# Patient Record
Sex: FEMALE | Race: OTHER | Employment: FULL TIME | URBAN - METROPOLITAN AREA
[De-identification: names, ages, dates, MRNs, and addresses within clinical notes are randomized per-mention and may not be internally consistent; named-entity substitution may affect disease eponyms.]

---

## 2016-09-13 LAB
ANTIBODY SCREEN, EXTERNAL: NEGATIVE
GRBS, EXTERNAL: POSITIVE
GRBS, EXTERNAL: POSITIVE
HIV, EXTERNAL: NON REACTIVE
RUBELLA, EXTERNAL: NORMAL
TYPE, ABO & RH, EXTERNAL: NORMAL

## 2017-01-13 LAB — T. PALLIDUM, EXTERNAL: NEGATIVE

## 2017-03-11 ENCOUNTER — TELEPHONE (OUTPATIENT)
Dept: INTERNAL MEDICINE CLINIC | Age: 32
End: 2017-03-11

## 2017-03-11 RX ORDER — OSELTAMIVIR PHOSPHATE 75 MG/1
75 CAPSULE ORAL DAILY
Qty: 10 CAP | Refills: 0 | Status: SHIPPED | OUTPATIENT
Start: 2017-03-11 | End: 2017-03-21

## 2017-03-27 ENCOUNTER — TELEPHONE (OUTPATIENT)
Dept: INTERNAL MEDICINE CLINIC | Age: 32
End: 2017-03-27

## 2017-04-05 ENCOUNTER — ANESTHESIA (OUTPATIENT)
Dept: LABOR AND DELIVERY | Age: 32
End: 2017-04-05
Payer: COMMERCIAL

## 2017-04-05 ENCOUNTER — HOSPITAL ENCOUNTER (INPATIENT)
Age: 32
LOS: 1 days | Discharge: HOME | End: 2017-04-07
Attending: OBSTETRICS & GYNECOLOGY | Admitting: OBSTETRICS & GYNECOLOGY
Payer: COMMERCIAL

## 2017-04-05 ENCOUNTER — ANESTHESIA EVENT (OUTPATIENT)
Dept: LABOR AND DELIVERY | Age: 32
End: 2017-04-05
Payer: COMMERCIAL

## 2017-04-05 LAB
ERYTHROCYTE [DISTWIDTH] IN BLOOD BY AUTOMATED COUNT: 13.6 % (ref 11.5–14.5)
HCT VFR BLD AUTO: 34.6 % (ref 35–47)
HGB BLD-MCNC: 11.7 G/DL (ref 11.5–16)
MCH RBC QN AUTO: 30 PG (ref 26–34)
MCHC RBC AUTO-ENTMCNC: 33.8 G/DL (ref 30–36.5)
MCV RBC AUTO: 88.7 FL (ref 80–99)
PLATELET # BLD AUTO: 283 K/UL (ref 150–400)
RBC # BLD AUTO: 3.9 M/UL (ref 3.8–5.2)
WBC # BLD AUTO: 10.4 K/UL (ref 3.6–11)

## 2017-04-05 PROCEDURE — 77030014125 HC TY EPDRL BBMI -B: Performed by: ANESTHESIOLOGY

## 2017-04-05 PROCEDURE — 00HU33Z INSERTION OF INFUSION DEVICE INTO SPINAL CANAL, PERCUTANEOUS APPROACH: ICD-10-PCS | Performed by: ANESTHESIOLOGY

## 2017-04-05 PROCEDURE — 4A1HXCZ MONITORING OF PRODUCTS OF CONCEPTION, CARDIAC RATE, EXTERNAL APPROACH: ICD-10-PCS | Performed by: OBSTETRICS & GYNECOLOGY

## 2017-04-05 PROCEDURE — 74011000258 HC RX REV CODE- 258: Performed by: OBSTETRICS & GYNECOLOGY

## 2017-04-05 PROCEDURE — 76060000078 HC EPIDURAL ANESTHESIA

## 2017-04-05 PROCEDURE — 75410000003 HC RECOV DEL/VAG/CSECN EA 0.5 HR

## 2017-04-05 PROCEDURE — 74011000250 HC RX REV CODE- 250

## 2017-04-05 PROCEDURE — 0HQ9XZZ REPAIR PERINEUM SKIN, EXTERNAL APPROACH: ICD-10-PCS | Performed by: OBSTETRICS & GYNECOLOGY

## 2017-04-05 PROCEDURE — 74011250636 HC RX REV CODE- 250/636

## 2017-04-05 PROCEDURE — 75410000000 HC DELIVERY VAGINAL/SINGLE

## 2017-04-05 PROCEDURE — 75410000002 HC LABOR FEE PER 1 HR

## 2017-04-05 PROCEDURE — 36415 COLL VENOUS BLD VENIPUNCTURE: CPT | Performed by: OBSTETRICS & GYNECOLOGY

## 2017-04-05 PROCEDURE — 85027 COMPLETE CBC AUTOMATED: CPT | Performed by: OBSTETRICS & GYNECOLOGY

## 2017-04-05 PROCEDURE — 77030031139 HC SUT VCRL2 J&J -A

## 2017-04-05 PROCEDURE — 74011250636 HC RX REV CODE- 250/636: Performed by: OBSTETRICS & GYNECOLOGY

## 2017-04-05 RX ORDER — ACETAMINOPHEN 325 MG/1
650 TABLET ORAL
Status: DISCONTINUED | OUTPATIENT
Start: 2017-04-05 | End: 2017-04-07 | Stop reason: HOSPADM

## 2017-04-05 RX ORDER — DIPHENHYDRAMINE HCL 25 MG
25 CAPSULE ORAL
Status: DISCONTINUED | OUTPATIENT
Start: 2017-04-05 | End: 2017-04-07 | Stop reason: HOSPADM

## 2017-04-05 RX ORDER — OXYCODONE AND ACETAMINOPHEN 5; 325 MG/1; MG/1
2 TABLET ORAL
Status: DISCONTINUED | OUTPATIENT
Start: 2017-04-05 | End: 2017-04-07 | Stop reason: HOSPADM

## 2017-04-05 RX ORDER — OXYCODONE AND ACETAMINOPHEN 5; 325 MG/1; MG/1
1 TABLET ORAL
Status: DISCONTINUED | OUTPATIENT
Start: 2017-04-05 | End: 2017-04-07 | Stop reason: HOSPADM

## 2017-04-05 RX ORDER — DOCUSATE SODIUM 100 MG/1
100 CAPSULE, LIQUID FILLED ORAL
Status: DISCONTINUED | OUTPATIENT
Start: 2017-04-05 | End: 2017-04-07 | Stop reason: HOSPADM

## 2017-04-05 RX ORDER — HYDROCORTISONE ACETATE PRAMOXINE HCL 2.5; 1 G/100G; G/100G
CREAM TOPICAL AS NEEDED
Status: DISCONTINUED | OUTPATIENT
Start: 2017-04-05 | End: 2017-04-07 | Stop reason: HOSPADM

## 2017-04-05 RX ORDER — NALBUPHINE HYDROCHLORIDE 10 MG/ML
10 INJECTION, SOLUTION INTRAMUSCULAR; INTRAVENOUS; SUBCUTANEOUS
Status: DISCONTINUED | OUTPATIENT
Start: 2017-04-05 | End: 2017-04-06 | Stop reason: HOSPADM

## 2017-04-05 RX ORDER — AMMONIA 15 % (W/V)
1 AMPUL (EA) INHALATION AS NEEDED
Status: DISCONTINUED | OUTPATIENT
Start: 2017-04-05 | End: 2017-04-07 | Stop reason: HOSPADM

## 2017-04-05 RX ORDER — ONDANSETRON 2 MG/ML
4 INJECTION INTRAMUSCULAR; INTRAVENOUS
Status: DISCONTINUED | OUTPATIENT
Start: 2017-04-05 | End: 2017-04-06 | Stop reason: HOSPADM

## 2017-04-05 RX ORDER — BUPIVACAINE HYDROCHLORIDE 5 MG/ML
30 INJECTION, SOLUTION EPIDURAL; INTRACAUDAL AS NEEDED
Status: DISCONTINUED | OUTPATIENT
Start: 2017-04-05 | End: 2017-04-06 | Stop reason: HOSPADM

## 2017-04-05 RX ORDER — FENTANYL/BUPIVACAINE/NS/PF 2-1250MCG
PREFILLED PUMP RESERVOIR EPIDURAL
Status: DISPENSED
Start: 2017-04-05 | End: 2017-04-06

## 2017-04-05 RX ORDER — IBUPROFEN 400 MG/1
800 TABLET ORAL EVERY 8 HOURS
Status: DISCONTINUED | OUTPATIENT
Start: 2017-04-05 | End: 2017-04-07 | Stop reason: HOSPADM

## 2017-04-05 RX ORDER — ONDANSETRON 4 MG/1
4 TABLET, ORALLY DISINTEGRATING ORAL
Status: DISCONTINUED | OUTPATIENT
Start: 2017-04-05 | End: 2017-04-07 | Stop reason: HOSPADM

## 2017-04-05 RX ORDER — OXYTOCIN IN 5 % DEXTROSE 30/500 ML
PLASTIC BAG, INJECTION (ML) INTRAVENOUS
Status: DISPENSED
Start: 2017-04-05 | End: 2017-04-06

## 2017-04-05 RX ORDER — OXYTOCIN/RINGER'S LACTATE 20/1000 ML
125-1000 PLASTIC BAG, INJECTION (ML) INTRAVENOUS AS NEEDED
Status: DISCONTINUED | OUTPATIENT
Start: 2017-04-05 | End: 2017-04-07 | Stop reason: HOSPADM

## 2017-04-05 RX ORDER — MINERAL OIL
OIL (ML) ORAL
Status: DISPENSED
Start: 2017-04-05 | End: 2017-04-06

## 2017-04-05 RX ORDER — BUPIVACAINE HYDROCHLORIDE 5 MG/ML
INJECTION, SOLUTION EPIDURAL; INTRACAUDAL
Status: DISPENSED
Start: 2017-04-05 | End: 2017-04-06

## 2017-04-05 RX ORDER — FENTANYL CITRATE 50 UG/ML
INJECTION, SOLUTION INTRAMUSCULAR; INTRAVENOUS
Status: DISPENSED
Start: 2017-04-05 | End: 2017-04-06

## 2017-04-05 RX ORDER — ZOLPIDEM TARTRATE 5 MG/1
5 TABLET ORAL
Status: DISCONTINUED | OUTPATIENT
Start: 2017-04-05 | End: 2017-04-07 | Stop reason: HOSPADM

## 2017-04-05 RX ORDER — SIMETHICONE 80 MG
80 TABLET,CHEWABLE ORAL
Status: DISCONTINUED | OUTPATIENT
Start: 2017-04-05 | End: 2017-04-07 | Stop reason: HOSPADM

## 2017-04-05 RX ORDER — OXYTOCIN 10 [USP'U]/ML
20 INJECTION, SOLUTION INTRAMUSCULAR; INTRAVENOUS ONCE
Status: COMPLETED | OUTPATIENT
Start: 2017-04-05 | End: 2017-04-05

## 2017-04-05 RX ORDER — HYDROCORTISONE 1 %
CREAM (GRAM) TOPICAL AS NEEDED
Status: DISCONTINUED | OUTPATIENT
Start: 2017-04-05 | End: 2017-04-07 | Stop reason: HOSPADM

## 2017-04-05 RX ORDER — LIDOCAINE HYDROCHLORIDE 10 MG/ML
INJECTION INFILTRATION; PERINEURAL
Status: DISPENSED
Start: 2017-04-05 | End: 2017-04-06

## 2017-04-05 RX ORDER — FENTANYL CITRATE 50 UG/ML
100 INJECTION, SOLUTION INTRAMUSCULAR; INTRAVENOUS ONCE
Status: DISCONTINUED | OUTPATIENT
Start: 2017-04-05 | End: 2017-04-06 | Stop reason: HOSPADM

## 2017-04-05 RX ORDER — TERBUTALINE SULFATE 1 MG/ML
0.25 INJECTION SUBCUTANEOUS AS NEEDED
Status: DISCONTINUED | OUTPATIENT
Start: 2017-04-05 | End: 2017-04-06 | Stop reason: HOSPADM

## 2017-04-05 RX ORDER — SODIUM CHLORIDE 0.9 % (FLUSH) 0.9 %
5-10 SYRINGE (ML) INJECTION AS NEEDED
Status: DISCONTINUED | OUTPATIENT
Start: 2017-04-05 | End: 2017-04-07 | Stop reason: HOSPADM

## 2017-04-05 RX ORDER — NALOXONE HYDROCHLORIDE 0.4 MG/ML
0.4 INJECTION, SOLUTION INTRAMUSCULAR; INTRAVENOUS; SUBCUTANEOUS AS NEEDED
Status: DISCONTINUED | OUTPATIENT
Start: 2017-04-05 | End: 2017-04-06 | Stop reason: HOSPADM

## 2017-04-05 RX ORDER — OXYTOCIN 10 [USP'U]/ML
INJECTION, SOLUTION INTRAMUSCULAR; INTRAVENOUS
Status: COMPLETED
Start: 2017-04-05 | End: 2017-04-05

## 2017-04-05 RX ORDER — FENTANYL/BUPIVACAINE/NS/PF 2-1250MCG
1-16 PREFILLED PUMP RESERVOIR EPIDURAL CONTINUOUS
Status: DISCONTINUED | OUTPATIENT
Start: 2017-04-05 | End: 2017-04-07 | Stop reason: HOSPADM

## 2017-04-05 RX ORDER — SODIUM CHLORIDE 0.9 % (FLUSH) 0.9 %
5-10 SYRINGE (ML) INJECTION EVERY 8 HOURS
Status: DISCONTINUED | OUTPATIENT
Start: 2017-04-05 | End: 2017-04-07 | Stop reason: HOSPADM

## 2017-04-05 RX ADMIN — OXYTOCIN 20 UNITS: 10 INJECTION, SOLUTION INTRAMUSCULAR; INTRAVENOUS at 21:17

## 2017-04-05 RX ADMIN — EPHEDRINE SULFATE 12.5 MG: 50 INJECTION INTRAMUSCULAR; INTRAVENOUS; SUBCUTANEOUS at 20:35

## 2017-04-05 RX ADMIN — SODIUM CHLORIDE, POTASSIUM CHLORIDE, SODIUM LACTATE AND CALCIUM CHLORIDE 1000 ML: 600; 310; 30; 20 INJECTION, SOLUTION INTRAVENOUS at 19:45

## 2017-04-05 RX ADMIN — AMPICILLIN SODIUM 2 G: 2 INJECTION, POWDER, FOR SOLUTION INTRAMUSCULAR; INTRAVENOUS at 20:02

## 2017-04-05 RX ADMIN — OXYTOCIN 20 UNITS: 10 INJECTION INTRAVENOUS at 21:17

## 2017-04-05 NOTE — IP AVS SNAPSHOT
Current Discharge Medication List  
  
CONTINUE these medications which have NOT CHANGED Dose & Instructions Dispensing Information Comments Morning Noon Evening Bedtime  
 albuterol 90 mcg/actuation inhaler Commonly known as:  PROAIR HFA Your last dose was: Your next dose is:    
   
   
 Dose:  2 Puff Take 2 puffs by inhalation every four (4) hours as needed for Wheezing or Shortness of Breath. Quantity:  1 Inhaler Refills:  6  
     
   
   
   
  
 fluticasone 50 mcg/actuation nasal spray Commonly known as:  Natali Antony Your last dose was: Your next dose is:    
   
   
 Dose:  2 Spray  
2 sprays by Both Nostrils route daily. Quantity:  1 Bottle Refills:  6 PRENATAL DHA+COMPLETE PRENATAL -300 mg-mcg-mg Cmpk Generic drug:  PNV no.24-iron-folic acid-dha Your last dose was: Your next dose is: Take  by mouth. Refills:  0

## 2017-04-05 NOTE — H&P
EDC:2017  EGA: 39 weeks, 3 days      Primary Provider:  Gato Jones MD      History of Present Illness:  pt presents to L&D with irregular contraactions, that have become more regular for the last hour. q3min, stronger  no LOF, no VB  2cm in office, 5cm here    pregnancy uncomplicated      Patient's Prenatal Care with Doctor of Record Gato Jones MD Notable For -    Anemia on FE pregnant  GBS positive (urine 2016)RX in labor   lab screening  Normal pregnancy multigravida  FOB SICKLE CELL TRAIT: PT NEGATIVE          Impression & Recommendations:    Problem # 1:  Normal pregnancy multigravida (ICD-V22.1) (EAM04-I57.83)  labor  GBS + , abx ordered  desires epidural      Past Pregnancy History      :  2     Term Births:  1     Premature Births: 0     Living Children: 1     Para:   1     Mult. Births:  0     Prev : 0     Aborta:  0     Elect. Ab:  0     Spont.  Ab:  0     Ectopics:  0    Pregnancy # 1     Delivery date:   2011     Weeks Gestation: 39w1d      labor:   no     Delivery type:        Hours of labor:   8     Anesthesia type:   epidural     Delivery location:   [facility]     Infant Sex:  Male     Birth weight:  7.14     Name:  Yonny Stein     Comments:  Del by Dr. Amber Garland 9&10           Past Medical History:     Reviewed history from 10/21/2016 and no changes required:        Fibrocystic Breasts-followed at [facility]    Past Surgical History:     Reviewed history from 2014 and no changes required:        Adenoids removed         m 11 Dr. Virginie Oro    Family History Summary:      Reviewed history Last on 10/21/2016 and no changes required:2017      General Comments - FH:  Family history transferred to [address] [place] [address] And [address] [place] [address]        Social History:     Reviewed history from 2016 and no changes required:        [employer]                 Son Mando Schuster -                 Smoking History:        Patient has never smoked. Review of Systems        See HPI    Except as noted in the HPI, the review of systems is negative for General and . Allergies    This patient has no known allergies. Medications Removed from Medication List        Flowsheet View for Follow-up Visit     Estimated weeks of        gestation:  39 3/7     Weight:  160     Blood pressure: 126 / 68     Cx Dilation:  5     Cx Effacement: 100%     Cx Station:  BB      Vital Signs    Blood Pressure: 126 / 68    Height:   65.5 inches  Weight:  160 pounds      Physical Exam     General           General appearance:  no acute distress    Chest           Lungs:  clear to auscultation          Heart:  regular rate and rhythm    Extremeties           Extremeties:  BLE symmetric 1+ pitting edema    Abdomen           Abdomen:  gravid    Pelvic Exam           EGBUS:  no lesions                  Dilation: : 5                  Effacement:  100%                  Station:  BB            Impression & Recommendations:    Problem # 1:  Normal pregnancy multigravida (ICD-V22.1) (DQK12-T92.83)  labor  GBS + , abx ordered  desires epidural      Medications (at conclusion of this visit)    09/13/2016 OMEGA-3 KRILL  MG ORAL CAPS (KRILL OIL) once a week  84/04/0821 * PNV WITH FOLIC ACID   15/92/2855 FERROUS SULFATE TABS (FERROUS SULFATE TABS) Takes every other day  03/16/2016 * CALCIUM           LABORATORY DATA   TEST DATE RESULT   Group B Strep culture 09/13/2016 GBS Urine                                   (Group B Strep Culture Result Field)   Blood Type 09/13/2016 A                                             (Blood Type Result Field)   Rh 09/13/2016 Positive                                   (Rh Result Field)   Rhogam Inj Given 09/13/2011 *   Tdap Vaccine Given 01/13/2017 Vacc.  [address]   Antibody Screen 01/13/2017 Negative   Rubella  [facility] Reference Ranges On or After 3/10/14                  <0.90              Non-immune      0.90 - 0.99     Equivocal      >0.99 Immune    [facility] Reference Ranges  Before 3/10/14           <5                 Non-immune             5 - 9               Equivocal            >9                 Immune  [facility] Reference Ranges       < Or = 0.90       Negative             0.91-1.09          Equivocal            > Or = 1.10       Positive   09/13/2016     11.30     TPA (T Pallidum Antibodies) 01/13/2017 Negative   Serology (RPR) 09/13/2011 *   HBsAg 09/13/2016 Negative   HIV 09/13/2016 Non Reactive   Hemoglobin 01/13/2017 10.3   Hematocrit 01/13/2017 30.5   Platelets 56/59/0833 265 X10E3/UL   TSH 09/13/2011 *   Urine Culture 09/13/2016 GBS   GC DNA Probe 09/13/2016 Negative   Chlamydia DNA 09/13/2016 Negative   PAP 03/16/2016 NIL   Flu Vaccine Given 10/21/2016 Vacc. [address]   HGBA1C 09/13/2011 *   HGB Electro 02/10/2011 AA   T4, Free 09/13/2011 *   BG Fasting 09/13/2011 *   GTT 1H 50G 01/13/2017 68   GTT 1H 100G 09/13/2011 *   GTT 2H 100G 09/13/2011 *   GTT 3H 100G 09/13/2011 *   Glucose Plasma 09/13/2011 *   CF Accept or Decline 10/21/2016 Declined Unless Pt.  Notifies us Otherwise   CF Screen Result 02/10/2011 Declined   Nuchal Trans 09/28/2016 1.400 mm   AFP Only 10/21/2016 *Screen Negative*   Tetra     AFP Serum 09/13/2011 *   CVS 02/10/2011 declined   AFP Amniotic 09/13/2011 *   Amnio Karyo 09/13/2011 *   FISH 09/13/2011 *   GC Culture 09/13/2011 *   Chlamydia Cult 09/13/2011 *   Ureaplasma     Mycoplasma     WBC 09/13/2016 7.3 X10E3/UL   RBC 09/13/2016 3.72 X10E6/UL   MCV 09/13/2016 91   MCH 09/13/2016 30.4   MCHC RBC 09/13/2016 33.5     ULTRASOUND DATA   TEST DATE RESULT   Estimated Fetal Weight 11/16/2016 [id number]                                     Weight % 11/16/2016 32nd%%                                                DAYAN 07/28/2011 9.299863                    BPP     Cervical Length (mm) 09/13/2016 36.000           Electronically signed by Salome Rojas MD on 04/05/2017 at 7:44 PM    ________________________________________________________________________    Vertex by bedside ultrasound

## 2017-04-05 NOTE — IP AVS SNAPSHOT
[address] [facility] [address] 
[phone] Patient: Chester Villasenor MRN: [id number] UIP:4/51/1108 You are allergic to the following Allergen Reactions Sulfa (Sulfonamide Antibiotics) Anaphylaxis Recent Documentation Height Weight Breastfeeding? BMI OB Status Smoking Status 1.664 m 72.6 kg Unknown 26.22 kg/m2 Recent pregnancy Never Smoker Unresulted Labs Order Current Status SAMPLE TO BLOOD BANK In process Emergency Contacts Name Discharge Info Relation Home Work Mobile Inga Alicia  Spouse [3] [phone] About your hospitalization You were admitted on:  April 6, 2017 You last received care in the:  [address] [facility] You were discharged on:  April 7, 2017 Unit phone number:  [phone] Why you were hospitalized Your primary diagnosis was:  Not on File Your diagnoses also included:  Normal Labor Providers Seen During Your Hospitalizations Provider Role Specialty Primary office phone Vinod Nielsen MD Attending Provider Obstetrics & Gynecology [phone] Your Primary Care Physician (PCP) Primary Care Physician Office Phone Office Fax [facility] [address] [facility] [phone] Follow-up Information Follow up With Details Comments Contact Info MD [place] [address] [address] [facility] [address] 
[phone] Vinod Nielsen MD   [facility] [address] [facility] [address] [facility] 
[phone] Current Discharge Medication List  
  
CONTINUE these medications which have NOT CHANGED Dose & Instructions Dispensing Information Comments Morning Noon Evening Bedtime  
 albuterol 90 mcg/actuation inhaler Commonly known as:  PROAIR HFA Your last dose was: Your next dose is:    
   
   
 Dose:  2 Puff Take 2 puffs by inhalation every four (4) hours as needed for Wheezing or Shortness of Breath. Quantity:  1 Inhaler Refills:  6  
     
   
   
   
  
 fluticasone 50 mcg/actuation nasal spray Commonly known as:  Jennifer Pace Your last dose was: Your next dose is:    
   
   
 Dose:  2 Spray  
2 sprays by Both Nostrils route daily. Quantity:  1 Bottle Refills:  6 PRENATAL DHA+COMPLETE PRENATAL -300 mg-mcg-mg Cmpk Generic drug:  PNV no.24-iron-folic acid-dha Your last dose was: Your next dose is: Take  by mouth. Refills:  0 Discharge Instructions POSTPARTUM DISCHARGE INSTRUCTIONS Name:  Debby Fill YOB: 1985 Admission Diagnosis:  maternity Normal labor Discharge Diagnosis:   
Problem List as of 4/7/2017  Date Reviewed: 11/20/2015 Codes Class Noted - Resolved Normal labor ICD-10-CM: O80, Z37.9 ICD-9-CM: 905  4/6/2017 - Present Delivery normal ICD-10-CM: O80, Z37.9 ICD-9-CM: 975  9/13/2011 - Present RESOLVED: Pregnancy ICD-10-CM: Z33.1 ICD-9-CM: V22.2  9/13/2011 - 9/13/2011 RESOLVED: Fibroids ICD-10-CM: D25.9 ICD-9-CM: 218.9  9/13/2011 - 9/13/2011 Attending Physician:  Giselle Ellison MD 
 
Delivery Type:  Vaginal Childbirth: What To Expect At Home Your Recovery: Your body will slowly heal in the next few weeks. It is easy to get too tired and overwhelmed during the first weeks after your baby is born. Changes in your hormones can shift your mood without warning. You may find it hard to meet the extra demands on your energy and time. Take it easy on yourself. Follow-up care is a key part of your treatment and safety. Be sure to make and go to all appointments, and call your doctor if you are having problems. It's also a good idea to know your test results and keep a list of the medicines you take. How can you care for yourself at home? Vaginal bleeding and cramps · After delivery, you will have a bloody discharge from the vagina. This will turn pink within a week and then white or yellow after about 10 days. It may last for 2 to 4 weeks or longer, until the uterus has healed. Use pads instead of tampons until you stop bleeding. · Do not worry if you pass some blood clots, as long as they are smaller than a golf ball. If you have a tear or stitches in your vaginal area, change the pad at least every 4 hours to prevent soreness and infection. · You may have cramps for the first few days after childbirth. These are normal and occur as the uterus shrinks to normal size. Take an over-the-counter pain medicine, such as acetaminophen (Tylenol), ibuprofen (Advil, Motrin), or naproxen (Aleve), for cramps. Read and follow all instructions on the label. Do not take aspirin, because it can cause more bleeding. Do not take acetaminophen (Tylenol) and other acetaminophen containing medications (i.e. Percocet) at the same time. Breast fullness · Your breasts may overfill (engorge) in the first few days after delivery. To help milk flow and to relieve pain, warm your breasts in the shower or by using warm, moist towels before nursing. · If you are not nursing, do not put warmth on your breasts or touch your breasts. Wear a tight bra or sports bra and use ice until the fullness goes away. This usually takes 2 to 3 days. · Put ice or a cold pack on your breast after nursing to reduce swelling and pain. Put a thin cloth between the ice and your skin. Activity · Eat a balanced diet. Do not try to lose weight by cutting calories. Keep taking your prenatal vitamins, or take a multivitamin. · Get as much rest as you can. Try to take naps when your baby sleeps during the day. · Get some exercise every day. But do not do any heavy exercise until your doctor says it is okay. · Wait until you are healed (about 4 to 6 weeks) before you have sexual intercourse. Your doctor will tell you when it is okay to have sex. · Talk to your doctor about birth control. You can get pregnant even before your period returns. Also, you can get pregnant while you are breast-feeding. Mental Health · Many women get the \"baby blues\" during the first few days after childbirth. You may lose sleep, feel irritable, and cry easily. You may feel happy one minute and sad the next. Hormone changes are one cause of these emotional changes. Also, the demands of a new baby, along with visits from relatives or other family needs, add to a mother's stress. The \"baby blues\" often peak around the fourth day. Then they ease up in less than 2 weeks. · If your moodiness or anxiety lasts for more than 2 weeks, or if you feel like life is not worth living, you may have postpartum depression. This is different for each mother. Some mothers with serious depression may worry intensely about their infant's well-being. Others may feel distant from their child. Some mothers might even feel that they might harm their baby. A mother may have signs of paranoia, wondering if someone is watching her. · With all the changes in your life, you may not know if you are depressed. Pregnancy sometimes causes changes in how you feel that are similar to the symptoms of depression. · Symptoms of depression include: · Feeling sad or hopeless and losing interest in daily activities. These are the most common symptoms of depression. · Sleeping too much or not enough. · Feeling tired. You may feel as if you have no energy. · Eating too much or too little. · POSTPARTUM SUPPORT INTERNATIONAL (PSI) offers a Warm line; Chat with the Expert phone sessions; Information and Articles about Pregnancy and Postpartum Mood Disorders;  Comprehensive List of Free Support Groups; Knowledgeable local coordinators who will offer support, information, and resources; Guide to Resources on Lenet; Calendar of events in the  mood disorders community; Latest News and Research; and [facility] [address] for [employer]. Remember - You are not alone; You are not to blame; With help, you will be well. 6-664-598-PPD(1021). WWW. POSTPARTUM. NET · Writing or talking about death, such as writing suicide notes or talking about guns, knives, or pills. Keep the numbers for these national suicide hotlines: 3-258-922-TALK (8-384.142.2658) and 4-601-PZSIZUQ (5-846.639.4232). If you or someone you know talks about suicide or feeling hopeless, get help right away. Constipation and Hemorrhoids · Drink plenty of fluids, enough so that your urine is light yellow or clear like water. If you have kidney, heart, or liver disease and have to limit fluids, talk with your doctor before you increase the amount of fluids you drink. · Eat plenty of fiber each day. Have a bran muffin or bran cereal for breakfast, and try eating a piece of fruit for a mid-afternoon snack. · For painful, itchy hemorrhoids, put ice or a cold pack on the area several times a day for 10 minutes at a time. Follow this by putting a warm compress on the area for another 10 to 20 minutes or by sitting in a shallow, warm bath. When should you call for help? Call 911 anytime you think you may need emergency care. For example, call if: 
· You are thinking of hurting yourself, your baby, or anyone else. · You passed out (lost consciousness). · You have symptoms of a blood clot in your lung (called a pulmonary embolism). These may include:   
· Sudden chest pain. · Trouble breathing. · Coughing up blood. Call your doctor now or seek immediate medical care if: 
· You have severe vaginal bleeding. · You are soaking through a pad each hour for 2 or more hours. · Your vaginal bleeding seems to be getting heavier or is still bright red 4 days after delivery. · You are dizzy or lightheaded, or you feel like you may faint. · You are vomiting or cannot keep fluids down. · You have a fever. · You have new or more belly pain. · You pass tissue (not just blood). · Your vaginal discharge smells bad. · Your belly feels tender or full and hard. · Your breasts are continuously painful or red. · You feel sad, anxious, or hopeless for more than a few days. · You have sudden, severe pain in your belly. · You have symptoms of a blood clot in your leg (called a deep vein thrombosis),  
       such as: 
· Pain in your calf, back of the knee, thigh, or groin. · Redness and swelling in your leg or groin. · You have symptoms of preeclampsia, such as: 
· Sudden swelling of your face, hands, or feet. · New vision problems (such as dimness or blurring). · A severe headache. · Your blood pressure is higher than it should be or rises suddenly. · You have new nausea or vomiting. Watch closely for changes in your health, and be sure to contact your doctor if you have any problems. Additional Information:  Not applicable These are general instructions for a healthy lifestyle: No smoking/ No tobacco products/ Avoid exposure to second hand smoke Surgeon General's Warning:  Quitting smoking now greatly reduces serious risk to your health. Obesity, smoking, and sedentary lifestyle greatly increases your risk for illness A healthy diet, regular physical exercise & weight monitoring are important for maintaining a healthy lifestyle Recognize signs and symptoms of STROKE: 
 
F-face looks uneven A-arms unable to move or move unevenly S-speech slurred or non-existent T-time-call 911 as soon as signs and symptoms begin - DO NOT go  
    back to bed or wait to see if you get better - TIME IS BRAIN. I have had the opportunity to make my options or choices for discharge. I have received and understand these instructions. Discharge Orders None hopscout Announcement We are excited to announce that we are making your provider's discharge notes available to you in hopscout. You will see these notes when they are completed and signed by the physician that discharged you from your recent hospital stay. If you have any questions or concerns about any information you see in hopscout, please call the Health Information Department where you were seen or reach out to your Primary Care Provider for more information about your plan of care. Introducing [facility]! Dear Zoe Dixon: Thank you for requesting a hopscout account. Our records indicate that you already have an active hopscout account. You can access your account anytime at [email or web address] Did you know that you can access your hospital and ER discharge instructions at any time in hopscout? You can also review all of your test results from your hospital stay or ER visit. Additional Information If you have questions, please visit the Frequently Asked Questions section of the hopscout website at [email or web address] Remember, hopscout is NOT to be used for urgent needs. For medical emergencies, dial 911. Now available from your iPhone and Android! General Information Please provide this summary of care documentation to your next provider. Patient Signature:  ____________________________________________________________ Date:  ____________________________________________________________  
  
Briana Burn Provider Signature:  ____________________________________________________________ Date:  ____________________________________________________________

## 2017-04-06 PROBLEM — Z37.9 NORMAL LABOR: Status: ACTIVE | Noted: 2017-04-06

## 2017-04-06 PROCEDURE — 65410000002 HC RM PRIVATE OB

## 2017-04-06 PROCEDURE — 74011250637 HC RX REV CODE- 250/637: Performed by: OBSTETRICS & GYNECOLOGY

## 2017-04-06 RX ADMIN — DOCUSATE SODIUM 100 MG: 100 CAPSULE, LIQUID FILLED ORAL at 07:02

## 2017-04-06 NOTE — LACTATION NOTE
This note was copied from a baby's chart. Made initial breastfeeding consult to second time breastfeeding mother. It has been 5 years since mother breast fed for 8 months and she is requesting my presence at this feeding. Infant was put skin to skin and then a little closer to mother's R breast where she latched herself. Mom said latch was comfortable. She will look for a round nipple when her baby comes off the breast.  Mom taught how to manually hand express her colostrum. Emphasized the importance of providing infant with valuable colostrum as infant rests skin to skin at breast.  Aware to avoid extended periods of non-feeding. Taught the rationale behind this low tech but highly effective evidence based practice. Mother was taught to keep infant very close by (preferably skin to skin) to watch for feeding cues and put her to the breast at first sign. Mother was advised to allow infant unlimited access to her breast, allowing infant to determine how long she wants to nurse and offering both breasts at a feeding. Normal  feeding behavior discussed with mother-initial sleepiness first 24 hours followed by cluster feeding. Mother's questions were answered and I left my contact information with mother so she can call for assistance.

## 2017-04-06 NOTE — PROGRESS NOTES
NUTRITION       Nutrition screening referral was triggered based on results obtained during nursing admission assessment (enteral/parenteral nutrition support PTA). The patient's chart was reviewed and nutrition assessment is not indicated at this time. No indication in H&P that pt was receiving tube feeding or TPN prior to admission. Plan to see patient for rescreen as indicated. Thank you.      [address]

## 2017-04-06 NOTE — ROUTINE PROCESS
sbar report from Renee 84 RN  Hourly rounds 1015-1193  Hourly rounds 6022-7669  Hourly rounds [id number]

## 2017-04-06 NOTE — PROGRESS NOTES
This patient was 30 or more weeks gestation at the time of [facility] go-live. For complete information pertaining to this patient's pregnancy, please refer to the paper chart and ACOG form. Delivery Note    Obstetrician:  Cecille Banegas MD    Assistant: none    Pre-Delivery Diagnosis: Term pregnancy or Spontaneous labor    Post-Delivery Diagnosis: Living  infant(s) or Female    Intrapartum Event: None    Procedure: Spontaneous vaginal delivery    Epidural: YES    Monitor:  Fetal Heart Tones - External and Uterine Contractions - External    Indications for instrumental delivery: none    Estimated Blood Loss: 250 cc    Episiotomy: none    Laceration(s):  1st degree    Laceration(s) repair: YES    Presentation: Cephalic    Fetal Description: patterson    Fetal Position: Occiput Anterior    Birth Weight:     Birth Length:     Apgar - One Minute: 9    Apgar - Five Minutes: 9    Umbilical Cord: 3 vessels present    Specimens:            Complications:  none           Cord Blood Results:   Information for the patient's :  Milanadonavonsebastian Me [[id number]]   No results found for: PCTABR, PCTDIG, BILI, ABORH    Prenatal Labs:     Lab Results   Component Value Date/Time    ABO,Rh A Positive 02/10/2011    HBsAg, External negative 02/10/2011    HIV, External non reactive 02/10/2011    Rubella, External immune 02/10/2011    RPR, External non reactive 2011    Gonorrhea, External negative 02/10/2011    Chlamydia, External negative 02/10/2011    GrBStrep, External negative 2011        Attending Attestation: I performed the procedure    Signed By:  Cecille Banegas MD     2017

## 2017-04-06 NOTE — LACTATION NOTE
This note was copied from a baby's chart. I assisted mom with breast feeding this afternoon. Baby has been sleepy this morning and mom stated she wasn't able to get her to nurse. She tried hand expression at that time but could not express any colostrum. We were able to get the baby latched in the cross cradle hold and the football hold. Baby sucking strongly with several swallows. I showed mom hand expression and we were able to express a drop of colostrum. Mom will continue to watch the baby for feeding cues and feed frequently.

## 2017-04-06 NOTE — PROGRESS NOTES
: Patient arrived via wheelchair from home with complaints of painful contractions every 3 minutes. : Dr. Kat Gutierrez at the bedside assessing patient and  [address]. SVE 5/100/-1    : Dr. Kat Gutierrez at the bedside with the bedside ultrasound to verify vertex position. : Dr. See Roach at the bedside for epidural placement. 2018: SROM during epidural placement, moderate amount of clear fluid. : SVE 10/100/+2. Dr. Alexandra Meadows notified. : BP low. Ephedrine given. : Started pushing    :  of viable female by Dr. Alexandra Meadows. Apgars 9,9. Baby skin to skin    0000: TRANSFER - OUT REPORT:    Verbal report given to Edilson Saldivar RN (name) on [facility]  being transferred to MIU (unit) for routine progression of care       Report consisted of patients Situation, Background, Assessment and   Recommendations(SBAR). Information from the following report(s) SBAR, MAR and Recent Results was reviewed with the receiving nurse. Lines:   Peripheral IV 17 Right Hand (Active)   Site Assessment Clean, dry, & intact 2017 12:06 AM   Phlebitis Assessment 0 2017 12:06 AM   Infiltration Assessment 0 2017 12:06 AM   Dressing Status Clean, dry, & intact 2017 12:06 AM   Dressing Type Tape;Transparent 2017 12:06 AM   Hub Color/Line Status Pink;Capped;Flushed 2017 12:06 AM        Opportunity for questions and clarification was provided.       Patient transported with:   Registered Nurse

## 2017-04-06 NOTE — ANESTHESIA PROCEDURE NOTES
Epidural Block    Start time: 4/5/2017 8:10 PM  End time: 4/5/2017 8:20 PM  Performed by: ENMA Oleary  Authorized by: ENMA Oleary     Pre-Procedure  Indication: labor epidural    Preanesthetic Checklist: patient identified, risks and benefits discussed, anesthesia consent, site marked, patient being monitored, timeout performed and anesthesia consent      Epidural:   Patient position:  Seated  Prep region:  Lumbar  Prep: Betadine    Location:  L2-3    Needle and Epidural Catheter:   Needle Type:  Tuohy  Needle Gauge:  17 G  Injection Technique:  Loss of resistance using air  Attempts:  1  Catheter Size:  19 G  Events: no blood with aspiration, no cerebrospinal fluid with aspiration, no paresthesia and negative aspiration test    Test Dose:  Bupivacaine 0.25% and negative    Assessment:   Catheter Secured:  Tegaderm and tape  Insertion:  Uncomplicated  Patient tolerance:  Patient tolerated the procedure well with no immediate complications

## 2017-04-06 NOTE — ROUTINE PROCESS
TRANSFER - IN REPORT:    Verbal report received from MARIBEL Dodd RN(name) on David Ramírez  being received from L&D(unit) for routine progression of care      Report consisted of patients Situation, Background, Assessment and   Recommendations(SBAR). Information from the following report(s) SBAR, Kardex, Intake/Output, MAR, Accordion and Recent Results was reviewed with the receiving nurse. Opportunity for questions and clarification was provided. Assessment completed upon patients arrival to unit and care assumed. 9415-9385: Hourly rounds completed. 7472-3214: Hourly rounds completed.

## 2017-04-06 NOTE — LACTATION NOTE
This note was copied from a baby's chart.     Couplet Interdisciplinary Rounds     MATERNAL    Daily Goal:   Vaginal Delivery Pathway Goal Day 1    Influenza screening completed: NA   Tdap screening completed: YES   Rhogam Given:N/A  MMR Given:N/A    VTE Prophylaxis: Not indicated, per Provider order    EPDS:            Patient Name: Cary Lorenzo Diagnosis: [place]  Single liveborn, born in hospital, delivered by vaginal delivery   Date of Admission: 2017 LOS: 1  Gestational Age: Gestational Age: 38w3d       Daily Goal:  [place] Pathway Goal Day 1    Birth Weight: 3.195 kg Current Weight: Weight: 3.195 kg (Filed from Delivery Summary)  % of Weight Change: 0%    Feeding: Breastfeeding  [place] Metabolic Screen: NO    Hepatitis B:  NO    Discharge Bili:  N/A  Car Seat Trial, if needed:  N/A      Patient/Family Teaching Needs:     Days before discharge: One day until discharge    In Attendance:  Nursing and Physician

## 2017-04-06 NOTE — PROGRESS NOTES
Post-Partum Day Number 1 Progress Note    Tameka Briscoe     Assessment: Doing well, post partum day 1, less than 12 hr pp, BF well so far, OOB well    Plan:  1. Continue routine postpartum and perineal care as well as maternal education. 2. N/A     Information for the patient's :  Shelton Kilgore [[id number]]   Vaginal, Spontaneous Delivery   Patient doing well without significant complaint. Voiding without difficulty, normal lochia. Vitals:  Visit Vitals    /66    Pulse 66    Temp 97.8 °F (36.6 °C)    Resp 14    Ht 5' 5.5\" (1.664 m)    Wt 72.6 kg (160 lb)    SpO2 94%    Breastfeeding Unknown    BMI 26.22 kg/m2     Temp (24hrs), Av °F (36.7 °C), Min:97.8 °F (36.6 °C), Max:98.1 °F (36.7 °C)        Exam:   Patient without distress. Abdomen soft, fundus firm, nontender                Perineum with normal lochia noted. Lower extremities are negative for swelling, cords or tenderness.     Labs:     Lab Results   Component Value Date/Time    WBC 10.4 2017 07:46 PM    WBC 6.1 2013 06:38 PM    WBC 5.5 2013 12:00 AM    WBC 8.9 2011 05:15 AM    HGB 11.7 2017 07:46 PM    HGB 12.0 2013 06:38 PM    HGB 12.1 2013 12:00 AM    HGB 11.9 2011 05:15 AM    HCT 34.6 2017 07:46 PM    HCT 35.7 2013 06:38 PM    HCT 39.0 2013 12:00 AM    HCT 35.1 2011 05:15 AM    PLATELET 890  07:46 PM    PLATELET 889  06:38 PM    PLATELET 769  12:00 AM    PLATELET 928  05:15 AM    Hgb, External 10.9 2011    Hct, External 33.5 2011    Platelet cnt., External 337 02/10/2011       Recent Results (from the past 24 hour(s))   CBC W/O DIFF    Collection Time: 17  7:46 PM   Result Value Ref Range    WBC 10.4 3.6 - 11.0 K/uL    RBC 3.90 3.80 - 5.20 M/uL    HGB 11.7 11.5 - 16.0 g/dL    HCT 34.6 (L) 35.0 - 47.0 %    MCV 88.7 80.0 - 99.0 FL    MCH 30.0 26.0 - 34.0 PG MCHC 33.8 30.0 - 36.5 g/dL    RDW 13.6 11.5 - 14.5 %    PLATELET 372 364 - 535 K/uL

## 2017-04-07 VITALS
SYSTOLIC BLOOD PRESSURE: 100 MMHG | DIASTOLIC BLOOD PRESSURE: 60 MMHG | TEMPERATURE: 98.9 F | WEIGHT: 160 LBS | RESPIRATION RATE: 16 BRPM | HEIGHT: 66 IN | BODY MASS INDEX: 25.71 KG/M2 | HEART RATE: 64 BPM | OXYGEN SATURATION: 94 %

## 2017-04-07 PROCEDURE — 74011250637 HC RX REV CODE- 250/637: Performed by: OBSTETRICS & GYNECOLOGY

## 2017-04-07 RX ADMIN — DOCUSATE SODIUM 100 MG: 100 CAPSULE, LIQUID FILLED ORAL at 16:49

## 2017-04-07 NOTE — ROUTINE PROCESS
1600 Received OB SBAR report at bedside from [facility]. Merlyn Clayton RN.  2000 Hourly rounds completed on patient from 1600 to 2000. Discharge instructions reviewed with patient. All questions answered. Patient discharged with infant.

## 2017-04-07 NOTE — LACTATION NOTE
This note was copied from a baby's chart. Mom and baby scheduled for discharge. Baby nursing well and has improved throughout post partum stay, deep latch maintained, mother is comfortable, milk is in transition, baby feeding vigorously with rhythmic suck, swallow, breathe pattern, with audible swallowing, and evident milk transfer, both breasts offerd, baby is asleep following feeding. Baby is feeding on demand, voiding and stools present as appropriate over the last 24 hours. Reviewed cluster feeding. The brief behavioral phase preceeding milk transition is called cluster feeding. Typical  behavior: baby becomes vigorous at the breast and wants to feed frequently- every 1-2 hours for several feedings. Emptying of the breast twice produces double in subsequent feedings. This is the normal process by which the baby demands his/her supply. This type of frequent feeding is the stimulation which causes lactogenesis II (milk coming in). Discussed engorgement. Breasts may become engorged when milk \"comes in\". How milk is made / normal phases of milk production, supply and demand discussed. Taught care of engorged breasts - frequent breastfeeding encouraged, warm compresses and breast massage ac. Then nurse the baby or pump. Apply cold compresses pc x 15 minutes a few times a day for swelling or discomfort. May need to do this care for a couple of days. Pumping and returning to work/school discussed:  Start pumping for storage after first 2-3 weeks- about one hour after first AM feeding when supply is most abundant, once a day to start, timing of pumping at work/school, storage options and guidelines, and clean private pumping location (never in the bathroom). Teaching completed and all questions answered. Feeding log updated and given to mom.

## 2017-04-07 NOTE — ROUTINE PROCESS
2000- Bedside shift change report given to MARYA Gates RN (oncoming nurse) by Thomas Hope RN (offgoing nurse). Report included the following information SBAR. Hourly rounds completed from 2000 to 0000. Hourly rounds completed from 0000 to 0400. Hourly rounds completed from 0400 to 0800.

## 2017-04-07 NOTE — PROGRESS NOTES
Bedside shift change report given to VANE Esparza RN (oncoming nurse) by Kym Constantino. [facility] Blayne RN (offgoing nurse).  Report included the following information SBAR, MAR.     [id number] Hourly rounds completed    [id number] Hourly rounds completed

## 2017-04-07 NOTE — DISCHARGE SUMMARY
Obstetrical Discharge Summary     Name: Kiya Maldonado MRN: [id number]  SSN: [id number]    YOB: 1985  Age: 27 y.o. Sex: female      Admit Date: 2017    Discharge Date: 2017     Admitting Physician: Mireille Eason MD     Attending Physician:  Mireille Eason MD     Admission Diagnoses: maternity   Normal labor    Discharge Diagnoses:   Information for the patient's :  Shahana Serrano [[id number]]   Delivery of a 3.195 kg female infant via Vaginal, Spontaneous Delivery on 2017 at 8:49 PM  by . Apgars were 9 and 9. Additional Diagnoses:   Hospital Problems  Date Reviewed: 2015          Codes Class Noted POA    Normal labor ICD-10-CM: O80, Z37.9  ICD-9-CM: 326  2017 Unknown             Lab Results   Component Value Date/Time    Rubella, External Immune 2016    GrBStrep, External Positive 2016    GrBStrep, External Positive 2016       Hospital Course: Normal hospital course following the delivery. Disposition at Discharge: Home or self care    Discharged Condition: Stable    Patient Instructions:   Current Discharge Medication List      CONTINUE these medications which have NOT CHANGED    Details   PNV no.24-iron-folic acid-dha (PRENATAL DHA+COMPLETE PRENATAL) -300 mg-mcg-mg cmpk Take  by mouth. fluticasone (FLONASE) 50 mcg/actuation nasal spray 2 sprays by Both Nostrils route daily. Qty: 1 Bottle, Refills: 6    Associated Diagnoses: Rhinitis      albuterol (PROAIR HFA) 90 mcg/actuation inhaler Take 2 puffs by inhalation every four (4) hours as needed for Wheezing or Shortness of Breath. Qty: 1 Inhaler, Refills: 6    Associated Diagnoses: History of wheezing             Reference my discharge instructions.     Follow-up Appointments   Procedures    FOLLOW UP VISIT Appointment in: 6 Weeks With Dr. Gin Moseley record     With Dr. Gin Moseley record     Standing Status:   Standing     Number of Occurrences:   1     Order Specific Question: Appointment in     Answer:   6 Weeks        Signed By:  Romaine Self CNM     April 7, 2017

## 2017-04-07 NOTE — LACTATION NOTE
This note was copied from a baby's chart.     Couplet Interdisciplinary Rounds     MATERNAL    Daily Goal:     Influenza screening completed: YES   Tdap screening completed: YES   Rhogam Given:N/A  MMR Given:N/A    VTE Prophylaxis: Not indicated, per Provider order    EPDS:            Patient Name: Fernanda Cruz Diagnosis: [place]  Single liveborn, born in hospital, delivered by vaginal delivery   Date of Admission: 2017 LOS: 2  Gestational Age: Gestational Age: 38w3d       Daily Goal:     Birth Weight: 3.195 kg Current Weight: Weight: 2.975 kg (6-9)  % of Weight Change: -7%    Feeding:  [place] Metabolic Screen: YES    Hepatitis B:  YES    Discharge Bili:  YES  Car Seat Trial, if needed:  N/A      Patient/Family Teaching Needs:     Days before discharge: Ready for discharge    In Attendance:  Nursing and Physician

## 2017-04-07 NOTE — PROGRESS NOTES
Post-Partum Day Number 2 Progress Note    Gissell Ducking     Assessment: Doing well, post partum day 2, BF well,( 2nd baby)    Plan:   1. Discharge home today  2. Follow up in office in 6 weeks with Scott Oreilly MD  3. Post partum activity advised, diet as tolerated  4. Discharge Medications:  medications prior to admission, declines pain Rxs    Information for the patient's :  Clyde Marie [[id number]]   Vaginal, Spontaneous Delivery   Patient doing well without significant complaint. Voiding without difficulty, normal lochia. Vitals:  Visit Vitals    /64 (BP 1 Location: Right arm, BP Patient Position: At rest)    Pulse 68    Temp 98.6 °F (37 °C)    Resp 16    Ht 5' 5.5\" (1.664 m)    Wt 72.6 kg (160 lb)    SpO2 94%    Breastfeeding Unknown    BMI 26.22 kg/m2     Temp (24hrs), Av.5 °F (36.9 °C), Min:98.4 °F (36.9 °C), Max:98.6 °F (37 °C)      Exam:         Patient without distress. Abdomen soft, fundus firm, nontender                 Lower extremities are negative for swelling, cords or tenderness. Labs:     Lab Results   Component Value Date/Time    WBC 10.4 2017 07:46 PM    WBC 6.1 2013 06:38 PM    WBC 5.5 2013 12:00 AM    WBC 8.9 2011 05:15 AM    HGB 11.7 2017 07:46 PM    HGB 12.0 2013 06:38 PM    HGB 12.1 2013 12:00 AM    HGB 11.9 2011 05:15 AM    HCT 34.6 2017 07:46 PM    HCT 35.7 2013 06:38 PM    HCT 39.0 2013 12:00 AM    HCT 35.1 2011 05:15 AM    PLATELET 490  07:46 PM    PLATELET 245  06:38 PM    PLATELET 036  12:00 AM    PLATELET 363  05:15 AM    Hgb, External 10.9 2011    Hct, External 33.5 2011    Platelet cnt., External 337 02/10/2011       No results found for this or any previous visit (from the past 24 hour(s)).

## 2017-04-07 NOTE — DISCHARGE INSTRUCTIONS
POSTPARTUM DISCHARGE INSTRUCTIONS       Name:  Vishnu Sol  YOB: 1985  Admission Diagnosis:  maternity   Normal labor     Discharge Diagnosis:    Problem List as of 4/7/2017  Date Reviewed: 11/20/2015          Codes Class Noted - Resolved    Normal labor ICD-10-CM: O80, Z37.9  ICD-9-CM: 374  4/6/2017 - Present        Delivery normal ICD-10-CM: O80, Z37.9  ICD-9-CM: 220  9/13/2011 - Present        RESOLVED: Pregnancy ICD-10-CM: Z33.1  ICD-9-CM: V22.2  9/13/2011 - 9/13/2011        RESOLVED: Fibroids ICD-10-CM: D25.9  ICD-9-CM: 218.9  9/13/2011 - 9/13/2011            Attending Physician:  Maite Magana MD    Delivery Type:  Vaginal Childbirth: What To Expect At Home    Your Recovery: Your body will slowly heal in the next few weeks. It is easy to get too tired and overwhelmed during the first weeks after your baby is born. Changes in your hormones can shift your mood without warning. You may find it hard to meet the extra demands on your energy and time. Take it easy on yourself. Follow-up care is a key part of your treatment and safety. Be sure to make and go to all appointments, and call your doctor if you are having problems. It's also a good idea to know your test results and keep a list of the medicines you take. How can you care for yourself at home? Vaginal bleeding and cramps  · After delivery, you will have a bloody discharge from the vagina. This will turn pink within a week and then white or yellow after about 10 days. It may last for 2 to 4 weeks or longer, until the uterus has healed. Use pads instead of tampons until you stop bleeding. · Do not worry if you pass some blood clots, as long as they are smaller than a golf ball. If you have a tear or stitches in your vaginal area, change the pad at least every 4 hours to prevent soreness and infection. · You may have cramps for the first few days after childbirth.  These are normal and occur as the uterus shrinks to normal size. Take an over-the-counter pain medicine, such as acetaminophen (Tylenol), ibuprofen (Advil, Motrin), or naproxen (Aleve), for cramps. Read and follow all instructions on the label. Do not take aspirin, because it can cause more bleeding. Do not take acetaminophen (Tylenol) and other acetaminophen containing medications (i.e. Percocet) at the same time. Breast fullness  · Your breasts may overfill (engorge) in the first few days after delivery. To help milk flow and to relieve pain, warm your breasts in the shower or by using warm, moist towels before nursing. · If you are not nursing, do not put warmth on your breasts or touch your breasts. Wear a tight bra or sports bra and use ice until the fullness goes away. This usually takes 2 to 3 days. · Put ice or a cold pack on your breast after nursing to reduce swelling and pain. Put a thin cloth between the ice and your skin. Activity  · Eat a balanced diet. Do not try to lose weight by cutting calories. Keep taking your prenatal vitamins, or take a multivitamin. · Get as much rest as you can. Try to take naps when your baby sleeps during the day. · Get some exercise every day. But do not do any heavy exercise until your doctor says it is okay. · Wait until you are healed (about 4 to 6 weeks) before you have sexual intercourse. Your doctor will tell you when it is okay to have sex. · Talk to your doctor about birth control. You can get pregnant even before your period returns. Also, you can get pregnant while you are breast-feeding. Mental Health  · Many women get the \"baby blues\" during the first few days after childbirth. You may lose sleep, feel irritable, and cry easily. You may feel happy one minute and sad the next. Hormone changes are one cause of these emotional changes. Also, the demands of a new baby, along with visits from relatives or other family needs, add to a mother's stress. The \"baby blues\" often peak around the fourth day.  Then they ease up in less than 2 weeks. · If your moodiness or anxiety lasts for more than 2 weeks, or if you feel like life is not worth living, you may have postpartum depression. This is different for each mother. Some mothers with serious depression may worry intensely about their infant's well-being. Others may feel distant from their child. Some mothers might even feel that they might harm their baby. A mother may have signs of paranoia, wondering if someone is watching her. · With all the changes in your life, you may not know if you are depressed. Pregnancy sometimes causes changes in how you feel that are similar to the symptoms of depression. · Symptoms of depression include:  · Feeling sad or hopeless and losing interest in daily activities. These are the most common symptoms of depression. · Sleeping too much or not enough. · Feeling tired. You may feel as if you have no energy. · Eating too much or too little. · POST[employer] ([employer]I) offers a Warm line; Chat with the Expert phone sessions; Information and Articles about Pregnancy and Postpartum Mood Disorders; Comprehensive List of Free Support Groups; Knowledgeable local coordinators who will offer support, information, and resources; Guide to Resources on kooaba; Calendar of events in the  mood disorders community; Latest News and Research; and [facility] [address] for [employer]. Remember - You are not alone; You are not to blame; With help, you will be well. 8-950-029-PPD(1566). WWW. POSTPARTUM. NET   · Writing or talking about death, such as writing suicide notes or talking about guns, knives, or pills. Keep the numbers for these national suicide hotlines: 3-537-673-TALK (2-520-197-192-172-5087) and 4-632-GRKSTDH (5-618.491.5764). If you or someone you know talks about suicide or feeling hopeless, get help right away.     Constipation and Hemorrhoids  · Drink plenty of fluids, enough so that your urine is light yellow or clear like water. If you have kidney, heart, or liver disease and have to limit fluids, talk with your doctor before you increase the amount of fluids you drink. · Eat plenty of fiber each day. Have a bran muffin or bran cereal for breakfast, and try eating a piece of fruit for a mid-afternoon snack. · For painful, itchy hemorrhoids, put ice or a cold pack on the area several times a day for 10 minutes at a time. Follow this by putting a warm compress on the area for another 10 to 20 minutes or by sitting in a shallow, warm bath. When should you call for help? Call 911 anytime you think you may need emergency care. For example, call if:  · You are thinking of hurting yourself, your baby, or anyone else. · You passed out (lost consciousness). · You have symptoms of a blood clot in your lung (called a pulmonary embolism). These may include:    · Sudden chest pain. · Trouble breathing. · Coughing up blood. Call your doctor now or seek immediate medical care if:  · You have severe vaginal bleeding. · You are soaking through a pad each hour for 2 or more hours. · Your vaginal bleeding seems to be getting heavier or is still bright red 4 days after delivery. · You are dizzy or lightheaded, or you feel like you may faint. · You are vomiting or cannot keep fluids down. · You have a fever. · You have new or more belly pain. · You pass tissue (not just blood). · Your vaginal discharge smells bad. · Your belly feels tender or full and hard. · Your breasts are continuously painful or red. · You feel sad, anxious, or hopeless for more than a few days. · You have sudden, severe pain in your belly. · You have symptoms of a blood clot in your leg (called a deep vein thrombosis),          such as:  · Pain in your calf, back of the knee, thigh, or groin. · Redness and swelling in your leg or groin. · You have symptoms of preeclampsia, such as:  · Sudden swelling of your face, hands, or feet.   · New vision problems (such as dimness or blurring). · A severe headache. · Your blood pressure is higher than it should be or rises suddenly. · You have new nausea or vomiting. Watch closely for changes in your health, and be sure to contact your doctor if you have any problems. Additional Information:  Not applicable    These are general instructions for a healthy lifestyle:    No smoking/ No tobacco products/ Avoid exposure to second hand smoke    Surgeon General's Warning:  Quitting smoking now greatly reduces serious risk to your health. Obesity, smoking, and sedentary lifestyle greatly increases your risk for illness    A healthy diet, regular physical exercise & weight monitoring are important for maintaining a healthy lifestyle    Recognize signs and symptoms of STROKE:    F-face looks uneven    A-arms unable to move or move unevenly    S-speech slurred or non-existent    T-time-call 911 as soon as signs and symptoms begin - DO NOT go       back to bed or wait to see if you get better - TIME IS BRAIN. I have had the opportunity to make my options or choices for discharge. I have received and understand these instructions.

## 2017-06-29 ENCOUNTER — OFFICE VISIT (OUTPATIENT)
Dept: INTERNAL MEDICINE CLINIC | Age: 32
End: 2017-06-29

## 2017-06-29 VITALS
DIASTOLIC BLOOD PRESSURE: 74 MMHG | SYSTOLIC BLOOD PRESSURE: 115 MMHG | HEIGHT: 66 IN | OXYGEN SATURATION: 98 % | HEART RATE: 66 BPM | TEMPERATURE: 98.4 F | WEIGHT: 143.5 LBS | RESPIRATION RATE: 18 BRPM | BODY MASS INDEX: 23.06 KG/M2

## 2017-06-29 DIAGNOSIS — J06.9 VIRAL UPPER RESPIRATORY TRACT INFECTION: Primary | ICD-10-CM

## 2017-06-29 RX ORDER — ACETAMINOPHEN 500 MG
500 TABLET ORAL
Refills: 0 | COMMUNITY
End: 2020-12-21 | Stop reason: ALTCHOICE

## 2017-06-29 RX ORDER — OXYMETAZOLINE HCL 0.05 %
2 SPRAY, NON-AEROSOL (ML) NASAL 2 TIMES DAILY
Qty: 1 BOTTLE | Refills: 0 | COMMUNITY
End: 2018-08-15 | Stop reason: ALTCHOICE

## 2017-06-29 NOTE — MR AVS SNAPSHOT
Visit Information Date & Time Provider Department Dept. Phone Encounter #  
 6/29/2017  8:00 AM Simeon Antunez MD [facility] [address] [facility] [id number] Follow-up Instructions Return if symptoms worsen or fail to improve. Upcoming Health Maintenance Date Due DTaP/Tdap/Td series (1 - Tdap) 2/10/2004 INFLUENZA AGE 9 TO ADULT 8/1/2017 PAP AKA CERVICAL CYTOLOGY 12/20/2017 Allergies as of 6/29/2017  Review Complete On: 6/29/2017 By: Mirian Gonzales LPN Severity Noted Reaction Type Reactions Sulfa (Sulfonamide Antibiotics) High 09/13/2011   Systemic Anaphylaxis Current Immunizations  Never Reviewed Name Date Influenza Vaccine 10/5/2015, 9/20/2014 Not reviewed this visit You Were Diagnosed With   
  
 Codes Comments Viral upper respiratory tract infection    -  Primary ICD-10-CM: J06.9, B97.89 ICD-9-CM: 465.9 Vitals BP Pulse Temp Resp Height(growth percentile) Weight(growth percentile) 115/74 (BP 1 Location: Left arm, BP Patient Position: Sitting) 66 98.4 °F (36.9 °C) (Oral) 18 5' 5.5\" (1.664 m) 143 lb 8 oz (65.1 kg) SpO2 BMI OB Status Smoking Status 98% 23.52 kg/m2 Breastfeeding Never Smoker Vitals History BMI and BSA Data Body Mass Index Body Surface Area  
 23.52 kg/m 2 1.73 m 2 Preferred Pharmacy Pharmacy Name Phone [phone] [facility] [phone] Your Updated Medication List  
  
   
This list is accurate as of: 6/29/17  8:27 AM.  Always use your most recent med list.  
  
  
  
  
 acetaminophen 500 mg tablet Commonly known as:  TYLENOL Take 1 Tab by mouth every six (6) hours as needed for Pain. AFRIN (OXYMETAZOLINE) 0.05 % nasal spray Generic drug:  oxymetazoline 2 Sprays two (2) times a day. albuterol 90 mcg/actuation inhaler Commonly known as:  PROAIR HFA  
 Take 2 puffs by inhalation every four (4) hours as needed for Wheezing or Shortness of Breath. CALCIUM 300 PO Take  by mouth.  
  
 guaiFENesin 1,200 mg Ta12 ER tablet Commonly known as:  [facility] Take  by mouth two (2) times a day. KRILL OIL PO Take  by mouth. PRENATAL DHA+COMPLETE PRENATAL -300 mg-mcg-mg Cmpk Generic drug:  [id number] pablito-folic-dha Take  by mouth. SLOW  mg (45 mg iron) ER tablet Generic drug:  ferrous sulfate Take  by mouth Daily (before breakfast). Follow-up Instructions Return if symptoms worsen or fail to improve. Patient Instructions Upper Respiratory Infection (Cold): Care Instructions Your Care Instructions An upper respiratory infection, or URI, is an infection of the nose, sinuses, or throat. URIs are spread by coughs, sneezes, and direct contact. The common cold is the most frequent kind of URI. The flu and sinus infections are other kinds of URIs. Almost all URIs are caused by viruses. Antibiotics won't cure them. But you can treat most infections with home care. This may include drinking lots of fluids and taking over-the-counter pain medicine. You will probably feel better in 4 to 10 days. The doctor has checked you carefully, but problems can develop later. If you notice any problems or new symptoms, get medical treatment right away. Follow-up care is a key part of your treatment and safety. Be sure to make and go to all appointments, and call your doctor if you are having problems. It's also a good idea to know your test results and keep a list of the medicines you take. How can you care for yourself at home? · To prevent dehydration, drink plenty of fluids, enough so that your urine is light yellow or clear like water. Choose water and other caffeine-free clear liquids until you feel better.  If you have kidney, heart, or liver disease and have to limit fluids, talk with your doctor before you increase the amount of fluids you drink. · Take an over-the-counter pain medicine, such as acetaminophen (Tylenol), ibuprofen (Advil, Motrin), or naproxen (Aleve). Read and follow all instructions on the label. · Before you use cough and cold medicines, check the label. These medicines may not be safe for young children or for people with certain health problems. · Be careful when taking over-the-counter cold or flu medicines and Tylenol at the same time. Many of these medicines have acetaminophen, which is Tylenol. Read the labels to make sure that you are not taking more than the recommended dose. Too much acetaminophen (Tylenol) can be harmful. · Get plenty of rest. 
· Do not smoke or allow others to smoke around you. If you need help quitting, talk to your doctor about stop-smoking programs and medicines. These can increase your chances of quitting for good. When should you call for help? Call 911 anytime you think you may need emergency care. For example, call if: 
· You have severe trouble breathing. Call your doctor now or seek immediate medical care if: 
· You seem to be getting much sicker. · You have new or worse trouble breathing. · You have a new or higher fever. · You have a new rash. Watch closely for changes in your health, and be sure to contact your doctor if: 
· You have a new symptom, such as a sore throat, an earache, or sinus pain. · You cough more deeply or more often, especially if you notice more mucus or a change in the color of your mucus. · You do not get better as expected. Where can you learn more? Go to http://paula-sahil.info/. Enter C141 in the search box to learn more about \"Upper Respiratory Infection (Cold): Care Instructions. \" Current as of: March 25, 2017 Content Version: 11.3 © 0781-6965 [employer], [employer].  Care instructions adapted under license by [employer] (which disclaims liability or warranty for this information). If you have questions about a medical condition or this instruction, always ask your healthcare professional. [address] any warranty or liability for your use of this information. Introducing [facility] & HEALTH SERVICES! Dear rKanthi Wilhelm: Thank you for requesting a [employer] account. Our records indicate that you have previously registered for a [employer] account but its currently inactive. Please call our [employer] support line at [phone]. Additional Information If you have questions, please visit the Frequently Asked Questions section of the [employer] website at [email or web address] Remember, [employer] is NOT to be used for urgent needs. For medical emergencies, dial 911. Now available from your iPhone and Android! Please provide this summary of care documentation to your next provider. Your primary care clinician is listed as Nighat Drummond. If you have any questions after today's visit, please call [phone].

## 2017-06-29 NOTE — PATIENT INSTRUCTIONS

## 2017-06-29 NOTE — PROGRESS NOTES
HISTORY OF PRESENT ILLNESS  Nancy Jim is a 27 y.o. female. HPI  Upper respiratory illness:  Nancy Jim presents with complaints of congestion, sore throat, post nasal drip, dry cough, bilateral, lower sinus pain and yellow nasal discharge for 5 days. no nausea and no vomiting . she has not had  night sweats, fever and chills. Symptoms are moderate. Over-the-counter remedies including    has never been tried. Breast feeding currently  Drinking plenty of fluids: yes  Asthma?:  no  non-smoker  Contacts with similar infections: yes       Patient Active Problem List   Diagnosis Code    Delivery normal O80, Z37.9    Normal labor O80, Z37.9     Past Medical History:   Diagnosis Date    HX OTHER MEDICAL 2/10/11    fibroids    Unspecified breast disorder     fibrocystic breasts, [facility]     Allergies   Allergen Reactions    Sulfa (Sulfonamide Antibiotics) Anaphylaxis     Current Outpatient Prescriptions on File Prior to Visit   Medication Sig Dispense Refill    PNV no.24-iron-folic acid-dha (PRENATAL DHA+COMPLETE PRENATAL) -300 mg-mcg-mg cmpk Take  by mouth.  albuterol (PROAIR HFA) 90 mcg/actuation inhaler Take 2 puffs by inhalation every four (4) hours as needed for Wheezing or Shortness of Breath. 1 Inhaler 6     No current facility-administered medications on file prior to visit. Social History   Substance Use Topics    Smoking status: Never Smoker    Smokeless tobacco: Never Used    Alcohol use No           ROS    Physical Exam   Constitutional: She is oriented to person, place, and time. She appears well-developed and well-nourished. No distress.    /74 (BP 1 Location: Left arm, BP Patient Position: Sitting)  Pulse 66  Temp 98.4 °F (36.9 °C) (Oral)   Resp 18  Ht 5' 5.5\" (1.664 m)  Wt 143 lb 8 oz (65.1 kg)  SpO2 98%  BMI 23.52 kg/m2   HENT:   Right Ear: Tympanic membrane and ear canal normal.   Left Ear: Tympanic membrane and ear canal normal. Nose: Mucosal edema and rhinorrhea present. Right sinus exhibits no maxillary sinus tenderness and no frontal sinus tenderness. Left sinus exhibits no maxillary sinus tenderness and no frontal sinus tenderness. Mouth/Throat: Uvula is midline and mucous membranes are normal. Posterior oropharyngeal erythema present. No oropharyngeal exudate, posterior oropharyngeal edema or tonsillar abscesses. Eyes: Conjunctivae are normal.   Neck: Neck supple. Cardiovascular: Normal rate, regular rhythm and normal heart sounds. Pulmonary/Chest: Effort normal and breath sounds normal. No respiratory distress. She has no wheezes. She has no rales. Lymphadenopathy:     She has no cervical adenopathy. Neurological: She is alert and oriented to person, place, and time. Skin: Skin is warm and dry. She is not diaphoretic. Nursing note and vitals reviewed. Centor criteria for strep pharyngitis diagnosis  Fever -no  Exudative pharyngitis-no  Lymphadenopathy-no  Absence of cough-no    ASSESSMENT and PLAN    ICD-10-CM ICD-9-CM    1. Viral upper respiratory tract infection  James Lau was diagnosed with a viral upper respiratory infection. she is advised to drink plenty of water, use shower steam or humidifier to loosen secretions, saline nasal lavage 3 times daily and get plenty of rest.  she may use mucinex 1200mg twice daily along with tylenol or advil as needed for fever and pain. Written instructions were given to the patient emphasizing these recommendations. J06.9 465.9 guaiFENesin (MUCINEX) 1,200 mg Ta12 ER tablet    B97.89  oxymetazoline (AFRIN, OXYMETAZOLINE,) 0.05 % nasal spray      acetaminophen (TYLENOL) 500 mg tablet     Follow-up Disposition:  Return if symptoms worsen or fail to improve.

## 2018-03-19 ENCOUNTER — TELEPHONE (OUTPATIENT)
Dept: INTERNAL MEDICINE CLINIC | Age: 33
End: 2018-03-19

## 2018-03-19 NOTE — TELEPHONE ENCOUNTER
Contacted pt and advised that if she is progressively getting better no need to test for Flu but is anyone starts to develop symptoms be seen immediately to be tested. Pt understood.

## 2018-03-19 NOTE — TELEPHONE ENCOUNTER
Patient states early last week she was feeling very fatigued and had some body aches that occurred in the evening. Patient states by the end of the week the body aches seemed to go away but  Was still fatigued. States she never got fever but has had a slight sniffle for  the past 3 day but is  progressively feeling better. Patient t states her boss was diag with the flu type  B. 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 States currently her children are not showing any symptoms of being sick. Patient can be reached at [phone] and leaving a  is okay .

## 2018-05-23 ENCOUNTER — TELEPHONE (OUTPATIENT)
Dept: INTERNAL MEDICINE CLINIC | Age: 33
End: 2018-05-23

## 2018-05-23 NOTE — TELEPHONE ENCOUNTER
She had stomach bug started Friday about 3 pm vomiting all weekend. She wants to know how long does it last. And when will she be able to eat really food?  Her no [phone]

## 2018-05-23 NOTE — TELEPHONE ENCOUNTER
Contacted pt who is advising that symptoms stopped on Saturday just doesn't quite feel like her self. Pt denies any diarrhea or other symptoms. Advised to start with bland diet and wean back onto normal diet. Pt understood.

## 2018-08-15 ENCOUNTER — OFFICE VISIT (OUTPATIENT)
Dept: INTERNAL MEDICINE CLINIC | Age: 33
End: 2018-08-15

## 2018-08-15 VITALS
TEMPERATURE: 98.6 F | SYSTOLIC BLOOD PRESSURE: 124 MMHG | BODY MASS INDEX: 23.46 KG/M2 | OXYGEN SATURATION: 97 % | HEIGHT: 65 IN | HEART RATE: 66 BPM | RESPIRATION RATE: 18 BRPM | DIASTOLIC BLOOD PRESSURE: 81 MMHG | WEIGHT: 140.8 LBS

## 2018-08-15 DIAGNOSIS — Z91.09 ENVIRONMENTAL ALLERGIES: ICD-10-CM

## 2018-08-15 DIAGNOSIS — B30.9 ACUTE VIRAL CONJUNCTIVITIS OF RIGHT EYE: Primary | ICD-10-CM

## 2018-08-15 DIAGNOSIS — H10.13 ALLERGIC CONJUNCTIVITIS OF BOTH EYES: ICD-10-CM

## 2018-08-15 RX ORDER — TOBRAMYCIN 3 MG/ML
1 SOLUTION/ DROPS OPHTHALMIC EVERY 4 HOURS
Qty: 1 BOTTLE | Refills: 0 | Status: SHIPPED | OUTPATIENT
Start: 2018-08-15 | End: 2018-11-26 | Stop reason: ALTCHOICE

## 2018-08-15 RX ORDER — KETOTIFEN FUMARATE 0.35 MG/ML
1 SOLUTION/ DROPS OPHTHALMIC 2 TIMES DAILY
Qty: 1 BOTTLE | Refills: 0
Start: 2018-08-15 | End: 2018-08-25

## 2018-08-15 RX ORDER — FLUTICASONE PROPIONATE 50 MCG
2 SPRAY, SUSPENSION (ML) NASAL DAILY
COMMUNITY
End: 2021-08-03

## 2018-08-15 NOTE — PROGRESS NOTES
HISTORY OF PRESENT ILLNESS  Maldonado Ryena is a 26 y.o. female. HPI  Presents with complaints of redness, edema and irritation to right eye since this am when she awoke; has had some thicker mucous type drainage from right eye. Denies vision changes or pain in eye. Has history of allergies particularly to dust and has had issues with itchy watery allergic eyes in past.   Has been to see Allergist and is currently on Flonase nasal spray and takes OTC Antihistamine as needed. Review of Systems   Constitutional: Negative for chills, fever and malaise/fatigue. HENT: Negative for congestion. Eyes: Positive for discharge and redness. Respiratory: Negative for cough. Cardiovascular: Negative for chest pain and palpitations. Skin: Negative for rash. Neurological: Negative for dizziness and headaches. /81 (BP 1 Location: Left arm, BP Patient Position: Sitting)  Pulse 66  Temp 98.6 °F (37 °C) (Oral)   Resp 18  Ht 5' 5\" (1.651 m)  Wt 140 lb 12.8 oz (63.9 kg)  LMP 07/16/2018 (Within Days)  SpO2 97%  BMI 23.43 kg/m2  Physical Exam   Constitutional: She is oriented to person, place, and time. She appears well-developed and well-nourished. HENT:   Head: Normocephalic and atraumatic. Right Ear: External ear normal.   Left Ear: External ear normal.   Nose: Nose normal.   Mouth/Throat: Oropharynx is clear and moist.   Eyes: EOM are normal. Pupils are equal, round, and reactive to light. Right eye exhibits discharge. Left eye exhibits no discharge. Right conjunctiva is injected. Left conjunctiva is not injected. Neck: Normal range of motion. Neck supple. Cardiovascular: Normal rate and regular rhythm. Pulmonary/Chest: Effort normal and breath sounds normal.   Neurological: She is alert and oriented to person, place, and time. Psychiatric: She has a normal mood and affect. Her behavior is normal.   Nursing note and vitals reviewed.       ASSESSMENT and PLAN  Diagnoses and all orders for this visit:    1. Acute viral conjunctivitis of right eye -- appears viral at this point; given Rx for Tobramycin to start if symptoms worsen or fail to improve over the next 2-3 days. -     tobramycin (TOBREX) 0.3 % ophthalmic solution; Administer 1 Drop to right eye every four (4) hours. X 3-4 days    2. Environmental allergies    3. Allergic conjunctivitis of both eyes -- for prn use   -     ketotifen (ZADITOR) 0.025 % (0.035 %) ophthalmic solution; Administer 1 Drop to both eyes two (2) times a day for 10 days.       reviewed diet, exercise and weight control  reviewed medications and side effects in detail

## 2018-08-15 NOTE — MR AVS SNAPSHOT
[facility] 
 
 
 [address] [place] Hodgkin [address] [facility] 
[phone] Patient: Addie Cruz MRN: [id number] RTS:[id number] Visit Information Date & Time Provider Department Dept. Phone Encounter #  
 8/15/2018 11:00 AM Shama Sandoval NP [facility] [address]  [id number] Upcoming Health Maintenance Date Due DTaP/Tdap/Td series (1 - Tdap) 2/10/2004 PAP AKA CERVICAL CYTOLOGY 12/20/2017 Influenza Age 5 to Adult 8/1/2018 Allergies as of 8/15/2018  Review Complete On: 8/15/2018 By: Shama Sandoval NP Severity Noted Reaction Type Reactions Sulfa (Sulfonamide Antibiotics) High 09/13/2011   Systemic Anaphylaxis Current Immunizations  Never Reviewed Name Date Influenza Vaccine 10/5/2015, 9/20/2014 Not reviewed this visit You Were Diagnosed With   
  
 Codes Comments Acute viral conjunctivitis of right eye    -  Primary ICD-10-CM: B30.9 ICD-9-CM: 077.99 Vitals BP Pulse Temp Resp Height(growth percentile) Weight(growth percentile) 124/81 (BP 1 Location: Left arm, BP Patient Position: Sitting) 66 98.6 °F (37 °C) (Oral) 18 5' 5\" (1.651 m) 140 lb 12.8 oz (63.9 kg) LMP SpO2 BMI OB Status Smoking Status 07/16/2018 (Within Days) 97% 23.43 kg/m2 Having regular periods Never Smoker Vitals History BMI and BSA Data Body Mass Index Body Surface Area  
 23.43 kg/m 2 1.71 m 2 Preferred Pharmacy Pharmacy Name Phone [phone] [facility] [phone] Your Updated Medication List  
  
   
This list is accurate as of 8/15/18 11:41 AM.  Always use your most recent med list.  
  
  
  
  
 acetaminophen 500 mg tablet Commonly known as:  TYLENOL Take 1 Tab by mouth every six (6) hours as needed for Pain. albuterol 90 mcg/actuation inhaler Commonly known as:  PROAIR HFA  
 Take 2 puffs by inhalation every four (4) hours as needed for Wheezing or Shortness of Breath. CALCIUM 300 PO Take  by mouth daily. FLONASE ALLERGY RELIEF 50 mcg/actuation nasal spray Generic drug:  fluticasone 2 Sprays by Both Nostrils route daily. guaiFENesin 1,200 mg Ta12 ER tablet Commonly known as:  [employer] Take  by mouth two (2) times a day.  
  
 ketotifen 0.025 % (0.035 %) ophthalmic solution Commonly known as:  ZADITOR Administer 1 Drop to both eyes two (2) times a day for 10 days. KRILL OIL PO Take  by mouth. PRENATAL DHA+COMPLETE PRENATAL -300 mg-mcg-mg Cmpk Generic drug:  [id number] pablito-folic-dha Take  by mouth. SLOW  mg (45 mg iron) ER tablet Generic drug:  ferrous sulfate Take  by mouth Daily (before breakfast). tobramycin 0.3 % ophthalmic solution Commonly known as:  Odette Severin Administer 1 Drop to right eye every four (4) hours. X 3-4 days Prescriptions Sent to Pharmacy Refills  
 tobramycin (TOBREX) 0.3 % ophthalmic solution 0 Sig: Administer 1 Drop to right eye every four (4) hours. X 3-4 days Class: Normal  
 Pharmacy: [facility]/pharmacy #[facility] #: [phone] Route: Right Eye Patient Instructions Pinkeye: Care Instructions Your Care Instructions Pinkeye is redness and swelling of the eye surface and the conjunctiva (the lining of the eyelid and the covering of the white part of the eye). Pinkeye is also called conjunctivitis. Pinkeye is often caused by infection with bacteria or a virus. Dry air, allergies, smoke, and chemicals are other common causes. Pinkeye often clears on its own in 7 to 10 days. Antibiotics only help if the pinkeye is caused by bacteria. Pinkeye caused by infection spreads easily.  If an allergy or chemical is causing pinkeye, it will not go away unless you can avoid whatever is causing it. Follow-up care is a key part of your treatment and safety. Be sure to make and go to all appointments, and call your doctor if you are having problems. It's also a good idea to know your test results and keep a list of the medicines you take. How can you care for yourself at home? · Wash your hands often. Always wash them before and after you treat pinkeye or touch your eyes or face. · Use moist cotton or a clean, wet cloth to remove crust. Wipe from the inside corner of the eye to the outside. Use a clean part of the cloth for each wipe. · Put cold or warm wet cloths on your eye a few times a day if the eye hurts. · Do not wear contact lenses or eye makeup until the pinkeye is gone. Throw away any eye makeup you were using when you got pinkeye. Clean your contacts and storage case. If you wear disposable contacts, use a new pair when your eye has cleared and it is safe to wear contacts again. · If the doctor gave you antibiotic ointment or eyedrops, use them as directed. Use the medicine for as long as instructed, even if your eye starts looking better soon. Keep the bottle tip clean, and do not let it touch the eye area. · To put in eyedrops or ointment: ¨ Tilt your head back, and pull your lower eyelid down with one finger. ¨ Drop or squirt the medicine inside the lower lid. ¨ Close your eye for 30 to 60 seconds to let the drops or ointment move around. ¨ Do not touch the ointment or dropper tip to your eyelashes or any other surface. · Do not share towels, pillows, or washcloths while you have pinkeye. When should you call for help?  
Call your doctor now or seek immediate medical care if: 
  · You have pain in your eye, not just irritation on the surface.  
  · You have a change in vision or loss of vision.  
  · You have an increase in discharge from the eye.  
  · Your eye has not started to improve or begins to get worse within 48 hours after you start using antibiotics.  
  · Pinkeye lasts longer than 7 days.  
 Watch closely for changes in your health, and be sure to contact your doctor if you have any problems. Where can you learn more? Go to http://paula-sahil.info/. Enter Y392 in the search box to learn more about \"Pinkeye: Care Instructions. \" Current as of: November 20, 2017 Content Version: 11.7 © 7686-6618 [employer]. Care instructions adapted under license by [employer] (which disclaims liability or warranty for this information). If you have questions about a medical condition or this instruction, always ask your healthcare professional. [address] any warranty or liability for your use of this information. Introducing [facility] & HEALTH SERVICES! Dear Stephen: Thank you for requesting a [employer] account. Our records indicate that you have previously registered for a [employer] account but its currently inactive. Please call our [employer] support line at [phone]. Additional Information If you have questions, please visit the Frequently Asked Questions section of the [employer] website at [email or web address] Remember, [employer] is NOT to be used for urgent needs. For medical emergencies, dial 911. Now available from your iPhone and Android! Please provide this summary of care documentation to your next provider. Your primary care clinician is listed as Elio Hess. If you have any questions after today's visit, please call [phone].

## 2018-08-15 NOTE — PATIENT INSTRUCTIONS
Pinkeye: Care Instructions  Your Care Instructions    Pinkeye is redness and swelling of the eye surface and the conjunctiva (the lining of the eyelid and the covering of the white part of the eye). Pinkeye is also called conjunctivitis. Pinkeye is often caused by infection with bacteria or a virus. Dry air, allergies, smoke, and chemicals are other common causes. Pinkeye often clears on its own in 7 to 10 days. Antibiotics only help if the pinkeye is caused by bacteria. Pinkeye caused by infection spreads easily. If an allergy or chemical is causing pinkeye, it will not go away unless you can avoid whatever is causing it. Follow-up care is a key part of your treatment and safety. Be sure to make and go to all appointments, and call your doctor if you are having problems. It's also a good idea to know your test results and keep a list of the medicines you take. How can you care for yourself at home? · Wash your hands often. Always wash them before and after you treat pinkeye or touch your eyes or face. · Use moist cotton or a clean, wet cloth to remove crust. Wipe from the inside corner of the eye to the outside. Use a clean part of the cloth for each wipe. · Put cold or warm wet cloths on your eye a few times a day if the eye hurts. · Do not wear contact lenses or eye makeup until the pinkeye is gone. Throw away any eye makeup you were using when you got pinkeye. Clean your contacts and storage case. If you wear disposable contacts, use a new pair when your eye has cleared and it is safe to wear contacts again. · If the doctor gave you antibiotic ointment or eyedrops, use them as directed. Use the medicine for as long as instructed, even if your eye starts looking better soon. Keep the bottle tip clean, and do not let it touch the eye area. · To put in eyedrops or ointment:  ¨ Tilt your head back, and pull your lower eyelid down with one finger.   ¨ Drop or squirt the medicine inside the lower lid.  ¨ Close your eye for 30 to 60 seconds to let the drops or ointment move around. ¨ Do not touch the ointment or dropper tip to your eyelashes or any other surface. · Do not share towels, pillows, or washcloths while you have pinkeye. When should you call for help? Call your doctor now or seek immediate medical care if:    · You have pain in your eye, not just irritation on the surface.     · You have a change in vision or loss of vision.     · You have an increase in discharge from the eye.     · Your eye has not started to improve or begins to get worse within 48 hours after you start using antibiotics.     · Pinkeye lasts longer than 7 days.    Watch closely for changes in your health, and be sure to contact your doctor if you have any problems. Where can you learn more? Go to http://paula-sahil.info/. Enter Y392 in the search box to learn more about \"Pinkeye: Care Instructions. \"  Current as of: November 20, 2017  Content Version: 11.7  © 0510-2454 [employer]. Care instructions adapted under license by [employer] (which disclaims liability or warranty for this information). If you have questions about a medical condition or this instruction, always ask your healthcare professional. [address] any warranty or liability for your use of this information.

## 2018-11-26 ENCOUNTER — OFFICE VISIT (OUTPATIENT)
Dept: INTERNAL MEDICINE CLINIC | Age: 33
End: 2018-11-26

## 2018-11-26 VITALS
TEMPERATURE: 98.4 F | RESPIRATION RATE: 12 BRPM | OXYGEN SATURATION: 99 % | DIASTOLIC BLOOD PRESSURE: 78 MMHG | SYSTOLIC BLOOD PRESSURE: 119 MMHG | HEIGHT: 65 IN | BODY MASS INDEX: 23.36 KG/M2 | HEART RATE: 76 BPM | WEIGHT: 140.2 LBS

## 2018-11-26 DIAGNOSIS — J01.00 ACUTE MAXILLARY SINUSITIS, RECURRENCE NOT SPECIFIED: Primary | ICD-10-CM

## 2018-11-26 DIAGNOSIS — J30.9 ALLERGIC RHINITIS, UNSPECIFIED SEASONALITY, UNSPECIFIED TRIGGER: ICD-10-CM

## 2018-11-26 DIAGNOSIS — J45.21 MILD INTERMITTENT REACTIVE AIRWAY DISEASE WITH ACUTE EXACERBATION: ICD-10-CM

## 2018-11-26 RX ORDER — BUDESONIDE AND FORMOTEROL FUMARATE DIHYDRATE 80; 4.5 UG/1; UG/1
2 AEROSOL RESPIRATORY (INHALATION) 2 TIMES DAILY
Qty: 1 INHALER | Refills: 0 | Status: SHIPPED | OUTPATIENT
Start: 2018-11-26 | End: 2019-01-30 | Stop reason: ALTCHOICE

## 2018-11-26 RX ORDER — AZITHROMYCIN 250 MG/1
250 TABLET, FILM COATED ORAL SEE ADMIN INSTRUCTIONS
Qty: 6 TAB | Refills: 0 | Status: SHIPPED | OUTPATIENT
Start: 2018-11-26 | End: 2018-12-01

## 2018-11-26 NOTE — PATIENT INSTRUCTIONS
Reactive Airway Disease: Care Instructions  Your Care Instructions    Reactive airway disease is a breathing problem that appears as wheezing, a whistling noise in your airways. It may be caused by a viral or bacterial infection, allergies, tobacco smoke, or something else in the environment. When you are around these triggers, your body releases chemicals that make the airways get tight. Reactive airway disease is a lot like asthma. Both can cause wheezing. But asthma is ongoing, while reactive airway disease may occur only now and then. Tests can be done to tell whether you have asthma. You may take the same medicines used to treat asthma. Good home care and follow-up care with your doctor can help you recover. Follow-up care is a key part of your treatment and safety. Be sure to make and go to all appointments, and call your doctor if you are having problems. It's also a good idea to know your test results and keep a list of the medicines you take. How can you care for yourself at home? · Take your medicines exactly as prescribed. Call your doctor if you think you are having a problem with your medicine. · Do not smoke or allow others to smoke around you. If you need help quitting, talk to your doctor about stop-smoking programs and medicines. These can increase your chances of quitting for good. · If you know what caused your wheezing (such as perfume or the odor of household chemicals), try to avoid it in the future. · Wash your hands several times a day, and consider using hand gels or wipes that contain alcohol. This can prevent colds and other infections. When should you call for help? Call 911 anytime you think you may need emergency care. For example, call if:    · You have severe trouble breathing.    Watch closely for changes in your health, and be sure to contact your doctor if:    · You cough up yellow, dark brown, or bloody mucus.     · You have a fever.     · Your wheezing gets worse. Where can you learn more? Go to http://paula-sahil.info/. Enter D016 in the search box to learn more about \"Reactive Airway Disease: Care Instructions. \"  Current as of: December 6, 2017  Content Version: 11.8  © 4064-2192 [employer]. Care instructions adapted under license by [employer] (which disclaims liability or warranty for this information). If you have questions about a medical condition or this instruction, always ask your healthcare professional. [address] any warranty or liability for your use of this information. Sinusitis: Care Instructions  Your Care Instructions    Sinusitis is an infection of the lining of the sinus cavities in your head. Sinusitis often follows a cold. It causes pain and pressure in your head and face. In most cases, sinusitis gets better on its own in 1 to 2 weeks. But some mild symptoms may last for several weeks. Sometimes antibiotics are needed. Follow-up care is a key part of your treatment and safety. Be sure to make and go to all appointments, and call your doctor if you are having problems. It's also a good idea to know your test results and keep a list of the medicines you take. How can you care for yourself at home? · Take an over-the-counter pain medicine, such as acetaminophen (Tylenol), ibuprofen (Advil, Motrin), or naproxen (Aleve). Read and follow all instructions on the label. · If the doctor prescribed antibiotics, take them as directed. Do not stop taking them just because you feel better. You need to take the full course of antibiotics. · Be careful when taking over-the-counter cold or flu medicines and Tylenol at the same time. Many of these medicines have acetaminophen, which is Tylenol. Read the labels to make sure that you are not taking more than the recommended dose. Too much acetaminophen (Tylenol) can be harmful.   · Breathe warm, moist air from a steamy shower, a hot bath, or a sink filled with hot water. Avoid cold, dry air. Using a humidifier in your home may help. Follow the directions for cleaning the machine. · Use saline (saltwater) nasal washes to help keep your nasal passages open and wash out mucus and bacteria. You can buy saline nose drops at a grocery store or drugstore. Or you can make your own at home by adding 1 teaspoon of salt and 1 teaspoon of baking soda to 2 cups of distilled water. If you make your own, fill a bulb syringe with the solution, insert the tip into your nostril, and squeeze gently. [place]uford Saver your nose. · Put a hot, wet towel or a warm gel pack on your face 3 or 4 times a day for 5 to 10 minutes each time. · Try a decongestant nasal spray like oxymetazoline (Afrin). Do not use it for more than 3 days in a row. Using it for more than 3 days can make your congestion worse. When should you call for help? Call your doctor now or seek immediate medical care if:    · You have new or worse swelling or redness in your face or around your eyes.     · You have a new or higher fever.    Watch closely for changes in your health, and be sure to contact your doctor if:    · You have new or worse facial pain.     · The mucus from your nose becomes thicker (like pus) or has new blood in it.     · You are not getting better as expected. Where can you learn more? Go to http://paula-saihl.info/. Enter A839 in the search box to learn more about \"Sinusitis: Care Instructions. \"  Current as of: March 28, 2018  Content Version: 11.8  © 8679-9929 [employer]. Care instructions adapted under license by [email or web address] (which disclaims liability or warranty for this information). If you have questions about a medical condition or this instruction, always ask your healthcare professional. [place] [address] any warranty or liability for your use of this information.

## 2018-11-26 NOTE — PROGRESS NOTES
HISTORY OF PRESENT ILLNESS  Susie Padilla is a 26 y.o. female. HPI  Presents with complaints of sinus congestion, postnasal drainage, thick yellow nasal drainage, fatigue, hot and cold chills for the past 2 weeks. Initially felt like she was developing allergy symptoms and has been taking Zyrtec and Flonase without significant improvement; has developed more sinus pressure and pain over the past 4 days. Had fever to 99.5 yesterday; has been using Mucinex OTC with loosening of mucus. Reports that she recently has been having issues with chest tightness and some shortness of breath and thought that this was related to mold accumulation in her work space. Was sent to physicians at [facility] and [facility] for evaluation and was prescribed albuterol inhaler that she has used on an as-needed basis. Has been feeling more chest tightness in the past week since her illness has progressively worsened. Denies history of asthma in the past.  She thought about filing Workmen's Compensation case but has been out of her office for the past 2 weeks and continues to have some upper chest tightness. Review of Systems   Constitutional: Positive for chills, fever and malaise/fatigue. HENT: Positive for congestion, sinus pain and sore throat. Negative for ear pain. Respiratory: Positive for cough. Negative for sputum production. Cardiovascular: Negative for chest pain and palpitations. Gastrointestinal: Negative for nausea and vomiting. Musculoskeletal: Negative for myalgias. Neurological: Positive for headaches. Negative for dizziness. /78 (BP 1 Location: Left arm, BP Patient Position: Sitting)   Pulse 76   Temp 98.4 °F (36.9 °C) (Oral)   Resp 12   Ht 5' 5\" (1.651 m)   Wt 140 lb 3.2 oz (63.6 kg)   LMP 11/19/2018 (Approximate)   SpO2 99%   Breastfeeding? No   BMI 23.33 kg/m²   Physical Exam   Constitutional: She is oriented to person, place, and time.  She appears well-developed and well-nourished. HENT:   Head: Normocephalic and atraumatic. Right Ear: Tympanic membrane and external ear normal.   Left Ear: Tympanic membrane and external ear normal.   Nose: Mucosal edema present. Right sinus exhibits maxillary sinus tenderness. Left sinus exhibits maxillary sinus tenderness. Mouth/Throat: Posterior oropharyngeal erythema present. Neck: Normal range of motion. Neck supple. No thyromegaly present. Cardiovascular: Normal rate and regular rhythm. Pulmonary/Chest: Effort normal and breath sounds normal.   Lymphadenopathy:     She has no cervical adenopathy. Neurological: She is alert and oriented to person, place, and time. Skin: Skin is warm and dry. Psychiatric: She has a normal mood and affect. Her behavior is normal.   Nursing note and vitals reviewed. ASSESSMENT and PLAN  Diagnoses and all orders for this visit:    1. Acute maxillary sinusitis, recurrence not specified --continue with Mucinex OTC as needed. -     azithromycin (ZITHROMAX) 250 mg tablet; Take 1 Tab by mouth See Admin Instructions for 5 days. 2. Mild intermittent reactive airway disease with acute exacerbation --will add on Symbicort as controller; advised to rinse mouth with water after each use. Can use albuterol inhaler on as needed basis. -     budesonide-formoterol (SYMBICORT) 80-4.5 mcg/actuation HFAA; Take 2 Puffs by inhalation two (2) times a day.  Rinse mouth with water after each use      lab results and schedule of future lab studies reviewed with patient  reviewed diet, exercise and weight control  reviewed medications and side effects in detail

## 2019-01-29 ENCOUNTER — TELEPHONE (OUTPATIENT)
Dept: INTERNAL MEDICINE CLINIC | Age: 34
End: 2019-01-29

## 2019-01-29 NOTE — TELEPHONE ENCOUNTER
----- Message from Mariela Boudreaux sent at 1/29/2019  8:22 AM EST -----  Regarding: Matt Barksdale/ Telephone  Pt has been congested and feeling sick with head cold symptoms and post nasal drip is constant for about two weeks now. Pt would like a call back to discuss symptoms.  Best contact [phone]

## 2019-01-29 NOTE — TELEPHONE ENCOUNTER
Patient returning your call from 10:38am. Stated she received the Flu shot on 1/14/19 and still has yellow mucus and post nasal drip. Please call patient back at [phone].

## 2019-01-29 NOTE — TELEPHONE ENCOUNTER
I called pt back, she states she has nasal congestion, post nasal drip and thick yellow mucus x 2 weeks. Pt has tried otc mucinex. She would like an appt to see if she has another sinus infection.  Appt schedule with NP. More specific PA form received, completed and routed to Dr. Kaba for review and signature.

## 2019-01-30 ENCOUNTER — OFFICE VISIT (OUTPATIENT)
Dept: INTERNAL MEDICINE CLINIC | Age: 34
End: 2019-01-30

## 2019-01-30 VITALS
BODY MASS INDEX: 22.99 KG/M2 | HEART RATE: 68 BPM | WEIGHT: 138 LBS | OXYGEN SATURATION: 100 % | RESPIRATION RATE: 12 BRPM | HEIGHT: 65 IN | SYSTOLIC BLOOD PRESSURE: 116 MMHG | TEMPERATURE: 98.5 F | DIASTOLIC BLOOD PRESSURE: 76 MMHG

## 2019-01-30 DIAGNOSIS — J01.00 ACUTE MAXILLARY SINUSITIS, RECURRENCE NOT SPECIFIED: ICD-10-CM

## 2019-01-30 DIAGNOSIS — J06.9 VIRAL UPPER RESPIRATORY TRACT INFECTION: Primary | ICD-10-CM

## 2019-01-30 RX ORDER — CEFDINIR 300 MG/1
300 CAPSULE ORAL 2 TIMES DAILY
Qty: 14 CAP | Refills: 0 | Status: SHIPPED | OUTPATIENT
Start: 2019-01-30 | End: 2019-05-22 | Stop reason: ALTCHOICE

## 2019-01-30 RX ORDER — GUAIFENESIN 600 MG/1
600 TABLET, EXTENDED RELEASE ORAL 2 TIMES DAILY
Qty: 30 TAB | Refills: 0
Start: 2019-01-30 | End: 2019-05-22

## 2019-01-30 NOTE — PATIENT INSTRUCTIONS
Sinusitis: Care Instructions  Your Care Instructions    Sinusitis is an infection of the lining of the sinus cavities in your head. Sinusitis often follows a cold. It causes pain and pressure in your head and face. In most cases, sinusitis gets better on its own in 1 to 2 weeks. But some mild symptoms may last for several weeks. Sometimes antibiotics are needed. Follow-up care is a key part of your treatment and safety. Be sure to make and go to all appointments, and call your doctor if you are having problems. It's also a good idea to know your test results and keep a list of the medicines you take. How can you care for yourself at home? · Take an over-the-counter pain medicine, such as acetaminophen (Tylenol), ibuprofen (Advil, Motrin), or naproxen (Aleve). Read and follow all instructions on the label. · If the doctor prescribed antibiotics, take them as directed. Do not stop taking them just because you feel better. You need to take the full course of antibiotics. · Be careful when taking over-the-counter cold or flu medicines and Tylenol at the same time. Many of these medicines have acetaminophen, which is Tylenol. Read the labels to make sure that you are not taking more than the recommended dose. Too much acetaminophen (Tylenol) can be harmful. · Breathe warm, moist air from a steamy shower, a hot bath, or a sink filled with hot water. Avoid cold, dry air. Using a humidifier in your home may help. Follow the directions for cleaning the machine. · Use saline (saltwater) nasal washes to help keep your nasal passages open and wash out mucus and bacteria. You can buy saline nose drops at a grocery store or drugstore. Or you can make your own at home by adding 1 teaspoon of salt and 1 teaspoon of baking soda to 2 cups of distilled water. If you make your own, fill a bulb syringe with the solution, insert the tip into your nostril, and squeeze gently. Michael Grammes your nose.   · Put a hot, wet towel or a warm gel pack on your face 3 or 4 times a day for 5 to 10 minutes each time. · Try a decongestant nasal spray like oxymetazoline (Afrin). Do not use it for more than 3 days in a row. Using it for more than 3 days can make your congestion worse. When should you call for help? Call your doctor now or seek immediate medical care if:    · You have new or worse swelling or redness in your face or around your eyes.     · You have a new or higher fever.    Watch closely for changes in your health, and be sure to contact your doctor if:    · You have new or worse facial pain.     · The mucus from your nose becomes thicker (like pus) or has new blood in it.     · You are not getting better as expected. Where can you learn more? Go to http://paula-sahil.info/. Enter R385 in the search box to learn more about \"Sinusitis: Care Instructions. \"  Current as of: March 27, 2018  Content Version: 11.9  © 8612-1904 [employer]. Care instructions adapted under license by [employer] (which disclaims liability or warranty for this information). If you have questions about a medical condition or this instruction, always ask your healthcare professional. [address] any warranty or liability for your use of this information.

## 2019-01-30 NOTE — PROGRESS NOTES
HISTORY OF PRESENT ILLNESS  Cody Brown is a 26 y.o. female. HPI  Upper respiratory illness:  Cody Brown presents with complaints of congestion, sore throat, post nasal drip, dry cough, headache and left  lower, upper sinus pain for 9 days. Felt like symptoms were viral initially but has developed more sinus pain and pressure especially over the past 2-3 days. Mucous has become much thicker and dark yellow with blood streaks. no nausea and no vomiting . she has not had  myalgias, fever and chills. Symptoms are moderate. Over-the-counter remedies including Tylenol sinus, Saline sinus rinses   has been used with poor relief of symptoms. Has started taking Mucinex which has loosened mucous. Denies shortness of breath, wheezing. Drinking plenty of fluids: yes  Asthma?:  no  non-smoker  Contacts with similar infections: yes         Review of Systems   Constitutional: Positive for malaise/fatigue. Negative for chills and fever. HENT: Positive for congestion, sinus pain and sore throat. Respiratory: Positive for cough. Negative for sputum production, shortness of breath and wheezing. Cardiovascular: Negative for chest pain and palpitations. Gastrointestinal: Negative for nausea and vomiting. Musculoskeletal: Negative for myalgias. Neurological: Positive for headaches. Negative for dizziness. Physical Exam   Constitutional: She is oriented to person, place, and time. She appears well-developed and well-nourished. HENT:   Head: Normocephalic and atraumatic. Right Ear: Tympanic membrane and external ear normal.   Left Ear: Tympanic membrane and external ear normal.   Nose: Mucosal edema present. Left sinus exhibits maxillary sinus tenderness and frontal sinus tenderness. Mouth/Throat: Posterior oropharyngeal erythema present. No posterior oropharyngeal edema. Neck: Normal range of motion. Neck supple. No thyromegaly present. Cardiovascular: Normal rate and regular rhythm. Pulmonary/Chest: Effort normal and breath sounds normal. She has no wheezes. Lymphadenopathy:     She has no cervical adenopathy. Neurological: She is alert and oriented to person, place, and time. Skin: Skin is warm and dry. Psychiatric: She has a normal mood and affect. Her behavior is normal.   Nursing note and vitals reviewed. ASSESSMENT and PLAN  Diagnoses and all orders for this visit:    1. Upper respiratory tract infection    2. Acute maxillary sinusitis, recurrence not specified  -     cefdinir (OMNICEF) 300 mg capsule; Take 1 Cap by mouth two (2) times a day. -     guaiFENesin ER (MUCINEX) 600 mg ER tablet; Take 1 Tab by mouth two (2) times a day.       lab results and schedule of future lab studies reviewed with patient  reviewed diet, exercise and weight control  reviewed medications and side effects in detail

## 2019-04-27 LAB
ANTIBODY SCREEN, EXTERNAL: NEGATIVE
HBSAG, EXTERNAL: NEGATIVE
HIV, EXTERNAL: NORMAL
RUBELLA, EXTERNAL: NORMAL
T. PALLIDUM, EXTERNAL: NEGATIVE

## 2019-05-21 ENCOUNTER — TELEPHONE (OUTPATIENT)
Dept: INTERNAL MEDICINE CLINIC | Age: 34
End: 2019-05-21

## 2019-05-21 NOTE — TELEPHONE ENCOUNTER
Contacted pt who is 13 weeks pregnant and complaining of sinus drainage and white coating on tongue. Advised of appt needed. Appt provided.

## 2019-05-21 NOTE — TELEPHONE ENCOUNTER
----- Message from Divya Roach sent at 5/21/2019  2:39 PM EDT -----  Regarding: Dr. Reshma Doyle  Pt would like a call back from the nurse regarding sinus drainage and poss thrush, pt would like a call to discuss the matter. Best contact number [phone].

## 2019-05-22 ENCOUNTER — OFFICE VISIT (OUTPATIENT)
Dept: INTERNAL MEDICINE CLINIC | Age: 34
End: 2019-05-22

## 2019-05-22 VITALS
OXYGEN SATURATION: 98 % | HEIGHT: 65 IN | BODY MASS INDEX: 23.36 KG/M2 | RESPIRATION RATE: 18 BRPM | WEIGHT: 140.2 LBS | HEART RATE: 78 BPM | DIASTOLIC BLOOD PRESSURE: 70 MMHG | SYSTOLIC BLOOD PRESSURE: 115 MMHG | TEMPERATURE: 98.1 F

## 2019-05-22 DIAGNOSIS — R09.81 SINUS CONGESTION: ICD-10-CM

## 2019-05-22 DIAGNOSIS — B37.0 THRUSH: Primary | ICD-10-CM

## 2019-05-22 RX ORDER — NYSTATIN 100000 [USP'U]/ML
500000 SUSPENSION ORAL 4 TIMES DAILY
Qty: 60 ML | Refills: 1 | Status: SHIPPED | OUTPATIENT
Start: 2019-05-22 | End: 2020-06-19

## 2019-05-22 NOTE — PROGRESS NOTES
HISTORY OF PRESENT ILLNESS  Brian Ingram is a 37 y.o. female. HPI  Pt is 13-weeks pregnant. Thrush: Pt c/o post-nasal drip. She suspects thrush. She has been doing salt rinses. She doesn't use inhaler and hasn't recently used Flonase. Denies having recently been on AB's. Pt plans to f/u with GYN for routine evaluation and blood work. Review of Systems   All other systems reviewed and are negative. Physical Exam   Constitutional: She is oriented to person, place, and time. She appears well-developed and well-nourished. HENT:   Head: Normocephalic and atraumatic. Right Ear: External ear normal.   Left Ear: External ear normal.   Nose: Nose normal.   Mouth/Throat: Oropharynx is clear and moist.   Thrush in back of throat. Eyes: Conjunctivae and EOM are normal.   Neck: Normal range of motion. Neck supple. Carotid bruit is not present. No thyroid mass and no thyromegaly present. Cardiovascular: Normal rate, regular rhythm, S1 normal, S2 normal, normal heart sounds and intact distal pulses. Pulmonary/Chest: Effort normal and breath sounds normal.   Abdominal: Soft. Normal appearance and bowel sounds are normal. There is no hepatosplenomegaly. There is no tenderness. Musculoskeletal: Normal range of motion. Neurological: She is alert and oriented to person, place, and time. She has normal strength. No cranial nerve deficit or sensory deficit. Coordination normal.   Skin: Skin is warm, dry and intact. No abrasion and no rash noted. Psychiatric: She has a normal mood and affect. Her behavior is normal. Judgment and thought content normal.   Nursing note and vitals reviewed. ASSESSMENT and PLAN  Diagnoses and all orders for this visit:    1. Thrush  Prescribed Mycostatin. Advised pt to swish Nycostatin in mouth and then spit out. Encouraged pt to use Flonase regularly. -     nystatin (MYCOSTATIN) 100,000 unit/mL suspension; Take 5 mL by mouth four (4) times daily.  swish and spit    2. Sinus congestion- trila of flonase and see if that helps      Lab results and schedule of future lab studies reviewed with patient. Reviewed diet, exercise and weight control. Written by Anjali Beck, as dictated by Arcadio Espitia MD.     Current diagnosis and concerns discussed with pt at length. Understands risks and benefits or current treatment plan and medications and accepts the treatment and medication with any possible risks. Pt asks appropriate questions which were answered. Pt instructed to call with any concerns or problems. This note will not be viewable in [address]

## 2019-10-30 LAB — GRBS, EXTERNAL: POSITIVE

## 2019-11-26 ENCOUNTER — HOSPITAL ENCOUNTER (INPATIENT)
Age: 34
LOS: 2 days | Discharge: HOME | End: 2019-11-28
Attending: OBSTETRICS & GYNECOLOGY | Admitting: OBSTETRICS & GYNECOLOGY
Payer: COMMERCIAL

## 2019-11-26 ENCOUNTER — ANESTHESIA (OUTPATIENT)
Dept: LABOR AND DELIVERY | Age: 34
End: 2019-11-26
Payer: COMMERCIAL

## 2019-11-26 ENCOUNTER — ANESTHESIA EVENT (OUTPATIENT)
Dept: LABOR AND DELIVERY | Age: 34
End: 2019-11-26
Payer: COMMERCIAL

## 2019-11-26 PROBLEM — O47.9 UTERINE CONTRACTIONS DURING PREGNANCY: Status: ACTIVE | Noted: 2019-11-26

## 2019-11-26 PROBLEM — Z3A.39 39 WEEKS GESTATION OF PREGNANCY: Status: ACTIVE | Noted: 2019-11-26

## 2019-11-26 LAB
BASOPHILS # BLD: 0 K/UL (ref 0–0.1)
BASOPHILS NFR BLD: 0 % (ref 0–1)
DIFFERENTIAL METHOD BLD: ABNORMAL
EOSINOPHIL # BLD: 0.2 K/UL (ref 0–0.4)
EOSINOPHIL NFR BLD: 2 % (ref 0–7)
ERYTHROCYTE [DISTWIDTH] IN BLOOD BY AUTOMATED COUNT: 13.2 % (ref 11.5–14.5)
HCT VFR BLD AUTO: 35.5 % (ref 35–47)
HGB BLD-MCNC: 11.8 G/DL (ref 11.5–16)
IMM GRANULOCYTES # BLD AUTO: 0.1 K/UL (ref 0–0.04)
IMM GRANULOCYTES NFR BLD AUTO: 1 % (ref 0–0.5)
LYMPHOCYTES # BLD: 2 K/UL (ref 0.8–3.5)
LYMPHOCYTES NFR BLD: 24 % (ref 12–49)
MCH RBC QN AUTO: 30.4 PG (ref 26–34)
MCHC RBC AUTO-ENTMCNC: 33.2 G/DL (ref 30–36.5)
MCV RBC AUTO: 91.5 FL (ref 80–99)
MONOCYTES # BLD: 0.6 K/UL (ref 0–1)
MONOCYTES NFR BLD: 7 % (ref 5–13)
NEUTS SEG # BLD: 5.5 K/UL (ref 1.8–8)
NEUTS SEG NFR BLD: 66 % (ref 32–75)
NRBC # BLD: 0 K/UL (ref 0–0.01)
NRBC BLD-RTO: 0 PER 100 WBC
PLATELET # BLD AUTO: 228 K/UL (ref 150–400)
PMV BLD AUTO: 10.9 FL (ref 8.9–12.9)
RBC # BLD AUTO: 3.88 M/UL (ref 3.8–5.2)
WBC # BLD AUTO: 8.3 K/UL (ref 3.6–11)

## 2019-11-26 PROCEDURE — 74011250636 HC RX REV CODE- 250/636: Performed by: ANESTHESIOLOGY

## 2019-11-26 PROCEDURE — 10907ZC DRAINAGE OF AMNIOTIC FLUID, THERAPEUTIC FROM PRODUCTS OF CONCEPTION, VIA NATURAL OR ARTIFICIAL OPENING: ICD-10-PCS | Performed by: OBSTETRICS & GYNECOLOGY

## 2019-11-26 PROCEDURE — 3E0R3BZ INTRODUCTION OF ANESTHETIC AGENT INTO SPINAL CANAL, PERCUTANEOUS APPROACH: ICD-10-PCS | Performed by: ANESTHESIOLOGY

## 2019-11-26 PROCEDURE — 74011000258 HC RX REV CODE- 258: Performed by: ADVANCED PRACTICE MIDWIFE

## 2019-11-26 PROCEDURE — 75410000002 HC LABOR FEE PER 1 HR

## 2019-11-26 PROCEDURE — 75410000003 HC RECOV DEL/VAG/CSECN EA 0.5 HR

## 2019-11-26 PROCEDURE — 65410000002 HC RM PRIVATE OB

## 2019-11-26 PROCEDURE — 36415 COLL VENOUS BLD VENIPUNCTURE: CPT

## 2019-11-26 PROCEDURE — 75410000000 HC DELIVERY VAGINAL/SINGLE

## 2019-11-26 PROCEDURE — 74011250637 HC RX REV CODE- 250/637: Performed by: ADVANCED PRACTICE MIDWIFE

## 2019-11-26 PROCEDURE — 74011000250 HC RX REV CODE- 250: Performed by: ANESTHESIOLOGY

## 2019-11-26 PROCEDURE — 74011250636 HC RX REV CODE- 250/636: Performed by: ADVANCED PRACTICE MIDWIFE

## 2019-11-26 PROCEDURE — 0HQ9XZZ REPAIR PERINEUM SKIN, EXTERNAL APPROACH: ICD-10-PCS | Performed by: OBSTETRICS & GYNECOLOGY

## 2019-11-26 PROCEDURE — 85025 COMPLETE CBC W/AUTO DIFF WBC: CPT

## 2019-11-26 PROCEDURE — 76060000078 HC EPIDURAL ANESTHESIA

## 2019-11-26 PROCEDURE — A4300 CATH IMPL VASC ACCESS PORTAL: HCPCS

## 2019-11-26 RX ORDER — EPHEDRINE SULFATE/0.9% NACL/PF 50 MG/5 ML
12.5 SYRINGE (ML) INTRAVENOUS
Status: COMPLETED | OUTPATIENT
Start: 2019-11-26 | End: 2019-11-26

## 2019-11-26 RX ORDER — SWAB
1 SWAB, NON-MEDICATED MISCELLANEOUS DAILY
Status: DISCONTINUED | OUTPATIENT
Start: 2019-11-26 | End: 2019-11-28 | Stop reason: HOSPADM

## 2019-11-26 RX ORDER — SODIUM CHLORIDE 900 MG/100ML
INJECTION INTRAVENOUS
Status: DISPENSED
Start: 2019-11-26 | End: 2019-11-26

## 2019-11-26 RX ORDER — NALOXONE HYDROCHLORIDE 0.4 MG/ML
0.4 INJECTION, SOLUTION INTRAMUSCULAR; INTRAVENOUS; SUBCUTANEOUS AS NEEDED
Status: DISCONTINUED | OUTPATIENT
Start: 2019-11-26 | End: 2019-11-26 | Stop reason: HOSPADM

## 2019-11-26 RX ORDER — METHYLERGONOVINE MALEATE 0.2 MG/ML
0.2 INJECTION INTRAVENOUS
Status: ACTIVE | OUTPATIENT
Start: 2019-11-26 | End: 2019-11-27

## 2019-11-26 RX ORDER — SODIUM CHLORIDE 0.9 % (FLUSH) 0.9 %
5-40 SYRINGE (ML) INJECTION EVERY 8 HOURS
Status: DISCONTINUED | OUTPATIENT
Start: 2019-11-26 | End: 2019-11-28 | Stop reason: HOSPADM

## 2019-11-26 RX ORDER — PENICILLIN G POTASSIUM 5000000 [IU]/1
INJECTION, POWDER, FOR SOLUTION INTRAMUSCULAR; INTRAVENOUS
Status: DISPENSED
Start: 2019-11-26 | End: 2019-11-26

## 2019-11-26 RX ORDER — HYDROCORTISONE ACETATE PRAMOXINE HCL 2.5; 1 G/100G; G/100G
CREAM TOPICAL AS NEEDED
Status: DISCONTINUED | OUTPATIENT
Start: 2019-11-26 | End: 2019-11-28 | Stop reason: HOSPADM

## 2019-11-26 RX ORDER — EPHEDRINE SULFATE/0.9% NACL/PF 50 MG/5 ML
SYRINGE (ML) INTRAVENOUS
Status: DISPENSED
Start: 2019-11-26 | End: 2019-11-26

## 2019-11-26 RX ORDER — IBUPROFEN 600 MG/1
600 TABLET ORAL
Qty: 50 TAB | Refills: 1 | Status: SHIPPED | OUTPATIENT
Start: 2019-11-26 | End: 2019-11-28

## 2019-11-26 RX ORDER — DOCUSATE SODIUM 100 MG/1
100 CAPSULE, LIQUID FILLED ORAL
Status: DISCONTINUED | OUTPATIENT
Start: 2019-11-26 | End: 2019-11-28 | Stop reason: HOSPADM

## 2019-11-26 RX ORDER — LIDOCAINE HYDROCHLORIDE AND EPINEPHRINE 15; 5 MG/ML; UG/ML
4.5 INJECTION, SOLUTION EPIDURAL AS NEEDED
Status: DISCONTINUED | OUTPATIENT
Start: 2019-11-26 | End: 2019-11-26 | Stop reason: HOSPADM

## 2019-11-26 RX ORDER — OXYCODONE AND ACETAMINOPHEN 5; 325 MG/1; MG/1
1 TABLET ORAL
Status: DISCONTINUED | OUTPATIENT
Start: 2019-11-26 | End: 2019-11-28 | Stop reason: HOSPADM

## 2019-11-26 RX ORDER — FENTANYL CITRATE 50 UG/ML
INJECTION, SOLUTION INTRAMUSCULAR; INTRAVENOUS
Status: DISPENSED
Start: 2019-11-26 | End: 2019-11-26

## 2019-11-26 RX ORDER — NALOXONE HYDROCHLORIDE 0.4 MG/ML
0.4 INJECTION, SOLUTION INTRAMUSCULAR; INTRAVENOUS; SUBCUTANEOUS AS NEEDED
Status: DISCONTINUED | OUTPATIENT
Start: 2019-11-26 | End: 2019-11-28 | Stop reason: HOSPADM

## 2019-11-26 RX ORDER — IBUPROFEN 600 MG/1
600 TABLET ORAL
Status: DISCONTINUED | OUTPATIENT
Start: 2019-11-26 | End: 2019-11-28 | Stop reason: HOSPADM

## 2019-11-26 RX ORDER — LIDOCAINE HYDROCHLORIDE AND EPINEPHRINE 15; 5 MG/ML; UG/ML
INJECTION, SOLUTION EPIDURAL
Status: COMPLETED | OUTPATIENT
Start: 2019-11-26 | End: 2019-11-26

## 2019-11-26 RX ORDER — FENTANYL/BUPIVACAINE/NS/PF 2-1250MCG
PREFILLED PUMP RESERVOIR EPIDURAL
Status: DISPENSED
Start: 2019-11-26 | End: 2019-11-26

## 2019-11-26 RX ORDER — SODIUM CHLORIDE 0.9 % (FLUSH) 0.9 %
5-40 SYRINGE (ML) INJECTION AS NEEDED
Status: DISCONTINUED | OUTPATIENT
Start: 2019-11-26 | End: 2019-11-28 | Stop reason: HOSPADM

## 2019-11-26 RX ORDER — BUPIVACAINE HYDROCHLORIDE 2.5 MG/ML
INJECTION, SOLUTION EPIDURAL; INFILTRATION; INTRACAUDAL
Status: DISPENSED
Start: 2019-11-26 | End: 2019-11-26

## 2019-11-26 RX ORDER — FENTANYL CITRATE 50 UG/ML
100 INJECTION, SOLUTION INTRAMUSCULAR; INTRAVENOUS ONCE
Status: COMPLETED | OUTPATIENT
Start: 2019-11-26 | End: 2019-11-26

## 2019-11-26 RX ORDER — OXYTOCIN/RINGER'S LACTATE 20/1000 ML
125-500 PLASTIC BAG, INJECTION (ML) INTRAVENOUS ONCE
Status: ACTIVE | OUTPATIENT
Start: 2019-11-26 | End: 2019-11-26

## 2019-11-26 RX ORDER — SODIUM CHLORIDE, SODIUM LACTATE, POTASSIUM CHLORIDE, CALCIUM CHLORIDE 600; 310; 30; 20 MG/100ML; MG/100ML; MG/100ML; MG/100ML
125 INJECTION, SOLUTION INTRAVENOUS CONTINUOUS
Status: DISCONTINUED | OUTPATIENT
Start: 2019-11-26 | End: 2019-11-28 | Stop reason: HOSPADM

## 2019-11-26 RX ORDER — ACETAMINOPHEN 325 MG/1
650 TABLET ORAL
Status: DISCONTINUED | OUTPATIENT
Start: 2019-11-26 | End: 2019-11-28 | Stop reason: HOSPADM

## 2019-11-26 RX ORDER — MAG HYDROX/ALUMINUM HYD/SIMETH 200-200-20
30 SUSPENSION, ORAL (FINAL DOSE FORM) ORAL
Status: DISCONTINUED | OUTPATIENT
Start: 2019-11-26 | End: 2019-11-26 | Stop reason: HOSPADM

## 2019-11-26 RX ORDER — OXYTOCIN/0.9 % SODIUM CHLORIDE 30/500 ML
PLASTIC BAG, INJECTION (ML) INTRAVENOUS
Status: DISPENSED
Start: 2019-11-26 | End: 2019-11-26

## 2019-11-26 RX ORDER — BUPIVACAINE HYDROCHLORIDE 2.5 MG/ML
INJECTION, SOLUTION EPIDURAL; INFILTRATION; INTRACAUDAL
Status: COMPLETED | OUTPATIENT
Start: 2019-11-26 | End: 2019-11-26

## 2019-11-26 RX ORDER — FENTANYL/BUPIVACAINE/NS/PF 2-1250MCG
1-16 PREFILLED PUMP RESERVOIR EPIDURAL CONTINUOUS
Status: DISCONTINUED | OUTPATIENT
Start: 2019-11-26 | End: 2019-11-26 | Stop reason: HOSPADM

## 2019-11-26 RX ORDER — LIDOCAINE HYDROCHLORIDE 10 MG/ML
10 INJECTION INFILTRATION; PERINEURAL AS NEEDED
Status: DISCONTINUED | OUTPATIENT
Start: 2019-11-26 | End: 2019-11-26 | Stop reason: HOSPADM

## 2019-11-26 RX ADMIN — BUPIVACAINE HYDROCHLORIDE 5 ML: 2.5 INJECTION, SOLUTION EPIDURAL; INFILTRATION; INTRACAUDAL; PERINEURAL at 02:03

## 2019-11-26 RX ADMIN — FENTANYL CITRATE 100 MCG: 50 INJECTION, SOLUTION INTRAMUSCULAR; INTRAVENOUS at 02:03

## 2019-11-26 RX ADMIN — Medication 10 MG: at 02:21

## 2019-11-26 RX ADMIN — SODIUM CHLORIDE, SODIUM LACTATE, POTASSIUM CHLORIDE, AND CALCIUM CHLORIDE 125 ML/HR: 600; 310; 30; 20 INJECTION, SOLUTION INTRAVENOUS at 01:27

## 2019-11-26 RX ADMIN — ACETAMINOPHEN 650 MG: 325 TABLET ORAL at 18:12

## 2019-11-26 RX ADMIN — PENICILLIN G POTASSIUM 5 MILLION UNITS: 5000000 INJECTION, POWDER, FOR SOLUTION INTRAMUSCULAR; INTRAVENOUS at 01:27

## 2019-11-26 RX ADMIN — LIDOCAINE HYDROCHLORIDE,EPINEPHRINE BITARTRATE 3 ML: 15; .005 INJECTION, SOLUTION EPIDURAL; INFILTRATION; INTRACAUDAL; PERINEURAL at 02:03

## 2019-11-26 RX ADMIN — Medication 1 TABLET: at 18:01

## 2019-11-26 RX ADMIN — Medication 10 ML/HR: at 02:14

## 2019-11-26 RX ADMIN — ACETAMINOPHEN 650 MG: 325 TABLET ORAL at 23:31

## 2019-11-26 NOTE — PROGRESS NOTES
CTSP due to prolonged decel after epidural placement and AROM- clear. Prolonged decel with return to baseline, but Cat2 tracing. Comforatble with epidural    38 yo  at 39.6 weeks, h/o  x 2  FHT's 150's  [place] Q 1-2 min  Cx 7/80/-3 Vtx     A/P  Multip in active labor  Cat 2 FHT   O2 on, positon changes and FSE on  A+    Discussed  delivery vs  with pat and FOB, questions answered.

## 2019-11-26 NOTE — LACTATION NOTE
This note was copied from a baby's chart. Initial Lactation Consultation: Infant born vaginally during the night to a  mom at 44 6/7 weeks gestation. Mom nursed her other 2 children for 6 mo and 1 yr with adequate milk supply. At the time of my visit, I assisted mom with positioning infant in the cross cradle position. Deep latch noted. Mom will incorporate breast massage into feeding regimen. Feeding Plan: Mother will keep baby skin to skin as often as possible, feed on demand, 8-12x/day , respond to feeding cues, obtain latch, listen for audible swallowing, be aware of signs of oxytocin release/ cramping,thirst,sleepiness while breastfeeding, offer both breasts,and will not limit feedings. Mother agrees to utilize breast massage while nursing to facilitate lactogenesis.

## 2019-11-26 NOTE — ANESTHESIA PROCEDURE NOTES
Epidural Block    Performed by: Lola Montes DO  Authorized by: Lola Montes DO     Pre-Procedure  Indication: labor epidural    Preanesthetic Checklist: patient identified, risks and benefits discussed, anesthesia consent, site marked, patient being monitored, timeout performed and anesthesia consent      Epidural:   Patient position:  Seated  Prep region:  Lumbar  Prep: Patient draped and DuraPrep    Location:  L3-4    Needle and Epidural Catheter:   Needle Type:  Tuohy  Needle Gauge:  17 G  Injection Technique:  Loss of resistance using air  Attempts:  2  Catheter Size:  19 G  Catheter at Skin Depth (cm):  10  Depth in Epidural Space (cm):  5  Events: no blood with aspiration, no cerebrospinal fluid with aspiration, no paresthesia and negative aspiration test    Test Dose:  Negative    Assessment:   Catheter Secured:  Tegaderm and tape  Insertion:  Uncomplicated  Patient tolerance:  Patient tolerated the procedure well with no immediate complications  First attempt resulted in wet tap. Pulled catheter out and successful one level lower.

## 2019-11-26 NOTE — PROGRESS NOTES
Post-Partum Day Number 1 Progress Note    Philippe Serrato     Assessment: Doing well, post partum day 1 VAVD for fetal distress    Plan:  1. Continue routine postpartum and perineal care as well as maternal education. Information for the patient's :  Cameron Hutchinson [[id number]]   Vaginal, Spontaneous   Patient doing well without significant complaint. Voiding without difficulty, normal lochia. Vitals:  Visit Vitals  BP 99/63 (BP 1 Location: Left arm, BP Patient Position: At rest;Supine)   Pulse 77   Temp 97.9 °F (36.6 °C)   Resp 16   Ht 5' 5\" (1.651 m)   Wt 73.9 kg (163 lb)   LMP 2019 (Exact Date)   SpO2 100%   Breastfeeding Unknown   BMI 27.12 kg/m²     Temp (24hrs), Av.9 °F (36.6 °C), Min:97.5 °F (36.4 °C), Max:98.4 °F (36.9 °C)        Exam:   Patient without distress. Abdomen soft, fundus firm, nontender                Lower extremities are negative for swelling, cords or tenderness.     Labs:     Lab Results   Component Value Date/Time    WBC 8.3 2019 01:28 AM    WBC 10.4 2017 07:46 PM    WBC 6.1 2013 06:38 PM    WBC 5.5 2013 12:00 AM    WBC 8.9 2011 05:15 AM    HGB 11.8 2019 01:28 AM    HGB 11.7 2017 07:46 PM    HGB 12.0 2013 06:38 PM    HGB 12.1 2013 12:00 AM    HGB 11.9 2011 05:15 AM    HCT 35.5 2019 01:28 AM    HCT 34.6 (L) 2017 07:46 PM    HCT 35.7 2013 06:38 PM    HCT 39.0 2013 12:00 AM    HCT 35.1 2011 05:15 AM    PLATELET 918  01:28 AM    PLATELET 893  07:46 PM    PLATELET 273  06:38 PM    PLATELET 837  12:00 AM    PLATELET 244  05:15 AM    Hgb, External 10.9 2011    Hct, External 33.5 2011    Platelet cnt., External 337 02/10/2011       Recent Results (from the past 24 hour(s))   CBC WITH AUTOMATED DIFF    Collection Time: 19  1:28 AM   Result Value Ref Range    WBC 8.3 3.6 - 11.0 K/uL    RBC 3.88 3.80 - 5.20 M/uL    HGB 11.8 11.5 - 16.0 g/dL    HCT 35.5 35.0 - 47.0 %    MCV 91.5 80.0 - 99.0 FL    MCH 30.4 26.0 - 34.0 PG    MCHC 33.2 30.0 - 36.5 g/dL    RDW 13.2 11.5 - 14.5 %    PLATELET 900 687 - 815 K/uL    MPV 10.9 8.9 - 12.9 FL    NRBC 0.0 0  WBC    ABSOLUTE NRBC 0.00 0.00 - 0.01 K/uL    NEUTROPHILS 66 32 - 75 %    LYMPHOCYTES 24 12 - 49 %    MONOCYTES 7 5 - 13 %    EOSINOPHILS 2 0 - 7 %    BASOPHILS 0 0 - 1 %    IMMATURE GRANULOCYTES 1 (H) 0.0 - 0.5 %    ABS. NEUTROPHILS 5.5 1.8 - 8.0 K/UL    ABS. LYMPHOCYTES 2.0 0.8 - 3.5 K/UL    ABS. MONOCYTES 0.6 0.0 - 1.0 K/UL    ABS. EOSINOPHILS 0.2 0.0 - 0.4 K/UL    ABS. BASOPHILS 0.0 0.0 - 0.1 K/UL    ABS. IMM.  GRANS. 0.1 (H) 0.00 - 0.04 K/UL    DF AUTOMATED

## 2019-11-26 NOTE — H&P
Labor and Delivery Admission Note  2019    Patient is a 37 y.o. Tamara Guzman at 39w6d with wade 19 who presents with c/o contractions at 0056. She reports contractions started around 0030 and are now q2-3 minutes and strong. She reports good FM. Denies VB, LOF. Denies hx of STIs, never had HSV outbreak or s/s. No n/v/d, fevers. She reports prenatal care with Dr. Abner Juares c/b hx of postpartum depression- no meds, sees counselor. AMA- NT/AFP normal. GBS positive. FOB sickle cell trait, patient AA.      P1 TSVD LMI 7lbs 13oz. Denies complications.  P2 TSVD LFI 7lbs 1oz. Denies complications. PNC: Blood type: A            RH: pos            Hep B: negative            Rubella: immune            GBS status: positive   HIV: non reactive   GC/CT: negative   TPA/RPR/VDRL: negative   MSAFP: negative   1st trimester screen: negative    Recent Results (from the past 12 hour(s))   CBC WITH AUTOMATED DIFF    Collection Time: 19  1:28 AM   Result Value Ref Range    WBC 8.3 3.6 - 11.0 K/uL    RBC 3.88 3.80 - 5.20 M/uL    HGB 11.8 11.5 - 16.0 g/dL    HCT 35.5 35.0 - 47.0 %    MCV 91.5 80.0 - 99.0 FL    MCH 30.4 26.0 - 34.0 PG    MCHC 33.2 30.0 - 36.5 g/dL    RDW 13.2 11.5 - 14.5 %    PLATELET 002 234 - 021 K/uL    MPV 10.9 8.9 - 12.9 FL    NRBC 0.0 0  WBC    ABSOLUTE NRBC 0.00 0.00 - 0.01 K/uL    NEUTROPHILS 66 32 - 75 %    LYMPHOCYTES 24 12 - 49 %    MONOCYTES 7 5 - 13 %    EOSINOPHILS 2 0 - 7 %    BASOPHILS 0 0 - 1 %    IMMATURE GRANULOCYTES 1 (H) 0.0 - 0.5 %    ABS. NEUTROPHILS 5.5 1.8 - 8.0 K/UL    ABS. LYMPHOCYTES 2.0 0.8 - 3.5 K/UL    ABS. MONOCYTES 0.6 0.0 - 1.0 K/UL    ABS. EOSINOPHILS 0.2 0.0 - 0.4 K/UL    ABS. BASOPHILS 0.0 0.0 - 0.1 K/UL    ABS. IMM.  GRANS. 0.1 (H) 0.00 - 0.04 K/UL    DF AUTOMATED       OBHX:   OB History        4    Para   2    Term   2            AB        Living   2       SAB        TAB        Ectopic        Molar        Multiple   0    Live Births 2          Obstetric Comments   Menarche:  13. LMP: 12/10/2012. # of Children:  1. Age at Delivery of First Child:  27.   Hysterectomy/oophorectomy:  NO/NO. Breast Bx:  no.  Hx of Breast Feeding:  yes. BCP:  yes. Hormone therapy:  no. PMH:   Past Medical History:   Diagnosis Date    Anemia     HX OTHER MEDICAL 2/10/11    fibroids    Postpartum depression     with 1st pregnancy    Unspecified breast disorder     fibrocystic breasts, followed at [facility]:   Past Surgical History:   Procedure Laterality Date    HX OTHER SURGICAL  age 1    adnoidectomy       OB/GYN: [id number] at 39w6d with wade . See above    Meds:   Prior to Admission Medications   Prescriptions Last Dose Informant Patient Reported? Taking? CALCIUM CARBONATE (CALCIUM 300 PO) Not Taking at Unknown time  Yes No   Sig: Take  by mouth daily. KRILL OIL PO Not Taking at Unknown time  Yes No   Sig: Take  by mouth. PNV no.24-iron-folic acid-dha (PRENATAL DHA+COMPLETE PRENATAL) -300 mg-mcg-mg cmpk 2019 at Unknown time  Yes Yes   Sig: Take  by mouth. acetaminophen (TYLENOL) 500 mg tablet Not Taking at Unknown time  Yes No   Sig: Take 1 Tab by mouth every six (6) hours as needed for Pain. albuterol (PROAIR HFA) 90 mcg/actuation inhaler Not Taking at Unknown time  No No   Sig: Take 2 puffs by inhalation every four (4) hours as needed for Wheezing or Shortness of Breath. ferrous sulfate (SLOW FE) 142 mg (45 mg iron) ER tablet 2019 at 2130  Yes Yes   Sig: Take 625 mg by mouth two (2) times a day. fluticasone (FLONASE ALLERGY RELIEF) 50 mcg/actuation nasal spray Not Taking at Unknown time  Yes No   Si Sprays by Both Nostrils route daily. nystatin (MYCOSTATIN) 100,000 unit/mL suspension Not Taking at Unknown time  No No   Sig: Take 5 mL by mouth four (4) times daily. swish and spit      Facility-Administered Medications: None       Allergies:    Allergies   Allergen Reactions    Sulfa (Sulfonamide Antibiotics) Anaphylaxis    Pseudoephedrine Palpitations       Pertinent ROS: Review of Systems - Negative except noted in HPI    [facility]:   Family History   Problem Relation Age of Onset    Hypertension Mother         caused by RA rx   [de-identified] Arthritis-rheumatoid Mother     Stroke Maternal Grandfather     Cancer Neg Hx     Heart Disease Neg Hx        SH:   Social History     Socioeconomic History    Marital status:      Spouse name: Not on file    Number of children: Not on file    Years of education: Not on file    Highest education level: Not on file   Occupational History    Not on file   Social Needs    Financial resource strain: Not on file    Food insecurity:     Worry: Not on file     Inability: Not on file    Transportation needs:     Medical: Not on file     Non-medical: Not on file   Tobacco Use    Smoking status: Never Smoker    Smokeless tobacco: Never Used   Substance and Sexual Activity    Alcohol use: No    Drug use: No    Sexual activity: Yes     Partners: Male     Birth control/protection: None   Lifestyle    Physical activity:     Days per week: Not on file     Minutes per session: Not on file    Stress: Not on file   Relationships    Social connections:     Talks on phone: Not on file     Gets together: Not on file     Attends Synagogue service: Not on file     Active member of club or organization: Not on file     Attends meetings of clubs or organizations: Not on file     Relationship status: Not on file    Intimate partner violence:     Fear of current or ex partner: Not on file     Emotionally abused: Not on file     Physically abused: Not on file     Forced sexual activity: Not on file   Other Topics Concern     Service Not Asked    Blood Transfusions Not Asked    Caffeine Concern Not Asked    Occupational Exposure Not Asked    Hobby Hazards Not Asked    Sleep Concern Not Asked    Stress Concern Not Asked    Weight Concern Not Asked    Special Diet Not Asked    Back Care Not Asked    Exercise Not Asked    Bike Helmet Not Asked    [facility] Not Asked    Self-Exams Not Asked   Social History Narrative    Not on file       OBJECTIVE:    No data recorded. Visit Vitals  Ht 5' 5\" (1.651 m)   Wt 73.9 kg (163 lb)   LMP 2019 (Exact Date)   Breastfeeding No   BMI 27.12 kg/m²       FHR baseline 155s, no accels, variables, moderate variability  [place] q1.5-3 minutes, strong    Exam:  General: alert & oriented x3  HEENT:  normal   Lungs:  clear  Cor:  RRR  Abdomen:  Soft, non-tender                    Clinical EFW 7lbs 8oz  Cervix:  5/90/-2, vtx by Myna Rule, RN  Fluid:  intact  Pelvimetry:  AP-good                      Arch- adequate                      Sidewalls- adequate                      Pelvis feels adequate for fetus.   Extremities:  normal, no edema       Impression:  at 39w6d IUP  Active labor  Cat 2 tracing  GBS positive  Hx of postpartum depression      Plan:  Admit  Review and sign consents  CBC, T&S  GBS prophylaxis  Epidural  Continuous monitoring  Reviewed prenatal records      Jacques Grayson CNM  1:33 AM

## 2019-11-26 NOTE — PROGRESS NOTES
Summary of events:  Dr. Leeanne Parks in room at approx [phone] to assess patient. SVE unchanged and still OT/OP. FSE fell off and reapplied by Dr. Leeanne Parks at 4387. Patient then placed in L lateral and continues to have late decels. Patient continues to have O2 and changing positions. 12 Dr. Leeanne Parks back in room assessing and will continue to monitor. I remained in room with RN. At  [facility] Dr. Leeanne Parks back in room assessing and assumed care of patient at this time. See her note and nurses note.

## 2019-11-26 NOTE — PROGRESS NOTES
6828 - Patient arrived c/o contractions that began at [address]. Patient denies any leaking of fluid or vaginal bleeding and reports active fetal movement. 80 - SPily Pineda notified of patient arrival, SVE, and GBS positive. Admission orders received. 0130 - Bedside and Verbal shift change report given to JOHNATHON Shoemaker RN (oncoming nurse) by MARYA Barkley RN (offgoing nurse). Report included the following information SBAR.

## 2019-11-26 NOTE — PROGRESS NOTES
Labor Progress Note    S: Patient seen, fetal heart rate and contraction pattern evaluated. Patient s/p epidural and getting more comfortable. Partner at bedside.     Physical Exam:  Patient Vitals for the past 4 hrs:   Temp Pulse Resp BP   19 0105 97.5 °F (36.4 °C) 79 16 117/74         Cervical Exam: /-1, BBOW  Membranes:  AROM per patient request for small amount clear fluid  Uterine Contractions:  Q1.5-3 minutes  Fetal Heart Rate: 150s, +scalp stim audibly heard on exam, variables, moderate variability      Assessment/Plan:    37 y.o.  at 39w6d IUP  Active labor  Cat 2 tracing  GBS positive    P:  AROM  Position change  Anticipate     Jones Molina CNM

## 2019-11-26 NOTE — ROUTINE PROCESS
TRANSFER - IN REPORT:    Verbal report received from Berkley(name) on Silvana Antis  being received from L&D(unit) for routine progression of care      Report consisted of patients Situation, Background, Assessment and   Recommendations(SBAR). Information from the following report(s) SBAR was reviewed with the receiving nurse. Opportunity for questions and clarification was provided. Assessment completed upon patients arrival to unit and care assumed.

## 2019-11-26 NOTE — PROGRESS NOTES
This patient was 30 or more weeks gestation at the time of [facility] go-live. For complete information pertaining to this patient's pregnancy, please refer to the paper chart and ACOG form. Delivery Note    Obstetrician:  Vicki Fortune MD    Assistant: none    Pre-Delivery Diagnosis: Term pregnancy, Spontaneous labor and Single fetus, Cat 2-3 FHT    Post-Delivery Diagnosis: Living  infant(s), Female and nuchal cord x 2, body/arm cord, terminal meconium    Intrapartum Event: Decreased variability, Extended fetal bradycardia, Meconium and Multiple late decelerations    Procedure: Vacuum assisted delivery, vacuum applied x 3 pulls with advancement and rotation to OA, no pop-offs    Epidural: YES    Monitor:  Fetal Heart Tones - Internal and Uterine Contractions - External    Indications for instrumental delivery: none, fetal intolerance of labor and malrotation    Estimated Blood Loss: 300 cc    Episiotomy: none    Laceration(s):  1st degree    Laceration(s) repair: YES, 2.0 chromic    Presentation: Cephalic, OT rotated to OA    Fetal Description: patterson    Fetal Position: Occiput Anterior    Birth Weight: 0000    Birth Length: 0000    Apgar - One Minute: 8    Apgar - Five Minutes: 9    Umbilical Cord: Nuchal Cord x  3, 3 vessels present and body cord    Specimens: none           Complications:  fetal distress           Cord Blood Results:   Information for the patient's :  Elba Wharton [[id number]]   No results found for: PCTABR, ABORH, PCTDIG, BILI, ABORH, ABORHEXT    Prenatal Labs:     Lab Results   Component Value Date/Time    HBsAg, External Negative 2019    HIV, External Non-reactive 2019    Rubella, External Immune 2019    RPR, External non reactive 2011    Gonorrhea, External negative 02/10/2011    Chlamydia, External negative 02/10/2011    GrBStrep, External Positive 10/30/2019        Attending Attestation: I performed the procedure    Signed By: Halie Cash MD     November 26, 2019

## 2019-11-26 NOTE — DISCHARGE SUMMARY
Obstetrical Discharge Summary     Name: Marley Ackerman MRN: [id number]  SSN: [id number]    YOB: 1985  Age: 37 y.o. Sex: female      Admit Date: 2019    Discharge Date: 2019     Admitting Physician: Freedom Cardoso MD     Attending Physician:  Genevieve Goodwin MD     Admission Diagnoses: 39 weeks gestation of pregnancy [Z3A.39]  Uterine contractions during pregnancy [O62.2]    Discharge Diagnoses:   Information for the patient's :  Dionne Squibb [[id number]]   Delivery of a 3.505 kg female infant via Vaginal, Spontaneous on 2019 at 4:06 AM  by Freedom Cardoso. Apgars were 8  and 9 . Additional Diagnoses:   Hospital Problems  Date Reviewed: 2019          Codes Class Noted POA    39 weeks gestation of pregnancy ICD-10-CM: Z3A.39  ICD-9-CM: V22.2  2019 Unknown        Uterine contractions during pregnancy ICD-10-CM: O62.2  ICD-9-CM: 661.20  2019 Unknown             Lab Results   Component Value Date/Time    Rubella, External Immune 2019    GrBStrep, External Positive 10/30/2019       Immunization(s):   Immunization History   Administered Date(s) Administered    Influenza Vaccine 2014, 10/05/2015        Hospital Course: Normal hospital course following the delivery. VAVD due to fetal decelerations. 1st degree laceration. Patient Instructions:   Current Discharge Medication List      START taking these medications    Details   ibuprofen (MOTRIN) 600 mg tablet Take 1 Tab by mouth every six (6) hours as needed for Pain. Qty: 50 Tab, Refills: 1         CONTINUE these medications which have NOT CHANGED    Details   ferrous sulfate (SLOW FE) 142 mg (45 mg iron) ER tablet Take 625 mg by mouth two (2) times a day. PNV no.24-iron-folic acid-dha (PRENATAL DHA+COMPLETE PRENATAL) -300 mg-mcg-mg cmpk Take  by mouth. nystatin (MYCOSTATIN) 100,000 unit/mL suspension Take 5 mL by mouth four (4) times daily.  swish and spit  Qty: 60 mL, Refills: 1    Associated Diagnoses: Thrush      fluticasone (FLONASE ALLERGY RELIEF) 50 mcg/actuation nasal spray 2 Sprays by Both Nostrils route daily. Associated Diagnoses: Environmental allergies      CALCIUM CARBONATE (CALCIUM 300 PO) Take  by mouth daily. KRILL OIL PO Take  by mouth. acetaminophen (TYLENOL) 500 mg tablet Take 1 Tab by mouth every six (6) hours as needed for Pain. Refills: 0    Associated Diagnoses: Viral upper respiratory tract infection      albuterol (PROAIR HFA) 90 mcg/actuation inhaler Take 2 puffs by inhalation every four (4) hours as needed for Wheezing or Shortness of Breath. Qty: 1 Inhaler, Refills: 6    Associated Diagnoses: History of wheezing             Please make followup appointment for 4-6 weeks  Pelvic rest for 6 weeks  Pain medication as prescribed. Use of contraception as discussed.     Condition: stable  Disposition: home    Follow-up Appointments   Procedures    [facility]     Standing Status:   Standing     Number of Occurrences:   1     Order Specific Question:   Appointment in     Answer:   6 Weeks        Signed By:  Joss Reyes MD     November 26, 2019

## 2019-11-26 NOTE — PROGRESS NOTES
36 Dr Maryjane Motley at bedside for epidural, time out done, belts off    Fadumo Bananeira 835 at bedside to do SVE and AROM    0242 Dr Nabil Hinson at bedside to check patient    [phone] came off during a SVE, Dr Sharon Aviles replaced FSE    8480 Dr Sharon Aviles came in to view strip and check on patient    0320 Dr Sharon Aviles at bedside to view strip and talk to patient. [place] remains at bedside    6908 Dr Sharon Aviles asked for a vacuum    0406 VAVD liveborn female delivered by Dr Nabil Hinson    3227 TRANSFER - OUT REPORT:    Verbal report given to MARYA Cárdenas RN(name) on Chepe Taylor  being transferred to [facility](unit) for routine progression of care       Report consisted of patients Situation, Background, Assessment and   Recommendations(SBAR). Information from the following report(s) SBAR, Kardex, Procedure Summary, Intake/Output and MAR was reviewed with the receiving nurse. Lines:   Peripheral IV 11/26/19 Left Wrist (Active)   Site Assessment Clean, dry, & intact 11/26/2019  1:25 AM   Phlebitis Assessment 0 11/26/2019  1:25 AM   Infiltration Assessment 0 11/26/2019  1:25 AM   Dressing Status Clean, dry, & intact 11/26/2019  1:25 AM   Dressing Type Tape;Transparent 11/26/2019  1:25 AM   Hub Color/Line Status Pink 11/26/2019  1:25 AM        Opportunity for questions and clarification was provided.       Patient transported with:   Registered Nurse

## 2019-11-26 NOTE — ROUTINE PROCESS
Bedside and Verbal shift change report given to JOHNATHON Staples (oncoming nurse) by AMIRA Mishra (offgoing nurse). Report included the following information SBAR.     1810 Pt c/o frontal headache that feels like pressure after returning from the bathroom. No c/o blurry vision or RUQ pain. /77, pulse 77. Pt reports she has not eaten much or slept much. Medicated with Tylenol and returned to bed. States she feels better lying down. FOB gone to get pt some food. Will continue to observe and re-evaluate. 1845 Pt has eaten. Reports headache pain is now 1 out of 10. Will continue to observe.

## 2019-11-26 NOTE — ANESTHESIA POSTPROCEDURE EVALUATION
* No procedures listed *.    epidural    Anesthesia Post Evaluation        Patient location during evaluation: PACU  Patient participation: complete - patient participated  Level of consciousness: awake  Pain management: adequate  Airway patency: patent  Anesthetic complications: no  Cardiovascular status: hemodynamically stable  Respiratory status: acceptable  Hydration status: acceptable  Comments: I have seen and evaluated the patient. The patient is ready for PACU discharge. [address], DO                         No vitals data found for the desired time range.

## 2019-11-26 NOTE — PROGRESS NOTES
Labor Progress Note    S: Patient seen, fetal heart rate and contraction pattern evaluated. Called by RN for prolonged decel s/p epidural. Patient feeling lots of pressure      O:    Patient Vitals for the past 12 hrs:   Temp Pulse Resp BP SpO2   19 0442 -- 64 -- 117/55 --   19 0427 97.9 °F (36.6 °C) 70 14 113/55 --   19 0334 -- -- -- -- 100 %   19 0333 -- 63 -- 108/58 --   19 0320 -- 69 -- 133/64 --   19 0231 -- 66 -- 106/58 --   19 0228 -- 67 -- 112/55 --   19 0105 97.5 °F (36.4 °C) 79 16 117/74 --     Cervical Exam: /-2 OP  Membranes:  clear  Uterine Contractions:  Q1.5-2.5 minutes, strong  Fetal Heart Rate: 150s, no accels, late decels, prolonged decel x 3.5 minutes with mehdi to 50s, moderate variability  Recovered to 160s  Interventions for decels: O2, bolus, position change    Assessment/Plan:    37 y.o.  at 39w6d IUP  Active labor  Cat 2 tracing  GBS positive    P:  Dr. La Suero at desk and reviewed patient, progress and strip with her.   Will continue to monitor    Maryjane Duarte CNM

## 2019-11-27 PROCEDURE — 77030040922 HC BLNKT HYPOTHRM STRY -A

## 2019-11-27 PROCEDURE — 3E0R3GC INTRODUCTION OF OTHER THERAPEUTIC SUBSTANCE INTO SPINAL CANAL, PERCUTANEOUS APPROACH: ICD-10-PCS | Performed by: ANESTHESIOLOGY

## 2019-11-27 PROCEDURE — 62273 INJECT EPIDURAL PATCH: CPT

## 2019-11-27 PROCEDURE — 65410000002 HC RM PRIVATE OB

## 2019-11-27 PROCEDURE — 74011250637 HC RX REV CODE- 250/637: Performed by: ADVANCED PRACTICE MIDWIFE

## 2019-11-27 RX ADMIN — ACETAMINOPHEN 650 MG: 325 TABLET ORAL at 17:40

## 2019-11-27 RX ADMIN — Medication 1 TABLET: at 11:53

## 2019-11-27 RX ADMIN — ACETAMINOPHEN 650 MG: 325 TABLET ORAL at 13:16

## 2019-11-27 RX ADMIN — ACETAMINOPHEN 650 MG: 325 TABLET ORAL at 08:35

## 2019-11-27 RX ADMIN — ACETAMINOPHEN 650 MG: 325 TABLET ORAL at 03:47

## 2019-11-27 RX ADMIN — ACETAMINOPHEN 650 MG: 325 TABLET ORAL at 21:58

## 2019-11-27 NOTE — ROUTINE PROCESS
Bedside and Verbal shift change report given to INEDR Peterson (oncoming nurse) by AMIRA Wells RN (offgoing nurse). Report included the following information SBAR.

## 2019-11-27 NOTE — PROGRESS NOTES
Epidural Blood Patch Procedure Note      Patient is a 38 y/o woman s/p  dural puncture . Patient has subsequently developed a positional headache relieved with assuming the recumbent position. No fever or focal neurological deficits. Patient wishes to pursue aggressive treatment with epidural blood patch vs conservative therapy. Risk and benefits were discussed with the patient and plans are to proceed. Patient was placed in the lateral position. The back was prepped in the lumbar region and left arm with betadine. 2% lidocaine was used as local at L3 - L4. A 17 Tuohy needle was passed 1 attempt(s) at the above stated level. Loss of resistance was obtained using saline. No blood or CSF. 20 mL of sterile blood was withdrawn and slowly placed in the epidural space. Patient tolerated the procedure well. Patient was instructed to followup emergently in the nearest ER if she experiences signs and symptoms of focal neurological deficits ie. bowel / bladder / motor loss or localized tenderness / erythema at the epidural site associated with fever. Patient also instructed not do any heavy lifting for the next 36-48 hours.

## 2019-11-27 NOTE — PROGRESS NOTES
Pt has probable spinal headache from \"wet tap\" during epidural placement. Postural, neck pain radiating to front. H/A 9/10. Discussed options, she prefers to attempt fluids in lieu of blood patch for now. I encouraged more definitive tx prior to discharge if h/a not improved.

## 2019-11-27 NOTE — LACTATION NOTE
This note was copied from a baby's chart. Infant improving at breast; mom states infant is latching well and she can hear swallowing when infant nurses. Mom has no concerns at this time. Breasts may become engorged when milk \"comes in\". How milk is made / normal phases of milk production, supply and demand discussed. Taught care of engorged breasts - frequent breastfeeding encouraged, warm compresses and breast massage ac. Then nurse the baby or pump. Apply cold compresses pc x 15 minutes a few times a day for swelling or discomfort. May need to do this care for a couple of days. Discussed prevention and treatment of mastitis.

## 2019-11-27 NOTE — ROUTINE PROCESS
Bedside and Verbal shift change report given to AMIRA Cardoza RN (oncoming nurse) by Guillermo Sharma RN (offgoing nurse). Report included the following information SBAR, MAR and Recent Results. 8180- Pt has decided to proceed with blood patch. Anesthesia called, spoke with Lin De Leon regarding pt's decision to get blood patch done. MD stated to call [phone], OR holding to get pt on schedule. OR holding called, spoke to Josie Chowdhury to call back this nurse when they can get pt on schedule. Pt informed of plan of care. 0930- pt getting ready to leave for blood patch procedure. 1105-Report received from Carmen in holding. 1115- Pt returned to room from [address] [place] [address].

## 2019-11-27 NOTE — LACTATION NOTE
This note was copied from a baby's chart. Not seen at breast, mother declines [facility] consult, expresses confidence in ability to breastfeed independently. Mother states that she has no further questions for Lactation Consultant before discharge.

## 2019-11-27 NOTE — PROGRESS NOTES
Called to assess patient for possible PDPH. Suggested they call the day OB Anesthesiologist at 7 am for assessment and suggested Tylenol or Toradol in the meantime.   Unlikely to perform Blood patch at 3:40 am.    Cristela Farias MD

## 2019-11-27 NOTE — PROGRESS NOTES
Post-Partum Day Number 1 Progress Note    Maxcine Person     Assessment: Doing well, post partum day 1    Plan:  1. Continue routine postpartum and perineal care as well as maternal education. Information for the patient's :  Filomena Jernigan [[id number]]   Vaginal, Spontaneous   Patient doing well without significant complaint. Voiding without difficulty, normal lochia. Vitals:  Visit Vitals  /63   Pulse 71   Temp 97.9 °F (36.6 °C)   Resp 17   Ht 5' 5\" (1.651 m)   Wt 73.9 kg (163 lb)   LMP 2019 (Exact Date)   SpO2 100%   Breastfeeding Unknown   BMI 27.12 kg/m²     Temp (24hrs), Av °F (36.7 °C), Min:97.9 °F (36.6 °C), Max:98.2 °F (36.8 °C)        Exam:   Patient without distress. Abdomen soft, fundus firm, nontender                Perineum with normal lochia noted. Lower extremities are negative for swelling, cords or tenderness. Labs:     Lab Results   Component Value Date/Time    WBC 8.3 2019 01:28 AM    WBC 10.4 2017 07:46 PM    WBC 6.1 2013 06:38 PM    WBC 5.5 2013 12:00 AM    WBC 8.9 2011 05:15 AM    HGB 11.8 2019 01:28 AM    HGB 11.7 2017 07:46 PM    HGB 12.0 2013 06:38 PM    HGB 12.1 2013 12:00 AM    HGB 11.9 2011 05:15 AM    HCT 35.5 2019 01:28 AM    HCT 34.6 (L) 2017 07:46 PM    HCT 35.7 2013 06:38 PM    HCT 39.0 2013 12:00 AM    HCT 35.1 2011 05:15 AM    PLATELET 671  01:28 AM    PLATELET 042  07:46 PM    PLATELET 596  06:38 PM    PLATELET 274  12:00 AM    PLATELET 468  05:15 AM    Hgb, External 10.9 2011    Hct, External 33.5 2011    Platelet cnt., External 337 02/10/2011       No results found for this or any previous visit (from the past 24 hour(s)).

## 2019-11-27 NOTE — ROUTINE PROCESS
Bedside shift change report given to [facility] (oncoming nurse) by Emiliana Felipe RN (offgoing nurse). Report included the following information SBAR, Kardex, Intake/Output and MAR.

## 2019-11-27 NOTE — PROGRESS NOTES
Patient complaining of 9/10 headache that is only relieved by laying flat. Needed to void but had to sit back down because of the intense pain. Called Anesthesia Dr Edie Hill to assess patient for a possible spinal headache. Dr. Edie Hill advised me that \" nothing could be done even if it is one, to give her a bed pan. \"

## 2019-11-27 NOTE — PROGRESS NOTES
Anesthesia to bedside. Blood patch recommended. However mom would like to try fluids until lunch and see how she feels. If still in pain would like to get the blood patch.

## 2019-11-28 VITALS
RESPIRATION RATE: 16 BRPM | OXYGEN SATURATION: 98 % | HEART RATE: 63 BPM | DIASTOLIC BLOOD PRESSURE: 62 MMHG | WEIGHT: 163 LBS | HEIGHT: 65 IN | SYSTOLIC BLOOD PRESSURE: 108 MMHG | TEMPERATURE: 98 F | BODY MASS INDEX: 27.16 KG/M2

## 2019-11-28 PROCEDURE — 74011250637 HC RX REV CODE- 250/637: Performed by: ADVANCED PRACTICE MIDWIFE

## 2019-11-28 RX ORDER — IBUPROFEN 600 MG/1
600 TABLET ORAL
Qty: 50 TAB | Refills: 0 | Status: SHIPPED | OUTPATIENT
Start: 2019-11-28 | End: 2020-12-21 | Stop reason: ALTCHOICE

## 2019-11-28 RX ADMIN — ACETAMINOPHEN 650 MG: 325 TABLET ORAL at 08:59

## 2019-11-28 RX ADMIN — Medication 1 TABLET: at 08:59

## 2019-11-28 NOTE — DISCHARGE SUMMARY
Obstetrical Discharge Summary     Name: Artie Cooper MRN: [id number]  SSN: [id number]    YOB: 1985  Age: 37 y.o. Sex: female      Admit Date: 2019    Discharge Date: 2019     Admitting Physician: Izzy Jeffries MD     Attending Physician:  Janet Cortes MD     Admission Diagnoses: 39 weeks gestation of pregnancy [Z3A.39]  Uterine contractions during pregnancy [O62.2]    Discharge Diagnoses:   Information for the patient's :  Jessee Armstrong [[id number]]   Delivery of a 3.505 kg female infant via Vaginal, Spontaneous on 2019 at 4:06 AM  by Izzy Jeffries. Apgars were 8  and 9 . Additional Diagnoses:   Hospital Problems  Date Reviewed: 2019          Codes Class Noted POA    39 weeks gestation of pregnancy ICD-10-CM: Z3A.39  ICD-9-CM: V22.2  2019 Unknown        Uterine contractions during pregnancy ICD-10-CM: O62.2  ICD-9-CM: 661.20  2019 Unknown             Lab Results   Component Value Date/Time    Rubella, External Immune 2019    GrBStrep, External Positive 10/30/2019       Hospital Course: Normal hospital course following the delivery. Disposition at Discharge: Home or self care    Discharged Condition: Stable    Patient Instructions:   Current Discharge Medication List      START taking these medications    Details   ibuprofen (MOTRIN) 600 mg tablet Take 1 Tab by mouth every six (6) hours as needed for Pain. Qty: 50 Tab, Refills: 0         CONTINUE these medications which have NOT CHANGED    Details   ferrous sulfate (SLOW FE) 142 mg (45 mg iron) ER tablet Take 625 mg by mouth two (2) times a day. PNV no.24-iron-folic acid-dha (PRENATAL DHA+COMPLETE PRENATAL) -300 mg-mcg-mg cmpk Take  by mouth. nystatin (MYCOSTATIN) 100,000 unit/mL suspension Take 5 mL by mouth four (4) times daily. swish and spit  Qty: 60 mL, Refills: 1    Associated Diagnoses:  Thrush      fluticasone (FLONASE ALLERGY RELIEF) 50 mcg/actuation nasal spray 2 Sprays by Both Nostrils route daily. Associated Diagnoses: Environmental allergies      CALCIUM CARBONATE (CALCIUM 300 PO) Take  by mouth daily. KRILL OIL PO Take  by mouth. acetaminophen (TYLENOL) 500 mg tablet Take 1 Tab by mouth every six (6) hours as needed for Pain. Refills: 0    Associated Diagnoses: Viral upper respiratory tract infection      albuterol (PROAIR HFA) 90 mcg/actuation inhaler Take 2 puffs by inhalation every four (4) hours as needed for Wheezing or Shortness of Breath. Qty: 1 Inhaler, Refills: 6    Associated Diagnoses: History of wheezing             Reference my discharge instructions.     Follow-up Appointments   Procedures    FOLLOW UP VISIT Appointment in: Prasad Lopez     Standing Status:   Standing     Number of Occurrences:   1     Order Specific Question:   Appointment in     Answer:   6 Weeks        Signed By:  Davion Elliott MD     November 28, 2019

## 2019-11-28 NOTE — PROGRESS NOTES
Post-Partum Day Number 2 Progress Note    Ingris Campos     Assessment: Doing well, post partum day 2    Plan:   1. Discharge home today  2. Follow up in office in 6 weeks with Tramaine Graves MD  3. Post partum activity advised, diet as tolerated      Information for the patient's :  Tyrone Alvarenga [[id number]]   Vaginal, Spontaneous   Patient doing well without significant complaint. Voiding without difficulty, normal lochia. Vitals:  Visit Vitals  BP 97/54 (BP 1 Location: Left arm, BP Patient Position: At rest)   Pulse (!) 54   Temp 97.9 °F (36.6 °C)   Resp 16   Ht 5' 5\" (1.651 m)   Wt 73.9 kg (163 lb)   LMP 2019 (Exact Date)   SpO2 98%   Breastfeeding Unknown   BMI 27.12 kg/m²     Temp (24hrs), Av °F (36.7 °C), Min:97.9 °F (36.6 °C), Max:98.2 °F (36.8 °C)      Exam:         Patient without distress. Abdomen soft, fundus firm, nontender                 Lower extremities are negative for swelling, cords or tenderness. Labs:     Lab Results   Component Value Date/Time    WBC 8.3 2019 01:28 AM    WBC 10.4 2017 07:46 PM    WBC 6.1 2013 06:38 PM    WBC 5.5 2013 12:00 AM    WBC 8.9 2011 05:15 AM    HGB 11.8 2019 01:28 AM    HGB 11.7 2017 07:46 PM    HGB 12.0 2013 06:38 PM    HGB 12.1 2013 12:00 AM    HGB 11.9 2011 05:15 AM    HCT 35.5 2019 01:28 AM    HCT 34.6 (L) 2017 07:46 PM    HCT 35.7 2013 06:38 PM    HCT 39.0 2013 12:00 AM    HCT 35.1 2011 05:15 AM    PLATELET 737  01:28 AM    PLATELET 260  07:46 PM    PLATELET 273  06:38 PM    PLATELET 216  12:00 AM    PLATELET 617  05:15 AM    Hgb, External 10.9 2011    Hct, External 33.5 2011    Platelet cnt., External 337 02/10/2011       No results found for this or any previous visit (from the past 24 hour(s)).

## 2019-11-28 NOTE — DISCHARGE INSTRUCTIONS
Patient Education        Vaginal Childbirth: Care Instructions  Your Care Instructions    Your body will slowly heal in the next few weeks. It is easy to get too tired and overwhelmed during the first weeks after your baby is born. Changes in your hormones can shift your mood without warning. You may find it hard to meet the extra demands on your energy and time. Take it easy on yourself. Follow-up care is a key part of your treatment and safety. Be sure to make and go to all appointments, and call your doctor if you are having problems. It's also a good idea to know your test results and keep a list of the medicines you take. How can you care for yourself at home? · Vaginal bleeding and cramps  ? After delivery, you will have a bloody discharge from the vagina. This will turn pink within a week and then white or yellow after about 10 days. It may last for 2 to 4 weeks or longer, until the uterus has healed. Use pads instead of tampons until you stop bleeding. ? Do not worry if you pass some blood clots, as long as they are smaller than a golf ball. If you have a tear or stitches in your vaginal area, change the pad at least every 4 hours to prevent soreness and infection. ? You may have cramps for the first few days after childbirth. These are normal and occur as the uterus shrinks to normal size. Take an over-the-counter pain medicine, such as acetaminophen (Tylenol), ibuprofen (Advil, Motrin), or naproxen (Aleve), for cramps. Read and follow all instructions on the label. Do not take aspirin, because it can cause more bleeding. ? Do not take two or more pain medicines at the same time unless the doctor told you to. Many pain medicines have acetaminophen, which is Tylenol. Too much acetaminophen (Tylenol) can be harmful. · Stitches  ? If you have stitches, they will dissolve on their own and do not need to be removed. Follow your doctor's instructions for cleaning the stitched area.   ? Put ice or a cold pack on your painful area for 10 to 20 minutes at a time, several times a day, for the first few days. Put a thin cloth between the ice and your skin. ? Sit in a few inches of warm water (sitz bath) 3 times a day and after bowel movements. The warm water helps with pain and itching. If you do not have a tub, a warm shower might help. · Breast fullness  ? Your breasts may overfill (engorge) in the first few days after delivery. To help milk flow and to relieve pain, warm your breasts in the shower or by using warm, moist towels before nursing. ? If you are not nursing, do not put warmth on your breasts or touch your breasts. Wear a tight bra or sports bra and use ice until the fullness goes away. This usually takes 2 to 3 days. ? Put ice or a cold pack on your breast after nursing to reduce swelling and pain. Put a thin cloth between the ice and your skin. · Activity  ? Eat a balanced diet. Do not try to lose weight by cutting calories. Keep taking your prenatal vitamins, or take a multivitamin. ? Get as much rest as you can. Try to take naps when your baby sleeps during the day. ? Get some exercise every day. But do not do any heavy exercise until your doctor says it is okay. ? Wait until you are healed (about 4 to 6 weeks) before you have sexual intercourse. Your doctor will tell you when it is okay to have sex. ? Talk to your doctor about birth control. You can get pregnant even before your period returns. Also, you can get pregnant while you are breastfeeding. · Mental health  ? It is normal to have some sadness, anxiety, sleeplessness, and mood swings after you go home. If you feel upset or hopeless for more than a few days or are having trouble doing the things you need to do, talk to your doctor. · Constipation and hemorrhoids  ? Drink plenty of fluids, enough so that your urine is light yellow or clear like water.  If you have kidney, heart, or liver disease and have to limit fluids, talk with your doctor before you increase the amount of fluids you drink. ? Eat plenty of fiber each day. Have a bran muffin or bran cereal for breakfast, and try eating a piece of fruit for a mid-afternoon snack. ? For painful, itchy hemorrhoids, put ice or a cold pack on the area several times a day for 10 minutes at a time. Follow this by putting a warm compress on the area for another 10 to 20 minutes or by sitting in a shallow, warm bath. When should you call for help? Call 911 anytime you think you may need emergency care. For example, call if:    · You have thoughts of harming yourself, your baby, or another person.     · You passed out (lost consciousness).     · You have chest pain, are short of breath, or cough up blood.     · You have a seizure.    Call your doctor now or seek immediate medical care if:    · You have severe vaginal bleeding.     · You are dizzy or lightheaded, or you feel like you may faint.     · You have a fever.     · You have new or more pain in your belly or pelvis.     · You have symptoms of a blood clot in your leg (called a deep vein thrombosis), such as:  ? Pain in the calf, back of the knee, thigh, or groin. ? Redness and swelling in your leg or groin.     · You have signs of preeclampsia, such as:  ? Sudden swelling of your face, hands, or feet. ? New vision problems (such as dimness, blurring, or seeing spots). ? A severe headache.    Watch closely for changes in your health, and be sure to contact your doctor if:    · Your vaginal bleeding seems to be getting heavier.     · You have new or worse vaginal discharge.     · You feel sad, anxious, or hopeless for more than a few days.     · You do not get better as expected. Where can you learn more? Go to http://paula-sahil.info/. Enter P408 in the search box to learn more about \"Vaginal Childbirth: Care Instructions. \"  Current as of: May 29, 2019  Content Version: 12.2  © 3181-2089 [email or web address], [facility].  Care instructions adapted under license by [email or web address] (which disclaims liability or warranty for this information). If you have questions about a medical condition or this instruction, always ask your healthcare professional. [address] any warranty or liability for your use of this information.

## 2019-11-28 NOTE — ROUTINE PROCESS
Bedside shift change report given to JOHNNY Aguilar (oncoming nurse) by Olesya Rasheed RN (offgoing nurse). Report included the following information SBAR.     1200-Pt discharged home with family. DIscharge instructions reviewed. Family verbalized understanding.

## 2020-06-19 DIAGNOSIS — B37.0 THRUSH: ICD-10-CM

## 2020-06-19 RX ORDER — NYSTATIN 100000 [USP'U]/ML
500000 SUSPENSION ORAL 4 TIMES DAILY
Qty: 60 ML | Refills: 1 | Status: SHIPPED | OUTPATIENT
Start: 2020-06-19 | End: 2021-08-03

## 2020-06-26 ENCOUNTER — TELEPHONE (OUTPATIENT)
Dept: INTERNAL MEDICINE CLINIC | Age: 35
End: 2020-06-26

## 2020-06-26 NOTE — TELEPHONE ENCOUNTER
She needs to discuss pediatrician to make sure but nystatin swish and swallow should be ok but I am not sure what prescription she was given

## 2020-06-26 NOTE — TELEPHONE ENCOUNTER
Patient request a return call regarding concerns w/ thrush after having second child & will the Rx effect the baby while she is breast feeding Patient best contact [phone]

## 2020-06-29 NOTE — TELEPHONE ENCOUNTER
Left v/m that patient should check with pediatrician to make absolutely sure it is ok to use nystatin while breast feeding. Pt is to return my call if she has any more questions.

## 2020-07-17 ENCOUNTER — TELEPHONE (OUTPATIENT)
Dept: INTERNAL MEDICINE CLINIC | Age: 35
End: 2020-07-17

## 2020-07-17 NOTE — TELEPHONE ENCOUNTER
Spoke with patient and she stated that she keeps having thrush and was inquiring why. Advised her to change her toothbrush and use mouthwash she already has. Pt is to call back early next week if she still feels like she has a thrush infection. Pt understood and was thankful for the call.

## 2020-07-17 NOTE — TELEPHONE ENCOUNTER
----- Message from Hannah Galloway sent at 7/17/2020 12:01 PM EDT -----  Regarding: Dr. Dimitris Prabhakar Message/Vendor Calls    Caller's first and last name:Edwige Beyer      Reason for call:questions about mouthwash for Thrush      Callback required yes/no and why:yes      Best contact number(s):[phone]      Details to clarify the request:please call the patient today she has questions about the mouthwash for thrust she is breastfeeding.     Hannah Galloway

## 2020-12-21 ENCOUNTER — OFFICE VISIT (OUTPATIENT)
Dept: INTERNAL MEDICINE CLINIC | Age: 35
End: 2020-12-21
Payer: COMMERCIAL

## 2020-12-21 VITALS
RESPIRATION RATE: 18 BRPM | BODY MASS INDEX: 23.56 KG/M2 | HEIGHT: 65 IN | WEIGHT: 141.4 LBS | OXYGEN SATURATION: 99 % | SYSTOLIC BLOOD PRESSURE: 109 MMHG | DIASTOLIC BLOOD PRESSURE: 67 MMHG | HEART RATE: 72 BPM | TEMPERATURE: 98 F

## 2020-12-21 DIAGNOSIS — F41.9 ANXIETY: Primary | ICD-10-CM

## 2020-12-21 DIAGNOSIS — Z23 NEEDS FLU SHOT: ICD-10-CM

## 2020-12-21 PROCEDURE — 99213 OFFICE O/P EST LOW 20 MIN: CPT | Performed by: INTERNAL MEDICINE

## 2020-12-21 PROCEDURE — 90471 IMMUNIZATION ADMIN: CPT | Performed by: INTERNAL MEDICINE

## 2020-12-21 PROCEDURE — 90686 IIV4 VACC NO PRSV 0.5 ML IM: CPT | Performed by: INTERNAL MEDICINE

## 2020-12-21 NOTE — PROGRESS NOTES
HISTORY OF PRESENT ILLNESS  Elpidio Wharton is a 38 y.o. female. HPI  Mood: Patient reports that she has been in therapy for the past year which has helped significantly with her anxiety. She has some anxiety going out to stores due to the pandemic. She is currently trying to wean her 3year old off breastfeeding and potty training her 1 yr old. Flu vaccination: Patient usually goes to [facility] for her influenza vaccination, but would rather come in office to have this done due to the pandemic. Review of Systems   All other systems reviewed and are negative. Physical Exam  Vitals signs and nursing note reviewed. Constitutional:       Appearance: Normal appearance. She is normal weight. HENT:      Head: Normocephalic and atraumatic. Right Ear: Tympanic membrane, ear canal and external ear normal.      Left Ear: Tympanic membrane, ear canal and external ear normal.      Nose: Nose normal.      Mouth/Throat:      Mouth: Mucous membranes are dry. Pharynx: Oropharynx is clear. Neck:      Musculoskeletal: Normal range of motion and neck supple. Cardiovascular:      Rate and Rhythm: Normal rate and regular rhythm. Pulses: Normal pulses. Heart sounds: Normal heart sounds. Pulmonary:      Effort: Pulmonary effort is normal.      Breath sounds: Normal breath sounds. Abdominal:      General: Abdomen is flat. Palpations: Abdomen is soft. Musculoskeletal: Normal range of motion. Skin:     General: Skin is warm and dry. Neurological:      General: No focal deficit present. Mental Status: She is alert and oriented to person, place, and time. Mental status is at baseline. Psychiatric:         Mood and Affect: Mood normal.         Behavior: Behavior normal.         Thought Content: Thought content normal.         Judgment: Judgment normal.       ASSESSMENT and PLAN  Diagnoses and all orders for this visit:    1. Anxiety  Stable and well-managed. No change in treatment.  Her anxiety has improved significantly since starting therapy a year ago. 2. Needs flu shot  Influenza vaccination given in office today. -     INFLUENZA VIRUS VAC QUAD,SPLIT,PRESV FREE SYRINGE IM    Lab results and schedule of future lab studies reviewed with patient. Reviewed diet, exercise and weight control. Written by Deidre Chakraborty, as dictated by Chichi Sandoval MD.     Current diagnosis and concerns discussed with pt at length. Understands risks and benefits or current treatment plan and medications and accepts the treatment and medication with any possible risks. Pt asks appropriate questions which were answered. Pt instructed to call with any concerns or problems.

## 2021-05-12 ENCOUNTER — TELEPHONE (OUTPATIENT)
Dept: INTERNAL MEDICINE CLINIC | Age: 36
End: 2021-05-12

## 2021-05-12 NOTE — TELEPHONE ENCOUNTER
Spoke with patient and she states that on Wed or Thurs last week and mon this week she had a feeling of dizziness, a \"woosh\" feeling and then a rapid heartbeat. She states that she stayed up late the night before working on projects. The first episode lasted for 20-25 minutes, she did breathing exercises to try to resolve her symptoms and noted she felt a little SOB. She states that the episodes are not really painful. Second occurrence only lasted about 4 minutes. She feels these episodes are different from her previous anxiety attacks. Advised patient is she should develop chest pain, nausea, sweating, jaw pain, or pain radiating down her left arm to seek urgent medical care at an emergency room but I felt it would be appropriate for her to wait until her scheduled appointment as she could not make offered appointment today. Pt understood and was thankful for the call.

## 2021-05-12 NOTE — TELEPHONE ENCOUNTER
----- Message from Ashley Hebert sent at 5/12/2021  1:03 PM EDT -----  Regarding: Level 1  Level 1/Escalated Issue      Caller's first and last name and relationship (if not the patient): PT       Best contact number(s): [phone]      What are the symptoms: overall not feeling well, Heart Palpitations, fluttering feeling in chest, pain in chest       Transfer successful - yes/no (include outcome): No  answer       Transfer declined - yes/no (include reason): No       Was caller advised to seek appropriate level of care - yes/no: Yes       Details to clarify the request: PT has an appointment tomorrow at 2:15         Ashley Hebert

## 2021-05-13 ENCOUNTER — OFFICE VISIT (OUTPATIENT)
Dept: INTERNAL MEDICINE CLINIC | Age: 36
End: 2021-05-13
Payer: COMMERCIAL

## 2021-05-13 VITALS
HEIGHT: 65 IN | OXYGEN SATURATION: 100 % | SYSTOLIC BLOOD PRESSURE: 108 MMHG | BODY MASS INDEX: 23.22 KG/M2 | HEART RATE: 68 BPM | TEMPERATURE: 98.4 F | DIASTOLIC BLOOD PRESSURE: 68 MMHG | WEIGHT: 139.4 LBS | RESPIRATION RATE: 16 BRPM

## 2021-05-13 DIAGNOSIS — R00.0 TACHYCARDIA: Primary | ICD-10-CM

## 2021-05-13 PROCEDURE — 99214 OFFICE O/P EST MOD 30 MIN: CPT | Performed by: INTERNAL MEDICINE

## 2021-05-13 NOTE — PROGRESS NOTES
Maria Luisa Donahue (: 1985) is a 43 y.o. female, established patient, here for evaluation of the following chief complaint(s):  Chest Pain (palpatations and fluttering)       ASSESSMENT/PLAN:  Below is the assessment and plan developed based on review of pertinent history, physical exam, labs, studies, and medications. 1. Tachycardia  I ordered labs and referred her to cardiology. Likely SVT episode. She has an extensive history of anxiety/panic attacks and is very aware of what a panic attack feels like. I advised her to continue to avoid caffeine and Sudafed, manage stress, and increase sleep. Will continue to monitor for improvements or changes. -     CBC W/O DIFF; Future  -     METABOLIC PANEL, COMPREHENSIVE; Future  -     TSH 3RD GENERATION; Future  -     REFERRAL TO CARDIOLOGY      No follow-ups on file. SUBJECTIVE/OBJECTIVE:  HPI    Palpitations/Chest pain: She notes first episode happened after she stayed up late the night before and had not taken her iron supplement. She notes the episodes are associated with dizziness, chest heaviness, and tachycardia. She notes the first episode lasted for 30 minutes, the second episode lasted for 2 minutes, and she can feel when the episode ends. She reports palpitations/chest pain do not feel like a panic attack. She avoids caffeine and Sudafed, as Sudafed previously caused palpitations. Mood: She notes increased stress at work. Health Maintenance: She plans to get the COVID-19 vaccine. Review of Systems   Cardiovascular: Positive for chest pain and palpitations. All other systems reviewed and are negative. Physical Exam  Constitutional:       Appearance: Normal appearance. HENT:      Right Ear: Tympanic membrane and external ear normal.      Left Ear: Tympanic membrane and external ear normal.      Mouth/Throat:      Mouth: Mucous membranes are moist.      Pharynx: Oropharynx is clear.    Cardiovascular:      Rate and Rhythm: Normal rate and regular rhythm. Pulses: Normal pulses. Heart sounds: Normal heart sounds. Pulmonary:      Effort: Pulmonary effort is normal.      Breath sounds: Normal breath sounds. Musculoskeletal: Normal range of motion. Skin:     General: Skin is warm and dry. Neurological:      General: No focal deficit present. Mental Status: She is alert and oriented to person, place, and time. Psychiatric:         Mood and Affect: Mood normal.         Behavior: Behavior normal.       On this date 05/13/2021 I have spent 30 minutes reviewing previous notes, test results and face to face with the patient discussing the diagnosis and importance of compliance with the treatment plan as well as documenting on the day of the visit. An electronic signature was used to authenticate this note.     Written by Van Lutz, as dictated by Dr. Merlin Maffucci, MD.    -- Carleen Velásquez

## 2021-06-18 ENCOUNTER — OFFICE VISIT (OUTPATIENT)
Dept: CARDIOLOGY CLINIC | Age: 36
End: 2021-06-18
Payer: COMMERCIAL

## 2021-06-18 VITALS
SYSTOLIC BLOOD PRESSURE: 120 MMHG | WEIGHT: 144 LBS | OXYGEN SATURATION: 98 % | HEIGHT: 65 IN | DIASTOLIC BLOOD PRESSURE: 70 MMHG | BODY MASS INDEX: 23.99 KG/M2 | HEART RATE: 84 BPM

## 2021-06-18 DIAGNOSIS — I47.1 SVT (SUPRAVENTRICULAR TACHYCARDIA) (HCC): Primary | ICD-10-CM

## 2021-06-18 DIAGNOSIS — F41.9 ANXIETY: ICD-10-CM

## 2021-06-18 DIAGNOSIS — R00.0 TACHYCARDIA: ICD-10-CM

## 2021-06-18 PROCEDURE — 93000 ELECTROCARDIOGRAM COMPLETE: CPT | Performed by: SPECIALIST

## 2021-06-18 PROCEDURE — 99204 OFFICE O/P NEW MOD 45 MIN: CPT | Performed by: SPECIALIST

## 2021-06-18 NOTE — PROGRESS NOTES
Chief Complaint   Patient presents with    New Patient     ref by Dr. Batsheva ARANGOT?  Anxiety     Visit Vitals  /70 (BP 1 Location: Left upper arm, BP Patient Position: Sitting, BP Cuff Size: Adult)   Pulse 84   Ht 5' 5\" (1.651 m)   Wt 144 lb (65.3 kg)   SpO2 98%   BMI 23.96 kg/m²     Chest pain denied   SOB denied   Palpitations denied   Swelling in hands/feet denied   Dizziness denied   Recent hospital stays denied   Refills denied     Hx anxiety/depression, has had panic attacks in past.  Does breathing exercises and goes away. Has 3 kids, youngest 21 month old. Feels like a hiccup in heart beat, then beating really fast.  Did breathing, pulse didn't come down. Lasted x30 min. Wasn't able to measure pulse. Felt different than past anxiety attacks. Last for a while, changed positions, went back to normal.  Happened about 1 month ago. Happened again about 1 week later. Happened when changing child's diaper, felt really dizzy. Stopped after 2 min. After that, in center of chest, felt that something \"shrunk in.\"  Yesterday & today this has happened. Currently moving, happens with stress. When has salt, gets lightheaded, swell. Had echo, EKG, monitor done when in college. She stated the office is no longer there but will try to remember/find records at home. Had tachycardia then as well, this is when she discovered it was all anxiety. Orders for Check an echo when possible. See me in 6 weeks per Dr. Manny Miles VO.   Dx: maribellt

## 2021-06-18 NOTE — PROGRESS NOTES
Escobar Dickson MD. [facility]              Patient: Tor Medina  : 1985      Today's Date: 2021          HISTORY OF PRESENT ILLNESS:     History of Present Illness:    She has had a couple of episodes of heart racing spells when sitting there - once lasted 30 min and one a couple of minutes and maybe some others for 30 seconds. Hx anxiety/depression, has had panic attacks in past.  Does breathing exercises and goes away.     Has 3 kids, youngest 21 month old.     Feels like a hiccup in heart beat, then beating really fast.  Did breathing, pulse didn't come down. Lasted x30 min. Wasn't able to measure pulse. Felt different than past anxiety attacks. Last for a while, changed positions, went back to normal.  Happened about 1 month ago.     Happened again about 1 week later. Happened when changing child's diaper, felt really dizzy. Stopped after 2 min.     After that, in center of chest, felt that something \"shrunk in.\"  Yesterday & today this has happened.     Otherwise OK other than fatigue - No CP or SOB in between spells. PAST MEDICAL HISTORY:     Past Medical History:   Diagnosis Date    Anemia     HX OTHER MEDICAL 2/10/11    fibroids    Postpartum depression     with 1st pregnancy    Unspecified breast disorder     fibrocystic breasts, followed at [facility]       Past Surgical History:   Procedure Laterality Date    HX OTHER SURGICAL  age 1    adnoidectomy           MEDICATIONS:     Current Outpatient Medications   Medication Sig Dispense Refill    ferrous sulfate (SLOW FE) 142 mg (45 mg iron) ER tablet Take 625 mg by mouth two (2) times a day.  fluticasone (FLONASE ALLERGY RELIEF) 50 mcg/actuation nasal spray 2 Sprays by Both Nostrils route daily.  CALCIUM CARBONATE (CALCIUM 300 PO) Take  by mouth daily.  nystatin (MYCOSTATIN) 100,000 unit/mL suspension TAKE 5 ML BY MOUTH FOUR (4) TIMES DAILY.  SWISH AND SPIT (Patient not taking: Reported on 6/18/2021) 60 mL 1       Allergies   Allergen Reactions    Blueberry Swelling    Sulfa (Sulfonamide Antibiotics) Anaphylaxis    Pseudoephedrine Palpitations             SOCIAL HISTORY:     Social History     Tobacco Use    Smoking status: Never Smoker    Smokeless tobacco: Never Used   Substance Use Topics    Alcohol use: No    Drug use: No           REVIEW OF SYMPTOMS:     Review of Symptoms:  Constitutional: Negative for fever, chills  HEENT: Negative for nosebleeds, tinnitus, and vision changes. Respiratory: Negative for cough, wheezing  Cardiovascular: Negative for orthopnea, claudication, syncope, and PND. Gastrointestinal: Negative for abdominal pain, diarrhea, melena. Genitourinary: Negative for dysuria  Musculoskeletal: Negative for myalgias. Skin: Negative for rash  Heme: No problems bleeding. Neurological: Negative for speech change and focal weakness. PHYSICAL EXAM:     Physical Exam:  Visit Vitals  /70 (BP 1 Location: Left upper arm, BP Patient Position: Sitting, BP Cuff Size: Adult)   Pulse 84   Ht 5' 5\" (1.651 m)   Wt 144 lb (65.3 kg)   SpO2 98%   BMI 23.96 kg/m²     Patient appears generally well, mood and affect are appropriate and pleasant. HEENT:  Hearing intact, non-icteric, normocephalic, atraumatic. Neck Exam: Supple, No JVD    Lung Exam: Clear to auscultation, even breath sounds. Cardiac Exam: Regular rate and rhythm with no murmur or rub  Abdomen: Soft, non-tender,   Extremities: Moves all ext well. No lower extremity edema.   MSKTL: Overall good ROM ext  Skin: No significant rashes  Psych: Appropriate affect  Neuro - Grossly intact        LABS / OTHER STUDIES reviewed:       Lab Results   Component Value Date/Time    Sodium 140 07/03/2013 06:38 PM    Potassium 3.6 07/03/2013 06:38 PM    Chloride 110 (H) 07/03/2013 06:38 PM    CO2 20 (L) 07/03/2013 06:38 PM    Anion gap 10 07/03/2013 06:38 PM    Glucose 72 07/03/2013 06:38 PM    BUN 7 07/03/2013 06:38 PM Creatinine 0.47 07/03/2013 06:38 PM    BUN/Creatinine ratio 15 07/03/2013 06:38 PM    GFR est AA >60 07/03/2013 06:38 PM    GFR est non-AA >60 07/03/2013 06:38 PM    Calcium 7.8 (L) 07/03/2013 06:38 PM    Bilirubin, total 0.5 07/03/2013 06:38 PM    Alk. phosphatase 50 07/03/2013 06:38 PM    Protein, total 6.7 07/03/2013 06:38 PM    Albumin 3.5 07/03/2013 06:38 PM    Globulin 3.2 07/03/2013 06:38 PM    A-G Ratio 1.1 07/03/2013 06:38 PM    ALT (SGPT) 14 07/03/2013 06:38 PM    AST (SGOT) 6 (L) 07/03/2013 06:38 PM     Lab Results   Component Value Date/Time    WBC 8.3 11/26/2019 01:28 AM    HGB 11.8 11/26/2019 01:28 AM    HCT 35.5 11/26/2019 01:28 AM    PLATELET 889 73/70/9716 01:28 AM    MCV 91.5 11/26/2019 01:28 AM    Hgb, External 10.9 06/30/2011 12:00 AM    Hct, External 33.5 06/30/2011 12:00 AM    Platelet cnt., External 337 02/10/2011 12:00 AM       No results found for: CHOL, CHOLPOCT, CHOLX, CHLST, CHOLV, HDL, HDLPOC, HDLP, LDL, LDLCPOC, LDLC, DLDLP, VLDLC, VLDL, TGLX, TRIGL, TRIGP, TGLPOCT, CHHD, CHHDX    No results found for: TSH, TSH2, TSH3, TSHP, TSHEXT          CARDIAC DIAGNOSTICS:     Cardiac Evaluation Includes:  I reviewed the results below. CTA chest 2013 - no acute process     EKG 6/18/21 - NSR, normal         ASSESSMENT AND PLAN:     Assessment and Plan:  1) Heart racing - probably SVT  - sounds like she has had some SVT spells a few times past several weeks - never had these problems before (although did have some tachycardia with anxiety in past)   - will check labs  - will get an echo   - will get a 30 day event monitor     2) Anxiety   - anxiety has been under control - sees a counselor    3) See me in 6 weeks. Has 3 young kids. Works at [employer]. Joselyn Baird MD, [address].  [address], [address] [place] [id number]  : [phone]    [phone]      ADDENDUM   7/28/2021  Echo 7/27/21 - LVEF 60-65%, normal study     Will have nurse call. ADDENDUM   8/22/2021  Event Monitor 7/19/21-8/17/21 - NSR, normal study - symptoms (heart racing, skipped beats) correlate with sinus or sinus tach sometimes with single PVC's. Overall rare PVC's (<1% of beats). Will have NP call with normal results.

## 2021-07-20 ENCOUNTER — TELEPHONE (OUTPATIENT)
Dept: CARDIOLOGY CLINIC | Age: 36
End: 2021-07-20

## 2021-07-20 NOTE — TELEPHONE ENCOUNTER
Patient called because she was suppose to be wearing a mckeon monitor for 30 days but the monitor went to her old address. She just received the monitor the other day. She was wondering if the provider could put a new order in from the time she actually has the monitor verse the original time because she is afraid her insurance won't cover the device for the time frame needed. Please give a call back. New address has been updated in the medical record.     Phone [phone]

## 2021-07-26 NOTE — TELEPHONE ENCOUNTER
Called, LVM  Per Jason Maravilla: \"  No because the study & charge doesn't even start until pt applies & does the baseline.  That is what insurance goes off of too.     \"

## 2021-07-27 ENCOUNTER — ANCILLARY PROCEDURE (OUTPATIENT)
Dept: CARDIOLOGY CLINIC | Age: 36
End: 2021-07-27
Payer: COMMERCIAL

## 2021-07-27 VITALS
BODY MASS INDEX: 23.99 KG/M2 | WEIGHT: 144 LBS | HEIGHT: 65 IN | SYSTOLIC BLOOD PRESSURE: 122 MMHG | DIASTOLIC BLOOD PRESSURE: 72 MMHG

## 2021-07-27 DIAGNOSIS — I47.1 SVT (SUPRAVENTRICULAR TACHYCARDIA) (HCC): ICD-10-CM

## 2021-07-27 LAB
ECHO AO ASC DIAM: 2.46 CM
ECHO AO ROOT DIAM: 2.89 CM
ECHO AV AREA PEAK VELOCITY: 2.49 CM2
ECHO AV AREA VTI: 2.52 CM2
ECHO AV AREA/BSA PEAK VELOCITY: 1.4 CM2/M2
ECHO AV AREA/BSA VTI: 1.5 CM2/M2
ECHO AV MEAN GRADIENT: 4.51 MMHG
ECHO AV PEAK GRADIENT: 8.99 MMHG
ECHO AV PEAK VELOCITY: 149.88 CM/S
ECHO AV VTI: 30.32 CM
ECHO EST RA PRESSURE: 3 MMHG
ECHO LA AREA 4C: 13.26 CM2
ECHO LA MAJOR AXIS: 2.73 CM
ECHO LA MINOR AXIS: 1.59 CM
ECHO LA VOL 2C: 43.97 ML (ref 22–52)
ECHO LA VOL 4C: 31.65 ML (ref 22–52)
ECHO LA VOL BP: 42.37 ML (ref 22–52)
ECHO LA VOL/BSA BIPLANE: 24.63 ML/M2 (ref 16–28)
ECHO LA VOLUME INDEX A2C: 25.56 ML/M2 (ref 16–28)
ECHO LA VOLUME INDEX A4C: 18.4 ML/M2 (ref 16–28)
ECHO LV E' LATERAL VELOCITY: 16.37 CM/S
ECHO LV E' SEPTAL VELOCITY: 12.8 CM/S
ECHO LV EDV A2C: 107.37 ML
ECHO LV EDV A4C: 86.55 ML
ECHO LV EDV BP: 101.06 ML (ref 56–104)
ECHO LV EDV INDEX A4C: 50.3 ML/M2
ECHO LV EDV INDEX BP: 58.8 ML/M2
ECHO LV EDV NDEX A2C: 62.4 ML/M2
ECHO LV EJECTION FRACTION A2C: 57 PERCENT
ECHO LV EJECTION FRACTION A4C: 70 PERCENT
ECHO LV EJECTION FRACTION BIPLANE: 63.5 PERCENT (ref 55–100)
ECHO LV ESV A2C: 46.29 ML
ECHO LV ESV A4C: 25.94 ML
ECHO LV ESV BP: 36.88 ML (ref 19–49)
ECHO LV ESV INDEX A2C: 26.9 ML/M2
ECHO LV ESV INDEX A4C: 15.1 ML/M2
ECHO LV ESV INDEX BP: 21.4 ML/M2
ECHO LV INTERNAL DIMENSION DIASTOLIC: 4.4 CM (ref 3.9–5.3)
ECHO LV INTERNAL DIMENSION SYSTOLIC: 2.73 CM
ECHO LV IVSD: 0.81 CM (ref 0.6–0.9)
ECHO LV MASS 2D: 97.2 G (ref 67–162)
ECHO LV MASS INDEX 2D: 56.5 G/M2 (ref 43–95)
ECHO LV POSTERIOR WALL DIASTOLIC: 0.65 CM (ref 0.6–0.9)
ECHO LVOT DIAM: 1.75 CM
ECHO LVOT PEAK GRADIENT: 9.54 MMHG
ECHO LVOT PEAK VELOCITY: 154.46 CM/S
ECHO LVOT SV: 76.5 ML
ECHO LVOT VTI: 31.64 CM
ECHO MV A VELOCITY: 73.26 CM/S
ECHO MV AREA PHT: 4.16 CM2
ECHO MV E DECELERATION TIME (DT): 182.35 MS
ECHO MV E VELOCITY: 117.14 CM/S
ECHO MV E/A RATIO: 1.6
ECHO MV E/E' LATERAL: 7.16
ECHO MV E/E' RATIO (AVERAGED): 8.15
ECHO MV E/E' SEPTAL: 9.15
ECHO MV PRESSURE HALF TIME (PHT): 52.88 MS
ECHO RA AREA 4C: 12.21 CM2
ECHO RIGHT VENTRICULAR SYSTOLIC PRESSURE (RVSP): 19.17 MMHG
ECHO RV INTERNAL DIMENSION: 3.79 CM
ECHO RV TAPSE: 2.04 CM (ref 1.5–2)
ECHO TV REGURGITANT MAX VELOCITY: 201.06 CM/S
ECHO TV REGURGITANT PEAK GRADIENT: 16.17 MMHG
LA VOL DISK BP: 38.28 ML (ref 22–52)

## 2021-07-27 PROCEDURE — 93306 TTE W/DOPPLER COMPLETE: CPT | Performed by: SPECIALIST

## 2021-07-29 ENCOUNTER — TELEPHONE (OUTPATIENT)
Dept: CARDIOLOGY CLINIC | Age: 36
End: 2021-07-29

## 2021-07-29 NOTE — TELEPHONE ENCOUNTER
Called pt,  Per Dr. Alvin Mooney: Pratik Fitzpatrick 7/27/21 Can you please call patient with normal results. \"  Confirmed NOV,  Future Appointments   Date Time Provider Janice Le   8/3/2021 10:40 AM Katherine OCHOA NP [facility] BS AMB   8/4/2021  1:30 PM Ryan Wolf MD [facility] BS AMB

## 2021-08-03 ENCOUNTER — OFFICE VISIT (OUTPATIENT)
Dept: CARDIOLOGY CLINIC | Age: 36
End: 2021-08-03
Payer: COMMERCIAL

## 2021-08-03 VITALS
HEIGHT: 65 IN | HEART RATE: 68 BPM | OXYGEN SATURATION: 97 % | RESPIRATION RATE: 16 BRPM | BODY MASS INDEX: 23.56 KG/M2 | WEIGHT: 141.4 LBS | SYSTOLIC BLOOD PRESSURE: 112 MMHG | DIASTOLIC BLOOD PRESSURE: 72 MMHG

## 2021-08-03 DIAGNOSIS — F41.9 ANXIETY: ICD-10-CM

## 2021-08-03 DIAGNOSIS — R00.2 HEART PALPITATIONS: Primary | ICD-10-CM

## 2021-08-03 PROCEDURE — 99213 OFFICE O/P EST LOW 20 MIN: CPT | Performed by: NURSE PRACTITIONER

## 2021-08-03 RX ORDER — FEXOFENADINE HCL 60 MG
60 TABLET ORAL DAILY
COMMUNITY

## 2021-08-03 NOTE — LETTER
8/3/2021    Patient: Theresa Vila   YOB: 1985   Date of Visit: 8/3/2021     Dalton Lanier MD  [facility] [address]  [address]  [address] [facility]    Dear Dalton Lanier MD,      Thank you for referring Ms. Wes Betancur to [facility] for evaluation. My notes for this consultation are attached. If you have questions, please do not hesitate to call me. I look forward to following your patient along with you.       Sincerely,    Bret Reyes NP

## 2021-08-03 NOTE — PROGRESS NOTES
[address]  [address]  [place], [address] [facility]    Office [phone]  Fax [phone]              Patient: Puja Zee  : 1985      Today's Date: 8/3/2021          HISTORY OF PRESENT ILLNESS:     History of Present Illness:  Here for f/u of palpitations. No events since wearing loop monitor. Pt hads been feeling better. During last event pt had been without much sleep x5 days d/t a sick . Patient denies any chest pain, breathing issues, syncope, orthopnea, edema, or paroxysmal nocturnal dyspnea. No recent panic attacks. Feels anxiety is well controlled with counseling. Does not drink caffeine or alcohol. Denies drug use. Tries to stay well hydrated but often forgets to drink when not sleeping well. PAST MEDICAL HISTORY:     Past Medical History:   Diagnosis Date    Anemia     Anxiety     HX OTHER MEDICAL 2/10/11    fibroids    Postpartum depression     with 1st pregnancy    Unspecified breast disorder     fibrocystic breasts, followed at [facility]       Past Surgical History:   Procedure Laterality Date    HX OTHER SURGICAL  age 1    adnoidectomy       MEDICATIONS:     Current Outpatient Medications   Medication Sig Dispense Refill    fexofenadine (Allegra Allergy) 60 mg tablet Take 60 mg by mouth two (2) times a day.  ferrous sulfate (SLOW FE) 142 mg (45 mg iron) ER tablet Take 625 mg by mouth two (2) times a day. Allergies   Allergen Reactions    Blueberry Swelling    Sulfa (Sulfonamide Antibiotics) Anaphylaxis    Pseudoephedrine Palpitations       SOCIAL HISTORY:     Social History     Tobacco Use    Smoking status: Never Smoker    Smokeless tobacco: Never Used   Substance Use Topics    Alcohol use: No    Drug use: No           REVIEW OF SYMPTOMS:     Review of Symptoms:  (Positive findings above)  Constitutional: Negative for fever, chills, and diaphoresis.    Respiratory: Negative for cough, hemoptysis, sputum production, shortness of breath and wheezing. Cardiovascular: Negative for chest pain,  leg swelling and PND. Gastrointestinal: Negative for vomiting, blood in stool and melena. Genitourinary: Negative for dysuria and flank pain. Neurological: Negative for focal weakness, seizures, loss of consciousness  Endo/Heme/Allergies: Negative for abnormal bleeding. Psychiatric/Behavioral: Negative for memory loss. PHYSICAL EXAM:     Physical Exam:  Visit Vitals  /72 (BP 1 Location: Left upper arm, BP Patient Position: Sitting)   Pulse 68   Resp 16   Ht 5' 5\" (1.651 m)   Wt 141 lb 6.4 oz (64.1 kg)   SpO2 97%   BMI 23.53 kg/m²     General - well developed well nourished  Neck - neck supple, no JVD  Cardiac - normal S1, S2, RRR, no murmurs, rubs or gallops. No clicks  Vascular -  radials pulses equal bilateral  Lungs - clear to auscultation bilaterals, no rales, wheezing or rhonchi  Abd - soft nontender, non-distended, +BS  Extremities - no edema, warm  Neuro - nonfocal  Psych - normal mood and affect      LABS / OTHER STUDIES reviewed:       Lab Results   Component Value Date/Time    Sodium 138 06/18/2021 12:02 PM    Potassium 4.2 06/18/2021 12:02 PM    Chloride 105 06/18/2021 12:02 PM    CO2 28 06/18/2021 12:02 PM    Anion gap 5 06/18/2021 12:02 PM    Glucose 87 06/18/2021 12:02 PM    BUN 13 06/18/2021 12:02 PM    Creatinine 0.52 (L) 06/18/2021 12:02 PM    BUN/Creatinine ratio 25 (H) 06/18/2021 12:02 PM    GFR est AA >60 06/18/2021 12:02 PM    GFR est non-AA >60 06/18/2021 12:02 PM    Calcium 8.9 06/18/2021 12:02 PM    Bilirubin, total 0.5 06/18/2021 12:02 PM    Alk.  phosphatase 59 06/18/2021 12:02 PM    Protein, total 6.7 06/18/2021 12:02 PM    Albumin 3.7 06/18/2021 12:02 PM    Globulin 3.0 06/18/2021 12:02 PM    A-G Ratio 1.2 06/18/2021 12:02 PM    ALT (SGPT) 10 (L) 06/18/2021 12:02 PM    AST (SGOT) 7 (L) 06/18/2021 12:02 PM     Lab Results   Component Value Date/Time    WBC 5.6 06/18/2021 12:02 PM    HGB 11.5 06/18/2021 12:02 PM    HCT 36.7 06/18/2021 12:02 PM    PLATELET 739 72/93/8087 12:02 PM    MCV 95.3 06/18/2021 12:02 PM    Hgb, External 10.9 06/30/2011 12:00 AM    Hct, External 33.5 06/30/2011 12:00 AM    Platelet cnt., External 337 02/10/2011 12:00 AM       No results found for: CHOL, CHOLPOCT, CHOLX, CHLST, CHOLV, HDL, HDLPOC, HDLP, LDL, LDLCPOC, LDLC, DLDLP, VLDLC, VLDL, TGLX, TRIGL, TRIGP, TGLPOCT, CHHD, CHHDX    Lab Results   Component Value Date/Time    TSH 1.38 06/18/2021 12:02 PM       CARDIAC DIAGNOSTICS:     Cardiac Evaluation Includes:  I reviewed the results below. CTA chest 2013 - no acute process     EKG 6/18/21 - NSR, normal     Echo 7/27/21 - LVEF 60-65%, normal study     ASSESSMENT AND PLAN:     Assessment and Plan:  1) Heart racing  - sounds like she has had some SVT spells - though no recurrent events since wearing loop  - prelim loop data reviewed - episodes of sinus tach correlate with activity - no sig arrhythmia; will cont to monitor for full 30d  - recent labs as well as echo were unremarkable  - advised to hydrate and work on improved sleep schedule; may benefit from a HR monitor in the future     2) Anxiety   - anxiety has been under control - sees a counselor    3) f/u in 6mo or PRN; phone fu after completion of loop     Has 3 young kids. Works at [employer].      Anitra Noriega, ANP

## 2021-08-03 NOTE — PROGRESS NOTES
Room B 3  Visit Vitals  /72 (BP 1 Location: Left upper arm, BP Patient Position: Sitting)   Pulse 68   Resp 16   Ht 5' 5\" (1.651 m)   Wt 141 lb 6.4 oz (64.1 kg)   SpO2 97%   BMI 23.53 kg/m²       Event monitor: 7/19/2021 - 8/17/2021  Chest pain: no  Shortness of breath: no  Edema: no  Palpitations, Skipped beats, Rapid heartbeat: elevated HR and skipping  Dizziness: no  Fatigue:no    New diagnosis/Surgeries: no    ER/Hospitalizations: no    Refills:no

## 2021-08-04 ENCOUNTER — OFFICE VISIT (OUTPATIENT)
Dept: INTERNAL MEDICINE CLINIC | Age: 36
End: 2021-08-04
Payer: COMMERCIAL

## 2021-08-04 VITALS
RESPIRATION RATE: 16 BRPM | BODY MASS INDEX: 23.46 KG/M2 | HEART RATE: 64 BPM | DIASTOLIC BLOOD PRESSURE: 64 MMHG | WEIGHT: 140.8 LBS | OXYGEN SATURATION: 99 % | SYSTOLIC BLOOD PRESSURE: 114 MMHG | TEMPERATURE: 98.1 F | HEIGHT: 65 IN

## 2021-08-04 DIAGNOSIS — Z00.00 ROUTINE GENERAL MEDICAL EXAMINATION AT A HEALTH CARE FACILITY: Primary | ICD-10-CM

## 2021-08-04 PROCEDURE — 99395 PREV VISIT EST AGE 18-39: CPT | Performed by: INTERNAL MEDICINE

## 2021-08-04 NOTE — PROGRESS NOTES
Jayy Dupree (: 1985) is a 43 y.o. female, established patient, here for evaluation of the following chief complaint(s):  Complete Physical       ASSESSMENT/PLAN:  Below is the assessment and plan developed based on review of pertinent history, physical exam, labs, studies, and medications. 1. Routine general medical examination at a health care facility    Recent labs reviewed and labs done - she is working on balance and better work life balance   Keep working on exercise   No follow-ups on file. 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 Physical Exam  Constitutional:       Appearance: Normal appearance. HENT:      Right Ear: Tympanic membrane and external ear normal.      Left Ear: Tympanic membrane and external ear normal.      Mouth/Throat:      Mouth: Mucous membranes are moist.      Pharynx: Oropharynx is clear. Cardiovascular:      Rate and Rhythm: Normal rate and regular rhythm. Pulses: Normal pulses. Heart sounds: Normal heart sounds. Pulmonary:      Effort: Pulmonary effort is normal.      Breath sounds: Normal breath sounds. Musculoskeletal:         General: Normal range of motion. Skin:     General: Skin is warm and dry. Neurological:      General: No focal deficit present. Mental Status: She is alert and oriented to person, place, and time.    Psychiatric:         Mood and Affect: Mood normal.         Behavior: Behavior normal.       On this date 2021 I have spent 30 minutes reviewing previous notes, test results and face to face with the patient discussing the diagnosis and importance of compliance with the treatment plan as well as documenting on the day of the visit. An electronic signature was used to authenticate this note.

## 2021-08-24 ENCOUNTER — TELEPHONE (OUTPATIENT)
Dept: CARDIOLOGY CLINIC | Age: 36
End: 2021-08-24

## 2021-08-24 NOTE — TELEPHONE ENCOUNTER
----- Message from Dunia Sparrow MD sent at 8/22/2021 11:17 PM EDT -----  Regarding: event monitor  Can you please call her with normal results. Event Monitor 7/19/21-8/17/21 - NSR, normal study - symptoms (heart racing, skipped beats) correlate with sinus or sinus tach sometimes with single PVC's. Overall rare PVC's (<1% of beats).         Thanks,  SK

## 2021-11-03 ENCOUNTER — TELEPHONE (OUTPATIENT)
Dept: INTERNAL MEDICINE CLINIC | Age: 36
End: 2021-11-03

## 2021-11-03 NOTE — TELEPHONE ENCOUNTER
Pt states she might have a pimple on her inner thigh or an ingrown hair. Pt states it had raised and popped but pt states it didn't go away and there is another one. Pt wants to speak with nurse regarding this issue.  Please call back and advise

## 2021-11-04 NOTE — TELEPHONE ENCOUNTER
It may not need antibiotics if it is just two of them- did she shave there?  If it is spreading she will need a course of abx though like doxy as it could be folliculitis

## 2021-11-04 NOTE — TELEPHONE ENCOUNTER
Spoke with patient and she states that she had a bump on her thigh that isn't near hair that was like a pimple that needed to be popped. She washed the area with antibacterial soap and covered it. She now has a second bump coming up. She states that in the recent past her  also had a bump. Advised patient Dr. Sid Lala should look at the area before deciding on treatment plan. In office appt provided 11/5/21 at 11:45am.  Pt understood and was thankful for the call.

## 2021-11-09 NOTE — PROGRESS NOTES
Karly Waterman (: 1985) is a 43 y.o. female, established patient, here for evaluation of the following chief complaint(s):  Follow-up (bump on inside of L thigh) and Immunization/Injection (flu vaccs)       ASSESSMENT/PLAN:  Below is the assessment and plan developed based on review of pertinent history, physical exam, labs, studies, and medications. 1. Folliculitis  2. Needs flu shot  -     INFLUENZA VIRUS VAC QUAD,SPLIT,PRESV FREE SYRINGE IM      I believe that the bumps are healing folliculitis. I informed the pt that this is not abnormal and occurs fairly frequently due to friction of hair follicles. I recommended that she wash with soap and water, avoid shaving in that area for some time, and change her razor head more frequently. I suggested that she consider laser hair removal of the area, especially during the summer time, because shaving could cause repetitive bumps. No follow-ups on file. SUBJECTIVE/OBJECTIVE:  HPI    Bump on legs: Pt presents today with concerns about a bump on her L thigh. She notes a hx of subdermal cysts \"on her bottom\" around the same time as her menstrual cycle, which generally resolve themselves. Given this hx, she did not immediately feel concern when she found the bump and attributed the area to a cyst, friction from leggings, or pimple. Pt notes that her  noticed bacterial bumps around his pubic area, which he notes happens when he sweats often. Her  is using antibacterial hand soap to wash the are. Pt states that the bump \"popped like a pimple in her jeans one day,\" and she applied neosporin and gauze to the area. She reports that approximately a month ago, a few more alvin began to arise. Pt notes concern to due to a similarity between an infected diaper rash that her daughter had and the bump on her leg. Review of Systems   All other systems reviewed and are negative. Physical Exam  Constitutional:       Appearance: Normal appearance. HENT:      Right Ear: Tympanic membrane and external ear normal.      Left Ear: Tympanic membrane and external ear normal.      Mouth/Throat:      Mouth: Mucous membranes are moist.      Pharynx: Oropharynx is clear. Cardiovascular:      Rate and Rhythm: Normal rate and regular rhythm. Pulses: Normal pulses. Heart sounds: Normal heart sounds. Pulmonary:      Effort: Pulmonary effort is normal.      Breath sounds: Normal breath sounds. Musculoskeletal:         General: Normal range of motion. Legs:    Skin:     General: Skin is warm and dry. Neurological:      General: No focal deficit present. Mental Status: She is alert and oriented to person, place, and time. Psychiatric:         Mood and Affect: Mood normal.         Behavior: Behavior normal.     On this date 11/10/2021 I have spent 25 minutes reviewing previous notes, test results and face to face with the patient discussing the diagnosis and importance of compliance with the treatment plan as well as documenting on the day of the visit. An electronic signature was used to authenticate this note. Written by Brent Mars as dictated by Dr. Odalis Harris.    -- Brent Mars

## 2021-11-10 ENCOUNTER — OFFICE VISIT (OUTPATIENT)
Dept: INTERNAL MEDICINE CLINIC | Age: 36
End: 2021-11-10
Payer: COMMERCIAL

## 2021-11-10 VITALS
TEMPERATURE: 98 F | WEIGHT: 144 LBS | SYSTOLIC BLOOD PRESSURE: 106 MMHG | RESPIRATION RATE: 16 BRPM | HEART RATE: 63 BPM | HEIGHT: 65 IN | OXYGEN SATURATION: 100 % | DIASTOLIC BLOOD PRESSURE: 64 MMHG | BODY MASS INDEX: 23.99 KG/M2

## 2021-11-10 DIAGNOSIS — L73.9 FOLLICULITIS: Primary | ICD-10-CM

## 2021-11-10 DIAGNOSIS — Z23 NEEDS FLU SHOT: ICD-10-CM

## 2021-11-10 PROCEDURE — 99213 OFFICE O/P EST LOW 20 MIN: CPT | Performed by: INTERNAL MEDICINE

## 2021-11-10 PROCEDURE — 90471 IMMUNIZATION ADMIN: CPT | Performed by: INTERNAL MEDICINE

## 2021-11-10 PROCEDURE — 90686 IIV4 VACC NO PRSV 0.5 ML IM: CPT | Performed by: INTERNAL MEDICINE

## 2022-01-01 ENCOUNTER — TELEPHONE (OUTPATIENT)
Dept: INTERNAL MEDICINE CLINIC | Age: 37
End: 2022-01-01

## 2022-01-01 RX ORDER — BENZONATATE 200 MG/1
200 CAPSULE ORAL
Qty: 21 CAPSULE | Refills: 0 | Status: SHIPPED | OUTPATIENT
Start: 2022-01-01 | End: 2022-01-08

## 2022-01-01 NOTE — TELEPHONE ENCOUNTER
Paged by patient, was tested positive for COVID-19 yesterday due to sinus symptoms and cough. No other infectious symptoms. Has a tickle and cough when laying down. No SOB. No MENDOZA. Was in contact with her mom the day before [place] who unfortunately tested positive several days later. The remainder of her family also have similar symptoms. All are mild. Main symptom is cough at night when she lays down. We discussed continuing Mucinex twice daily. Patient continue stay hydrated and quarantine. We will call her in [place] to take as needed. She will let us know if any symptoms worsen.

## 2022-03-08 NOTE — TELEPHONE ENCOUNTER
Get the bloodwork done.    Start avoidance measures.     -No changes in the treatment plan as of now.       Allergy Staff Appt Hours Shot Hours Locations    Physician     Alon Padron MD       Support Staff     Monica CHE, JOYCE CHE CMA  Tuesday:   [place] :  [place]: :         [place]:  [place]        Tuesday: : : : : [facility] [place]       Tues & Wed: :       Mon & Thurs:        Fri:          [facility]  [address] [place] [address]  Appt Line: [phone]    [facility]  [address] [place] [address]  Appt Line: [phone]    Pulmonary Function Scheduling:  [place]: [phone]  [place]: [phone]  [phone]     Prescription Assistance    If you need assistance with your prescriptions (cost, coverage, etc) please contact: [employer] Prescription Assistance Program [phone]    Important Scheduling Information    Appointments for skin testing: Appointment will last approximately 45 minutes.  Please call the appointment line for your clinic to schedule.  Discontinue oral antihistamines 7 days prior to the appointment.  Discontinue nasal and ocular antihistamines 4 days prior to appointment.    Appointments for challenges (oral food challenge, penicillin testing, aspirin desensitization) If your provider instructs you to that this additional testing is indicated, it will need to be scheduled directly through the allergy department.  Please contact them via [employer] or by calling the clinic and asking to speak with the allergy team.  They will provide additional information and instructions for the appointment.  Discontinue oral antihistamines 7 days prior to the appointment.  Discontinue nasal and ocular antihistamines 4 days prior to the appointment.    Thank you for  Results reviewed with patient. trusting us with your care. Please feel free to contact us with any questions or concerns you may have.

## 2022-03-19 PROBLEM — Z37.9 NORMAL LABOR: Status: ACTIVE | Noted: 2017-04-06

## 2022-03-20 PROBLEM — O47.9 UTERINE CONTRACTIONS DURING PREGNANCY: Status: ACTIVE | Noted: 2019-11-26

## 2022-03-20 PROBLEM — Z3A.39 39 WEEKS GESTATION OF PREGNANCY: Status: ACTIVE | Noted: 2019-11-26

## 2022-07-13 ENCOUNTER — OFFICE VISIT (OUTPATIENT)
Dept: SURGERY | Age: 37
End: 2022-07-13
Payer: COMMERCIAL

## 2022-07-13 ENCOUNTER — DOCUMENTATION ONLY (OUTPATIENT)
Dept: SURGERY | Age: 37
End: 2022-07-13

## 2022-07-13 VITALS
WEIGHT: 137 LBS | BODY MASS INDEX: 22.82 KG/M2 | HEIGHT: 65 IN | DIASTOLIC BLOOD PRESSURE: 73 MMHG | SYSTOLIC BLOOD PRESSURE: 118 MMHG

## 2022-07-13 DIAGNOSIS — D05.12 DUCTAL CARCINOMA IN SITU (DCIS) OF LEFT BREAST: Primary | ICD-10-CM

## 2022-07-13 PROCEDURE — 99205 OFFICE O/P NEW HI 60 MIN: CPT | Performed by: SURGERY

## 2022-07-13 NOTE — PROGRESS NOTES
Breast Next Genetic testing ordered, TRF completed on [employer] Portal.  Saliva kit given to patient.

## 2022-07-13 NOTE — PROGRESS NOTES
HISTORY OF PRESENT ILLNESS  Brittani Steve is a 42 y.o. female. HPI NEW patient consult referred by Dr. Yazmin Herrera for LEFT breast multicentric DCIS. No changes before imaging. Denies pain. RIGHT breast has slight discharge. 6/21/22 biopsy LEFT breast- DCIS   1- 8 o'clock ER 60, CA 60   2- 4 o'clock posterior depth       Has a history of cysts in both breasts. Family History:   Mother- Breast cancer dx at 72     Mammogram, [address] 5-25-22 suspicious calcs 4:00 and 8:00    Past Medical History:   Diagnosis Date    Anemia     Anxiety     HX OTHER MEDICAL 2/10/11    fibroids    Postpartum depression     with 1st pregnancy    Unspecified breast disorder     fibrocystic breasts, [facility] at [facility]     Past Surgical History:   Procedure Laterality Date    HX OTHER SURGICAL  age 1    adnoidectomy     Social History     Socioeconomic History    Marital status:      Spouse name: Not on file    Number of children: Not on file    Years of education: Not on file    Highest education level: Not on file   Occupational History    Not on file   Tobacco Use    Smoking status: Never Smoker    Smokeless tobacco: Never Used   Substance and Sexual Activity    Alcohol use: No    Drug use: No    Sexual activity: Yes     Partners: Male     Birth control/protection: None   Other Topics Concern     Service Not Asked    Blood Transfusions Not Asked    Caffeine Concern Not Asked    Occupational Exposure Not Asked    Hobby Hazards Not Asked    Sleep Concern Not Asked    Stress Concern Not Asked    Weight Concern Not Asked    Special Diet Not Asked    Back Care Not Asked    Exercise Not Asked    Bike Helmet Not Asked   [address] Not Asked    Self-Exams Not Asked   Social History Narrative    Not on file     Social Determinants of Health     Financial Resource Strain:     Difficulty of Paying Living Expenses: Not on file   Food Insecurity:     Worried About Running Out of Food in the Last Year: Not on file    Ran Out of Food in the Last Year: Not on file   Transportation Needs:     Lack of Transportation (Medical): Not on file    Lack of Transportation (Non-Medical): Not on file   Physical Activity:     Days of Exercise per Week: Not on file    Minutes of Exercise per Session: Not on file   Stress:     Feeling of Stress : Not on file   Social Connections:     Frequency of Communication with Friends and Family: Not on file    Frequency of Social Gatherings with Friends and Family: Not on file    Attends Mosque Services: Not on file    Active Member of [address] [place] [address] [employer] or Organizations: Not on file    Attends Club or Organization Meetings: Not on file    Marital Status: Not on file   Intimate Partner Violence:     Fear of Current or Ex-Partner: Not on file    Emotionally Abused: Not on file    Physically Abused: Not on file    Sexually Abused: Not on file   Housing Stability:     Unable to Pay for Housing in the Last Year: Not on file    Number of [place] in the Last Year: Not on file    Unstable Housing in the Last Year: Not on file     OB History        4    Para   3    Term   3            AB        Living   3       SAB        IAB        Ectopic        Molar        Multiple   0    Live Births   3          Obstetric Comments   Menarche:  4. LMP: 12/10/2012. # of Children:  3. Age at Delivery of First Child:  27.   Hysterectomy/oophorectomy:  NO/NO. Breast Bx:  Yes, left . Hx of Breast Feeding:  yes. BCP:  yes.  Hormone therapy:  no.             Current Outpatient Medications:     fexofenadine (Allegra Allergy) 60 mg tablet, Take 60 mg by mouth two (2) times a day., Disp: , Rfl:     ferrous sulfate (SLOW FE) 142 mg (45 mg iron) ER tablet, Take 625 mg by mouth two (2) times a day., Disp: , Rfl:   Allergies   Allergen Reactions    Blueberry Swelling    Sulfa (Sulfonamide Antibiotics) Anaphylaxis    Pseudoephedrine Palpitations     Review of Systems HENT: Positive for congestion and sinus pain. Psychiatric/Behavioral: Positive for depression. All other systems reviewed and are negative. Physical Exam  Vitals and nursing note reviewed. Chest:   Breasts: Breasts are symmetrical.      Right: Nipple discharge present. No inverted nipple, mass, skin change, tenderness, axillary adenopathy or supraclavicular adenopathy. Left: No inverted nipple, mass, nipple discharge, skin change, tenderness, axillary adenopathy or supraclavicular adenopathy. Comments: Bilateral multiple small masses c/w lifelong fibroadenomas  Lymphadenopathy:      Cervical: No cervical adenopathy. Upper Body:      Right upper body: No supraclavicular, axillary or pectoral adenopathy. Left upper body: No supraclavicular, axillary or pectoral adenopathy. ASSESSMENT and PLAN    ICD-10-CM ICD-9-CM    1. Ductal carcinoma in situ (DCIS) of left breast  D05.12 233.0 MRI BREAST BI W WO CONT     43 yo female with left breast multicentric stage 0 dcis, Er+ Pr +  She is here with her   I have reviewed the imaging and pathology with her and she was given copies of these reports. 90 minutes were spent face-to-face with the patient during this encounter and 90% of that time was spent on counseling and coordination of care. 1. Discussed lumpectomy and radiation vs mastectomy. Discussed reconstruction. MRI ordered   2. Discussed sentinel lymph node biopsy. 3. Discussed external beam radiation. 4. Discussed hormone therapy. 5. Discussed the possibility of chemotherapy. Not for dcis    Patient will need skin and nipple sparing mastectomy, recon on left  Will check mri. Patient was given her films back for mri  Refer to plastics and send genetic testing.    She will decide if she wants bilateral

## 2022-07-27 ENCOUNTER — TELEPHONE (OUTPATIENT)
Dept: SURGERY | Age: 37
End: 2022-07-27

## 2022-07-27 NOTE — TELEPHONE ENCOUNTER
The patient had called and left a message on the nurse line, had questions about dropping off her films as she forgot to bring them to her breast talk, and questions about scheduling surgery. She will drop her films off either tomorrow or Friday at our [facility] office so we can have them uploaded in time for her MRI on 8/3. She also asked about scheduling surgery as she was looking through the breast talk folder. I let her know that we will schedule that after she has met with the plastic surgeon and after her MRI is back. She has not received an appointment with the plastic surgeon yet. I told her I would reach out to one of the surgery schedulers to touch base on the plastics referral. She was very appreciative of the phone call back.

## 2022-07-29 ENCOUNTER — DOCUMENTATION ONLY (OUTPATIENT)
Dept: SURGERY | Age: 37
End: 2022-07-29

## 2022-08-01 ENCOUNTER — DOCUMENTATION ONLY (OUTPATIENT)
Dept: SURGERY | Age: 37
End: 2022-08-01

## 2022-08-01 NOTE — PROGRESS NOTES
Type of Film: [x] CD [] FILMS  Type of Test: [] MRI [x] MAMMO  From: [address]  Given to: [facility]UENT[facility]  LOCATION  To be Downloaded into PACS:  YES

## 2022-08-03 ENCOUNTER — HOSPITAL ENCOUNTER (OUTPATIENT)
Dept: MRI IMAGING | Age: 37
Discharge: HOME | End: 2022-08-03
Attending: SURGERY
Payer: COMMERCIAL

## 2022-08-03 DIAGNOSIS — D05.12 DUCTAL CARCINOMA IN SITU (DCIS) OF LEFT BREAST: ICD-10-CM

## 2022-08-03 PROCEDURE — 77049 MRI BREAST C-+ W/CAD BI: CPT

## 2022-08-03 PROCEDURE — 74011250636 HC RX REV CODE- 250/636

## 2022-08-03 PROCEDURE — A9576 INJ PROHANCE MULTIPACK: HCPCS

## 2022-08-03 PROCEDURE — 74011000258 HC RX REV CODE- 258: Performed by: SURGERY

## 2022-08-03 PROCEDURE — 74011000250 HC RX REV CODE- 250: Performed by: SURGERY

## 2022-08-03 RX ORDER — SODIUM CHLORIDE 0.9 % (FLUSH) 0.9 %
10 SYRINGE (ML) INJECTION ONCE
Status: COMPLETED | OUTPATIENT
Start: 2022-08-03 | End: 2022-08-03

## 2022-08-03 RX ADMIN — SODIUM CHLORIDE 100 ML: 9 INJECTION, SOLUTION INTRAVENOUS at 09:49

## 2022-08-03 RX ADMIN — GADOTERIDOL 12 ML: 279.3 INJECTION, SOLUTION INTRAVENOUS at 10:00

## 2022-08-03 RX ADMIN — SODIUM CHLORIDE, PRESERVATIVE FREE 10 ML: 5 INJECTION INTRAVENOUS at 09:49

## 2022-08-04 ENCOUNTER — DOCUMENTATION ONLY (OUTPATIENT)
Dept: SURGERY | Age: 37
End: 2022-08-04

## 2022-08-04 NOTE — PROGRESS NOTES
I called the patient after she called back stating she missed Dr. Onofre Eisenberg phone call regarding her MRI results. I instructed the patient to make sure she has her phone nearby tomorrow so she does not miss Dr. Onofre Eisenberg call. She also told me she has been seen in the past by a cardiologist for palpitations. She has episodes about every 6 months. She was last seen last year but she will make a follow up prior to surgery. I asked that the patient have the cardiologist write in his note she is cleared to proceed to surgery. She was suppose to follow up but never did.

## 2022-08-04 NOTE — PROGRESS NOTES
Received message from [facility] in radiology that the patient's MRI results are back. Recommendations have been given. Please advise.

## 2022-08-05 NOTE — PROGRESS NOTES
Called patient with mri results  Need biopsy right breast and left axilla. Will set up has seen dr. Kelly Schools  Will schedule surgery and biopsies.

## 2022-08-11 ENCOUNTER — HOSPITAL ENCOUNTER (OUTPATIENT)
Dept: MAMMOGRAPHY | Age: 37
Discharge: HOME | End: 2022-08-11
Attending: SURGERY
Payer: COMMERCIAL

## 2022-08-11 ENCOUNTER — APPOINTMENT (OUTPATIENT)
Dept: ULTRASOUND IMAGING | Age: 37
End: 2022-08-11
Attending: SURGERY
Payer: COMMERCIAL

## 2022-08-11 DIAGNOSIS — R92.8 ABNORMAL MRI, BREAST: ICD-10-CM

## 2022-08-11 DIAGNOSIS — R93.89 ABNORMAL MRI: ICD-10-CM

## 2022-08-11 PROCEDURE — 77065 DX MAMMO INCL CAD UNI: CPT

## 2022-08-11 PROCEDURE — 76642 ULTRASOUND BREAST LIMITED: CPT

## 2022-08-11 PROCEDURE — 88305 TISSUE EXAM BY PATHOLOGIST: CPT

## 2022-08-11 PROCEDURE — 88341 IMHCHEM/IMCYTCHM EA ADD ANTB: CPT

## 2022-08-11 PROCEDURE — 88342 IMHCHEM/IMCYTCHM 1ST ANTB: CPT

## 2022-08-11 PROCEDURE — 74011000250 HC RX REV CODE- 250: Performed by: RADIOLOGY

## 2022-08-11 PROCEDURE — 19084 BX BREAST ADD LESION US IMAG: CPT

## 2022-08-11 PROCEDURE — 76882 US LMTD JT/FCL EVL NVASC XTR: CPT

## 2022-08-11 PROCEDURE — 77030020003 US BX BREAST VAC RT 1ST LESION W/CLIP AND SPECIMEN

## 2022-08-11 PROCEDURE — 88360 TUMOR IMMUNOHISTOCHEM/MANUAL: CPT

## 2022-08-11 RX ORDER — LIDOCAINE HYDROCHLORIDE 10 MG/ML
15 INJECTION INFILTRATION; PERINEURAL
Status: COMPLETED | OUTPATIENT
Start: 2022-08-11 | End: 2022-08-11

## 2022-08-11 RX ORDER — LIDOCAINE HYDROCHLORIDE AND EPINEPHRINE 10; 10 MG/ML; UG/ML
10 INJECTION, SOLUTION INFILTRATION; PERINEURAL ONCE
Status: COMPLETED | OUTPATIENT
Start: 2022-08-11 | End: 2022-08-11

## 2022-08-11 RX ADMIN — LIDOCAINE HYDROCHLORIDE 15 ML: 10 INJECTION, SOLUTION INFILTRATION; PERINEURAL at 10:10

## 2022-08-11 RX ADMIN — LIDOCAINE HYDROCHLORIDE,EPINEPHRINE BITARTRATE 100 MG: 10; .01 INJECTION, SOLUTION INFILTRATION; PERINEURAL at 10:10

## 2022-08-11 RX ADMIN — LIDOCAINE HYDROCHLORIDE,EPINEPHRINE BITARTRATE 100 MG: 10; .01 INJECTION, SOLUTION INFILTRATION; PERINEURAL at 10:00

## 2022-08-11 RX ADMIN — LIDOCAINE HYDROCHLORIDE 15 ML: 10 INJECTION, SOLUTION INFILTRATION; PERINEURAL at 10:00

## 2022-08-11 NOTE — PROGRESS NOTES
Patient tolerated right breast biopsy x 2 well with scant bleeding. Biopsy sites were bandaged in the usual fashion and discharge instructions were reviewed with the patient. She was provided with a written copy as well. Advised patient that results should be available in 2-3 business days and that she will receive a phone call. Encouraged her to call with any questions or concerns.

## 2022-08-12 ENCOUNTER — DOCUMENTATION ONLY (OUTPATIENT)
Dept: SURGERY | Age: 37
End: 2022-08-12

## 2022-08-12 NOTE — PROGRESS NOTES
Patient called requesting if it's possible to re-schedule her surgery date from 9/13 because that's her son's birthday. I explained to pt that I will need to get with Dr. Marcelino Masters assistant to see if I can re-schedule her to a different date, and I'll have to reach back to her.

## 2022-08-15 NOTE — PROGRESS NOTES
Pathology approved by Dr. Juju Bailey. Called patient and relayed benign results. Advised patient that because of the papilloma and atypical cells that were found, Dr. Melani Huang would have a conversation with her to discuss how this may affect her surgery plans. She reports that her biopsy sites are healing well with no concerns. Encouraged her to call with any question or concerns.

## 2022-08-16 ENCOUNTER — TELEPHONE (OUTPATIENT)
Dept: SURGERY | Age: 37
End: 2022-08-16

## 2022-09-06 ENCOUNTER — NURSE NAVIGATOR (OUTPATIENT)
Dept: CASE MANAGEMENT | Age: 37
End: 2022-09-06

## 2022-09-06 NOTE — NURSE NAVIGATOR
[address]  Breast Cancer Nurse Navigator Encounter    Name: Isrrael Marina  Age: 42 y.o.  : 1985  Diagnosis: Left breast DCIS; ER+/AK+    Encounter type:  []Initial Navigator Encounter  [x]Patient Initiated  []Navigator Follow-up  []Other:     Narrative:   Pt called with questions about upcoming bilateral mastectomies with reconstruction. Answered questions about post-op expectations. She states she will have her MIL and  to help at home. Explained that she will have drains for at least a week, maybe longer depending on output. Pt also inquired about breast cancer support groups. Sent support group info via e-mail (e-mail verified as [email or web address]). Also sent breast surgery education packet as well as RADHA drain education. Pt encouraged to reach out as needed. Addendum:  Pt also inquired about possible support groups for her . I sent her links to reputable organizations listed below. I also shared info for Here for the Girls for pt's benefit. mydoodle.como.nl. org/support_groups/77-caregiver_support_group_spouses_partners  https://abcdbreastcancersupport.org/get-support/ - helps match you with someone who is going through a similar experience.  Can be for family and friends as well.   https://www.day.info/ - for those with cancer and their loved ones    Interdisciplinary Team:  Surg-Onc: Irene Ramos MD  Med-Onc:  Rad-Onc:  Plastics: Nannette Rivera MD  :   Nurse Navigator: EVELYN Hazel BSN, RN, [facility]  Breast Cancer Nurse [address] [place] [address] Nw  W: [phone]  F: [phone]  [email or web address]   Good [facility] in [facility]

## 2022-09-08 ENCOUNTER — DOCUMENTATION ONLY (OUTPATIENT)
Dept: SURGERY | Age: 37
End: 2022-09-08

## 2022-09-08 NOTE — PROGRESS NOTES
Patient Surgery Information Sheet        Patient Name:  Asad Cheng  Surgery Date:  September 27, 2022  Type of Surgery:  BILATERAL SKIN SPARING MASTECTOMIES LEFT BREAST SENTINEL NODE BIOPSY RECONSTRUCTION DR. Shelia Brice  Time of Surgery:  10:00 AM  Pre-Procedure required? Yes :  Time of Procedure: 9/27/22    1. [place] Node Biopsy which is a pre-op injection prior to surgery:   Scheduled 2 hours prior to surgery  2. Needle Localization which is a pre-op procedure where a wire will be inserted prior to surgery:  Scheduled 2 hours prior to surgery    Pre-Operative Testing Department will call prior to surgery to determine if you need to come in and will schedule if needed. Arrival Time on the day of Surgery:    Hospital:  [address] [address]    Check in is located:  [address] entrance and make an immediate left and check in at patient access.     YOU MAY NOT GET A REMINDER CALL FROM THE HOSPITAL    Pre-Operative Instructions:      \" Nothing to eat or drink after midnight the night before surgery  \" Do not wear makeup, lotion, deodorant, perfume  \" Please have a  to take you home from the hospital, failure to have a  could result in canceled surgery  \" All valuables should be left at home, the hospital Is not responsible for valuables  \" Special Instructions if needed:     **NOTIFIED PATIENT IS AWARE     Follow up appointment for post-operative visit with provider :   October 3, 2022    [address], [address] [facility]

## 2022-09-19 ENCOUNTER — OFFICE VISIT (OUTPATIENT)
Dept: CARDIOLOGY CLINIC | Age: 37
End: 2022-09-19
Payer: COMMERCIAL

## 2022-09-19 VITALS
OXYGEN SATURATION: 99 % | WEIGHT: 139.8 LBS | BODY MASS INDEX: 23.29 KG/M2 | HEIGHT: 65 IN | DIASTOLIC BLOOD PRESSURE: 80 MMHG | SYSTOLIC BLOOD PRESSURE: 118 MMHG | HEART RATE: 74 BPM

## 2022-09-19 DIAGNOSIS — Z01.810 PREOP CARDIOVASCULAR EXAM: ICD-10-CM

## 2022-09-19 DIAGNOSIS — Z00.00 ANNUAL PHYSICAL EXAM: ICD-10-CM

## 2022-09-19 DIAGNOSIS — R00.2 PALPITATIONS: Primary | ICD-10-CM

## 2022-09-19 PROCEDURE — 99214 OFFICE O/P EST MOD 30 MIN: CPT | Performed by: SPECIALIST

## 2022-09-19 PROCEDURE — 93000 ELECTROCARDIOGRAM COMPLETE: CPT | Performed by: SPECIALIST

## 2022-09-19 NOTE — PROGRESS NOTES
Chief Complaint   Patient presents with    Surgical Clearance     Bilat Mastectomy       Vitals:    09/19/22 1519   BP: 118/80   Pulse: 74   Height: 5' 5\" (1.651 m)   Weight: 139 lb 12.8 oz (63.4 kg)   SpO2: 99%         Chest pain: no    SOB: no    Palpitations: yes, when being active. Happened only Once    Dizziness: no    Swelling: no    Refills:       Have you been to the ER, urgent care clinic since your last visit? Hospitalized since your last visit? no    Have you sen or consulted other health care providers outside of the [facility] system since your last visit?  (Include any pap smears or colon screening.)      Dr. Derci Vázquez- Clearance

## 2022-09-19 NOTE — PROGRESS NOTES
Kailey Montemayor MD. [facility]              Patient: Ivory Chan  : 1985      Today's Date: 2022          HISTORY OF PRESENT ILLNESS:     History of Present Illness:    She has had a couple of episodes of heart racing spells when sitting there - once lasted 30 min and one a couple of minutes and maybe some others for 30 seconds. She had a spell this summer which lasted a minute - prior spell was a year prior. Otherwise OK. No CP or SOB. She exercises 3-4 times a week. PAST MEDICAL HISTORY:     Past Medical History:   Diagnosis Date    Anemia     Anxiety     HX OTHER MEDICAL 2/10/11    fibroids    Postpartum depression     with 1st pregnancy    Unspecified breast disorder     fibrocystic breasts, followed at [facility]       Past Surgical History:   Procedure Laterality Date    HX OTHER SURGICAL  age 1    adnoidectomy           CURRENT MEDICATIONS:    .  Current Outpatient Medications   Medication Sig Dispense Refill    fexofenadine (ALLEGRA) 60 mg tablet Take 60 mg by mouth two (2) times a day. ferrous sulfate (SLOW FE) 142 mg (45 mg iron) ER tablet Take 625 mg by mouth two (2) times a day. Allergies   Allergen Reactions    Blueberry Swelling    Sulfa (Sulfonamide Antibiotics) Anaphylaxis    Pseudoephedrine Palpitations             SOCIAL HISTORY:     Social History     Tobacco Use    Smoking status: Never    Smokeless tobacco: Never   Substance Use Topics    Alcohol use: No    Drug use: No         FAMILY HISTORY:     Family History   Problem Relation Age of Onset    Hypertension Mother         caused by RA rx    Arthritis-rheumatoid Mother     Stroke Maternal Grandfather     Cancer Neg Hx     Heart Disease Neg Hx               REVIEW OF SYMPTOMS:      Review of Symptoms:  Constitutional: Negative for fever, chills  HEENT: Negative for nosebleeds, tinnitus, and vision changes.    Respiratory: Negative for cough, wheezing  Cardiovascular: Negative for orthopnea, claudication, syncope, and PND. Gastrointestinal: Negative for abdominal pain, diarrhea, melena. Genitourinary: Negative for dysuria  Musculoskeletal: Negative for myalgias. Skin: Negative for rash  Heme: No problems bleeding. Neurological: Negative for speech change and focal weakness. PHYSICAL EXAM:      Physical Exam:  Visit Vitals  /80   Pulse 74   Ht 5' 5\" (1.651 m)   Wt 139 lb 12.8 oz (63.4 kg)   SpO2 99%   BMI 23.26 kg/m²       Patient appears generally well, mood and affect are appropriate and pleasant. HEENT:  Hearing intact, non-icteric, normocephalic, atraumatic. Neck Exam: Supple, No JVD    Lung Exam: Clear to auscultation, even breath sounds. Cardiac Exam: Regular rate and rhythm with no murmur or rub  Abdomen: Soft, non-tender,   Extremities: Moves all ext well. No lower extremity edema. MSKTL: Overall good ROM ext  Skin: No significant rashes  Psych: Appropriate affect  Neuro - Grossly intact           LABS / OTHER STUDIES reviewed:      Lab Results   Component Value Date/Time    Sodium 138 06/18/2021 12:02 PM    Potassium 4.2 06/18/2021 12:02 PM    Chloride 105 06/18/2021 12:02 PM    CO2 28 06/18/2021 12:02 PM    Anion gap 5 06/18/2021 12:02 PM    Glucose 87 06/18/2021 12:02 PM    BUN 13 06/18/2021 12:02 PM    Creatinine 0.52 (L) 06/18/2021 12:02 PM    BUN/Creatinine ratio 25 (H) 06/18/2021 12:02 PM    GFR est AA >60 06/18/2021 12:02 PM    GFR est non-AA >60 06/18/2021 12:02 PM    Calcium 8.9 06/18/2021 12:02 PM    Bilirubin, total 0.5 06/18/2021 12:02 PM    Alk.  phosphatase 59 06/18/2021 12:02 PM    Protein, total 6.7 06/18/2021 12:02 PM    Albumin 3.7 06/18/2021 12:02 PM    Globulin 3.0 06/18/2021 12:02 PM    A-G Ratio 1.2 06/18/2021 12:02 PM    ALT (SGPT) 10 (L) 06/18/2021 12:02 PM    AST (SGOT) 7 (L) 06/18/2021 12:02 PM       Lab Results   Component Value Date/Time    WBC 5.6 06/18/2021 12:02 PM    HGB 11.5 06/18/2021 12:02 PM    HCT 36.7 06/18/2021 12:02 PM    PLATELET 302 84/74/2483 12:02 PM    MCV 95.3 06/18/2021 12:02 PM    Hgb, External 10.9 06/30/2011 12:00 AM    Hct, External 33.5 06/30/2011 12:00 AM    Platelet cnt., External 337 02/10/2011 12:00 AM                   CARDIAC DIAGNOSTICS:      Cardiac Evaluation Includes:  I reviewed the results below. CTA chest 2013 - no acute process     Echo 7/27/21 - LVEF 60-65%, normal study      Event Monitor 7/19/21-8/17/21 - NSR, normal study - symptoms (heart racing, skipped beats) correlate with sinus or sinus tach sometimes with single PVC's. Overall rare PVC's (<1% of beats). EKG 6/18/21 - NSR, normal   EKG 9/19/22 - NSR, normal              ASSESSMENT AND PLAN:      Assessment and Plan:  1) Preop Evaluation   - she has plans for breast surgery   - She can proceed with surgery and should have low cardiac risk,     2) Heart racing   - sounds like she has had some SVT spells in past - has it infrequently   - Echo 7/27/21 - LVEF 60-65%, normal study   - Event Monitor 7/19/21-8/17/21 - NSR, normal study - symptoms (heart racing, skipped beats) correlate with sinus or sinus tach sometimes with single PVC's. Overall rare PVC's (<1% of beats). - can repeat a monitor for worsening symptoms     4) Anxiety   - anxiety has been under control - sees a counselor     4) See me as needed. Has 3 young kids. Works at [employer]. Wellington Lopez MD, [address]  [address] [facility], [address]      [place] [address]  [address]  [address]  Ph: [phone]                               Ph [phone]

## 2022-09-20 ENCOUNTER — HOSPITAL ENCOUNTER (OUTPATIENT)
Dept: PREADMISSION TESTING | Age: 37
Discharge: HOME | End: 2022-09-20
Payer: COMMERCIAL

## 2022-09-20 ENCOUNTER — NURSE NAVIGATOR (OUTPATIENT)
Dept: CASE MANAGEMENT | Age: 37
End: 2022-09-20

## 2022-09-20 VITALS
BODY MASS INDEX: 23.14 KG/M2 | TEMPERATURE: 98.4 F | HEART RATE: 76 BPM | DIASTOLIC BLOOD PRESSURE: 73 MMHG | SYSTOLIC BLOOD PRESSURE: 110 MMHG | WEIGHT: 138.89 LBS | HEIGHT: 65 IN

## 2022-09-20 LAB
BASOPHILS # BLD: 0 K/UL (ref 0–0.1)
BASOPHILS NFR BLD: 1 % (ref 0–1)
DIFFERENTIAL METHOD BLD: NORMAL
EOSINOPHIL # BLD: 0.3 K/UL (ref 0–0.4)
EOSINOPHIL NFR BLD: 4 % (ref 0–7)
ERYTHROCYTE [DISTWIDTH] IN BLOOD BY AUTOMATED COUNT: 13 % (ref 11.5–14.5)
HCT VFR BLD AUTO: 37.5 % (ref 35–47)
HGB BLD-MCNC: 12.3 G/DL (ref 11.5–16)
IMM GRANULOCYTES # BLD AUTO: 0 K/UL (ref 0–0.04)
IMM GRANULOCYTES NFR BLD AUTO: 0 % (ref 0–0.5)
LYMPHOCYTES # BLD: 2.1 K/UL (ref 0.8–3.5)
LYMPHOCYTES NFR BLD: 33 % (ref 12–49)
MCH RBC QN AUTO: 30.7 PG (ref 26–34)
MCHC RBC AUTO-ENTMCNC: 32.8 G/DL (ref 30–36.5)
MCV RBC AUTO: 93.5 FL (ref 80–99)
MONOCYTES # BLD: 0.3 K/UL (ref 0–1)
MONOCYTES NFR BLD: 5 % (ref 5–13)
NEUTS SEG # BLD: 3.6 K/UL (ref 1.8–8)
NEUTS SEG NFR BLD: 57 % (ref 32–75)
NRBC # BLD: 0 K/UL (ref 0–0.01)
NRBC BLD-RTO: 0 PER 100 WBC
PLATELET # BLD AUTO: 170 K/UL (ref 150–400)
RBC # BLD AUTO: 4.01 M/UL (ref 3.8–5.2)
WBC # BLD AUTO: 6.4 K/UL (ref 3.6–11)

## 2022-09-20 PROCEDURE — 85025 COMPLETE CBC W/AUTO DIFF WBC: CPT

## 2022-09-20 PROCEDURE — 36415 COLL VENOUS BLD VENIPUNCTURE: CPT

## 2022-09-20 RX ORDER — BISMUTH SUBSALICYLATE 262 MG
1 TABLET,CHEWABLE ORAL DAILY
COMMUNITY

## 2022-09-20 NOTE — NURSE NAVIGATOR
[address]  Breast Cancer Nurse Navigator Encounter    Name: Zoila Duran  Age: 42 y.o.  : 1985  Diagnosis: Left breast DCIS; ER+/MI+    Encounter type:  []Initial Navigator Encounter  [x]Patient Initiated  []Navigator Follow-up  []Other:     Narrative:   Pt called seeking clarification on pre-op testing that is scheduled for today. Confirmed date/time of this appointment and explained that this is routine testing done before all breast surgeries. Explained that this is to discuss health history, med list, have blood work, possibly an EKG (if not done recently), and be given any instructions for the night before surgery and day of. Pt appreciative for the info.      Interdisciplinary Team:  Surg-Onc: Kori Rivera MD  Med-Onc:  Rad-Onc:  Plastics: Delores Guerra MD  :   Nurse Navigator: Pippa Muñoz, RN      Pippa Muñoz, BSN, RN, [facility]  Breast Cancer Nurse [address]  W: [phone]  F: [phone]  [email or web address]   Good Help to Those in [facility]

## 2022-09-20 NOTE — PERIOP NOTES
[address] [facility] INSTRUCTIONS    Surgery Date:   09/27/2022    Your surgeon's office or [facility] staff will call you between 4 PM- 8 PM the day before surgery with your arrival time. If your surgery is on a Monday, you will receive a call the preceding Friday. Please report to [facility] Patient Access/Admitting on the 1st floor. Bring your insurance card, photo identification, and any copayment ( if applicable). If you are going home the same day of your surgery, you must have a responsible adult to drive you home. You need to have a responsible adult to stay with you the first 24 hours after surgery and you should not drive a car for 24 hours following your surgery. Nothing to eat or drink after midnight the night before surgery. This includes no water, gum, mints, coffee, juice, etc.  Please note special instructions, if applicable, below for medications. Do NOT drink alcohol or smoke 24 hours before surgery. STOP smoking for 14 days prior as it helps with breathing and healing after surgery. If you are being admitted to the hospital, please leave personal belongings/luggage in your car until you have an assigned hospital room number. Please wear comfortable clothes. Wear your glasses instead of contacts. We ask that all money, jewelry and valuables be left at home. Wear no make up, particularly mascara, the day of surgery. All body piercings, rings, and jewelry need to be removed and left at home. Please remove any nail polish or artificial nails from your fingernails. Please wear your hair loose or down. Please no pony-tails, buns, or any metal hair accessories. If you shower the morning of surgery, please do not apply any lotions or powders afterwards. You may wear deodorant, unless having breast surgery. Do not shave any body area within 24 hours of your surgery. Please follow all instructions to avoid any potential surgical cancellation.   Should your physical condition change, (i.e. fever, cold, flu, etc.) please notify your surgeon as soon as possible. It is important to be on time. If a situation occurs where you may be delayed, please call:  [phone] / [phone] on the day of surgery. The Preadmission Testing staff can be reached at [phone]. Special instructions: BRING ADVANCE DIRECTIVE, IF  COMPLETED      Current Outpatient Medications   Medication Sig    multivitamin (ONE A DAY) tablet Take 1 Tablet by mouth daily. fexofenadine (ALLEGRA) 60 mg tablet Take 60 mg by mouth daily. ferrous sulfate (SLOW FE) 142 mg (45 mg iron) ER tablet Take 625 mg by mouth two (2) times a day. No current facility-administered medications for this encounter. YOU MUST ONLY TAKE THESE MEDICATIONS THE MORNING OF SURGERY WITH A SIP OF WATER: ALLEGRA  MEDICATIONS TO TAKE THE MORNING OF SURGERY ONLY IF NEEDED: NONE  HOLD these prescription medications BEFORE Surgery: NONE  Ask your surgeon/prescribing physician about when/if to STOP taking these medications: NONE  Stop all vitamins, herbal medicines and Aspirin containing products 7 days prior to surgery. Stop any non-steroidal anti-inflammatory drugs (i.e. Ibuprofen, Naproxen, Advil, Aleve) 3 days before surgery. You may take Tylenol. If you are currently taking Plavix, Coumadin, or any other blood-thinning/anticoagulant medication contact your prescribing physician for instructions. Preventing Infections Before and After - Your Surgery    IMPORTANT INSTRUCTIONS      You play an important role in your health and preparation for surgery. To reduce the germs on your skin you will need to shower with CHG soap (Chorhexidine gluconate 4%) two times before surgery. CHG soap (Hibiclens, Hex-A-Clens or store brand)  CHG soap will be provided at your Preadmission Testing (PAT) appointment.   If you do not have a PAT appointment before surgery, you may arrange to  CHG soap from our office or purchase CHG soap at a pharmacy, grocery or department store. You need to purchase TWO 4 ounce bottles to use for your 2 showers. Steps to follow:  Cecilia Oddi your hair with your normal shampoo and your body with regular soap and rinse well to remove shampoo and soap from your skin. Wet a clean washcloth and turn off the shower. Put CHG soap on washcloth and apply to your entire body from the neck down. Do not use on your head, face or private parts(genitals). Do not use CHG soap on open sores, wounds or areas of skin irritation. Wash you body gently for 5 minutes. Do not wash your skin too hard. This soap does not create lather. Pay special attention to your underarms and from your belly button to your feet. Turn the shower back on and rinse well to get CHG soap off your body. Pat your skin dry with a clean, dry towel. Do not apply lotions or moisturizer. Put on clean clothes and sleep on fresh bed sheets and do not allow pets to sleep with you. Shower with CHG soap 2 times before your surgery  The evening before your surgery  The morning of your surgery      Tips to help prevent infections after your surgery:  Protect your surgical wound from germs:  Hand washing is the most important thing you and your caregivers can do to prevent infections. Keep your bandage clean and dry! Do not touch your surgical wound. Use clean, freshly washed towels and washcloths every time you shower; do not share bath linens with others. Until your surgical wound is healed, wear clothing and sleep on bed linens each day that are clean and freshly washed. Do not allow pets to sleep in your bed with you or touch your surgical wound. Do not smoke - smoking delays wound healing. This may be a good time to stop smoking. If you have diabetes, it is important for you to manage your blood sugar levels properly before your surgery as well as after your surgery.  Poorly managed blood sugar levels slow down wound healing and prevent you from healing completely. Patient Information Regarding COVID Restrictions    Day of Procedure    Please park in the parking deck or any designated visitor parking lot. Enter the facility through the Main Entrance of the hospital.  On the day of surgery, please provide the cell phone number of the person who will be waiting for you to the Patient Access representative at the time of registration. Please wear a mask on the day of your procedure. We are now allowing two designated visitors per stay. Pediatric patients may have 2 designated visitors. These two people may come in with you on the day of your procedure. The designated visitor must also wear a mask. Once your procedure and the immediate recovery period is completed, a nurse in the recovery area will contact your designated visitor to inform them of your room number or to otherwise review other pertinent information regarding your care. Social distancing practices are to be adhered to in waiting areas and the cafeteria. The patient was contacted  in person. She verbalized understanding of all instructions does not  need reinforcement.

## 2022-09-21 ENCOUNTER — TELEPHONE (OUTPATIENT)
Dept: SURGERY | Age: 37
End: 2022-09-21

## 2022-09-21 NOTE — TELEPHONE ENCOUNTER
Patient called and left a message saying she had questions about surgery on 9/27. Called patient back, no answer. LVM to return call or reach out on [employer].

## 2022-09-23 ENCOUNTER — DOCUMENTATION ONLY (OUTPATIENT)
Dept: SURGERY | Age: 37
End: 2022-09-23

## 2022-09-23 NOTE — PROGRESS NOTES
I called patient [phone] to get her updated insurance information. I left her a detailed voicemail to return my call when possible.

## 2022-09-26 ENCOUNTER — ANESTHESIA EVENT (OUTPATIENT)
Dept: MEDSURG UNIT | Age: 37
End: 2022-09-26
Payer: COMMERCIAL

## 2022-09-26 ENCOUNTER — OFFICE VISIT (OUTPATIENT)
Dept: SURGERY | Age: 37
End: 2022-09-26
Payer: COMMERCIAL

## 2022-09-26 VITALS — WEIGHT: 138 LBS | BODY MASS INDEX: 22.99 KG/M2 | HEIGHT: 65 IN

## 2022-09-26 DIAGNOSIS — D05.12 BREAST NEOPLASM, TIS (DCIS), LEFT: Primary | ICD-10-CM

## 2022-09-26 DIAGNOSIS — N60.99 ATYPICAL HYPERPLASIA OF BREAST: ICD-10-CM

## 2022-09-26 PROCEDURE — 99214 OFFICE O/P EST MOD 30 MIN: CPT | Performed by: SURGERY

## 2022-09-26 NOTE — PROGRESS NOTES
HISTORY OF PRESENT ILLNESS  Zoila Duran is a 42 y.o. female. HPI ESTABLISHED Patient here for surgical discussion for bilateral mastectomies on 2/27/22 for LEFT breast multicentric DCIS. No new concerns and denies pain. 6/21/22 biopsy LEFT breast- DCIS   1- 8 o'clock ER 60, HI 60   2- 4 o'clock posterior depth   Right breast papilloma, atypia     Has a history of cysts in both breasts. Family History: Mother- Breast cancer dx at 72       Review of Systems   All other systems reviewed and are negative. Physical Exam  Vitals and nursing note reviewed. Chest:   Breasts:     Right: No swelling, bleeding, inverted nipple, mass, nipple discharge, skin change or tenderness. Left: No swelling, bleeding, inverted nipple, mass, nipple discharge, skin change or tenderness. Lymphadenopathy:      Upper Body:      Right upper body: No axillary adenopathy. Left upper body: No axillary adenopathy. ASSESSMENT and PLAN    ICD-10-CM ICD-9-CM    1. Breast neoplasm, Tis (DCIS), left  D05.12 233.0       2. Atypical hyperplasia of breast  N60.99 611.1       41 yo female with multicentric left dcis, right atypia  Discussed bilateral skin sparing mastectomies, left sln biopsy   Reconstruction  Discussed recovery 4-6 weeks  Discussed drains  Having surgery tomorrow  Given camisole drain pouches  30 minutes was spent with patient on counseling and coordination of care.

## 2022-09-27 ENCOUNTER — APPOINTMENT (OUTPATIENT)
Dept: NUCLEAR MEDICINE | Age: 37
End: 2022-09-27
Attending: SURGERY
Payer: COMMERCIAL

## 2022-09-27 ENCOUNTER — HOSPITAL ENCOUNTER (OUTPATIENT)
Age: 37
Setting detail: OBSERVATION
Discharge: HOME | End: 2022-09-28
Attending: SURGERY | Admitting: PLASTIC SURGERY
Payer: COMMERCIAL

## 2022-09-27 ENCOUNTER — ANESTHESIA (OUTPATIENT)
Dept: MEDSURG UNIT | Age: 37
End: 2022-09-27
Payer: COMMERCIAL

## 2022-09-27 DIAGNOSIS — D05.12 DUCTAL CARCINOMA IN SITU OF LEFT BREAST: ICD-10-CM

## 2022-09-27 DIAGNOSIS — D05.12 DUCTAL CARCINOMA IN SITU (DCIS) OF LEFT BREAST: ICD-10-CM

## 2022-09-27 PROBLEM — Z90.10 S/P MASTECTOMY: Status: ACTIVE | Noted: 2022-09-27

## 2022-09-27 LAB — HCG UR QL: NEGATIVE

## 2022-09-27 PROCEDURE — 74011000250 HC RX REV CODE- 250: Performed by: PLASTIC SURGERY

## 2022-09-27 PROCEDURE — 77030008684 HC TU ET CUF COVD -B: Performed by: NURSE ANESTHETIST, CERTIFIED REGISTERED

## 2022-09-27 PROCEDURE — C1789 PROSTHESIS, BREAST, IMP: HCPCS | Performed by: SURGERY

## 2022-09-27 PROCEDURE — 77030002996 HC SUT SLK J&J -A: Performed by: SURGERY

## 2022-09-27 PROCEDURE — 77030011267 HC ELECTRD BLD COVD -A: Performed by: SURGERY

## 2022-09-27 PROCEDURE — 76210000035 HC AMBSU PH I REC 1 TO 1.5 HR: Performed by: SURGERY

## 2022-09-27 PROCEDURE — 77030040361 HC SLV COMPR DVT MDII -B: Performed by: SURGERY

## 2022-09-27 PROCEDURE — 77030005513 HC CATH URETH FOL11 MDII -B: Performed by: SURGERY

## 2022-09-27 PROCEDURE — 74011000258 HC RX REV CODE- 258: Performed by: NURSE ANESTHETIST, CERTIFIED REGISTERED

## 2022-09-27 PROCEDURE — 74011250636 HC RX REV CODE- 250/636: Performed by: NURSE ANESTHETIST, CERTIFIED REGISTERED

## 2022-09-27 PROCEDURE — 77030012407 HC DRN WND BARD -B: Performed by: SURGERY

## 2022-09-27 PROCEDURE — 38525 BIOPSY/REMOVAL LYMPH NODES: CPT | Performed by: SURGERY

## 2022-09-27 PROCEDURE — 2709999900 HC NON-CHARGEABLE SUPPLY: Performed by: SURGERY

## 2022-09-27 PROCEDURE — 74011250636 HC RX REV CODE- 250/636: Performed by: ANESTHESIOLOGY

## 2022-09-27 PROCEDURE — 88307 TISSUE EXAM BY PATHOLOGIST: CPT

## 2022-09-27 PROCEDURE — 19303 MAST SIMPLE COMPLETE: CPT | Performed by: SURGERY

## 2022-09-27 PROCEDURE — 77030034626 HC LIGASURE SM JAW SEAL OPN SURG COVD -E: Performed by: SURGERY

## 2022-09-27 PROCEDURE — 77030026438 HC STYL ET INTUB CARD -A: Performed by: NURSE ANESTHETIST, CERTIFIED REGISTERED

## 2022-09-27 PROCEDURE — 77030002966 HC SUT PDS J&J -A: Performed by: SURGERY

## 2022-09-27 PROCEDURE — 81025 URINE PREGNANCY TEST: CPT

## 2022-09-27 PROCEDURE — 77030008462 HC STPLR SKN PROX J&J -A: Performed by: SURGERY

## 2022-09-27 PROCEDURE — G0378 HOSPITAL OBSERVATION PER HR: HCPCS

## 2022-09-27 PROCEDURE — 88360 TUMOR IMMUNOHISTOCHEM/MANUAL: CPT

## 2022-09-27 PROCEDURE — 77030002933 HC SUT MCRYL J&J -A: Performed by: SURGERY

## 2022-09-27 PROCEDURE — 77030040506 HC DRN WND MDII -A: Performed by: SURGERY

## 2022-09-27 PROCEDURE — 77030010507 HC ADH SKN DERMBND J&J -B: Performed by: SURGERY

## 2022-09-27 PROCEDURE — 74011250637 HC RX REV CODE- 250/637: Performed by: PLASTIC SURGERY

## 2022-09-27 PROCEDURE — 88331 PATH CONSLTJ SURG 1 BLK 1SPC: CPT

## 2022-09-27 PROCEDURE — C1889 IMPLANT/INSERT DEVICE, NOC: HCPCS | Performed by: SURGERY

## 2022-09-27 PROCEDURE — 77030041680 HC PNCL ELECSURG SMK EVAC CNMD -B: Performed by: SURGERY

## 2022-09-27 PROCEDURE — 88342 IMHCHEM/IMCYTCHM 1ST ANTB: CPT

## 2022-09-27 PROCEDURE — 74011000272 HC RX REV CODE- 272: Performed by: PLASTIC SURGERY

## 2022-09-27 PROCEDURE — 76060000067 HC AMB SURG ANES 3.5 TO 4 HR: Performed by: SURGERY

## 2022-09-27 PROCEDURE — 74011000250 HC RX REV CODE- 250: Performed by: NURSE ANESTHETIST, CERTIFIED REGISTERED

## 2022-09-27 PROCEDURE — 76030000007 HC AMB SURG OR TIME 3.5 TO 4: Performed by: SURGERY

## 2022-09-27 PROCEDURE — C9290 INJ, BUPIVACAINE LIPOSOME: HCPCS | Performed by: PLASTIC SURGERY

## 2022-09-27 PROCEDURE — 77030013674 HC FIL EXPND TISS ALGN -A: Performed by: SURGERY

## 2022-09-27 PROCEDURE — 74011250636 HC RX REV CODE- 250/636: Performed by: PLASTIC SURGERY

## 2022-09-27 PROCEDURE — 74011250637 HC RX REV CODE- 250/637: Performed by: ANESTHESIOLOGY

## 2022-09-27 PROCEDURE — A9520 TC99 TILMANOCEPT DIAG 0.5MCI: HCPCS

## 2022-09-27 DEVICE — IMPLANTABLE DEVICE: Type: IMPLANTABLE DEVICE | Site: BREAST | Status: FUNCTIONAL

## 2022-09-27 RX ORDER — FENTANYL CITRATE 50 UG/ML
INJECTION, SOLUTION INTRAMUSCULAR; INTRAVENOUS AS NEEDED
Status: DISCONTINUED | OUTPATIENT
Start: 2022-09-27 | End: 2022-09-27 | Stop reason: HOSPADM

## 2022-09-27 RX ORDER — ALBUTEROL SULFATE 0.83 MG/ML
2.5 SOLUTION RESPIRATORY (INHALATION) AS NEEDED
Status: DISCONTINUED | OUTPATIENT
Start: 2022-09-27 | End: 2022-09-27 | Stop reason: HOSPADM

## 2022-09-27 RX ORDER — MIDAZOLAM HYDROCHLORIDE 1 MG/ML
1 INJECTION, SOLUTION INTRAMUSCULAR; INTRAVENOUS AS NEEDED
Status: DISCONTINUED | OUTPATIENT
Start: 2022-09-27 | End: 2022-09-27 | Stop reason: HOSPADM

## 2022-09-27 RX ORDER — IBUPROFEN 400 MG/1
800 TABLET ORAL
Status: DISCONTINUED | OUTPATIENT
Start: 2022-09-27 | End: 2022-09-28 | Stop reason: HOSPADM

## 2022-09-27 RX ORDER — SCOLOPAMINE TRANSDERMAL SYSTEM 1 MG/1
1 PATCH, EXTENDED RELEASE TRANSDERMAL ONCE
Status: DISCONTINUED | OUTPATIENT
Start: 2022-09-27 | End: 2022-09-27 | Stop reason: HOSPADM

## 2022-09-27 RX ORDER — PROPOFOL 10 MG/ML
INJECTION, EMULSION INTRAVENOUS AS NEEDED
Status: DISCONTINUED | OUTPATIENT
Start: 2022-09-27 | End: 2022-09-27 | Stop reason: HOSPADM

## 2022-09-27 RX ORDER — DEXTROSE, SODIUM CHLORIDE, AND POTASSIUM CHLORIDE 5; .45; .15 G/100ML; G/100ML; G/100ML
50 INJECTION INTRAVENOUS CONTINUOUS
Status: DISCONTINUED | OUTPATIENT
Start: 2022-09-27 | End: 2022-09-28

## 2022-09-27 RX ORDER — HYDROMORPHONE HYDROCHLORIDE 1 MG/ML
0.2 INJECTION, SOLUTION INTRAMUSCULAR; INTRAVENOUS; SUBCUTANEOUS
Status: DISCONTINUED | OUTPATIENT
Start: 2022-09-27 | End: 2022-09-27 | Stop reason: HOSPADM

## 2022-09-27 RX ORDER — ONDANSETRON 2 MG/ML
4 INJECTION INTRAMUSCULAR; INTRAVENOUS AS NEEDED
Status: DISCONTINUED | OUTPATIENT
Start: 2022-09-27 | End: 2022-09-27 | Stop reason: HOSPADM

## 2022-09-27 RX ORDER — LIDOCAINE HYDROCHLORIDE 10 MG/ML
0.1 INJECTION, SOLUTION EPIDURAL; INFILTRATION; INTRACAUDAL; PERINEURAL AS NEEDED
Status: DISCONTINUED | OUTPATIENT
Start: 2022-09-27 | End: 2022-09-27 | Stop reason: HOSPADM

## 2022-09-27 RX ORDER — KETAMINE HYDROCHLORIDE 10 MG/ML
INJECTION, SOLUTION INTRAMUSCULAR; INTRAVENOUS AS NEEDED
Status: DISCONTINUED | OUTPATIENT
Start: 2022-09-27 | End: 2022-09-27 | Stop reason: HOSPADM

## 2022-09-27 RX ORDER — DEXAMETHASONE SODIUM PHOSPHATE 4 MG/ML
INJECTION, SOLUTION INTRA-ARTICULAR; INTRALESIONAL; INTRAMUSCULAR; INTRAVENOUS; SOFT TISSUE AS NEEDED
Status: DISCONTINUED | OUTPATIENT
Start: 2022-09-27 | End: 2022-09-27 | Stop reason: HOSPADM

## 2022-09-27 RX ORDER — ACETAMINOPHEN 325 MG/1
650 TABLET ORAL ONCE
Status: COMPLETED | OUTPATIENT
Start: 2022-09-27 | End: 2022-09-27

## 2022-09-27 RX ORDER — HYDROMORPHONE HYDROCHLORIDE 2 MG/1
2-4 TABLET ORAL
Status: DISCONTINUED | OUTPATIENT
Start: 2022-09-27 | End: 2022-09-28 | Stop reason: HOSPADM

## 2022-09-27 RX ORDER — SODIUM CHLORIDE 9 MG/ML
25 INJECTION, SOLUTION INTRAVENOUS CONTINUOUS
Status: DISCONTINUED | OUTPATIENT
Start: 2022-09-27 | End: 2022-09-27 | Stop reason: HOSPADM

## 2022-09-27 RX ORDER — FENTANYL CITRATE 50 UG/ML
25 INJECTION, SOLUTION INTRAMUSCULAR; INTRAVENOUS
Status: DISCONTINUED | OUTPATIENT
Start: 2022-09-27 | End: 2022-09-27 | Stop reason: HOSPADM

## 2022-09-27 RX ORDER — SODIUM CHLORIDE, SODIUM LACTATE, POTASSIUM CHLORIDE, CALCIUM CHLORIDE 600; 310; 30; 20 MG/100ML; MG/100ML; MG/100ML; MG/100ML
1000 INJECTION, SOLUTION INTRAVENOUS CONTINUOUS
Status: DISCONTINUED | OUTPATIENT
Start: 2022-09-27 | End: 2022-09-27 | Stop reason: HOSPADM

## 2022-09-27 RX ORDER — MIDAZOLAM HYDROCHLORIDE 1 MG/ML
INJECTION, SOLUTION INTRAMUSCULAR; INTRAVENOUS AS NEEDED
Status: DISCONTINUED | OUTPATIENT
Start: 2022-09-27 | End: 2022-09-27 | Stop reason: HOSPADM

## 2022-09-27 RX ORDER — ONDANSETRON 2 MG/ML
INJECTION INTRAMUSCULAR; INTRAVENOUS AS NEEDED
Status: DISCONTINUED | OUTPATIENT
Start: 2022-09-27 | End: 2022-09-27 | Stop reason: HOSPADM

## 2022-09-27 RX ORDER — MIDAZOLAM HYDROCHLORIDE 1 MG/ML
0.5 INJECTION, SOLUTION INTRAMUSCULAR; INTRAVENOUS
Status: DISCONTINUED | OUTPATIENT
Start: 2022-09-27 | End: 2022-09-27 | Stop reason: HOSPADM

## 2022-09-27 RX ORDER — ROCURONIUM BROMIDE 10 MG/ML
INJECTION, SOLUTION INTRAVENOUS AS NEEDED
Status: DISCONTINUED | OUTPATIENT
Start: 2022-09-27 | End: 2022-09-27 | Stop reason: HOSPADM

## 2022-09-27 RX ORDER — HYDROCODONE BITARTRATE AND ACETAMINOPHEN 5; 325 MG/1; MG/1
1 TABLET ORAL AS NEEDED
Status: DISCONTINUED | OUTPATIENT
Start: 2022-09-27 | End: 2022-09-27 | Stop reason: HOSPADM

## 2022-09-27 RX ORDER — GLYCOPYRROLATE 0.2 MG/ML
0.2 INJECTION INTRAMUSCULAR; INTRAVENOUS
Status: DISCONTINUED | OUTPATIENT
Start: 2022-09-27 | End: 2022-09-27 | Stop reason: HOSPADM

## 2022-09-27 RX ORDER — FENTANYL CITRATE 50 UG/ML
50 INJECTION, SOLUTION INTRAMUSCULAR; INTRAVENOUS AS NEEDED
Status: DISCONTINUED | OUTPATIENT
Start: 2022-09-27 | End: 2022-09-27 | Stop reason: HOSPADM

## 2022-09-27 RX ORDER — ACETAMINOPHEN 325 MG/1
650 TABLET ORAL
Status: DISCONTINUED | OUTPATIENT
Start: 2022-09-27 | End: 2022-09-28 | Stop reason: HOSPADM

## 2022-09-27 RX ORDER — BACITRACIN ZINC 500 UNIT/G
OINTMENT (GRAM) TOPICAL AS NEEDED
Status: DISCONTINUED | OUTPATIENT
Start: 2022-09-27 | End: 2022-09-27 | Stop reason: HOSPADM

## 2022-09-27 RX ORDER — HYDROMORPHONE HYDROCHLORIDE 2 MG/ML
INJECTION, SOLUTION INTRAMUSCULAR; INTRAVENOUS; SUBCUTANEOUS AS NEEDED
Status: DISCONTINUED | OUTPATIENT
Start: 2022-09-27 | End: 2022-09-27 | Stop reason: HOSPADM

## 2022-09-27 RX ORDER — DIPHENHYDRAMINE HYDROCHLORIDE 50 MG/ML
12.5 INJECTION, SOLUTION INTRAMUSCULAR; INTRAVENOUS AS NEEDED
Status: DISCONTINUED | OUTPATIENT
Start: 2022-09-27 | End: 2022-09-27 | Stop reason: HOSPADM

## 2022-09-27 RX ORDER — ONDANSETRON 2 MG/ML
4 INJECTION INTRAMUSCULAR; INTRAVENOUS
Status: DISCONTINUED | OUTPATIENT
Start: 2022-09-27 | End: 2022-09-28 | Stop reason: HOSPADM

## 2022-09-27 RX ORDER — ROPIVACAINE HYDROCHLORIDE 5 MG/ML
30 INJECTION, SOLUTION EPIDURAL; INFILTRATION; PERINEURAL AS NEEDED
Status: DISCONTINUED | OUTPATIENT
Start: 2022-09-27 | End: 2022-09-27 | Stop reason: HOSPADM

## 2022-09-27 RX ORDER — MORPHINE SULFATE 2 MG/ML
2 INJECTION, SOLUTION INTRAMUSCULAR; INTRAVENOUS
Status: DISCONTINUED | OUTPATIENT
Start: 2022-09-27 | End: 2022-09-27 | Stop reason: HOSPADM

## 2022-09-27 RX ORDER — LIDOCAINE HYDROCHLORIDE 20 MG/ML
INJECTION, SOLUTION EPIDURAL; INFILTRATION; INTRACAUDAL; PERINEURAL AS NEEDED
Status: DISCONTINUED | OUTPATIENT
Start: 2022-09-27 | End: 2022-09-27 | Stop reason: HOSPADM

## 2022-09-27 RX ADMIN — PROPOFOL 50 MG: 10 INJECTION, EMULSION INTRAVENOUS at 10:25

## 2022-09-27 RX ADMIN — HYDROMORPHONE HYDROCHLORIDE 0.25 MG: 2 INJECTION, SOLUTION INTRAMUSCULAR; INTRAVENOUS; SUBCUTANEOUS at 11:28

## 2022-09-27 RX ADMIN — ONDANSETRON HYDROCHLORIDE 4 MG: 2 INJECTION, SOLUTION INTRAMUSCULAR; INTRAVENOUS at 13:51

## 2022-09-27 RX ADMIN — SODIUM CHLORIDE, POTASSIUM CHLORIDE, SODIUM LACTATE AND CALCIUM CHLORIDE: 600; 310; 30; 20 INJECTION, SOLUTION INTRAVENOUS at 12:00

## 2022-09-27 RX ADMIN — MIDAZOLAM 2 MG: 1 INJECTION INTRAMUSCULAR; INTRAVENOUS at 10:10

## 2022-09-27 RX ADMIN — FENTANYL CITRATE 50 MCG: 50 INJECTION, SOLUTION INTRAMUSCULAR; INTRAVENOUS at 10:22

## 2022-09-27 RX ADMIN — FENTANYL CITRATE 25 MCG: 50 INJECTION, SOLUTION INTRAMUSCULAR; INTRAVENOUS at 14:47

## 2022-09-27 RX ADMIN — PROPOFOL 200 MG: 10 INJECTION, EMULSION INTRAVENOUS at 10:22

## 2022-09-27 RX ADMIN — ROCURONIUM BROMIDE 50 MG: 10 SOLUTION INTRAVENOUS at 10:22

## 2022-09-27 RX ADMIN — SUGAMMADEX 120 MG: 100 INJECTION, SOLUTION INTRAVENOUS at 13:49

## 2022-09-27 RX ADMIN — ROCURONIUM BROMIDE 10 MG: 10 SOLUTION INTRAVENOUS at 13:25

## 2022-09-27 RX ADMIN — Medication 25 MG: at 10:39

## 2022-09-27 RX ADMIN — ROCURONIUM BROMIDE 20 MG: 10 SOLUTION INTRAVENOUS at 10:42

## 2022-09-27 RX ADMIN — ACETAMINOPHEN 650 MG: 325 TABLET, FILM COATED ORAL at 09:20

## 2022-09-27 RX ADMIN — IBUPROFEN 800 MG: 400 TABLET, FILM COATED ORAL at 16:41

## 2022-09-27 RX ADMIN — HYDROMORPHONE HYDROCHLORIDE 0.25 MG: 2 INJECTION, SOLUTION INTRAMUSCULAR; INTRAVENOUS; SUBCUTANEOUS at 13:51

## 2022-09-27 RX ADMIN — DEXAMETHASONE SODIUM PHOSPHATE 8 MG: 4 INJECTION, SOLUTION INTRAMUSCULAR; INTRAVENOUS at 10:30

## 2022-09-27 RX ADMIN — CEFAZOLIN 1 G: 1 INJECTION, POWDER, FOR SOLUTION INTRAMUSCULAR; INTRAVENOUS at 16:40

## 2022-09-27 RX ADMIN — SODIUM CHLORIDE, POTASSIUM CHLORIDE, SODIUM LACTATE AND CALCIUM CHLORIDE: 600; 310; 30; 20 INJECTION, SOLUTION INTRAVENOUS at 10:00

## 2022-09-27 RX ADMIN — LIDOCAINE HYDROCHLORIDE 80 MG: 20 INJECTION, SOLUTION EPIDURAL; INFILTRATION; INTRACAUDAL; PERINEURAL at 10:22

## 2022-09-27 RX ADMIN — ACETAMINOPHEN 650 MG: 325 TABLET, FILM COATED ORAL at 17:31

## 2022-09-27 RX ADMIN — ROCURONIUM BROMIDE 20 MG: 10 SOLUTION INTRAVENOUS at 11:28

## 2022-09-27 RX ADMIN — PROPOFOL 50 MCG/KG/MIN: 10 INJECTION, EMULSION INTRAVENOUS at 10:30

## 2022-09-27 RX ADMIN — SODIUM CHLORIDE, POTASSIUM CHLORIDE, SODIUM LACTATE AND CALCIUM CHLORIDE 1000 ML: 600; 310; 30; 20 INJECTION, SOLUTION INTRAVENOUS at 14:58

## 2022-09-27 RX ADMIN — FENTANYL CITRATE 25 MCG: 50 INJECTION, SOLUTION INTRAMUSCULAR; INTRAVENOUS at 10:42

## 2022-09-27 RX ADMIN — FENTANYL CITRATE 25 MCG: 50 INJECTION, SOLUTION INTRAMUSCULAR; INTRAVENOUS at 10:28

## 2022-09-27 RX ADMIN — ONDANSETRON HYDROCHLORIDE 4 MG: 2 INJECTION, SOLUTION INTRAMUSCULAR; INTRAVENOUS at 10:30

## 2022-09-27 RX ADMIN — ROCURONIUM BROMIDE 10 MG: 10 SOLUTION INTRAVENOUS at 12:57

## 2022-09-27 RX ADMIN — ONDANSETRON 4 MG: 2 INJECTION INTRAMUSCULAR; INTRAVENOUS at 17:43

## 2022-09-27 RX ADMIN — ROCURONIUM BROMIDE 10 MG: 10 SOLUTION INTRAVENOUS at 12:21

## 2022-09-27 RX ADMIN — HYDROMORPHONE HYDROCHLORIDE 0.25 MG: 2 INJECTION, SOLUTION INTRAMUSCULAR; INTRAVENOUS; SUBCUTANEOUS at 14:01

## 2022-09-27 RX ADMIN — DEXMEDETOMIDINE HYDROCHLORIDE 5 MCG: 100 INJECTION, SOLUTION, CONCENTRATE INTRAVENOUS at 11:01

## 2022-09-27 RX ADMIN — HYDROMORPHONE HYDROCHLORIDE 0.25 MG: 2 INJECTION, SOLUTION INTRAMUSCULAR; INTRAVENOUS; SUBCUTANEOUS at 10:39

## 2022-09-27 RX ADMIN — POTASSIUM CHLORIDE, DEXTROSE MONOHYDRATE AND SODIUM CHLORIDE 50 ML/HR: 150; 5; 450 INJECTION, SOLUTION INTRAVENOUS at 16:41

## 2022-09-27 NOTE — ANESTHESIA POSTPROCEDURE EVALUATION
Post-Anesthesia Evaluation and Assessment    Patient: Gemma Marcelo MRN: [id number]  SSN: [id number]    YOB: 1985  Age: 42 y.o. Sex: female      I have evaluated the patient and they are stable and ready for discharge from the PACU. Cardiovascular Function/Vital Signs  Visit Vitals  BP (!) 87/47   Pulse 70   Temp 36.8 °C (98.3 °F)   Resp 19   SpO2 100%       Patient is status post General anesthesia for Procedure(s):  BILATERAL SKIN SPARING MASTECTOMIES LEFT BREAST SENTINEL NODE BIOPSY  DR. Prieto Klein - BILATERAL BREAST RECONSTRUCTION WITH TISSUE EXPANDERS AND ALLODERM  . Padmini Montero Nausea/Vomiting: None    Postoperative hydration reviewed and adequate. Pain:  Pain Scale 1: Numeric (0 - 10) (09/27/22 1410)  Pain Intensity 1: 0 (09/27/22 1410)   Managed    Neurological Status:   Neuro (WDL): Exceptions to WDL (09/27/22 1410)  Neuro  Neurologic State: Drowsy; Eyes open to voice (09/27/22 1410)   At baseline    Mental Status, Level of Consciousness: Alert and  oriented to person, place, and time    Pulmonary Status:   O2 Device: Nasal cannula (09/27/22 1410)   Adequate oxygenation and airway patent    Complications related to anesthesia: None    Post-anesthesia assessment completed. No concerns    Signed By: Tayler Willett MD     September 27, 2022              Procedure(s):  BILATERAL SKIN SPARING MASTECTOMIES LEFT BREAST SENTINEL NODE BIOPSY  DR. Prieto Klein - BILATERAL BREAST RECONSTRUCTION WITH TISSUE EXPANDERS AND ALLODERM  . .    general    <BSHSIANPOST>    INITIAL Post-op Vital signs:   Vitals Value Taken Time   BP 90/48 09/27/22 1430   Temp 36.8 °C (98.3 °F) 09/27/22 1410   Pulse 64 09/27/22 1439   Resp 17 09/27/22 1439   SpO2 100 % 09/27/22 1439   Vitals shown include unvalidated device data.

## 2022-09-27 NOTE — OP NOTES
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    PROCEDURE PERFORMED:  Bilateral skin-sparing mastectomies, left deep axillary sentinel node biopsy. SURGEON:  Rosy Burks MD    ASSISTANT:  Kade Roberts. ANESTHESIA:  General.    COMPLICATIONS:  None. SPECIMENS REMOVED:  1. Right breast mastectomy. 2.  Left axillary sentinel node #1.  3.  Left axillary sentinel node #2. 4.  Left axillary sentinel node #3. 5.  Left mastectomy. 6.  Left breast anterior margin. IMPLANTS:  None. DRAINS:  None. ESTIMATED BLOOD LOSS:  75 mL. FINDINGS:  Hayden lymph nodes negative on frozen section. INDICATIONS:  A 57-year-old female with multicentric DCIS of the left breast.  She opted for bilateral skin-sparing mastectomies and left deep axillary sentinel node biopsy. PROCEDURE:  The patient initially went to Nuclear Medicine where technetium-99 was injected to the left breast.  She tolerated this well. She went to the preop holding area where surgical site was marked by surgeon. Informed consent was obtained. She was taken to operating room, laid in supine position where general endotracheal anesthesia was induced. A Chan catheter was placed without complication. Bilateral breasts were prepped and draped in the usual fashion and a time-out was performed. Attention was turned to the right breast where a periareolar incision was made with 10-blade. Bovie cautery was used to dissect superomedial, inferior and lateral skin flaps.   The breast was removed in total from the chest wall in a medial-to-lateral fashion incorporating the pectoral fascia and marked with short stitch superior, long stitch lateral and weighed and sent for permanent pathology. Bovie cautery was used to obtain hemostasis. A moist lap was packed in the cavity. Next, attention was turned to the left breast where a periareolar incision was made with a 10-blade. Bovie cautery was used to dissect superior and lateral flaps. The axillary fascia was incised with Bovie. Three sentinel nodes were identified using Neoprobe guidance, excised with LigaSure. These were normal in gross size and appearance and negative on frozen section. Next, dissection of medial, inferior and lateral skin flaps was carried out using Bovie cautery. The breast was removed in total from the chest wall in a medial-to-lateral fashion incorporating the pectoral fascia. This breast was marked short superior and long stitch lateral.  Of note, on dissection at 6 o'clock portion of the anterior margin, this tissue was pretty adherent and additional anterior margin was sharply excised with Metzenbaum scissors and sent for permanent pathology. Bovie cautery was used to obtain hemostasis. A moist lap was packed in the cavity in preparation for Dr. Cher Zepeda' portion of the procedure. All sponge, needle, and instrument counts were correct during this time.       MD DERIC [id number]  D:  09/27/2022 12:09  T:  09/27/2022 12:32  JOB #:  [id number]

## 2022-09-27 NOTE — BRIEF OP NOTE
Brief Postoperative Note    Patient: Luis Martinez  YOB: 1985  MRN: [id number]    Date of Procedure: 9/27/2022     Pre-Op Diagnosis: DUCTAL CARCINOMA IN SITU OF LEFT BREAST    Post-Op Diagnosis: Same as preoperative diagnosis. Procedure(s):  BILATERAL SKIN SPARING MASTECTOMIES LEFT BREAST SENTINEL NODE BIOPSY  DR. Stephanie Zhang - BILATERAL BREAST RECONSTRUCTION WITH TISSUE EXPANDERS AND ALLODERM  . Elizabeth Tsang Surgeon(s):   MD Gabriella Ferguson MD    Surgical Assistant: Surg Asst-1: Carlos LÓPEZ    Anesthesia: General     Estimated Blood Loss (mL): Minimal    Complications: None    Specimens:   ID Type Source Tests Collected by Time Destination   1 : RIGHT BREAST MASTECTOMY, SHORT STITCH SUPERIOR, LONG IS LATERAL  Fresh Breast  Eusebio Dumont MD 9/27/2022 1108 Pathology   2 : LEFT AXILLARY SENTINEL NODE #1 Frozen Section Axillary  Eusebio Dumont MD 9/27/2022 1121 Pathology   3 : LEFT AXILLARY SENTINEL NODE #2 Frozen Section Axillary  Eusebio Dumont MD 9/27/2022 1122 Pathology   4 : LEFT AXILLARY SENTINEL NODE #3 Frozen Section Axillary  Eusebio Dumont MD 9/27/2022 1124 Pathology   5 : LEFT BREAST MASTECTOMY Fresh Breast  Eusebio Dumont MD 9/27/2022 1140 Pathology   6 : LEFT BREAST MASTECTOMY ANTERIOR MARGIN Fresh Breast  Eusebio Dumont MD 9/27/2022 1140 Pathology        Implants: * No implants in log *    Drains: * No LDAs found *    Findings: sln negative on frozen    Electronically Signed by Carole Jack MD on 9/27/2022 at 12:05 PM    Dictated stat

## 2022-09-27 NOTE — PROGRESS NOTES
TRANSFER - IN REPORT:    Verbal report received from Kaur(name) on Kristyn Watson  being received from PACU (unit) for routine post - op      Report consisted of patients Situation, Background, Assessment and   Recommendations(SBAR). Information from the following report(s) SBAR, Procedure Summary, Intake/Output, MAR, and Accordion was reviewed with the receiving nurse. Opportunity for questions and clarification was provided. Assessment completed upon patients arrival to unit and care assumed.

## 2022-09-27 NOTE — ANESTHESIA PREPROCEDURE EVALUATION
Relevant Problems   No relevant active problems       Anesthetic History   No history of anesthetic complications            Review of Systems / Medical History  Patient summary reviewed, nursing notes reviewed and pertinent labs reviewed    Pulmonary  Within defined limits                 Neuro/Psych   Within defined limits           Cardiovascular  Within defined limits                Exercise tolerance: >4 METS     GI/Hepatic/Renal  Within defined limits              Endo/Other        Cancer     Other Findings              Physical Exam    Airway  Mallampati: II  TM Distance: > 6 cm  Neck ROM: normal range of motion   Mouth opening: Normal     Cardiovascular  Regular rate and rhythm,  S1 and S2 normal,  no murmur, click, rub, or gallop             Dental  No notable dental hx       Pulmonary  Breath sounds clear to auscultation               Abdominal  GI exam deferred       Other Findings            Anesthetic Plan    ASA: 2  Anesthesia type: general          Induction: Intravenous  Anesthetic plan and risks discussed with: Patient

## 2022-09-27 NOTE — H&P
History and Physical    HISTORY OF PRESENT ILLNESS  Melony Santana is a 42 y.o. female. HPI NEW patient consult referred by Dr. Martinez Haile for LEFT breast multicentric DCIS. No changes before imaging. Denies pain. RIGHT breast has slight discharge. 6/21/22 biopsy LEFT breast- DCIS   1- 8 o'clock ER 60, NV 60   2- 4 o'clock posterior depth         Has a history of cysts in both breasts. Family History:   Mother- Breast cancer dx at 72      Mammogram, 606/706 [facility]worth 5-25-22 suspicious calcs 4:00 and 8:00          Past Medical History:   Diagnosis Date    Anemia      Anxiety      HX OTHER MEDICAL 2/10/11     fibroids    Postpartum depression       with 1st pregnancy    Unspecified breast disorder       fibrocystic breasts, followed at [facility]            Past Surgical History:   Procedure Laterality Date    HX OTHER SURGICAL   age 1     adnoidectomy      Social History            Socioeconomic History    Marital status:        Spouse name: Not on file    Number of children: Not on file    Years of education: Not on file    Highest education level: Not on file   Occupational History    Not on file   Tobacco Use    Smoking status: Never Smoker    Smokeless tobacco: Never Used   Substance and Sexual Activity    Alcohol use: No    Drug use: No    Sexual activity: Yes       Partners: Male       Birth control/protection: None   Other Topics Concern     Service Not Asked    Blood Transfusions Not Asked    Caffeine Concern Not Asked    Occupational Exposure Not Asked    Hobby Hazards Not Asked    Sleep Concern Not Asked    Stress Concern Not Asked    Weight Concern Not Asked    Special Diet Not Asked    Back Care Not Asked    Exercise Not Asked    Bike Helmet Not Asked    Seat Belt Not Asked    Self-Exams Not Asked   Social History Narrative    Not on file      Social Determinants of Health          Financial Resource Strain:     Difficulty of Paying Living Expenses: Not on file   Food Insecurity: Worried About [facility] in the Last Year: Not on file    Jose of Food in the Last Year: Not on file   Transportation Needs:     Lack of Transportation (Medical): Not on file    Lack of Transportation (Non-Medical): Not on file   Physical Activity:     Days of Exercise per Week: Not on file    Minutes of Exercise per Session: Not on file   Stress:     Feeling of Stress : Not on file   Social Connections:     Frequency of Communication with Friends and Family: Not on file    Frequency of Social Gatherings with Friends and Family: Not on file    Attends [employer] Services: Not on file    Active Member of Clubs or Organizations: Not on file    Attends Club or Organization Meetings: Not on file    Marital Status: Not on file   Intimate Partner Violence:     Fear of Current or Ex-Partner: Not on file    Emotionally Abused: Not on file    Physically Abused: Not on file    Sexually Abused: Not on file   Housing Stability:     Unable to Pay for Housing in the Last Year: Not on file    Number of [place] in the Last Year: Not on file    Unstable Housing in the Last Year: Not on file               OB History         4    Para   3    Term   3            AB        Living   3        SAB        IAB        Ectopic        Molar        Multiple   0    Live Births   3           Obstetric Comments   Menarche:  4. LMP: 12/10/2012. # of Children:  3. Age at Delivery of First Child:  27.   Hysterectomy/oophorectomy:  NO/NO. Breast Bx:  Yes, left . Hx of Breast Feeding:  yes. BCP:  yes.  Hormone therapy:  no.                Current Outpatient Medications:     fexofenadine (Allegra Allergy) 60 mg tablet, Take 60 mg by mouth two (2) times a day., Disp: , Rfl:     ferrous sulfate (SLOW FE) 142 mg (45 mg iron) ER tablet, Take 625 mg by mouth two (2) times a day., Disp: , Rfl:        Allergies   Allergen Reactions    Blueberry Swelling    Sulfa (Sulfonamide Antibiotics) Anaphylaxis    Pseudoephedrine Palpitations      Review of Systems   HENT: Positive for congestion and sinus pain. Psychiatric/Behavioral: Positive for depression. All other systems reviewed and are negative. Physical Exam  Vitals and nursing note reviewed. Chest:   Breasts: Breasts are symmetrical.      Right: Nipple discharge present. No inverted nipple, mass, skin change, tenderness, axillary adenopathy or supraclavicular adenopathy. Left: No inverted nipple, mass, nipple discharge, skin change, tenderness, axillary adenopathy or supraclavicular adenopathy. Comments: Bilateral multiple small masses c/w lifelong fibroadenomas  Lymphadenopathy:      Cervical: No cervical adenopathy. Upper Body:      Right upper body: No supraclavicular, axillary or pectoral adenopathy. Left upper body: No supraclavicular, axillary or pectoral adenopathy. ASSESSMENT and PLAN      ICD-10-CM ICD-9-CM     1. Ductal carcinoma in situ (DCIS) of left breast  D05.12 233.0 MRI BREAST BI W WO CONT      41 yo female with left breast multicentric stage 0 dcis, Er+ Pr +  She is here with her   I have reviewed the imaging and pathology with her and she was given copies of these reports. 90 minutes were spent face-to-face with the patient during this encounter and 90% of that time was spent on counseling and coordination of care. 1. Discussed lumpectomy and radiation vs mastectomy. Discussed reconstruction. MRI ordered   2. Discussed sentinel lymph node biopsy. 3. Discussed external beam radiation. 4. Discussed hormone therapy. 5. Discussed the possibility of chemotherapy.  Not for dcis    Bilateral skin sparing mastectomies, left sln biopsy recon  Admit postop for obs

## 2022-09-27 NOTE — ROUTINE PROCESS
Patient: Howie Grayson MRN: [id number]  SSN: [id number]   YOB: 1985  Age: 42 y.o. Sex: female     Patient is status post Procedure(s):  BILATERAL SKIN SPARING MASTECTOMIES LEFT BREAST SENTINEL NODE BIOPSY  DR. Paulina Saravia - BILATERAL BREAST RECONSTRUCTION WITH TISSUE EXPANDERS AND ALLODERM  . Abby Seymour Surgeon(s) and Role:  Panel 1:     Elisabeth Abraham MD - Primary  Panel 2:     * Mary Carmen Jiménez MD - Primary    Local/Dose/Irrigation:                   Peripheral IV 09/27/22 Left;Posterior Hand (Active)   Site Assessment Clean, dry, & intact 09/27/22 0954   Phlebitis Assessment 0 09/27/22 0954   Infiltration Assessment 0 09/27/22 0954   Dressing Status Clean, dry, & intact 09/27/22 0954   Dressing Type Transparent 09/27/22 0954   Hub Color/Line Status Blue; Infusing 09/27/22 0954          Drain 15 F DRAIN 09/27/22 Right Other (comment) (Active)       Drain 15 f drain 09/27/22 Left Other (comment) (Active)                     Dressing/Packing:  Incision 09/27/22 Breast-Dressing/Treatment:  (XEROFORM, 4X4, ABD, TAPE SURGI BRA) (09/27/22 1300)  Incision 09/27/22 Axilla Left-Dressing/Treatment:  (XEROFORM, 4X4, TAPE) (09/27/22 1300)    Splint/Cast:  ]    Other:

## 2022-09-27 NOTE — BRIEF OP NOTE
Brief Postoperative Note    Patient: Grecia Snow  YOB: 1985  MRN: [id number]    Date of Procedure: 9/27/2022     Pre-Op Diagnosis: DUCTAL CARCINOMA IN SITU OF LEFT BREAST, s/p bilateral mastectomy    Post-Op Diagnosis: Same as preoperative diagnosis. Procedure(s):  BILATERAL BREAST RECONSTRUCTION WITH TISSUE EXPANDERS AND ALLODERM  . Surgeon: Ángel Guaman. Tori Moyer MD, Sunday Fresh    Surgical Assistant: Surg Asst-1: Arabella Has A    Anesthesia: General     Estimated Blood Loss (mL): Minimal    Complications: None    Specimens:   ID Type Source Tests Collected by Time Destination   1 : RIGHT BREAST MASTECTOMY, SHORT STITCH SUPERIOR, LONG IS LATERAL  Fresh Breast  Randall Peña MD 9/27/2022 1108 Pathology   2 : LEFT AXILLARY SENTINEL NODE #1 Frozen Section Axillary  Randall Peña MD 9/27/2022 1121 Pathology   3 : LEFT AXILLARY SENTINEL NODE #2 Frozen Section Axillary  Randall Peña MD 9/27/2022 1122 Pathology   4 : LEFT AXILLARY SENTINEL NODE #3 Frozen Section Axillary  Randall Peña MD 9/27/2022 1124 Pathology   5 : LEFT BREAST MASTECTOMY Fresh Breast  Randall Peña MD 9/27/2022 1140 Pathology   6 : LEFT BREAST MASTECTOMY ANTERIOR MARGIN Fresh Breast  Randall Peña MD 9/27/2022 1140 Pathology        Implants:   Implant Name Type Inv.  Item Serial No.  Lot No. LRB No. Used Action   MENTO BREAST  TISSUE EXPANDER Other  N/A  [id number] Right 1 Implanted   GRAFT HUMAN TSSUE M THK12 2MM MED PRFRTD CNTR ACLLLR  R - [id number] Other GRAFT HUMAN TSSUE M THK12 2MM MED PRFRTD CNTR ACLLLR DRML R [id number] [address] [id number] Right 1 Implanted   GRAFT HUMAN TSSUE M UGJ44 2MM MED PRFRTD CNTR ACLLLR DRML R - [id number] Other GRAFT HUMAN TSSUE M RJQ69 2MM MED PRFRTD CNTR ACLLLR DRML R [id number] [facility] [id number] Left 1 Implanted   MENTOR BREAST TISSUE EXPANDER Other  N/A  [id number] Left 1 Implanted       Drains:   Drain 13 F DRAIN 09/27/22 Right Other (comment) (Active)       Drain 15 f drain 09/27/22 Left Other (comment) (Active)       Findings: none    Electronically Signed by Chica Villasenor MD on 9/27/2022 at 2:15 PM

## 2022-09-27 NOTE — PERIOP NOTES
TRANSFER - OUT REPORT:    Verbal report given to Elton Yap RN (name) on Claudette Ke  being transferred to [facility] (unit) for routine post - op       Report consisted of patients Situation, Background, Assessment and   Recommendations(SBAR). Information from the following report(s) SBAR, Kardex, OR Summary, Procedure Summary, Intake/Output, and MAR was reviewed with the receiving nurse. Lines:   Peripheral IV 09/27/22 Left;Posterior Hand (Active)   Site Assessment Clean, dry, & intact 09/27/22 1420   Phlebitis Assessment 0 09/27/22 1420   Infiltration Assessment 0 09/27/22 1420   Dressing Status Clean, dry, & intact 09/27/22 1420   Dressing Type Transparent 09/27/22 1420   Hub Color/Line Status Blue; Infusing 09/27/22 1420   Alcohol Cap Used Yes 09/27/22 1420        Opportunity for questions and clarification was provided.

## 2022-09-28 ENCOUNTER — NURSE NAVIGATOR (OUTPATIENT)
Dept: CASE MANAGEMENT | Age: 37
End: 2022-09-28

## 2022-09-28 VITALS
SYSTOLIC BLOOD PRESSURE: 108 MMHG | HEART RATE: 62 BPM | DIASTOLIC BLOOD PRESSURE: 67 MMHG | OXYGEN SATURATION: 100 % | TEMPERATURE: 98.1 F | RESPIRATION RATE: 18 BRPM

## 2022-09-28 PROCEDURE — 74011250637 HC RX REV CODE- 250/637: Performed by: PLASTIC SURGERY

## 2022-09-28 PROCEDURE — G0378 HOSPITAL OBSERVATION PER HR: HCPCS

## 2022-09-28 PROCEDURE — 74011000250 HC RX REV CODE- 250: Performed by: PLASTIC SURGERY

## 2022-09-28 PROCEDURE — 74011250636 HC RX REV CODE- 250/636: Performed by: PLASTIC SURGERY

## 2022-09-28 PROCEDURE — 77010033678 HC OXYGEN DAILY

## 2022-09-28 RX ORDER — HYDROMORPHONE HYDROCHLORIDE 2 MG/1
2-4 TABLET ORAL
Qty: 30 TABLET | Refills: 0 | Status: SHIPPED | OUTPATIENT
Start: 2022-09-28 | End: 2022-10-01

## 2022-09-28 RX ORDER — CEPHALEXIN 250 MG/1
500 CAPSULE ORAL 3 TIMES DAILY
Qty: 42 CAPSULE | Refills: 0 | Status: SHIPPED | OUTPATIENT
Start: 2022-09-28 | End: 2022-10-05

## 2022-09-28 RX ADMIN — ONDANSETRON 4 MG: 2 INJECTION INTRAMUSCULAR; INTRAVENOUS at 00:56

## 2022-09-28 RX ADMIN — CEFAZOLIN 1 G: 1 INJECTION, POWDER, FOR SOLUTION INTRAMUSCULAR; INTRAVENOUS at 00:33

## 2022-09-28 RX ADMIN — IBUPROFEN 800 MG: 400 TABLET, FILM COATED ORAL at 00:56

## 2022-09-28 RX ADMIN — IBUPROFEN 800 MG: 400 TABLET, FILM COATED ORAL at 10:54

## 2022-09-28 NOTE — DISCHARGE INSTRUCTIONS
Viji Whitlock MD, [facility]  [phone]    TISSUE EXPANDER BREAST RECONSTRUCTION POST-OPERATIVE INSTRUCTIONS      SURGICAL BRA:  You will be in a surgical bra following the procedure. This bra, or another soft, front-fastening sports bra (with no underwire) should be worn at all times except when showering for the first two weeks. If the surgical bra is irritating to your skin, you may place gauze pads between your skin and the bra for added comfort. You may wash the surgical bra if it gets soiled, but allow it to air-dry, do not place it in the dryer. SURGICAL DRAINS:  Please refer to the RADHA Drain Instructions below for details. ACTIVITY:  Take it easy for the first several days. No cleaning, housework, or strenuous activity. Do not lift anything over 10 pounds, including children, and do not lift your hands over your head. You may resume non-vigorous activities at two weeks, then gently increase your activity as tolerated. No running, weight lifting, cross fit, or other strenuous activities until four weeks. BATHING:  You may shower two days after surgery. NO tub baths, hot tubs, or swimming for two weeks after removal of all RADHA drains. Remove any gauze or other bandages before showering. Your incisions can get wet in the shower. Simply pat everything dry after the shower. MEDICATION:  Your prescriptions will be sent electronically to your pharmacy. You will receive prescriptions for an antibiotic and a narcotic pain medication (usually dilaudid, generic name is hydromorphone). Take the antibiotic until it is finished. Take the narcotic pain medication as needed according to the directions. Do not drive while taking narcotic pain medication. Avoid aspirin for two weeks after surgery.   You may take ibuprofen (e.g. Advil)  and/or acetaminophen (Tylenol) according to the package instructions in addition to, or instead of, the prescription pain medicine. EXPANSION:  The tissue expander will typically have some saline fluid placed in it at the time of surgery. The expansion process, to stretch the skin and create the new breast, is performed by filling the tissue expander during office visits. This usually begins approximately three weeks after your surgery. THINGS TO WATCH FOR:  If you experience excessive or sudden swelling, spreading or increasing redness, increasing pain, foul-smelling drainage, separation of any incisions, fever, shaking chills, or any other concerns, please call the office immediately ([phone]). FOLLOW-UP APPOINTMENT: please call the office at [phone] during regular business hours to schedule an appointment on 1 week. APPOINTMENT LOCATION:     [***] [facility]    [address] [facility], [address] [place] [address], [address] [place] [address]     [***] [place] [address] [place] [address], [facility] [address]      RADHA DRAIN INSTRUCTIONS    PURPOSE:  You have had surgery during which a Alex-Chao drain, or RADHA drain, has been placed by your surgeon. A RADHA drain is a rubber tube which goes under the skin and drains excess fluid during the healing process so that this fluid does not accumulate. There is a one-way valve which allows the fluid to collect in the bulb on the end of the drain. The drain is usually held in place by a single stitch. The color of the drainage can range from reddish to pink to straw-colored. CARE OF THE DRAIN:  Caring for the RADHA drain is fairly easy. First, protect the drain so that it does not get pulled inadvertently. You may fasten them to your clothing with a safety pin through the floppy tag on the bulb. DO NOT place a pin through either the drain tubing or the bulb itself. The drain works by maintaining a constant low pressure of suction when the bulb is in the collapsed (squeezed in) position.   If the bulb will not maintain this collapsed position when it is recapped, please call the office, as the drain may not be working properly. MEASURING THE DRAINAGE:  Grasp the bulb in one hand and remove the cap with the other hand. This will cause the bulb to relax into a round shape. Holding the open end over a specimen cup, squirt the fluid into the cup by squeezing the bulb. Once the bulb is empty, squeeze it in (collapse it) with one hand, and replace the cap with your other hand. Then holding the measuring cup level, note how much fluid there is (in milliliters, mL), and record this number on the chart below. Discard the fluid in the toilet and rinse out the specimen cup. Note: your specimen cup may be in cubic centimeters (cc). 1 cc = 1 mL. REMOVAL:  The RADHA drain will be removed in the office when the daily drainage is at a low enough level. You may be asked to call the office with your measuring totals to determine if the drain(s) is (are) ready to be removed. BRING THE CHART BELOW WITH YOU TO YOUR OFFICE VISIT. Removal involves minimal discomfort without the need for anesthesia. The drain site in the skin heals on its own once the drain is removed without any further stitches. Just use a bandaid or gauze over the site until it is dry. The site may get wet in the shower. SHOWERING:  You may shower after you are discharged home while you have one or more RADHA drains in. Just let the water run over the drain sites and then pat them dry when you are done. Some patients like to place a long string necklace around their necks during showers, to which they can safety pin the tag on the bulb to prevent it from dangling. DO NOT take a tub bath, go swimming (pool or lake/ocean), or otherwise submerge your body in water before all RADHA drains have been removed and you are permitted by your surgeon.     THINGS TO WATCH FOR:  Please call the office immediately ([phone]) if you notice:  Sudden bright or dark red bleeding into the bulb or around the drain site in the skin  Dislodgment of the RADHA drain or if the drain falls out  Spreading redness around the drain site in the skin  Cloudy or foul-smelling drainage in the bulb or around the drain site  Fever, shaking chills, excessive swelling, pain or discomfort  Any other concerns or questions        Date           #1 (AM)             #1 (PM)             Daily Total #1  (AM+PM)             #2 (AM)           #2 (PM)           Daily  Total  #2  (AM+PM)

## 2022-09-28 NOTE — PROGRESS NOTES
Bedside and Verbal shift change report given to EVELYN Isabel and SN Viral (oncoming nurse) by Bonnie Fitzpatrick RN (offgoing nurse). Report included the following information SBAR, Kardex, OR Summary, Procedure Summary, Intake/Output, MAR, Accordion, Recent Results, and Med Rec Status.

## 2022-09-28 NOTE — PROGRESS NOTES
0800: Bedside and Verbal shift change report given to JEOVANNY (oncoming nurse) by MARYA Scherer (offgoing nurse). Report included the following information SBAR, Intake/Output, MAR, Accordion, Recent Results, and Med Rec Status. 1130: I have reviewed discharge instructions with the patient. The patient verbalized understanding. Discharge medications reviewed with patient and appropriate educational materials and side effects teaching were provided. RADHA drain teaching complete, patient returned demonstration, and verbalized understanding. Out put documentation sheet given to patient and explained. Home hygeine care of incisions and drain sites discussed with patient.

## 2022-09-28 NOTE — PROGRESS NOTES
Attempted to deliver and verbally explain the /VOON with patient. Patient was with clinical staff.  Alexia Luevano, Care Management Assistant

## 2022-09-28 NOTE — PROGRESS NOTES
Maribeth Castanon is doing well this am. She had some nausea last night. She is tolerating PO pain medication.     AF  VSS  Flaps are pink and viable, no hematoma, no infection, RADHA serosanguinous    A/P: POD 1 s/p bilateral breast expander and alloderm, stable  Discharge planning  Encourage PO if no nausea  Ambulate  Drain care

## 2022-09-28 NOTE — NURSE NAVIGATOR
[employer]  Breast Cancer Nurse Navigator Encounter    Name: Lindsey Talley  Age: 42 y.o.  : 1985  Diagnosis: Left breast DCIS; ER+/VA+    Encounter type:  []Initial Navigator Encounter  [x]Patient Initiated  []Navigator Follow-up  []Other:     Narrative:   Pt called with concerns regarding her surgical drain. She states the drain accidentally became lodged in the chair and got tugged on when she stood. She states it was uncomfortable but does not appear to have gotten pulled out. I explained that the drains are held in place by sutures and are likely okay. Her sister will look at the insertion site to see if it is visibly dislodged. I instructed her to keep an eye on the output and if she notices significant decrease in output then she should get it evaluated. Pt appreciative of the guidance.      Interdisciplinary Team:  Surg-Onc: Candy West MD  Med-Onc:  Rad-Onc:  Plastics: Tommy Krishna MD  :   Nurse Navigator: EVELYN Freed BSN, RN, [facility]  Breast Cancer Nurse [address] [place] [address]  W: [phone]  F: [phone]  [email or web address]   Good Help to Those in [facility]

## 2022-09-28 NOTE — OP NOTES
[address] [facility]  OPERATIVE REPORT    Name:  Promise Cuevas  MR#:  [id number]  :  1985  ACCOUNT #:  [de-identified]  DATE OF SERVICE:  2022      PREOPERATIVE DIAGNOSES:  1. Right breast cancer. 2.  Status post bilateral skin-sparing mastectomy. POSTOPERATIVE DIAGNOSES:  1. Right breast cancer. 2.  Status post bilateral skin-sparing mastectomy. PROCEDURE PERFORMED:  Bilateral breast reconstruction using tissue expander and AlloDerm. SURGEON:  Brannon Aviles MD    ASSISTANT:  Storm Troy    ANESTHESIA:  General.    COMPLICATIONS:  None. SPECIMENS REMOVED:  None. IMPLANTS:  see dictation. ESTIMATED BLOOD LOSS:  Minimal.    DRAINS:  #15 round Alex-Chao drain in each breast for a total of two drains. INDICATIONS FOR SURGERY:  The patient is a 29-year-old woman who has a history of right-sided breast cancer. She is here for Dr. Moshe Cuellar to perform bilateral skin-sparing mastectomy. She has opted to have bilateral breast reconstruction using tissue expanders and AlloDerm. PROCEDURE:  After she signed informed consent, she was marked in the preoperative holding area and taken to the operating room and placed on the operating room table in supine position. She was placed under general anesthesia without complication. Dr. Moshe Cuellar performed her portion of the procedure. For details of her procedure, please see her dictation from this date. The patient was then re-prepped and re-draped in a sterile surgical fashion using Betadine paint. She had been given preoperative antibiotics which consisted of IV Ancef. I began on the left side where I elevated the pectoralis major muscle off the attachments at the inframammary fold along the costal margin using electrocautery. I then prepared the AlloDerm on the back table by soaking it for a minimum of 3 minutes and inset it at the inframammary fold using interrupted horizontal mattress 3-0 PDS suture.   This was reinforced using a running 3-0 PDS suture. After this was performed, the operative site was injected with a mixture of Exparel with 0.25% Marcaine plain. A total of 20 mL was injected into each breast pocket. The operative site was then irrigated copiously using antibiotic irrigation. The expander was chosen and prepared on the back table and all of the air was removed from within it. This expander was then placed in the subpectoral and sub-AlloDerm space. The interface was closed using an interrupted horizontal mattress 3-0 PDS suture which was reinforced using a running 3-0 PDS. The 15 round Alex-Hcao drain was placed at the base of the operated breast exiting the lateral chest wall. This was secured using an interrupted 3-0 PDS suture. The deep dermis was closed using buried interrupted 3-0 Monocryl and the skin was closed using running subcuticular 4-0 Monocryl. A similar procedure was performed on the contralateral side. The ports for the expander was located using the [address] [place] [address] and approximately 150 mL of fluid was added into the expander without complication. There was no sign of ischemia or tension on the skin at the conclusion of the expansion process. The right breast tissue weighed 270 g and the left breast tissue weighed 260 g. The information for the AlloDerm on the right is AlloDerm Select tissue matrix perforated contour medium, lot number [id number], reference number [id number]. On the left, it is the same; AlloDerm Select tissue matrix perforated contour medium, lot number [id number], reference number [id number]. The expander information for the right is a [place] CPX low height breast tissue expander, reference number [id number], lot number [id number], SN number [id number]. The left side is [place] CPX low height breast tissue expander, reference number [id number], lot number [id number], SN number [id number].   The dressing consisted of bacitracin ointment, Xeroform, 4x4s, ABD and a surgical bra. The patient was then extubated and transferred to the PACU in stable condition. There were no complications during the procedure. The patient tolerated the procedure without complication. The instrument, sponge, and needle counts were correct at the conclusion of the case.         Syed Agudelo MD      [id number]  D:  09/27/2022 18:56  T:  09/28/2022 2:49  JOB #:  [id number]

## 2022-09-29 ENCOUNTER — DOCUMENTATION ONLY (OUTPATIENT)
Dept: SURGERY | Age: 37
End: 2022-09-29

## 2022-10-03 ENCOUNTER — TELEPHONE (OUTPATIENT)
Dept: SURGERY | Age: 37
End: 2022-10-03

## 2022-10-03 NOTE — TELEPHONE ENCOUNTER
Called patient to find out if she has an appointment with Dr. Petty Hall this week. She stated she does. I let her know that we are seeing her too soon. She should come next week for post op follow up. She was appreciative of call. Notified Johnson Duran, who will call and schedule her for next week.

## 2022-10-03 NOTE — PROGRESS NOTES
Called with surg path   T1 n1   Awaiting receptors  Refer to med onc rad onc  Will present at tumor board  Moises Nava please list for cancer conference   Margins clear

## 2022-10-04 ENCOUNTER — DOCUMENTATION ONLY (OUTPATIENT)
Dept: SURGERY | Age: 37
End: 2022-10-04

## 2022-10-04 DIAGNOSIS — D05.12 DUCTAL CARCINOMA IN SITU OF LEFT BREAST: Primary | ICD-10-CM

## 2022-10-04 DIAGNOSIS — N60.99 ATYPICAL HYPERPLASIA OF BREAST: ICD-10-CM

## 2022-10-04 NOTE — PROGRESS NOTES
Patient has an appointment with Dr. Lon Dahl on 10/12/2022 at 1:00 pm. I also faxed patient clinicals to Dr. Marine Sesay office for appointment request. I will call and notify patient.

## 2022-10-10 ENCOUNTER — OFFICE VISIT (OUTPATIENT)
Dept: SURGERY | Age: 37
End: 2022-10-10
Payer: COMMERCIAL

## 2022-10-10 VITALS — WEIGHT: 138 LBS | BODY MASS INDEX: 22.99 KG/M2 | HEIGHT: 65 IN

## 2022-10-10 DIAGNOSIS — C50.912 BREAST CANCER METASTASIZED TO AXILLARY LYMPH NODE, LEFT (HCC): Primary | ICD-10-CM

## 2022-10-10 DIAGNOSIS — C77.3 BREAST CANCER METASTASIZED TO AXILLARY LYMPH NODE, LEFT (HCC): Primary | ICD-10-CM

## 2022-10-10 PROCEDURE — 99024 POSTOP FOLLOW-UP VISIT: CPT | Performed by: SURGERY

## 2022-10-10 NOTE — PROGRESS NOTES
[facility] at [address], [address] [place]: [phone]  F: [phone]    Reason for Visit:   Phylicia Gale is a 42 y.o. female who is seen in consultation at the request of Dr. Yvette Leal for evaluation of Left breast cancer . Treatment History:   9/27/2022-bilateral mastectomy with left sentinel node biopsy    History of Present Illness:   Patient is a 68-year-old female with a history of anemia and fibrocystic breast disease seen for left breast cancer and DCIS. She had a mammogram in May 2022 that showed diffuse left breast calcifications as well as several masses in the right breast that were thought to be fibroadenomas. Initial biopsy of left breast calcifications in June 2022 was consistent with DCIS. She was referred to Dr. Yvette Leal who obtained a breast MRI on 8/3/2022. This confirmed multicentric multifocal left breast DCIS in all quadrants . Probable left axillary kari metastases, bilobed mass at 2:00 in the right breast as well is linear enhancement in the subareolar right breast.  Right breast biopsy of the subareolar region was consistent with intraductal papilloma with focal atypical ductal hyperplasia, right breast 2 cm masslike lesion was consistent with fibroadenoma. Underwent a bilateral mastectomy with sentinel node biopsy on the left on 9/27/2022. Right breast showed intraductal papilloma and fibroadenoma. Left breast showed extensive high-grade DCIS with negative margins as well as a 3 mm focus of invasive carcinoma, grade 2, 1 positive node of 3. ER 90% NE 90% HER2/raad negative, Ki-67 20%. Mother's cancer diagnosed when she was 72, and had negative genetic testing. Patient has had [facility] genetic testing with variant of unknown significance detected. She comes with her  Aung Hogan    She has 3 children. Falguni Ruvalcaba is almost 3 years out. She has no fevers, chills, sweats, cough, HA.  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        Past Medical History:   Diagnosis Date    Adverse effect of anesthesia 1999    Delayed awakening after anesthesia    Anemia     Anxiety     HX OTHER MEDICAL 02/10/2011    fibroids    Ill-defined condition     PALPITATIONS, CLEARED BY CARDIOLOGIST    Postpartum depression     with 1st pregnancy    Unspecified breast disorder     fibrocystic breasts, followed at [facility]      Past Surgical History:   Procedure Laterality Date    HX BREAST RECONSTRUCTION Bilateral 9/27/2022    . performed by Gautam Morales MD at [place] [address]    HX MASTECTOMY Bilateral 9/27/2022    BILATERAL SKIN SPARING MASTECTOMIES LEFT BREAST SENTINEL NODE BIOPSY  DR. Ravi Shi - BILATERAL BREAST RECONSTRUCTION WITH TISSUE EXPANDERS AND ALLODERM performed by Carolynn Hernadez MD at [place] [address]    HX OTHER SURGICAL  age 1    adnoidectomy    CO BREAST SURGERY PROCEDURE UNLISTED      06/2022 AND 08/2022 BIOPSIES      Social History     Tobacco Use    Smoking status: Never    Smokeless tobacco: Never   Substance Use Topics    Alcohol use: No      Family History   Problem Relation Age of Onset    Hypertension Mother         caused by RA rx    Arthritis-rheumatoid Mother     OSTEOARTHRITIS Mother     Asthma Sister     Stroke Maternal Grandfather     Cancer Neg Hx     Heart Disease Neg Hx     Anesth Problems Neg Hx      Current Outpatient Medications   Medication Sig    multivitamin (ONE A DAY) tablet Take 1 Tablet by mouth daily. fexofenadine (ALLEGRA) 60 mg tablet Take 60 mg by mouth daily. ferrous sulfate (SLOW FE) 142 mg (45 mg iron) ER tablet Take 625 mg by mouth two (2) times a day. No current facility-administered medications for this visit.       Allergies   Allergen Reactions    Blueberry Swelling    Sulfa (Sulfonamide Antibiotics) Anaphylaxis    Caffeine Palpitations    Crab Swelling    Pseudoephedrine Palpitations        Review of Systems: A complete review of systems was obtained, negative except as described above. Physical Exam:   Visit Vitals  /80   Pulse 67   Temp 98 °F (36.7 °C)   Resp 17   Ht 5' 5\" (1.651 m)   Wt 138 lb (62.6 kg)   LMP 10/05/2022 (Within Weeks) Comment: 1.5 weeks ago   SpO2 98%   BMI 22.96 kg/m²     ECOG PS: 0  General: No distress  Eyes: PERRLA, anicteric sclerae  HENT: Atraumatic  Neck: Supple  Breast: Deferred  Respiratory:  normal respiratory effort  CV: Normal rate,  no peripheral edema  MS: Normal gait and station. Digits without clubbing or cyanosis. Skin: No rashes, ecchymoses, or petechiae. Normal temperature, turgor, and texture. Psych: Alert, oriented, appropriate affect, normal judgment/insight    Results:     Lab Results   Component Value Date/Time    WBC 6.4 09/20/2022 02:27 PM    HGB 12.3 09/20/2022 02:27 PM    HCT 37.5 09/20/2022 02:27 PM    PLATELET 714 94/00/9298 02:27 PM    MCV 93.5 09/20/2022 02:27 PM    ABS. NEUTROPHILS 3.6 09/20/2022 02:27 PM    Hgb, External 10.9 06/30/2011 12:00 AM    Hct, External 33.5 06/30/2011 12:00 AM    Platelet cnt., External 337 02/10/2011 12:00 AM     Lab Results   Component Value Date/Time    Sodium 138 06/18/2021 12:02 PM    Potassium 4.2 06/18/2021 12:02 PM    Chloride 105 06/18/2021 12:02 PM    CO2 28 06/18/2021 12:02 PM    Glucose 87 06/18/2021 12:02 PM    BUN 13 06/18/2021 12:02 PM    Creatinine 0.52 (L) 06/18/2021 12:02 PM    GFR est AA >60 06/18/2021 12:02 PM    GFR est non-AA >60 06/18/2021 12:02 PM    Calcium 8.9 06/18/2021 12:02 PM     Lab Results   Component Value Date/Time    Bilirubin, total 0.5 06/18/2021 12:02 PM    ALT (SGPT) 10 (L) 06/18/2021 12:02 PM    Alk. phosphatase 59 06/18/2021 12:02 PM    Protein, total 6.7 06/18/2021 12:02 PM    Albumin 3.7 06/18/2021 12:02 PM    Globulin 3.0 06/18/2021 12:02 PM         Records reviewed and summarized above. Pathology report(s) reviewed above. 9/27/2022  INVASIVE CARCINOMA OF THE BREAST: Resection    SPECIMEN       Procedure:  Total mastectomy       Specimen Laterality: Left    TUMOR       Histologic Type: Invasive carcinoma of no special type (ductal)       Glandular (Acinar) / Tubular Differentiation: Score 2       Nuclear Pleomorphism: Score 2       Mitotic Rate: Score 2       Overall Grade: Grade 2 (scores of 6 or 7)       Tumor Size: 3 mm       Tumor Focality: Single focus of invasive carcinoma       Ductal Carcinoma In Situ (DCIS): Present           Ductal Carcinoma In Situ (DCIS): Positive for extensive   intraductal component (EIC)           Size (Extent) of DCIS: 90 mm           Architectural Patterns: Micropapillary, Cribriform, Papillary,   [place]           Nuclear Grade: Grade III (high)           Necrosis: Present, central (expansive \"comedo\" necrosis)       Lobular Carcinoma In Situ (LCIS): Not identified    Tumor Extent       Skin: Present and uninvolved by invasive carcinoma       Nipple DCIS: DCIS does not involve the nipple epidermis       Lymphovascular Invasion: Not identified       Dermal Lymphovascular Invasion: Not identified       Microcalcifications: Present in DCIS, invasive carcinoma, and   non-neoplastic tissue       Treatment Effect in the Breast: No known presurgical therapy    MARGINS       Invasive Carcinoma Margins: Uninvolved by invasive carcinoma           Distance from Closest Margin (Millimeters): Greater than 2 mm       DCIS Margins: Uninvolved by DCIS           Distance from Closest Margin (Millimeters): Less than 1 mm           Closest Margin(s):  Anterior, Posterior    LYMPH NODES       Regional Lymph Nodes: Involved by tumor cells           Number of Lymph Nodes with Macrometastases (> 2 mm): 1           Number of Lymph Nodes with Micrometastases (> 0.2 mm to 2 mm and/   or > 200 cells): 0           Size of Largest Metastatic Deposit (Millimeters): 11 mm           Extranodal Extension: Not identified           Total Number of Lymph Nodes Examined: 3           Number of [place] Nodes Examined: 3 PATHOLOGIC STAGE CLASSIFICATION (pTNM, AJCC 8th Edition)       Primary Tumor (pT): pT1a       Regional Lymph Nodes Modifier: (sn): [place] node(s) evaluated       Regional Lymph Nodes (pN): pN1a    ADDITIONAL FINDINGS       Additional Findings: Fibroadenomas          6. Left breast anterior margin, excision:        Ductal carcinoma in situ (DCIS), nuclear grade 3   DCIS is 3 mm from designated anterior margin       Radiology report(s) reviewed above. MRI 8/2022     IMPRESSION  1. Multicentric, multifocal, left DCIS involves all quadrants and largely  replaces the inferior breast. BI-RADS 6.  2. DCIS extends to the left nipple, with an enhancing mass in the nipple. 3. Probable left axillary kari metastasis. BI-RADS 4.  4. Linear enhancement in the subareolar right breast, likely DCIS. BI-RADS 4.  5. Bilobed mass at 2:00 in the right breast. BI-RADS 4A. 6. Overall assessment: BI-RADS Assessment Category 4: Suspicious abnormality. 7. Recommendations: Ultrasound-guided left axillary lymph node biopsy, MRI  guided right breast biopsy. Assessment:   1) Left breast cancer    S/P Bilateral mastectomy and Left SLN on 9/27/2022   3 mm, 1/3 + nodes  Grade 2  Margins negative  9 CM are of DCIS    ER 90% PR10%HER2 raad negative  Ki 67 20%    [id number]- Stage IA    I discussed the natural history of estrogen receptor positive early stage breast cancer. I discussed the limitation of clinical tools for prediction of risk of recurrence. She does have a clinically high risk tumor ( N+,and Ki 67 of 20%) and hence benefit from adjuvant chemotherapy is not completely excluded. I discussed the challenges with current day genomic recurrence scores to determine this in premenopausal women. Premenopausal women represented 33% of patients in the MINDACT trial. 21% were N+.  If this is clearly low risk I would exercise caution and consider offering OFS+ AI+ Verzenio per the [facility] CTR trial. If high risk genomic recurrence score then will suggest chemotherapy. She understands that tissue may be insufficient for testing and if so be the case then we would revisit options. We had a detailed discussion about the side effects of chemotherapy in general, side effects of AI and that of Verzenio. 2) R breast intraductal papilloma  S/p mastectomy    3) FH of breast cancer  Lucero- JENNIFER    4) Psychosocial  Coping well       Plan:     Mammaprint  She has a second opinion at [facility] set up  She will see me in 2 weeks     I appreciate the opportunity to participate in [facility] jayro.     Signed By: Nathaniel Ramirez MD

## 2022-10-10 NOTE — PROGRESS NOTES
HISTORY OF PRESENT ILLNESS  Claudette Ke is a 42 y.o. female. HPI ESTABLISHED Patient here for post op follow up 9/27/22 Bilateral skin sparing mastectomy, l sln biopsy  with reconstruction. Has been having pain that comes and goes. Taking motrin. 6/21/22 biopsy LEFT breast- DCIS   1- 8 o'clock ER 60, VA 60   2- 4 o'clock posterior depth   Right breast papilloma, atypia   9/27/22 bilateral mastectomies, l sln biopsy and recon: T1 (3 mm) 90 mm dcis N1 (1/3 sln no montez)  Has a history of cysts in both breasts. Family History: Mother- Breast cancer dx at 72     Review of Systems   All other systems reviewed and are negative. Physical Exam  Vitals and nursing note reviewed. Chest:          Comments: Incisions healing well       ASSESSMENT and PLAN    ICD-10-CM ICD-9-CM    1. Breast cancer metastasized to axillary lymph node, left (HCC)  C50.912 174.9     C77.3 196.3       Stage 2 IDC   Discussed in tumor board  Given positive node and age, adjuvant chemo will place port  Will refer to rad onc will need xrt.

## 2022-10-12 ENCOUNTER — OFFICE VISIT (OUTPATIENT)
Dept: ONCOLOGY | Age: 37
End: 2022-10-12
Payer: COMMERCIAL

## 2022-10-12 VITALS
DIASTOLIC BLOOD PRESSURE: 80 MMHG | RESPIRATION RATE: 17 BRPM | SYSTOLIC BLOOD PRESSURE: 135 MMHG | TEMPERATURE: 98 F | BODY MASS INDEX: 22.99 KG/M2 | OXYGEN SATURATION: 98 % | WEIGHT: 138 LBS | HEIGHT: 65 IN | HEART RATE: 67 BPM

## 2022-10-12 DIAGNOSIS — Z17.0 MALIGNANT NEOPLASM OF LEFT BREAST IN FEMALE, ESTROGEN RECEPTOR POSITIVE, UNSPECIFIED SITE OF BREAST (HCC): Primary | ICD-10-CM

## 2022-10-12 DIAGNOSIS — C50.912 MALIGNANT NEOPLASM OF LEFT BREAST IN FEMALE, ESTROGEN RECEPTOR POSITIVE, UNSPECIFIED SITE OF BREAST (HCC): Primary | ICD-10-CM

## 2022-10-12 PROCEDURE — 99205 OFFICE O/P NEW HI 60 MIN: CPT | Performed by: INTERNAL MEDICINE

## 2022-10-12 NOTE — PROGRESS NOTES
Roderick Loera is a 42 y.o. female  Chief Complaint   Patient presents with    New Patient    Breast Cancer     1. Have you been to the ER, urgent care clinic since your last visit? Hospitalized since your last visit? No    2. Have you seen or consulted any other health care providers outside of the [address] [place] [address] since your last visit? Include any pap smears or colon screening.  No

## 2022-10-13 ENCOUNTER — DOCUMENTATION ONLY (OUTPATIENT)
Dept: SURGERY | Age: 37
End: 2022-10-13

## 2022-10-13 NOTE — PROGRESS NOTES
I gave patient a call on today to schedule her surgery, I tried contacting patient on yesterday as well. I spoke with patient today, she stated that Dr. Lorrie Watson ordered a mammaprint on yesterday, and she will give me a call back to schedule her port placement in a few days.

## 2022-10-19 ENCOUNTER — HOSPITAL ENCOUNTER (OUTPATIENT)
Dept: RADIATION THERAPY | Age: 37
Discharge: HOME | End: 2022-10-19

## 2022-10-24 ENCOUNTER — TELEPHONE (OUTPATIENT)
Dept: ONCOLOGY | Age: 37
End: 2022-10-24

## 2022-10-24 ENCOUNTER — DOCUMENTATION ONLY (OUTPATIENT)
Dept: SURGERY | Age: 37
End: 2022-10-24

## 2022-10-24 NOTE — TELEPHONE ENCOUNTER
Patient called and would like to speak to the nurse.   Patient has specific question relating to her condition regarding \"numbers\" and would like a call back to discuss [[phone]]

## 2022-10-24 NOTE — PROGRESS NOTES
Patient clinicals have been faxed to Dr. Sophia Anthony office for a second opinion per patient's request.

## 2022-10-24 NOTE — TELEPHONE ENCOUNTER
[phone]  Returned pts call and left VM stating that I will try calling again tomorrow when back in office.

## 2022-10-25 ENCOUNTER — TELEPHONE (OUTPATIENT)
Dept: ONCOLOGY | Age: 37
End: 2022-10-25

## 2022-10-25 NOTE — TELEPHONE ENCOUNTER
[phone]  Returned pts call for a second time and left VM to r/t call to the office at her convenience to discuss questions/concerns.

## 2022-10-25 NOTE — TELEPHONE ENCOUNTER
Called Minoo Ramirez- went to [address] [place] [address] her  . Minoo Ashley works between 8.30 am -6  I will try to call her again later today  I discussed results from mammaprint tetsing  Has high risk Luminal B breast cancer  I have recommended chemotherapy and recommend we work on getting this started no later than 2-3 weeks    He shared with me that their second opinion is scheduled for 11/10/2022  After talking to Minoo Ramirez I will reach out to Dr. Malachi Mccullough for a port

## 2022-10-26 ENCOUNTER — TELEPHONE (OUTPATIENT)
Dept: ONCOLOGY | Age: 37
End: 2022-10-26

## 2022-10-26 NOTE — TELEPHONE ENCOUNTER
Called Corazon Harris  Discussed mmaprint results and recommendations for adjuvant ddAC-T  She is agreeable to proceeding with a port. She is seeing [facility] 11/10/2022 and will see me 11/2/2022 for chemo consent.   Was appreciative of call and also mentioned that Dr. Sony Costa office has been very proactive

## 2022-10-27 ENCOUNTER — DOCUMENTATION ONLY (OUTPATIENT)
Dept: SURGERY | Age: 37
End: 2022-10-27

## 2022-10-31 NOTE — H&P (VIEW-ONLY)
[facility] at [address], [id number] [place] [address] Road, [facility]: [phone]  F: [phone]    Reason for Visit:   Marino Kolb is a 42 y.o. female who is seen for follow up of Left breast cancer . Treatment History:   9/27/2022-bilateral mastectomy with left sentinel node biopsy    History of Present Illness:   Patient is a 55-year-old female with a history of anemia and fibrocystic breast disease seen for left breast cancer and DCIS. She had a mammogram in May 2022 that showed diffuse left breast calcifications as well as several masses in the right breast that were thought to be fibroadenomas. Initial biopsy of left breast calcifications in June 2022 was consistent with DCIS. She was referred to Dr. Marine Stephenson who obtained a breast MRI on 8/3/2022. This confirmed multicentric multifocal left breast DCIS in all quadrants . Probable left axillary kari metastases, bilobed mass at 2:00 in the right breast as well is linear enhancement in the subareolar right breast.  Right breast biopsy of the subareolar region was consistent with intraductal papilloma with focal atypical ductal hyperplasia, right breast 2 cm masslike lesion was consistent with fibroadenoma. Underwent a bilateral mastectomy with sentinel node biopsy on the left on 9/27/2022. Right breast showed intraductal papilloma and fibroadenoma. Left breast showed extensive high-grade DCIS with negative margins as well as a 3 mm focus of invasive carcinoma, grade 2, 1 positive node of 3. ER 90% AK 90% HER2/raad negative, Ki-67 20%. Mother's cancer diagnosed when she was 72, and had negative genetic testing. Patient has had Ambry genetic testing with variant of unknown significance detected. She is seen today to discuss MammaPrint results and additional management. She comes with her  Zena Benito    She has 3 children. Brendolyn Barthel is almost 3 years out. She has no fevers, chills, sweats, cough, HA.  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        Past Medical History:   Diagnosis Date    Adverse effect of anesthesia 1999    Delayed awakening after anesthesia    Anemia     Anxiety     HX OTHER MEDICAL 02/10/2011    fibroids    Ill-defined condition     PALPITATIONS, CLEARED BY CARDIOLOGIST    Postpartum depression     with 1st pregnancy    Unspecified breast disorder     fibrocystic breasts, followed at [facility]      Past Surgical History:   Procedure Laterality Date    HX BREAST RECONSTRUCTION Bilateral 9/27/2022    . performed by Tori Sampson MD at [place] [address]    HX MASTECTOMY Bilateral 9/27/2022    BILATERAL SKIN SPARING MASTECTOMIES LEFT BREAST SENTINEL NODE BIOPSY  DR. Carmel Davila - BILATERAL BREAST RECONSTRUCTION WITH TISSUE EXPANDERS AND ALLODERM performed by Randall Peña MD at [place] [address]    HX OTHER SURGICAL  age 1    adnoidectomy    AK BREAST SURGERY PROCEDURE UNLISTED      06/2022 AND 08/2022 BIOPSIES      Social History     Tobacco Use    Smoking status: Never    Smokeless tobacco: Never   Substance Use Topics    Alcohol use: No      Family History   Problem Relation Age of Onset    Hypertension Mother         caused by RA rx    Arthritis-rheumatoid Mother     OSTEOARTHRITIS Mother     Asthma Sister     Stroke Maternal Grandfather     Cancer Neg Hx     Heart Disease Neg Hx     Anesth Problems Neg Hx      Current Outpatient Medications   Medication Sig    multivitamin (ONE A DAY) tablet Take 1 Tablet by mouth daily. fexofenadine (ALLEGRA) 60 mg tablet Take 60 mg by mouth daily. ferrous sulfate (SLOW FE) 142 mg (45 mg iron) ER tablet Take 625 mg by mouth two (2) times a day. (Patient not taking: Reported on 10/12/2022)     No current facility-administered medications for this visit.       Allergies   Allergen Reactions    Blueberry Swelling    Sulfa (Sulfonamide Antibiotics) Anaphylaxis    Caffeine Palpitations    Crab Swelling Pseudoephedrine Palpitations        Review of Systems: A complete review of systems was obtained, negative except as described above. Physical Exam:   Visit Vitals  LMP 10/05/2022 (Within Weeks) Comment: 1.5 weeks ago     ECOG PS: 0  General: No distress  Eyes: PERRLA, anicteric sclerae  HENT: Atraumatic  Skin: No rashes, ecchymoses, or petechiae. Normal temperature, turgor, and texture. Psych: Alert, oriented, appropriate affect, normal judgment/insight    Results:     Lab Results   Component Value Date/Time    WBC 6.4 09/20/2022 02:27 PM    HGB 12.3 09/20/2022 02:27 PM    HCT 37.5 09/20/2022 02:27 PM    PLATELET 506 74/45/4622 02:27 PM    MCV 93.5 09/20/2022 02:27 PM    ABS. NEUTROPHILS 3.6 09/20/2022 02:27 PM    Hgb, External 10.9 06/30/2011 12:00 AM    Hct, External 33.5 06/30/2011 12:00 AM    Platelet cnt., External 337 02/10/2011 12:00 AM     Lab Results   Component Value Date/Time    Sodium 138 06/18/2021 12:02 PM    Potassium 4.2 06/18/2021 12:02 PM    Chloride 105 06/18/2021 12:02 PM    CO2 28 06/18/2021 12:02 PM    Glucose 87 06/18/2021 12:02 PM    BUN 13 06/18/2021 12:02 PM    Creatinine 0.52 (L) 06/18/2021 12:02 PM    GFR est AA >60 06/18/2021 12:02 PM    GFR est non-AA >60 06/18/2021 12:02 PM    Calcium 8.9 06/18/2021 12:02 PM     Lab Results   Component Value Date/Time    Bilirubin, total 0.5 06/18/2021 12:02 PM    ALT (SGPT) 10 (L) 06/18/2021 12:02 PM    Alk. phosphatase 59 06/18/2021 12:02 PM    Protein, total 6.7 06/18/2021 12:02 PM    Albumin 3.7 06/18/2021 12:02 PM    Globulin 3.0 06/18/2021 12:02 PM         Records reviewed and summarized above. Pathology report(s) reviewed above. 9/27/2022  INVASIVE CARCINOMA OF THE BREAST: Resection    SPECIMEN       Procedure: Total mastectomy       Specimen Laterality: Left    TUMOR       Histologic Type:  Invasive carcinoma of no special type (ductal)       Glandular (Acinar) / Tubular Differentiation: Score 2       Nuclear Pleomorphism: Score 2 Mitotic Rate: Score 2       Overall Grade: Grade 2 (scores of 6 or 7)       Tumor Size: 3 mm       Tumor Focality: Single focus of invasive carcinoma       Ductal Carcinoma In Situ (DCIS): Present           Ductal Carcinoma In Situ (DCIS): Positive for extensive   intraductal component (EIC)           Size (Extent) of DCIS: 90 mm           Architectural Patterns: Micropapillary, Cribriform, Papillary,   Comedo           Nuclear Grade: Grade III (high)           Necrosis: Present, central (expansive \"comedo\" necrosis)       Lobular Carcinoma In Situ (LCIS): Not identified    Tumor Extent       Skin: Present and uninvolved by invasive carcinoma       Nipple DCIS: DCIS does not involve the nipple epidermis       Lymphovascular Invasion: Not identified       Dermal Lymphovascular Invasion: Not identified       Microcalcifications: Present in DCIS, invasive carcinoma, and   non-neoplastic tissue       Treatment Effect in the Breast: No known presurgical therapy    MARGINS       Invasive Carcinoma Margins: Uninvolved by invasive carcinoma           Distance from Closest Margin (Millimeters): Greater than 2 mm       DCIS Margins: Uninvolved by DCIS           Distance from Closest Margin (Millimeters): Less than 1 mm           Closest Margin(s):  Anterior, Posterior    LYMPH NODES       Regional Lymph Nodes: Involved by tumor cells           Number of Lymph Nodes with Macrometastases (> 2 mm): 1           Number of Lymph Nodes with Micrometastases (> 0.2 mm to 2 mm and/   or > 200 cells): 0           Size of Largest Metastatic Deposit (Millimeters): 11 mm           Extranodal Extension: Not identified           Total Number of Lymph Nodes Examined: 3           Number of Hollywood Nodes Examined: 3    PATHOLOGIC STAGE CLASSIFICATION (pTNM, AJCC 8th Edition)       Primary Tumor (pT): pT1a       Regional Lymph Nodes Modifier: (sn): Hollywood node(s) evaluated       Regional Lymph Nodes (pN): pN1a    ADDITIONAL FINDINGS Additional Findings: Fibroadenomas          6. Left breast anterior margin, excision:        Ductal carcinoma in situ (DCIS), nuclear grade 3   DCIS is 3 mm from designated anterior margin       Radiology report(s) reviewed above. MRI 8/2022     IMPRESSION  1. Multicentric, multifocal, left DCIS involves all quadrants and largely  replaces the inferior breast. BI-RADS 6.  2. DCIS extends to the left nipple, with an enhancing mass in the nipple. 3. Probable left axillary kari metastasis. BI-RADS 4.  4. Linear enhancement in the subareolar right breast, likely DCIS. BI-RADS 4.  5. Bilobed mass at 2:00 in the right breast. BI-RADS 4A. 6. Overall assessment: BI-RADS Assessment Category 4: Suspicious abnormality. 7. Recommendations: Ultrasound-guided left axillary lymph node biopsy, MRI  guided right breast biopsy. Assessment:   1) Left breast cancer    S/P Bilateral mastectomy and Left SLN on 9/27/2022   3 mm, 1/3 + nodes  Grade 2  Margins negative  9 CM are of DCIS    ER 90% PR10%HER2 raad negative  Ki 67 20%  Mamma print high risk Luminal B     [id number]- Stage IA    I discussed the natural history of estrogen receptor positive early stage breast cancer. I discussed the limitation of clinical tools for prediction of risk of recurrence. She does have a clinically high risk tumor ( N+,and Ki 67 of 20%) and hence benefit from adjuvant chemotherapy is not completely excluded. I discussed the challenges with current day genomic recurrence scores to determine this in premenopausal women. Premenopausal women represented 33% of patients in the MINDACT trial. 21% were N+. MammaPrint results were discussed today and this does show a high risk luminal B subtype. I have recommended adjuvant chemotherapy. I discussed both dose dense AC followed by T ordered TC.   My recommendation is to pursue dose dense AC followed by Taxol    We discussed the chemotherapy regimen, it's logistics, and potential toxicities in detail. Potential side effects include, but are not limited to, nausea, vomiting, diarrhea, taste changes, myelosuppression, infection, fatigue, allergic reactions, rash, edema, neuropathy, and rarely, death. The patient asked several well thought out questions which I answered to the best of my ability and to their apparent satisfaction. The patient has given consent for chemotherapy. We also reviewed the possibility of early menopause and loss of fertility. She wants to make sure that she has no metastasis, we discussed contraception        2) R breast intraductal papilloma  S/p mastectomy  Post chemotherapy RT    3) FH of breast cancer  Ambry- VUS    4) Psychosocial  Coping well   Encouraged Flu vaccine and COVID booster  Discussed disability, has a new job, suggest tele work for fewer hours on chemotherapy  She has reached out to her PCP for anxiolytics , we are happy to prescribe if needed       Plan:     Echocardiogram  Port with Dr. Yordy Schaefer- 11/15/2022  CT CAP and bone scan on patients request  Approval for dose dense AC followed by Taxol  Antiemetics per protocol  She has a second opinion at [facility] set up 11/10/2022  She will see me with cycle 1 day 1  Will discuss adjuvant endocrine therapy after radiation. This may involve ovarian function suppression plus AI given high risk disease    I appreciate the opportunity to participate in MsPily Ginny Kong jayro.     Planned cycle 1 on 11/16/2022    Signed By: Danya Ramirez MD

## 2022-10-31 NOTE — PROGRESS NOTES
[facility] at [address], [address] [place] [address] Road, [place]: [phone]  F: [phone]    Reason for Visit:   Tatyana Clemente is a 42 y.o. female who is seen for follow up of Left breast cancer . Treatment History:   9/27/2022-bilateral mastectomy with left sentinel node biopsy    History of Present Illness:   Patient is a 68-year-old female with a history of anemia and fibrocystic breast disease seen for left breast cancer and DCIS. She had a mammogram in May 2022 that showed diffuse left breast calcifications as well as several masses in the right breast that were thought to be fibroadenomas. Initial biopsy of left breast calcifications in June 2022 was consistent with DCIS. She was referred to Dr. Scotty Bruno who obtained a breast MRI on 8/3/2022. This confirmed multicentric multifocal left breast DCIS in all quadrants . Probable left axillary kari metastases, bilobed mass at 2:00 in the right breast as well is linear enhancement in the subareolar right breast.  Right breast biopsy of the subareolar region was consistent with intraductal papilloma with focal atypical ductal hyperplasia, right breast 2 cm masslike lesion was consistent with fibroadenoma. Underwent a bilateral mastectomy with sentinel node biopsy on the left on 9/27/2022. Right breast showed intraductal papilloma and fibroadenoma. Left breast showed extensive high-grade DCIS with negative margins as well as a 3 mm focus of invasive carcinoma, grade 2, 1 positive node of 3. ER 90% VT 90% HER2/raad negative, Ki-67 20%. Mother's cancer diagnosed when she was 72, and had negative genetic testing. Patient has had Ambry genetic testing with variant of unknown significance detected. She is seen today to discuss MammaPrint results and additional management. She comes with her  John Restrepo    She has 3 children. Mynor Chaves is almost 3 years out. She has no fevers, chills, sweats, cough, HA.  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        Past Medical History:   Diagnosis Date    Adverse effect of anesthesia 1999    Delayed awakening after anesthesia    Anemia     Anxiety     HX OTHER MEDICAL 02/10/2011    fibroids    Ill-defined condition     PALPITATIONS, CLEARED BY CARDIOLOGIST    Postpartum depression     with 1st pregnancy    Unspecified breast disorder     fibrocystic breasts, followed at [facility]      Past Surgical History:   Procedure Laterality Date    HX BREAST RECONSTRUCTION Bilateral 9/27/2022    . performed by Michaelle Walker MD at [place] [address]    HX MASTECTOMY Bilateral 9/27/2022    BILATERAL SKIN SPARING MASTECTOMIES LEFT BREAST SENTINEL NODE BIOPSY  DR. Althea Fernandes - BILATERAL BREAST RECONSTRUCTION WITH TISSUE EXPANDERS AND ALLODERM performed by Gerson Oliver MD at [place] [address]    HX OTHER SURGICAL  age 1    adnoidectomy    AR BREAST SURGERY PROCEDURE UNLISTED      06/2022 AND 08/2022 BIOPSIES      Social History     Tobacco Use    Smoking status: Never    Smokeless tobacco: Never   Substance Use Topics    Alcohol use: No      Family History   Problem Relation Age of Onset    Hypertension Mother         caused by RA rx    Arthritis-rheumatoid Mother     OSTEOARTHRITIS Mother     Asthma Sister     Stroke Maternal Grandfather     Cancer Neg Hx     Heart Disease Neg Hx     Anesth Problems Neg Hx      Current Outpatient Medications   Medication Sig    multivitamin (ONE A DAY) tablet Take 1 Tablet by mouth daily. fexofenadine (ALLEGRA) 60 mg tablet Take 60 mg by mouth daily. ferrous sulfate (SLOW FE) 142 mg (45 mg iron) ER tablet Take 625 mg by mouth two (2) times a day. (Patient not taking: Reported on 10/12/2022)     No current facility-administered medications for this visit.       Allergies   Allergen Reactions    Blueberry Swelling    Sulfa (Sulfonamide Antibiotics) Anaphylaxis    Caffeine Palpitations    Crab Swelling Pseudoephedrine Palpitations        Review of Systems: A complete review of systems was obtained, negative except as described above. Physical Exam:   Visit Vitals  LMP 10/05/2022 (Within Weeks) Comment: 1.5 weeks ago     ECOG PS: 0  General: No distress  Eyes: PERRLA, anicteric sclerae  HENT: Atraumatic  Skin: No rashes, ecchymoses, or petechiae. Normal temperature, turgor, and texture. Psych: Alert, oriented, appropriate affect, normal judgment/insight    Results:     Lab Results   Component Value Date/Time    WBC 6.4 09/20/2022 02:27 PM    HGB 12.3 09/20/2022 02:27 PM    HCT 37.5 09/20/2022 02:27 PM    PLATELET 455 24/23/1243 02:27 PM    MCV 93.5 09/20/2022 02:27 PM    ABS. NEUTROPHILS 3.6 09/20/2022 02:27 PM    Hgb, External 10.9 06/30/2011 12:00 AM    Hct, External 33.5 06/30/2011 12:00 AM    Platelet cnt., External 337 02/10/2011 12:00 AM     Lab Results   Component Value Date/Time    Sodium 138 06/18/2021 12:02 PM    Potassium 4.2 06/18/2021 12:02 PM    Chloride 105 06/18/2021 12:02 PM    CO2 28 06/18/2021 12:02 PM    Glucose 87 06/18/2021 12:02 PM    BUN 13 06/18/2021 12:02 PM    Creatinine 0.52 (L) 06/18/2021 12:02 PM    GFR est AA >60 06/18/2021 12:02 PM    GFR est non-AA >60 06/18/2021 12:02 PM    Calcium 8.9 06/18/2021 12:02 PM     Lab Results   Component Value Date/Time    Bilirubin, total 0.5 06/18/2021 12:02 PM    ALT (SGPT) 10 (L) 06/18/2021 12:02 PM    Alk. phosphatase 59 06/18/2021 12:02 PM    Protein, total 6.7 06/18/2021 12:02 PM    Albumin 3.7 06/18/2021 12:02 PM    Globulin 3.0 06/18/2021 12:02 PM         Records reviewed and summarized above. Pathology report(s) reviewed above. 9/27/2022  INVASIVE CARCINOMA OF THE BREAST: Resection    SPECIMEN       Procedure: Total mastectomy       Specimen Laterality: Left    TUMOR       Histologic Type:  Invasive carcinoma of no special type (ductal)       Glandular (Acinar) / Tubular Differentiation: Score 2       Nuclear Pleomorphism: Score 2 Mitotic Rate: Score 2       Overall Grade: Grade 2 (scores of 6 or 7)       Tumor Size: 3 mm       Tumor Focality: Single focus of invasive carcinoma       Ductal Carcinoma In Situ (DCIS): Present           Ductal Carcinoma In Situ (DCIS): Positive for extensive   intraductal component (EIC)           Size (Extent) of DCIS: 90 mm           Architectural Patterns: Micropapillary, Cribriform, Papillary,   Comedo           Nuclear Grade: Grade III (high)           Necrosis: Present, central (expansive \"comedo\" necrosis)       Lobular Carcinoma In Situ (LCIS): Not identified    Tumor Extent       Skin: Present and uninvolved by invasive carcinoma       Nipple DCIS: DCIS does not involve the nipple epidermis       Lymphovascular Invasion: Not identified       Dermal Lymphovascular Invasion: Not identified       Microcalcifications: Present in DCIS, invasive carcinoma, and   non-neoplastic tissue       Treatment Effect in the Breast: No known presurgical therapy    MARGINS       Invasive Carcinoma Margins: Uninvolved by invasive carcinoma           Distance from Closest Margin (Millimeters): Greater than 2 mm       DCIS Margins: Uninvolved by DCIS           Distance from Closest Margin (Millimeters): Less than 1 mm           Closest Margin(s):  Anterior, Posterior    LYMPH NODES       Regional Lymph Nodes: Involved by tumor cells           Number of Lymph Nodes with Macrometastases (> 2 mm): 1           Number of Lymph Nodes with Micrometastases (> 0.2 mm to 2 mm and/   or > 200 cells): 0           Size of Largest Metastatic Deposit (Millimeters): 11 mm           Extranodal Extension: Not identified           Total Number of Lymph Nodes Examined: 3           Number of [place] Nodes Examined: 3    PATHOLOGIC STAGE CLASSIFICATION (pTNM, AJCC 8th Edition)       Primary Tumor (pT): pT1a       Regional Lymph Nodes Modifier: (sn): [place] node(s) evaluated       Regional Lymph Nodes (pN): pN1a    ADDITIONAL FINDINGS Additional Findings: Fibroadenomas          6. Left breast anterior margin, excision:        Ductal carcinoma in situ (DCIS), nuclear grade 3   DCIS is 3 mm from designated anterior margin       Radiology report(s) reviewed above. MRI 8/2022     IMPRESSION  1. Multicentric, multifocal, left DCIS involves all quadrants and largely  replaces the inferior breast. BI-RADS 6.  2. DCIS extends to the left nipple, with an enhancing mass in the nipple. 3. Probable left axillary kari metastasis. BI-RADS 4.  4. Linear enhancement in the subareolar right breast, likely DCIS. BI-RADS 4.  5. Bilobed mass at 2:00 in the right breast. BI-RADS 4A. 6. Overall assessment: BI-RADS Assessment Category 4: Suspicious abnormality. 7. Recommendations: Ultrasound-guided left axillary lymph node biopsy, MRI  guided right breast biopsy. Assessment:   1) Left breast cancer    S/P Bilateral mastectomy and Left SLN on 9/27/2022   3 mm, 1/3 + nodes  Grade 2  Margins negative  9 CM are of DCIS    ER 90% PR10%HER2 raad negative  Ki 67 20%  Mamma print high risk Luminal B     [id number]- Stage IA    I discussed the natural history of estrogen receptor positive early stage breast cancer. I discussed the limitation of clinical tools for prediction of risk of recurrence. She does have a clinically high risk tumor ( N+,and Ki 67 of 20%) and hence benefit from adjuvant chemotherapy is not completely excluded. I discussed the challenges with current day genomic recurrence scores to determine this in premenopausal women. Premenopausal women represented 33% of patients in the MINDACT trial. 21% were N+. MammaPrint results were discussed today and this does show a high risk luminal B subtype. I have recommended adjuvant chemotherapy. I discussed both dose dense AC followed by T ordered TC.   My recommendation is to pursue dose dense AC followed by Taxol    We discussed the chemotherapy regimen, it's logistics, and potential toxicities in detail. Potential side effects include, but are not limited to, nausea, vomiting, diarrhea, taste changes, myelosuppression, infection, fatigue, allergic reactions, rash, edema, neuropathy, and rarely, death. The patient asked several well thought out questions which I answered to the best of my ability and to their apparent satisfaction. The patient has given consent for chemotherapy. We also reviewed the possibility of early menopause and loss of fertility. She wants to make sure that she has no metastasis, we discussed contraception        2) R breast intraductal papilloma  S/p mastectomy  Post chemotherapy RT    3) FH of breast cancer  Ambry- JENNIFER    4) Psychosocial  Coping well   Encouraged Flu vaccine and COVID booster  Discussed disability, has a new job, suggest tele work for fewer hours on chemotherapy  She has reached out to her PCP for anxiolytics , we are happy to prescribe if needed       Plan:     Echocardiogram  Port with Dr. Elma Mckenzie- 11/15/2022  CT CAP and bone scan on patients request  Approval for dose dense AC followed by Taxol  Antiemetics per protocol  She has a second opinion at [facility] set up 11/10/2022  She will see me with cycle 1 day 1  Will discuss adjuvant endocrine therapy after radiation. This may involve ovarian function suppression plus AI given high risk disease    I appreciate the opportunity to participate in MsPily Fanny Samira dial.     Planned cycle 1 on 11/16/2022    Signed By: Aliya Richards MD

## 2022-11-02 ENCOUNTER — OFFICE VISIT (OUTPATIENT)
Dept: ONCOLOGY | Age: 37
End: 2022-11-02
Payer: COMMERCIAL

## 2022-11-02 ENCOUNTER — DOCUMENTATION ONLY (OUTPATIENT)
Dept: ONCOLOGY | Age: 37
End: 2022-11-02

## 2022-11-02 VITALS
SYSTOLIC BLOOD PRESSURE: 118 MMHG | HEIGHT: 65 IN | RESPIRATION RATE: 15 BRPM | WEIGHT: 136 LBS | TEMPERATURE: 98 F | BODY MASS INDEX: 22.66 KG/M2 | HEART RATE: 72 BPM | DIASTOLIC BLOOD PRESSURE: 79 MMHG | OXYGEN SATURATION: 98 %

## 2022-11-02 DIAGNOSIS — Z17.0 MALIGNANT NEOPLASM OF LEFT BREAST IN FEMALE, ESTROGEN RECEPTOR POSITIVE, UNSPECIFIED SITE OF BREAST (HCC): Primary | ICD-10-CM

## 2022-11-02 DIAGNOSIS — C50.912 MALIGNANT NEOPLASM OF LEFT BREAST IN FEMALE, ESTROGEN RECEPTOR POSITIVE, UNSPECIFIED SITE OF BREAST (HCC): Primary | ICD-10-CM

## 2022-11-02 PROCEDURE — 99215 OFFICE O/P EST HI 40 MIN: CPT | Performed by: INTERNAL MEDICINE

## 2022-11-02 RX ORDER — LIDOCAINE AND PRILOCAINE 25; 25 MG/G; MG/G
CREAM TOPICAL AS NEEDED
Qty: 30 G | Refills: 3 | Status: SHIPPED | OUTPATIENT
Start: 2022-11-02 | End: 2022-11-10 | Stop reason: ALTCHOICE

## 2022-11-02 RX ORDER — OLANZAPINE 5 MG/1
5 TABLET ORAL
Qty: 20 TABLET | Refills: 0 | Status: SHIPPED | OUTPATIENT
Start: 2022-11-02 | End: 2022-11-10 | Stop reason: ALTCHOICE

## 2022-11-02 RX ORDER — PROCHLORPERAZINE MALEATE 5 MG
5 TABLET ORAL
Qty: 30 TABLET | Refills: 3 | Status: SHIPPED | OUTPATIENT
Start: 2022-11-02 | End: 2022-11-10 | Stop reason: ALTCHOICE

## 2022-11-02 RX ORDER — DEXAMETHASONE 4 MG/1
TABLET ORAL
Qty: 16 TABLET | Refills: 0 | Status: SHIPPED | OUTPATIENT
Start: 2022-11-02 | End: 2022-11-10 | Stop reason: ALTCHOICE

## 2022-11-02 RX ORDER — ONDANSETRON 8 MG/1
8 TABLET, ORALLY DISINTEGRATING ORAL
Qty: 30 TABLET | Refills: 3 | Status: SHIPPED | OUTPATIENT
Start: 2022-11-02 | End: 2022-11-10 | Stop reason: ALTCHOICE

## 2022-11-02 NOTE — PROGRESS NOTES
Bucky Lincoln is a 42 y.o. female  Chief Complaint   Patient presents with    Follow-up    Breast Cancer     1. Have you been to the ER, urgent care clinic since your last visit? Hospitalized since your last visit? No    2. Have you seen or consulted any other health care providers outside of the [address] [place] [address] since your last visit? Include any pap smears or colon screening.  No

## 2022-11-02 NOTE — PROGRESS NOTES
Pharmacy Note- Chemotherapy Education    Bucky Lincoln is a  42 y. o.female  diagnosed with breast cancer here today for chemotherapy counseling and consent. Ms. Keisha Campbell is being treated with ddAC T. Provided education on DOXOrubicin, cyclophosphosphamide, PACLitaxel and premedications - fosaprepitant, palonosetron, dexamethasone, diphenhydramine, famotidine. Side effects of chemotherapy reviewed included s/s infection, anemia, appetite changes, thrombocytopenia, fatigue, hair loss/alopecia, bone pain, skin and nail changes, diarrhea/constipation, infusion reactions, peripheral neuropathy, urine discoloration and cardiac toxicity. Patient given ways to manage these side effects and when to contact office. Evie [address] Dr Handout of medications provided to patient. Ms. Keisha Campbell verbalized understanding of the information presented and all of the patient's questions were answered.     Peggy Vora, PharmD, [facility], [address] [place] [address] in place:   Recommendation Provided To: Patient/Caregiver: 1 via In person    Intervention Accepted By: Patient/Caregiver: 1  Time Spent (min): 30

## 2022-11-04 ENCOUNTER — DOCUMENTATION ONLY (OUTPATIENT)
Dept: SURGERY | Age: 37
End: 2022-11-04

## 2022-11-04 NOTE — PROGRESS NOTES
Patient Surgery Information Sheet        Patient Name:  Kathy Santiago  Surgery Date:  November 18, 2022  Type of Surgery:  PORT PLACEMENT  Time of Surgery:  07:30 AM  Pre-Procedure required? No    Pre-Operative Testing Department will call prior to surgery to determine if you need to come in and will schedule if needed. Arrival Time on the day of Surgery:    Hospital:  [place] [address]    Check in is located:  [address] entrance and make an immediate left and check in at patient access. YOU MAY NOT GET A REMINDER CALL FROM THE HOSPITAL    Pre-Operative Instructions:      \" Nothing to eat or drink after midnight the night before surgery  \" Do not wear makeup, lotion, deodorant, perfume  \" Please have a  to take you home from the hospital, failure to have a  could result in canceled surgery  \" All valuables should be left at home, the hospital Is not responsible for valuables  \" Special Instructions if needed:        Follow up appointment for post-operative visit with provider :   November 0, 2022    Katie Neves4   [id number] [facility] [id number]

## 2022-11-08 NOTE — PROGRESS NOTES
Omar Mijares (: 1985) is a 42 y.o. female, established patient, here for evaluation of the following chief complaint(s):  Medication Check       ASSESSMENT/PLAN:  Below is the assessment and plan developed based on review of pertinent history, physical exam, labs, studies, and medications. 1. Malignant neoplasm of left breast in female, estrogen receptor positive, unspecified site of breast ([address]- per [facility] and has port being placed in next week and starting chemotherapy soon  2. Status post bilateral mastectomy  3. Anxiety- we discussed getting on medicine as anxiety is something she deals with daily and trying to get a better handle on it for quality of life is important   -     escitalopram oxalate (LEXAPRO) 5 mg tablet; Take 1 Tablet by mouth daily. , Normal, Disp-30 Tablet, R-3  4. Needs flu shot  -     INFLUENZA, FLUARIX, FLULAVAL, FLUZONE (AGE 6 MO+), AFLURIA(AGE 3Y+) IM, PF, 0.5 ML    No follow-ups on file. 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 Physical Exam  Vitals and nursing note reviewed. Constitutional:       Appearance: She is well-developed. HENT:      Head: Normocephalic and atraumatic. Right Ear: External ear normal.      Left Ear: External ear normal.      Nose: Nose normal.   Neck:      Thyroid: No thyroid mass or thyromegaly. Vascular: No carotid bruit or JVD. Cardiovascular:      Rate and Rhythm: Normal rate and regular rhythm. Pulses: Normal pulses. Heart sounds: Normal heart sounds, S1 normal and S2 normal. No murmur heard. No friction rub. No gallop.    Pulmonary:      Effort: Pulmonary effort is normal.      Breath sounds: Normal breath sounds. Abdominal:      General: Bowel sounds are normal.      Palpations: Abdomen is soft. Musculoskeletal:         General: Normal range of motion. Cervical back: Normal range of motion and neck supple. Skin:     General: Skin is warm and dry. Neurological:      Mental Status: She is alert and oriented to person, place, and time. Psychiatric:         Behavior: Behavior normal.         Thought Content: Thought content normal.         Judgment: Judgment normal.       On this date 11/10/2022 I have spent 30 minutes reviewing previous notes, test results and face to face with the patient discussing the diagnosis and importance of compliance with the treatment plan as well as documenting on the day of the visit. An electronic signature was used to authenticate this note.   -- Muna Amos MD

## 2022-11-09 ENCOUNTER — HOSPITAL ENCOUNTER (OUTPATIENT)
Dept: CT IMAGING | Age: 37
Discharge: HOME | End: 2022-11-09
Attending: INTERNAL MEDICINE
Payer: COMMERCIAL

## 2022-11-09 ENCOUNTER — HOSPITAL ENCOUNTER (OUTPATIENT)
Dept: PREADMISSION TESTING | Age: 37
Discharge: HOME | End: 2022-11-09

## 2022-11-09 ENCOUNTER — TELEPHONE (OUTPATIENT)
Dept: ONCOLOGY | Age: 37
End: 2022-11-09

## 2022-11-09 ENCOUNTER — ANCILLARY PROCEDURE (OUTPATIENT)
Dept: CARDIOLOGY CLINIC | Age: 37
End: 2022-11-09
Payer: COMMERCIAL

## 2022-11-09 VITALS
SYSTOLIC BLOOD PRESSURE: 111 MMHG | WEIGHT: 134 LBS | BODY MASS INDEX: 22.33 KG/M2 | HEIGHT: 65 IN | DIASTOLIC BLOOD PRESSURE: 74 MMHG

## 2022-11-09 VITALS
HEIGHT: 65 IN | BODY MASS INDEX: 22.41 KG/M2 | WEIGHT: 134.48 LBS | TEMPERATURE: 98.2 F | DIASTOLIC BLOOD PRESSURE: 74 MMHG | HEART RATE: 79 BPM | SYSTOLIC BLOOD PRESSURE: 111 MMHG

## 2022-11-09 DIAGNOSIS — Z17.0 MALIGNANT NEOPLASM OF LEFT BREAST IN FEMALE, ESTROGEN RECEPTOR POSITIVE, UNSPECIFIED SITE OF BREAST (HCC): ICD-10-CM

## 2022-11-09 DIAGNOSIS — C50.912 MALIGNANT NEOPLASM OF LEFT BREAST IN FEMALE, ESTROGEN RECEPTOR POSITIVE, UNSPECIFIED SITE OF BREAST (HCC): ICD-10-CM

## 2022-11-09 DIAGNOSIS — C50.912 BREAST CANCER METASTASIZED TO AXILLARY LYMPH NODE, LEFT (HCC): ICD-10-CM

## 2022-11-09 DIAGNOSIS — C77.3 BREAST CANCER METASTASIZED TO AXILLARY LYMPH NODE, LEFT (HCC): ICD-10-CM

## 2022-11-09 LAB
ECHO AO ASC DIAM: 2.7 CM
ECHO AO ASCENDING AORTA INDEX: 1.62 CM/M2
ECHO AO ROOT DIAM: 3 CM
ECHO AO ROOT INDEX: 1.8 CM/M2
ECHO AV AREA PEAK VELOCITY: 3 CM2
ECHO AV AREA VTI: 2.7 CM2
ECHO AV AREA/BSA PEAK VELOCITY: 1.8 CM2/M2
ECHO AV AREA/BSA VTI: 1.6 CM2/M2
ECHO AV MEAN GRADIENT: 5 MMHG
ECHO AV MEAN VELOCITY: 1.1 M/S
ECHO AV PEAK GRADIENT: 10 MMHG
ECHO AV PEAK VELOCITY: 1.6 M/S
ECHO AV VELOCITY RATIO: 0.94
ECHO AV VTI: 30.5 CM
ECHO EST RA PRESSURE: 3 MMHG
ECHO LA DIAMETER INDEX: 1.44 CM/M2
ECHO LA DIAMETER: 2.4 CM
ECHO LA TO AORTIC ROOT RATIO: 0.8
ECHO LA VOL 2C: 30 ML (ref 22–52)
ECHO LA VOL 4C: 32 ML (ref 22–52)
ECHO LA VOL BP: 35 ML (ref 22–52)
ECHO LA VOL/BSA BIPLANE: 21 ML/M2 (ref 16–34)
ECHO LA VOLUME AREA LENGTH: 38 ML
ECHO LA VOLUME INDEX A2C: 18 ML/M2 (ref 16–34)
ECHO LA VOLUME INDEX A4C: 19 ML/M2 (ref 16–34)
ECHO LA VOLUME INDEX AREA LENGTH: 23 ML/M2 (ref 16–34)
ECHO LV E' LATERAL VELOCITY: 17 CM/S
ECHO LV E' SEPTAL VELOCITY: 10 CM/S
ECHO LV EDV A2C: 76 ML
ECHO LV EDV A4C: 78 ML
ECHO LV EDV BP: 77 ML (ref 56–104)
ECHO LV EDV INDEX A4C: 47 ML/M2
ECHO LV EDV INDEX BP: 46 ML/M2
ECHO LV EDV NDEX A2C: 46 ML/M2
ECHO LV EJECTION FRACTION A2C: 60 %
ECHO LV EJECTION FRACTION A4C: 61 %
ECHO LV EJECTION FRACTION BIPLANE: 61 % (ref 55–100)
ECHO LV ESV A2C: 30 ML
ECHO LV ESV A4C: 30 ML
ECHO LV ESV BP: 30 ML (ref 19–49)
ECHO LV ESV INDEX A2C: 18 ML/M2
ECHO LV ESV INDEX A4C: 18 ML/M2
ECHO LV ESV INDEX BP: 18 ML/M2
ECHO LV FRACTIONAL SHORTENING: 33 % (ref 28–44)
ECHO LV GLOBAL LONGITUDINAL STRAIN (GLS): -22.1 %
ECHO LV INTERNAL DIMENSION DIASTOLE INDEX: 2.34 CM/M2
ECHO LV INTERNAL DIMENSION DIASTOLIC: 3.9 CM (ref 3.9–5.3)
ECHO LV INTERNAL DIMENSION SYSTOLIC INDEX: 1.56 CM/M2
ECHO LV INTERNAL DIMENSION SYSTOLIC: 2.6 CM
ECHO LV IVSD: 0.8 CM (ref 0.6–0.9)
ECHO LV MASS 2D: 89.7 G (ref 67–162)
ECHO LV MASS INDEX 2D: 53.7 G/M2 (ref 43–95)
ECHO LV POSTERIOR WALL DIASTOLIC: 0.8 CM (ref 0.6–0.9)
ECHO LV RELATIVE WALL THICKNESS RATIO: 0.41
ECHO LVOT AREA: 3.1 CM2
ECHO LVOT AV VTI INDEX: 0.86
ECHO LVOT DIAM: 2 CM
ECHO LVOT MEAN GRADIENT: 5 MMHG
ECHO LVOT PEAK GRADIENT: 9 MMHG
ECHO LVOT PEAK VELOCITY: 1.5 M/S
ECHO LVOT STROKE VOLUME INDEX: 49.5 ML/M2
ECHO LVOT SV: 82.6 ML
ECHO LVOT VTI: 26.3 CM
ECHO MV A VELOCITY: 0.43 M/S
ECHO MV AREA PHT: 3.9 CM2
ECHO MV E DECELERATION TIME (DT): 196.8 MS
ECHO MV E VELOCITY: 0.92 M/S
ECHO MV E/A RATIO: 2.14
ECHO MV E/E' LATERAL: 5.41
ECHO MV E/E' RATIO (AVERAGED): 7.31
ECHO MV E/E' SEPTAL: 9.2
ECHO MV PRESSURE HALF TIME (PHT): 57.1 MS
ECHO RIGHT VENTRICULAR SYSTOLIC PRESSURE (RVSP): 22 MMHG
ECHO RV INTERNAL DIMENSION: 3.2 CM
ECHO RV TAPSE: 1.7 CM (ref 1.7–?)
ECHO TV REGURGITANT MAX VELOCITY: 2.16 M/S
ECHO TV REGURGITANT PEAK GRADIENT: 19 MMHG

## 2022-11-09 PROCEDURE — 74011000636 HC RX REV CODE- 636: Performed by: INTERNAL MEDICINE

## 2022-11-09 PROCEDURE — 74177 CT ABD & PELVIS W/CONTRAST: CPT

## 2022-11-09 PROCEDURE — 74011000250 HC RX REV CODE- 250: Performed by: INTERNAL MEDICINE

## 2022-11-09 RX ORDER — HEPARIN 100 UNIT/ML
500 SYRINGE INTRAVENOUS AS NEEDED
Status: CANCELLED
Start: 2022-11-22

## 2022-11-09 RX ORDER — ACETAMINOPHEN 325 MG/1
650 TABLET ORAL AS NEEDED
Status: CANCELLED
Start: 2022-11-22

## 2022-11-09 RX ORDER — SODIUM CHLORIDE 9 MG/ML
5-250 INJECTION, SOLUTION INTRAVENOUS AS NEEDED
Status: CANCELLED | OUTPATIENT
Start: 2022-11-22

## 2022-11-09 RX ORDER — DOXORUBICIN HYDROCHLORIDE 2 MG/ML
60 INJECTION, SOLUTION INTRAVENOUS ONCE
Status: CANCELLED
Start: 2022-11-22 | End: 2022-11-22

## 2022-11-09 RX ORDER — ALBUTEROL SULFATE 0.83 MG/ML
2.5 SOLUTION RESPIRATORY (INHALATION) AS NEEDED
Status: CANCELLED
Start: 2022-11-22

## 2022-11-09 RX ORDER — DIPHENHYDRAMINE HYDROCHLORIDE 50 MG/ML
50 INJECTION, SOLUTION INTRAMUSCULAR; INTRAVENOUS AS NEEDED
Status: CANCELLED
Start: 2022-11-22

## 2022-11-09 RX ORDER — HYDROCORTISONE SODIUM SUCCINATE 100 MG/2ML
100 INJECTION, POWDER, FOR SOLUTION INTRAMUSCULAR; INTRAVENOUS AS NEEDED
Status: CANCELLED | OUTPATIENT
Start: 2022-11-22

## 2022-11-09 RX ORDER — SODIUM CHLORIDE 0.9 % (FLUSH) 0.9 %
5-40 SYRINGE (ML) INJECTION AS NEEDED
Status: CANCELLED | OUTPATIENT
Start: 2022-11-22

## 2022-11-09 RX ORDER — PALONOSETRON 0.05 MG/ML
0.25 INJECTION, SOLUTION INTRAVENOUS ONCE
Status: CANCELLED | OUTPATIENT
Start: 2022-11-22 | End: 2022-11-22

## 2022-11-09 RX ORDER — DIPHENHYDRAMINE HYDROCHLORIDE 50 MG/ML
25 INJECTION, SOLUTION INTRAMUSCULAR; INTRAVENOUS AS NEEDED
Status: CANCELLED
Start: 2022-11-22

## 2022-11-09 RX ORDER — ONDANSETRON 2 MG/ML
8 INJECTION INTRAMUSCULAR; INTRAVENOUS AS NEEDED
Status: CANCELLED | OUTPATIENT
Start: 2022-11-22

## 2022-11-09 RX ORDER — SODIUM CHLORIDE 9 MG/ML
5-40 INJECTION INTRAMUSCULAR; INTRAVENOUS; SUBCUTANEOUS AS NEEDED
Status: CANCELLED | OUTPATIENT
Start: 2022-11-22

## 2022-11-09 RX ORDER — EPINEPHRINE 1 MG/ML
0.3 INJECTION, SOLUTION, CONCENTRATE INTRAVENOUS AS NEEDED
Status: CANCELLED | OUTPATIENT
Start: 2022-11-22

## 2022-11-09 RX ORDER — BARIUM SULFATE 20 MG/ML
900 SUSPENSION ORAL ONCE
Status: COMPLETED | OUTPATIENT
Start: 2022-11-09 | End: 2022-11-09

## 2022-11-09 RX ADMIN — BARIUM SULFATE 900 ML: 20 SUSPENSION ORAL at 14:50

## 2022-11-09 RX ADMIN — IOPAMIDOL 100 ML: 755 INJECTION, SOLUTION INTRAVENOUS at 14:50

## 2022-11-09 NOTE — TELEPHONE ENCOUNTER
Per patient call, patient is requesting reschedule of infusion schedule, beginning with 12/6/22 appointment, due to work schedule. Patient would like all appointments from 12/6/22 on to be scheduled on Thursdays, preferrably at 8am or 9am.  If necessary, the patient has some flexibility with the time. Please call the patient to confirm date/time at:  [phone]     Message sent to OA Scheduling.

## 2022-11-09 NOTE — PERIOP NOTES
[facility] INSTRUCTIONS    Surgery Date:   11/18  Your surgeon's office or [facility] staff will call you between 4 PM- 8 PM the day before surgery with your arrival time. If your surgery is on a Monday, you will receive a call the preceding Friday. Please report to [facility] Patient Access/Admitting on the 1st floor. Bring your insurance card, photo identification, and any copayment ( if applicable). If you are going home the same day of your surgery, you must have a responsible adult to drive you home. You need to have a responsible adult to stay with you the first 24 hours after surgery and you should not drive a car for 24 hours following your surgery. Nothing to eat or drink after midnight the night before surgery. This includes no water, gum, mints, coffee, juice, etc.  Please note special instructions, if applicable, below for medications. Do NOT drink alcohol or smoke 24 hours before surgery. STOP smoking for 14 days prior as it helps with breathing and healing after surgery. If you are being admitted to the hospital, please leave personal belongings/luggage in your car until you have an assigned hospital room number. Please wear comfortable clothes. Wear your glasses instead of contacts. We ask that all money, jewelry and valuables be left at home. Wear no make up, particularly mascara, the day of surgery. All body piercings, rings, and jewelry need to be removed and left at home. Please remove any nail polish or artificial nails from your fingernails. Please wear your hair loose or down. Please no pony-tails, buns, or any metal hair accessories. If you shower the morning of surgery, please do not apply any lotions or powders afterwards. You may wear deodorant, unless having breast surgery. Do not shave any body area within 24 hours of your surgery. Please follow all instructions to avoid any potential surgical cancellation.   Should your physical condition change, (i.e. fever, cold, flu, etc.) please notify your surgeon as soon as possible. It is important to be on time. If a situation occurs where you may be delayed, please call:  [phone] / [phone] on the day of surgery. The Preadmission Testing staff can be reached at [phone]. Special instructions: BRING IN LIVING WILL IF FINISHED      Current Outpatient Medications   Medication Sig    fexofenadine (ALLEGRA) 60 mg tablet Take 60 mg by mouth nightly. dexAMETHasone (DECADRON) 4 mg tablet Take 2 tabs (8mg) once daily on days 2 & 3 of chemotherapy (Patient not taking: Reported on 11/9/2022)    ondansetron (ZOFRAN ODT) 8 mg disintegrating tablet Take 1 Tablet by mouth every eight (8) hours as needed for Nausea or Vomiting. (Patient not taking: Reported on 11/9/2022)    prochlorperazine (Compazine) 5 mg tablet Take 1 Tablet by mouth every six (6) hours as needed for Nausea or Vomiting. (Patient not taking: Reported on 11/9/2022)    lidocaine-prilocaine (EMLA) topical cream Apply  to affected area as needed for Pain. Apply a thick layer over port a cath 30-60 minutes prior to port access (Patient not taking: Reported on 11/9/2022)    OLANZapine (ZyPREXA) 5 mg tablet Take 1 Tablet by mouth nightly. Nights 1-5 of chemotherapy (Patient not taking: Reported on 11/9/2022)    ferrous sulfate (SLOW FE) 142 mg (45 mg iron) ER tablet Take 625 mg by mouth two (2) times a day. (Patient not taking: No sig reported)     No current facility-administered medications for this encounter. YOU MUST ONLY TAKE THESE MEDICATIONS THE MORNING OF SURGERY WITH A SIP OF WATER: NONE  MEDICATIONS TO TAKE THE MORNING OF SURGERY ONLY IF NEEDED: NONE  HOLD these prescription medications BEFORE Surgery: NONE  Ask your surgeon/prescribing physician about when/if to STOP taking these medications: NONE  Stop all vitamins, herbal medicines and Aspirin containing products 7 days prior to surgery.  Stop any non-steroidal anti-inflammatory drugs (i.e. Ibuprofen, Naproxen, Advil, Aleve) 3 days before surgery. You may take Tylenol. If you are currently taking Plavix, Coumadin, or any other blood-thinning/anticoagulant medication contact your prescribing physician for instructions. Preventing Infections Before and After - Your Surgery    IMPORTANT INSTRUCTIONS      You play an important role in your health and preparation for surgery. To reduce the germs on your skin you will need to shower with CHG soap (Chorhexidine gluconate 4%) two times before surgery. CHG soap (Hibiclens, Hex-A-Clens or store brand)  CHG soap will be provided at your Preadmission Testing (PAT) appointment. If you do not have a PAT appointment before surgery, you may arrange to  CHG soap from our office or purchase CHG soap at a pharmacy, grocery or department store. You need to purchase TWO 4 ounce bottles to use for your 2 showers. Steps to follow:  Charo Lewisuel your hair with your normal shampoo and your body with regular soap and rinse well to remove shampoo and soap from your skin. Wet a clean washcloth and turn off the shower. Put CHG soap on washcloth and apply to your entire body from the neck down. Do not use on your head, face or private parts(genitals). Do not use CHG soap on open sores, wounds or areas of skin irritation. Wash you body gently for 5 minutes. Do not wash your skin too hard. This soap does not create lather. Pay special attention to your underarms and from your belly button to your feet. Turn the shower back on and rinse well to get CHG soap off your body. Pat your skin dry with a clean, dry towel. Do not apply lotions or moisturizer. Put on clean clothes and sleep on fresh bed sheets and do not allow pets to sleep with you.     Shower with CHG soap 2 times before your surgery  The evening before your surgery  The morning of your surgery      Tips to help prevent infections after your surgery:  Protect your surgical wound from germs:  Hand washing is the most important thing you and your caregivers can do to prevent infections. Keep your bandage clean and dry! Do not touch your surgical wound. Use clean, freshly washed towels and washcloths every time you shower; do not share bath linens with others. Until your surgical wound is healed, wear clothing and sleep on bed linens each day that are clean and freshly washed. Do not allow pets to sleep in your bed with you or touch your surgical wound. Do not smoke - smoking delays wound healing. This may be a good time to stop smoking. If you have diabetes, it is important for you to manage your blood sugar levels properly before your surgery as well as after your surgery. Poorly managed blood sugar levels slow down wound healing and prevent you from healing completely. Patient Information Regarding COVID Restrictions    Day of Procedure    Please park in the parking deck or any designated visitor parking lot. Enter the facility through the Main Entrance of the hospital.  On the day of surgery, please provide the cell phone number of the person who will be waiting for you to the Patient Access representative at the time of registration. Masks are highly recommended in the hospital, but not required. Once your procedure and the immediate recovery period is completed, a nurse in the recovery area will contact your designated visitor to inform them of your room number or to otherwise review other pertinent information regarding your care. Social distancing practices are strongly encouraged in waiting areas and the cafeteria. The patient was contacted  in person. She verbalized understanding of all instructions does not  need reinforcement.

## 2022-11-10 ENCOUNTER — OFFICE VISIT (OUTPATIENT)
Dept: INTERNAL MEDICINE CLINIC | Age: 37
End: 2022-11-10
Payer: COMMERCIAL

## 2022-11-10 VITALS
TEMPERATURE: 97.8 F | OXYGEN SATURATION: 98 % | HEART RATE: 75 BPM | BODY MASS INDEX: 22.66 KG/M2 | WEIGHT: 136 LBS | HEIGHT: 65 IN | DIASTOLIC BLOOD PRESSURE: 70 MMHG | SYSTOLIC BLOOD PRESSURE: 106 MMHG | RESPIRATION RATE: 16 BRPM

## 2022-11-10 DIAGNOSIS — Z90.13 STATUS POST BILATERAL MASTECTOMY: ICD-10-CM

## 2022-11-10 DIAGNOSIS — F41.9 ANXIETY: ICD-10-CM

## 2022-11-10 DIAGNOSIS — Z23 NEEDS FLU SHOT: ICD-10-CM

## 2022-11-10 DIAGNOSIS — Z17.0 MALIGNANT NEOPLASM OF LEFT BREAST IN FEMALE, ESTROGEN RECEPTOR POSITIVE, UNSPECIFIED SITE OF BREAST (HCC): Primary | ICD-10-CM

## 2022-11-10 DIAGNOSIS — C50.912 MALIGNANT NEOPLASM OF LEFT BREAST IN FEMALE, ESTROGEN RECEPTOR POSITIVE, UNSPECIFIED SITE OF BREAST (HCC): Primary | ICD-10-CM

## 2022-11-10 PROCEDURE — 90471 IMMUNIZATION ADMIN: CPT | Performed by: INTERNAL MEDICINE

## 2022-11-10 PROCEDURE — 99214 OFFICE O/P EST MOD 30 MIN: CPT | Performed by: INTERNAL MEDICINE

## 2022-11-10 PROCEDURE — 90686 IIV4 VACC NO PRSV 0.5 ML IM: CPT | Performed by: INTERNAL MEDICINE

## 2022-11-10 RX ORDER — ESCITALOPRAM OXALATE 5 MG/1
5 TABLET ORAL DAILY
Qty: 30 TABLET | Refills: 3 | Status: SHIPPED | OUTPATIENT
Start: 2022-11-10

## 2022-11-16 ENCOUNTER — APPOINTMENT (OUTPATIENT)
Dept: INFUSION THERAPY | Age: 37
End: 2022-11-16

## 2022-11-16 DIAGNOSIS — Z17.0 MALIGNANT NEOPLASM OF LEFT BREAST IN FEMALE, ESTROGEN RECEPTOR POSITIVE, UNSPECIFIED SITE OF BREAST (HCC): Primary | ICD-10-CM

## 2022-11-16 DIAGNOSIS — C50.912 MALIGNANT NEOPLASM OF LEFT BREAST IN FEMALE, ESTROGEN RECEPTOR POSITIVE, UNSPECIFIED SITE OF BREAST (HCC): Primary | ICD-10-CM

## 2022-11-16 RX ORDER — DOXORUBICIN HYDROCHLORIDE 2 MG/ML
30 INJECTION, SOLUTION INTRAVENOUS ONCE
Status: CANCELLED
Start: 2022-11-22 | End: 2022-11-22

## 2022-11-16 RX ORDER — SODIUM CHLORIDE 9 MG/ML
5-250 INJECTION, SOLUTION INTRAVENOUS AS NEEDED
Status: CANCELLED | OUTPATIENT
Start: 2022-11-22

## 2022-11-16 RX ORDER — DIPHENHYDRAMINE HYDROCHLORIDE 50 MG/ML
50 INJECTION, SOLUTION INTRAMUSCULAR; INTRAVENOUS AS NEEDED
Status: CANCELLED
Start: 2022-11-22

## 2022-11-16 RX ORDER — ONDANSETRON 8 MG/1
8 TABLET, ORALLY DISINTEGRATING ORAL
Qty: 30 TABLET | Refills: 3 | Status: SHIPPED | OUTPATIENT
Start: 2022-11-16

## 2022-11-16 RX ORDER — EPINEPHRINE 1 MG/ML
0.3 INJECTION, SOLUTION, CONCENTRATE INTRAVENOUS AS NEEDED
Status: CANCELLED | OUTPATIENT
Start: 2022-11-22

## 2022-11-16 RX ORDER — DIPHENHYDRAMINE HYDROCHLORIDE 50 MG/ML
25 INJECTION, SOLUTION INTRAMUSCULAR; INTRAVENOUS AS NEEDED
Status: CANCELLED
Start: 2022-11-22

## 2022-11-16 RX ORDER — ONDANSETRON 2 MG/ML
8 INJECTION INTRAMUSCULAR; INTRAVENOUS AS NEEDED
Status: CANCELLED | OUTPATIENT
Start: 2022-11-22

## 2022-11-16 RX ORDER — DEXAMETHASONE SODIUM PHOSPHATE 100 MG/10ML
10 INJECTION INTRAMUSCULAR; INTRAVENOUS ONCE
Status: CANCELLED | OUTPATIENT
Start: 2022-11-22 | End: 2022-11-22

## 2022-11-16 RX ORDER — ALBUTEROL SULFATE 0.83 MG/ML
2.5 SOLUTION RESPIRATORY (INHALATION) AS NEEDED
Status: CANCELLED
Start: 2022-11-22

## 2022-11-16 RX ORDER — HYDROCORTISONE SODIUM SUCCINATE 100 MG/2ML
100 INJECTION, POWDER, FOR SOLUTION INTRAMUSCULAR; INTRAVENOUS AS NEEDED
Status: CANCELLED | OUTPATIENT
Start: 2022-11-22

## 2022-11-16 RX ORDER — ACETAMINOPHEN 325 MG/1
650 TABLET ORAL AS NEEDED
Status: CANCELLED
Start: 2022-11-22

## 2022-11-16 RX ORDER — SODIUM CHLORIDE 0.9 % (FLUSH) 0.9 %
5-40 SYRINGE (ML) INJECTION AS NEEDED
Status: CANCELLED | OUTPATIENT
Start: 2022-11-22

## 2022-11-16 RX ORDER — HEPARIN 100 UNIT/ML
500 SYRINGE INTRAVENOUS AS NEEDED
Status: CANCELLED
Start: 2022-11-22

## 2022-11-16 RX ORDER — SODIUM CHLORIDE 9 MG/ML
5-40 INJECTION INTRAMUSCULAR; INTRAVENOUS; SUBCUTANEOUS AS NEEDED
Status: CANCELLED | OUTPATIENT
Start: 2022-11-22

## 2022-11-16 RX ORDER — PALONOSETRON 0.05 MG/ML
0.25 INJECTION, SOLUTION INTRAVENOUS ONCE
Status: CANCELLED | OUTPATIENT
Start: 2022-11-22 | End: 2022-11-22

## 2022-11-17 ENCOUNTER — ANESTHESIA EVENT (OUTPATIENT)
Dept: MEDSURG UNIT | Age: 37
End: 2022-11-17
Payer: COMMERCIAL

## 2022-11-18 ENCOUNTER — HOSPITAL ENCOUNTER (OUTPATIENT)
Age: 37
Setting detail: OUTPATIENT SURGERY
Discharge: HOME | End: 2022-11-18
Attending: SURGERY | Admitting: SURGERY
Payer: COMMERCIAL

## 2022-11-18 ENCOUNTER — ANESTHESIA (OUTPATIENT)
Dept: MEDSURG UNIT | Age: 37
End: 2022-11-18
Payer: COMMERCIAL

## 2022-11-18 ENCOUNTER — APPOINTMENT (OUTPATIENT)
Dept: GENERAL RADIOLOGY | Age: 37
End: 2022-11-18
Attending: SURGERY
Payer: COMMERCIAL

## 2022-11-18 ENCOUNTER — TELEPHONE (OUTPATIENT)
Dept: ONCOLOGY | Age: 37
End: 2022-11-18

## 2022-11-18 VITALS
WEIGHT: 134.48 LBS | TEMPERATURE: 97.7 F | RESPIRATION RATE: 13 BRPM | HEART RATE: 104 BPM | OXYGEN SATURATION: 97 % | DIASTOLIC BLOOD PRESSURE: 78 MMHG | BODY MASS INDEX: 22.41 KG/M2 | HEIGHT: 65 IN | SYSTOLIC BLOOD PRESSURE: 114 MMHG

## 2022-11-18 DIAGNOSIS — G89.18 PAIN FOLLOWING SURGERY OR PROCEDURE: ICD-10-CM

## 2022-11-18 DIAGNOSIS — C50.912 MALIGNANT NEOPLASM OF LEFT BREAST IN FEMALE, ESTROGEN RECEPTOR POSITIVE, UNSPECIFIED SITE OF BREAST (HCC): Primary | ICD-10-CM

## 2022-11-18 DIAGNOSIS — Z17.0 MALIGNANT NEOPLASM OF LEFT BREAST IN FEMALE, ESTROGEN RECEPTOR POSITIVE, UNSPECIFIED SITE OF BREAST (HCC): Primary | ICD-10-CM

## 2022-11-18 LAB — HCG UR QL: NEGATIVE

## 2022-11-18 PROCEDURE — 74011000250 HC RX REV CODE- 250: Performed by: ANESTHESIOLOGY

## 2022-11-18 PROCEDURE — 74011250636 HC RX REV CODE- 250/636: Performed by: ANESTHESIOLOGY

## 2022-11-18 PROCEDURE — 74011250636 HC RX REV CODE- 250/636: Performed by: SURGERY

## 2022-11-18 PROCEDURE — 76210000037 HC AMBSU PH I REC 2 TO 2.5 HR: Performed by: SURGERY

## 2022-11-18 PROCEDURE — 74011250636 HC RX REV CODE- 250/636: Performed by: NURSE ANESTHETIST, CERTIFIED REGISTERED

## 2022-11-18 PROCEDURE — 81025 URINE PREGNANCY TEST: CPT

## 2022-11-18 PROCEDURE — 77001 FLUOROGUIDE FOR VEIN DEVICE: CPT | Performed by: SURGERY

## 2022-11-18 PROCEDURE — 74011000250 HC RX REV CODE- 250: Performed by: NURSE ANESTHETIST, CERTIFIED REGISTERED

## 2022-11-18 PROCEDURE — 77030010507 HC ADH SKN DERMBND J&J -B: Performed by: SURGERY

## 2022-11-18 PROCEDURE — C1788 PORT, INDWELLING, IMP: HCPCS | Performed by: SURGERY

## 2022-11-18 PROCEDURE — 74011000250 HC RX REV CODE- 250: Performed by: SURGERY

## 2022-11-18 PROCEDURE — 77030011267 HC ELECTRD BLD COVD -A: Performed by: SURGERY

## 2022-11-18 PROCEDURE — 76030000000 HC AMB SURG OR TIME 0.5 TO 1: Performed by: SURGERY

## 2022-11-18 PROCEDURE — 77030002986 HC SUT PROL J&J -A: Performed by: SURGERY

## 2022-11-18 PROCEDURE — 71045 X-RAY EXAM CHEST 1 VIEW: CPT

## 2022-11-18 PROCEDURE — 36561 INSERT TUNNELED CV CATH: CPT | Performed by: SURGERY

## 2022-11-18 PROCEDURE — 77030031139 HC SUT VCRL2 J&J -A: Performed by: SURGERY

## 2022-11-18 PROCEDURE — 77030002933 HC SUT MCRYL J&J -A: Performed by: SURGERY

## 2022-11-18 PROCEDURE — 76060000061 HC AMB SURG ANES 0.5 TO 1 HR: Performed by: SURGERY

## 2022-11-18 PROCEDURE — 77030040922 HC BLNKT HYPOTHRM STRY -A

## 2022-11-18 PROCEDURE — 2709999900 HC NON-CHARGEABLE SUPPLY: Performed by: SURGERY

## 2022-11-18 PROCEDURE — 74011250637 HC RX REV CODE- 250/637: Performed by: ANESTHESIOLOGY

## 2022-11-18 DEVICE — POWERPORT ISP IMPLANTABLE PORT WITH ATTACHABLE 8F CHRONOFLEX OPEN-ENDED SINGLE-LUMEN VENOUS CATHETER INTERMEDIATE KIT (WITH SUTURE PLUGS)
Type: IMPLANTABLE DEVICE | Site: CHEST  WALL | Status: FUNCTIONAL
Brand: POWERPORT, CHRONOFLEX

## 2022-11-18 DEVICE — POWERPORT ISP IMPLANTABLE PORT WITH ATTACHABLE 8F CHRONOFLEX OPEN-ENDED SINGLE-LUMEN VENOUS CATHETER INTERMEDIATE KIT (WITH SUTURE PLUGS)
Type: IMPLANTABLE DEVICE | Site: CHEST  WALL | Status: NON-FUNCTIONAL
Brand: POWERPORT, CHRONOFLEX
Removed: 2023-03-07

## 2022-11-18 RX ORDER — ONDANSETRON 2 MG/ML
INJECTION INTRAMUSCULAR; INTRAVENOUS AS NEEDED
Status: DISCONTINUED | OUTPATIENT
Start: 2022-11-18 | End: 2022-11-18 | Stop reason: HOSPADM

## 2022-11-18 RX ORDER — ESMOLOL HYDROCHLORIDE 10 MG/ML
INJECTION INTRAVENOUS AS NEEDED
Status: DISCONTINUED | OUTPATIENT
Start: 2022-11-18 | End: 2022-11-18 | Stop reason: HOSPADM

## 2022-11-18 RX ORDER — MIDAZOLAM HYDROCHLORIDE 1 MG/ML
1 INJECTION, SOLUTION INTRAMUSCULAR; INTRAVENOUS AS NEEDED
Status: DISCONTINUED | OUTPATIENT
Start: 2022-11-18 | End: 2022-11-18 | Stop reason: HOSPADM

## 2022-11-18 RX ORDER — FENTANYL CITRATE 50 UG/ML
INJECTION, SOLUTION INTRAMUSCULAR; INTRAVENOUS AS NEEDED
Status: DISCONTINUED | OUTPATIENT
Start: 2022-11-18 | End: 2022-11-18 | Stop reason: HOSPADM

## 2022-11-18 RX ORDER — OXYCODONE HYDROCHLORIDE 5 MG/1
5 TABLET ORAL AS NEEDED
Status: DISCONTINUED | OUTPATIENT
Start: 2022-11-18 | End: 2022-11-18 | Stop reason: HOSPADM

## 2022-11-18 RX ORDER — NORETHINDRONE AND ETHINYL ESTRADIOL 0.5-0.035
10 KIT ORAL ONCE
Status: COMPLETED | OUTPATIENT
Start: 2022-11-18 | End: 2022-11-18

## 2022-11-18 RX ORDER — HYDROMORPHONE HYDROCHLORIDE 1 MG/ML
0.2 INJECTION, SOLUTION INTRAMUSCULAR; INTRAVENOUS; SUBCUTANEOUS
Status: DISCONTINUED | OUTPATIENT
Start: 2022-11-18 | End: 2022-11-18 | Stop reason: HOSPADM

## 2022-11-18 RX ORDER — SODIUM CHLORIDE 0.9 % (FLUSH) 0.9 %
5-40 SYRINGE (ML) INJECTION AS NEEDED
Status: DISCONTINUED | OUTPATIENT
Start: 2022-11-18 | End: 2022-11-18 | Stop reason: HOSPADM

## 2022-11-18 RX ORDER — FENTANYL CITRATE 50 UG/ML
50 INJECTION, SOLUTION INTRAMUSCULAR; INTRAVENOUS AS NEEDED
Status: DISCONTINUED | OUTPATIENT
Start: 2022-11-18 | End: 2022-11-18 | Stop reason: HOSPADM

## 2022-11-18 RX ORDER — DIPHENHYDRAMINE HYDROCHLORIDE 50 MG/ML
12.5 INJECTION, SOLUTION INTRAMUSCULAR; INTRAVENOUS AS NEEDED
Status: DISCONTINUED | OUTPATIENT
Start: 2022-11-18 | End: 2022-11-18 | Stop reason: HOSPADM

## 2022-11-18 RX ORDER — FENTANYL CITRATE 50 UG/ML
25 INJECTION, SOLUTION INTRAMUSCULAR; INTRAVENOUS
Status: DISCONTINUED | OUTPATIENT
Start: 2022-11-18 | End: 2022-11-18 | Stop reason: HOSPADM

## 2022-11-18 RX ORDER — ROPIVACAINE HYDROCHLORIDE 5 MG/ML
30 INJECTION, SOLUTION EPIDURAL; INFILTRATION; PERINEURAL AS NEEDED
Status: DISCONTINUED | OUTPATIENT
Start: 2022-11-18 | End: 2022-11-18 | Stop reason: HOSPADM

## 2022-11-18 RX ORDER — ACETAMINOPHEN 325 MG/1
650 TABLET ORAL ONCE
Status: COMPLETED | OUTPATIENT
Start: 2022-11-18 | End: 2022-11-18

## 2022-11-18 RX ORDER — PROPOFOL 10 MG/ML
INJECTION, EMULSION INTRAVENOUS AS NEEDED
Status: DISCONTINUED | OUTPATIENT
Start: 2022-11-18 | End: 2022-11-18 | Stop reason: HOSPADM

## 2022-11-18 RX ORDER — HYDROMORPHONE HYDROCHLORIDE 2 MG/1
2 TABLET ORAL
Qty: 15 TABLET | Refills: 0 | Status: SHIPPED | OUTPATIENT
Start: 2022-11-18 | End: 2022-11-25

## 2022-11-18 RX ORDER — SODIUM CHLORIDE 0.9 % (FLUSH) 0.9 %
5-40 SYRINGE (ML) INJECTION EVERY 8 HOURS
Status: DISCONTINUED | OUTPATIENT
Start: 2022-11-18 | End: 2022-11-18 | Stop reason: HOSPADM

## 2022-11-18 RX ORDER — MIDAZOLAM HYDROCHLORIDE 1 MG/ML
0.5 INJECTION, SOLUTION INTRAMUSCULAR; INTRAVENOUS
Status: DISCONTINUED | OUTPATIENT
Start: 2022-11-18 | End: 2022-11-18 | Stop reason: HOSPADM

## 2022-11-18 RX ORDER — ONDANSETRON 2 MG/ML
4 INJECTION INTRAMUSCULAR; INTRAVENOUS AS NEEDED
Status: DISCONTINUED | OUTPATIENT
Start: 2022-11-18 | End: 2022-11-18 | Stop reason: HOSPADM

## 2022-11-18 RX ORDER — LIDOCAINE HYDROCHLORIDE 20 MG/ML
INJECTION, SOLUTION EPIDURAL; INFILTRATION; INTRACAUDAL; PERINEURAL AS NEEDED
Status: DISCONTINUED | OUTPATIENT
Start: 2022-11-18 | End: 2022-11-18 | Stop reason: HOSPADM

## 2022-11-18 RX ORDER — SODIUM CHLORIDE 9 MG/ML
25 INJECTION, SOLUTION INTRAVENOUS CONTINUOUS
Status: DISCONTINUED | OUTPATIENT
Start: 2022-11-18 | End: 2022-11-18 | Stop reason: HOSPADM

## 2022-11-18 RX ORDER — DEXAMETHASONE SODIUM PHOSPHATE 4 MG/ML
INJECTION, SOLUTION INTRA-ARTICULAR; INTRALESIONAL; INTRAMUSCULAR; INTRAVENOUS; SOFT TISSUE AS NEEDED
Status: DISCONTINUED | OUTPATIENT
Start: 2022-11-18 | End: 2022-11-18 | Stop reason: HOSPADM

## 2022-11-18 RX ORDER — MORPHINE SULFATE 2 MG/ML
2 INJECTION, SOLUTION INTRAMUSCULAR; INTRAVENOUS
Status: DISCONTINUED | OUTPATIENT
Start: 2022-11-18 | End: 2022-11-18 | Stop reason: HOSPADM

## 2022-11-18 RX ORDER — SODIUM CHLORIDE, SODIUM LACTATE, POTASSIUM CHLORIDE, CALCIUM CHLORIDE 600; 310; 30; 20 MG/100ML; MG/100ML; MG/100ML; MG/100ML
100 INJECTION, SOLUTION INTRAVENOUS CONTINUOUS
Status: DISCONTINUED | OUTPATIENT
Start: 2022-11-18 | End: 2022-11-18 | Stop reason: HOSPADM

## 2022-11-18 RX ORDER — MIDAZOLAM HYDROCHLORIDE 1 MG/ML
INJECTION, SOLUTION INTRAMUSCULAR; INTRAVENOUS AS NEEDED
Status: DISCONTINUED | OUTPATIENT
Start: 2022-11-18 | End: 2022-11-18 | Stop reason: HOSPADM

## 2022-11-18 RX ORDER — LIDOCAINE HYDROCHLORIDE 10 MG/ML
0.1 INJECTION, SOLUTION EPIDURAL; INFILTRATION; INTRACAUDAL; PERINEURAL AS NEEDED
Status: DISCONTINUED | OUTPATIENT
Start: 2022-11-18 | End: 2022-11-18 | Stop reason: HOSPADM

## 2022-11-18 RX ORDER — SODIUM CHLORIDE, SODIUM LACTATE, POTASSIUM CHLORIDE, CALCIUM CHLORIDE 600; 310; 30; 20 MG/100ML; MG/100ML; MG/100ML; MG/100ML
25 INJECTION, SOLUTION INTRAVENOUS CONTINUOUS
Status: DISCONTINUED | OUTPATIENT
Start: 2022-11-18 | End: 2022-11-18 | Stop reason: HOSPADM

## 2022-11-18 RX ORDER — PROPOFOL 10 MG/ML
INJECTION, EMULSION INTRAVENOUS
Status: DISCONTINUED | OUTPATIENT
Start: 2022-11-18 | End: 2022-11-18 | Stop reason: HOSPADM

## 2022-11-18 RX ORDER — HEPARIN 100 UNIT/ML
SYRINGE INTRAVENOUS AS NEEDED
Status: DISCONTINUED | OUTPATIENT
Start: 2022-11-18 | End: 2022-11-18 | Stop reason: HOSPADM

## 2022-11-18 RX ADMIN — ESMOLOL HYDROCHLORIDE 20 MG: 10 INJECTION, SOLUTION INTRAVENOUS at 08:03

## 2022-11-18 RX ADMIN — ONDANSETRON HYDROCHLORIDE 4 MG: 2 INJECTION, SOLUTION INTRAMUSCULAR; INTRAVENOUS at 07:37

## 2022-11-18 RX ADMIN — EPHEDRINE SULFATE 10 MG: 50 INJECTION INTRAVENOUS at 08:40

## 2022-11-18 RX ADMIN — WATER 2 G: 1 INJECTION INTRAMUSCULAR; INTRAVENOUS; SUBCUTANEOUS at 07:49

## 2022-11-18 RX ADMIN — FENTANYL CITRATE 25 MCG: 50 INJECTION, SOLUTION INTRAMUSCULAR; INTRAVENOUS at 07:27

## 2022-11-18 RX ADMIN — PROPOFOL 100 MCG/KG/MIN: 10 INJECTION, EMULSION INTRAVENOUS at 07:34

## 2022-11-18 RX ADMIN — MIDAZOLAM 2 MG: 1 INJECTION INTRAMUSCULAR; INTRAVENOUS at 07:27

## 2022-11-18 RX ADMIN — PROPOFOL 40 MG: 10 INJECTION, EMULSION INTRAVENOUS at 07:34

## 2022-11-18 RX ADMIN — SODIUM CHLORIDE, POTASSIUM CHLORIDE, SODIUM LACTATE AND CALCIUM CHLORIDE 25 ML/HR: 600; 310; 30; 20 INJECTION, SOLUTION INTRAVENOUS at 07:20

## 2022-11-18 RX ADMIN — LIDOCAINE HYDROCHLORIDE 50 MG: 20 INJECTION, SOLUTION EPIDURAL; INFILTRATION; INTRACAUDAL; PERINEURAL at 07:34

## 2022-11-18 RX ADMIN — ACETAMINOPHEN 650 MG: 325 TABLET ORAL at 07:19

## 2022-11-18 RX ADMIN — LIDOCAINE HYDROCHLORIDE 50 MG: 20 INJECTION, SOLUTION EPIDURAL; INFILTRATION; INTRACAUDAL; PERINEURAL at 08:02

## 2022-11-18 RX ADMIN — DEXAMETHASONE SODIUM PHOSPHATE 4 MG: 4 INJECTION, SOLUTION INTRAMUSCULAR; INTRAVENOUS at 07:37

## 2022-11-18 NOTE — OP NOTES
[facility][note 3]  OPERATIVE REPORT    Name:  Nivia Oliver  MR#:  [id number]  :  1985  ACCOUNT #:  [de-identified]  DATE OF SERVICE:  2022      PREOPERATIVE DIAGNOSIS:  Left breast cancer, need intravenous access for chemotherapy. POSTOPERATIVE DIAGNOSIS:  Left breast cancer, need intravenous access for chemotherapy. PROCEDURE PERFORMED:  Right subclavian port. SURGEON:  Sejal Buckley MD    ASSISTANT:  None. ANESTHESIA:  MAC.    COMPLICATIONS:  None. SPECIMENS REMOVED:  None. IMPLANTS:  8-[place] PowerPort. ESTIMATED BLOOD LOSS:  Minimal.    DRAINS:  No drains. FINDINGS:  Port in good position. INDICATIONS FOR PROCEDURE:  This is a 68-year-old female with status post mastectomy and sentinel node biopsy for left breast cancer and she needed a port for adjuvant therapy. PROCEDURE:  The patient was seen in the preoperative holding area where surgical site was marked by surgeon and informed consent was obtained. She was taken to the operating room and laid in supine position where MAC anesthesia was induced. She was placed in Trendelenburg position. 10 mL of local anesthetic was injected just inferior to the right clavicle. An 18-gauge introducer needle was used to access the right subclavian vein. This went very easily. Next, the wire was threaded through the needle and the needle was removed and the wire was going toward the SVC and right atrium on fluoro. Next, another 10 mL of local anesthetic was injected in the wire insertion site. A 15-blade was used make an incision at the wire. Bovie cautery was used to create a port pocket. The dilator sheath was threaded over the wire. The inner cannula and wire were removed and the catheter was clamped and threaded through the sheath and this was torn away and adjusted via fluoroscopy for the tip to be at the junction of SVC and right atrium. The catheter was clamped, cut and threaded onto the port.   The port was secured on either side with 3-0 Prolene. This aspirated and flushed well with injectable saline and final heparin solution. The incision was closed with interrupted 3-0 Vicryl and 4-0 subcuticular Monocryl. Skin glue was placed on the incision. All sponge, needle, and instrument counts were correct. The patient went to Recovery in stable condition.         MD DERIC Blankenship/[email or web address]  D:  11/18/2022 8:27  T:  11/18/2022 9:10  JOB #:  [id number]

## 2022-11-18 NOTE — TELEPHONE ENCOUNTER
Pharmacy Note- Chemotherapy Education    Claudette Ke is a  42 y. o.female  diagnosed with breast cancer contacted today for chemotherapy counseling. Ms. Linda Adrian is decided to change treatment  TC. Provided education on DOCEtaxel, cyclophosphamide and premedications - dexamethasone and palonosetron. Reviewed differences in side effects between previously discussed regimen and this regimen. Side effects of chemotherapy reviewed included s/s infection, anemia, appetite changes, thrombocytopenia, fatigue, hair loss/alopecia, bone pain, skin and nail changes, diarrhea/constipation, fluid retention/infusion reactions and peripheral neuropathy. Patient given ways to manage these side effects and when to contact office. Ms. Linda Adrian verbalized understanding of the information presented and all of the patient's questions were answered.     Santos Pineda, BoD, [facility], [address] [place] [address] in place: Yes  Recommendation Provided To: Patient/Caregiver: 1 via In person    Intervention Accepted By: Patient/Caregiver: 1  Time Spent (min): 20

## 2022-11-18 NOTE — ROUTINE PROCESS
Patient: Shana Nelson MRN: [id number]  SSN: [id number]   YOB: 1985  Age: 42 y.o. Sex: female     Patient is status post Procedure(s):  PORT A CATH PLACEMENT. Surgeon(s) and Role:     Rosy Greene MD - Primary    Local/Dose/Irrigation:  SEE MAR                  Peripheral IV 11/18/22 Anterior; Left Hand (Active)   Site Assessment Clean, dry, & intact 11/18/22 0721   Phlebitis Assessment 0 11/18/22 0721   Dressing Status Clean, dry, & intact 11/18/22 0721   Dressing Type Transparent;Tape 11/18/22 0721   Hub Color/Line Status Blue; Infusing 11/18/22 0721   Alcohol Cap Used Yes 11/18/22 0721                           Dressing/Packing:  Incision 11/18/22 Chest Right-Dressing/Treatment: Surgical glue (11/18/22 0801)    Splint/Cast:  ]    Other:

## 2022-11-18 NOTE — DISCHARGE INSTRUCTIONS
Discharge Instructions from Dr. Dawn Cordero may shower, but no hot tubs, swimming pools, or baths until your incision is healed. No heavy lifting with the affected extremity (nothing greater than 5 pounds), and limit its use for the next 4-5 days. You may use an ice pack for comfort for the next couple of days, but do not place ice directly on the skin. Rather, use a towel or clothing to serve as a barrier between skin and ice to prevent injury. If I placed a drain, follow the drain instructions provided, especially as you keep a record of the drain output. You will have bruising and swelling  Watch for signs of infection as listed below. Redness  Swelling  Drainage from the incision or from your nipple that appears infected  Fever over 101.5 degrees for consecutive readings, or over 99.5 if you are currently undergoing chemotherapy. Call our office (number is below) for a follow-up appointment.   If you have any problems, our phone number is [phone]

## 2022-11-18 NOTE — PERIOP NOTES
I have reviewed discharge instructions with the spouse- - Arnol Acosta. The spouse- deisy verbalized understanding. All questions addressed at this time. A paper copy of these instructions have been given to the patient to take home.

## 2022-11-18 NOTE — PERIOP NOTES
9300- Patient out of OR, monitors applied x 3, Patient in Trigeminy. Patient asymptomatic. Dr. Joao Santana to bedside. 4071- Ephedrine given per order from Dr. Jeana Winston. 1038- Continued to monitor patient ,patient remains asymptomatic, prior ekgs/holter data reviewed from chart. patient remains in trigeminy. Dr Gutierrez Webber aware. 7425-Glycopyrrolate given by Dr. Jeana Winston. 3107- Patient in NSR. Remains asymptomatic. Stable for d/c. Awaiting xray results prior to  d/c.

## 2022-11-18 NOTE — ANESTHESIA POSTPROCEDURE EVALUATION
Post-Anesthesia Evaluation and Assessment    Patient: Elba Killian MRN: [id number]  SSN: [id number]    YOB: 1985  Age: 42 y.o. Sex: female      I have evaluated the patient and they are stable and ready for discharge from the PACU. Cardiovascular Function/Vital Signs  Visit Vitals  /71   Pulse 61   Temp 36.5 °C (97.7 °F)   Resp 14   Ht 5' 5\" (1.651 m)   Wt 61 kg (134 lb 7.7 oz)   SpO2 100%   BMI 22.38 kg/m²       Patient is status post MAC anesthesia for Procedure(s):  PORT A CATH PLACEMENT. Nausea/Vomiting: None    Postoperative hydration reviewed and adequate. Pain:  Pain Scale 1: Numeric (0 - 10) (11/18/22 0917)  Pain Intensity 1: 0 (11/18/22 0917)   Managed    Neurological Status:   Neuro (WDL): Exceptions to WDL (11/18/22 0827)  Neuro  Neurologic State: Anesthetized (11/18/22 0827)   At baseline    Mental Status, Level of Consciousness: Alert and  oriented to person, place, and time    Pulmonary Status:   O2 Device: None (Room air) (11/18/22 0917)   Adequate oxygenation and airway patent    Complications related to anesthesia: None    Post-anesthesia assessment completed. Pt (with h/o PVCs on numerous prior evals) developed trigeminy EKG pattern in PACU. Treated with glycopyrrolate to increase HR successfully. Signed By: Sergio Zimmer MD     November 18, 2022              Procedure(s):  PORT A CATH PLACEMENT. MAC    <BSHSIANPOST>    INITIAL Post-op Vital signs:   Vitals Value Taken Time   /86 11/18/22 0930   Temp 36.5 °C (97.7 °F) 11/18/22 0826   Pulse 117 11/18/22 0937   Resp 16 11/18/22 0937   SpO2 100 % 11/18/22 0937   Vitals shown include unvalidated device data.

## 2022-11-18 NOTE — INTERVAL H&P NOTE
Update History & Physical    The Patient's History and Physical of November 11, 2022  Port placement was reviewed with the patient and I examined the patient. There was no change. The surgical site was confirmed by the patient and me. Plan:  The risk, benefits, expected outcome, and alternative to the recommended procedure have been discussed with the patient. Patient understands and wants to proceed with the procedure.     Electronically signed by Ashish Zarco MD on 11/18/2022 at 7:23 AM

## 2022-11-18 NOTE — BRIEF OP NOTE
Brief Postoperative Note    Patient: Shana Nelson  YOB: 1985  MRN: [id number]    Date of Procedure: 11/18/2022     Pre-Op Diagnosis: LEFT BREAST CANCER    Post-Op Diagnosis: Same as preoperative diagnosis. Procedure(s):  PORT A CATH PLACEMENT    Surgeon(s): Gerson Oliver MD    Surgical Assistant: None    Anesthesia: MAC     Estimated Blood Loss (mL): Minimal    Complications: None    Specimens: * No specimens in log *     Implants:   Implant Name Type Inv.  Item Serial No.  Lot No. LRB No. Used Action   PORT SL POWERPORT 8FR TI -- CONVERT TO ITEM [id number] - SNA  PORT SL POWERPORT 8FR TI -- CONVERT TO ITEM [id number] NA [employer]_WD [id number] 1 Implanted       Drains: * No LDAs found *    Findings: port in good position    Electronically Signed by Tavo Mcclain MD on 11/18/2022 at 8:23 AM  Dictated stat

## 2022-11-18 NOTE — ANESTHESIA PREPROCEDURE EVALUATION
Relevant Problems   PERSONAL HX & FAMILY HX OF CANCER   (+) Malignant neoplasm of left breast in female, estrogen receptor positive (HCC)       Anesthetic History   No history of anesthetic complications            Review of Systems / Medical History  Patient summary reviewed, nursing notes reviewed and pertinent labs reviewed    Pulmonary  Within defined limits                 Neuro/Psych         Psychiatric history     Cardiovascular  Within defined limits                Exercise tolerance: >4 METS     GI/Hepatic/Renal  Within defined limits              Endo/Other        Cancer     Other Findings              Physical Exam    Airway  Mallampati: II  TM Distance: > 6 cm  Neck ROM: normal range of motion   Mouth opening: Normal     Cardiovascular  Regular rate and rhythm,  S1 and S2 normal,  no murmur, click, rub, or gallop             Dental  No notable dental hx       Pulmonary  Breath sounds clear to auscultation               Abdominal  GI exam deferred       Other Findings            Anesthetic Plan    ASA: 2  Anesthesia type: MAC          Induction: Intravenous  Anesthetic plan and risks discussed with: Patient

## 2022-11-21 ENCOUNTER — DOCUMENTATION ONLY (OUTPATIENT)
Dept: GENETICS | Age: 37
End: 2022-11-21

## 2022-11-21 NOTE — PROGRESS NOTES
I have sent the below information to the billing company so that they can reach out to the pt if needed.

## 2022-11-21 NOTE — PROGRESS NOTES
[facility] [place] at [address] [place], [id number] [place] [address] Road, [facility]: [phone]  F: [phone]    Reason for Visit:   Marino Kolb is a 42 y.o. female who is seen for follow up of Left breast cancer . Treatment History:   9/27/2022-bilateral mastectomy with left sentinel node biopsy  11/22/22: Adjuvant TC     History of Present Illness:   Patient is a 49-year-old female with a history of anemia and fibrocystic breast disease seen for left breast cancer and DCIS. She had a mammogram in May 2022 that showed diffuse left breast calcifications as well as several masses in the right breast that were thought to be fibroadenomas. Initial biopsy of left breast calcifications in June 2022 was consistent with DCIS. She was referred to Dr. Marine Stephenson who obtained a breast MRI on 8/3/2022. This confirmed multicentric multifocal left breast DCIS in all quadrants . Probable left axillary kari metastases, bilobed mass at 2:00 in the right breast as well is linear enhancement in the subareolar right breast.  Right breast biopsy of the subareolar region was consistent with intraductal papilloma with focal atypical ductal hyperplasia, right breast 2 cm masslike lesion was consistent with fibroadenoma. Underwent a bilateral mastectomy with sentinel node biopsy on the left on 9/27/2022. Right breast showed intraductal papilloma and fibroadenoma. Left breast showed extensive high-grade DCIS with negative margins as well as a 3 mm focus of invasive carcinoma, grade 2, 1 positive node of 3. ER 90% MI 90% HER2/raad negative, Ki-67 20%. Mother's cancer diagnosed when she was 72, and had negative genetic testing. She comes today for cycle 1 of adjuvant TC. Has some soreness over port. Denies nausea/vomiting or diarrhea/constipation. No pain. No complaints. She comes with her  Zena Benito    She has 3 children. She has her own business.     She is premenopausal.      Past Medical History:   Diagnosis Date    Adverse effect of anesthesia 1999    Delayed awakening after anesthesia    Anemia     Anxiety     Breast cancer ([address] 2022    LEFT    HX OTHER MEDICAL 02/10/2011    fibroids    Ill-defined condition     PALPITATIONS, CLEARED BY CARDIOLOGIST    Postpartum depression     with 1st pregnancy    Unspecified breast disorder     fibrocystic breasts, followed at [facility]      Past Surgical History:   Procedure Laterality Date    HX ADENOIDECTOMY  1988    HX BILATERAL MASTECTOMY  09/27/2022    HX BREAST BIOPSY Left     06/2022 AND 08/2022 BIOPSIES    HX BREAST RECONSTRUCTION Bilateral 09/27/2022    . performed by Elidia Rodríguez MD at [facility]    HX One [place]    HX MASTECTOMY Bilateral 09/27/2022    BILATERAL SKIN SPARING MASTECTOMIES LEFT BREAST SENTINEL NODE BIOPSY  DR. Diogenes Parada - BILATERAL BREAST RECONSTRUCTION WITH TISSUE EXPANDERS AND ALLODERM performed by Robbie Mosquera MD at [address] [place] [address] History     Tobacco Use    Smoking status: Never    Smokeless tobacco: Never   Substance Use Topics    Alcohol use: No      Family History   Problem Relation Age of Onset    Hypertension Mother         caused by RA rx    Arthritis-rheumatoid Mother     OSTEOARTHRITIS Mother     No Known Problems Father     Other Sister         UTERINE FIBROIDS    Stroke Maternal Grandfather     Cancer Neg Hx     Heart Disease Neg Hx     Anesth Problems Neg Hx      Current Outpatient Medications   Medication Sig    HYDROmorphone (DILAUDID) 2 mg tablet Take 1 Tablet by mouth every four (4) hours as needed for Pain for up to 7 days. Max Daily Amount: 12 mg.    ondansetron (ZOFRAN ODT) 8 mg disintegrating tablet Take 1 Tablet by mouth every eight (8) hours as needed for Nausea or Vomiting.    escitalopram oxalate (LEXAPRO) 5 mg tablet Take 1 Tablet by mouth daily. fexofenadine (ALLEGRA) 60 mg tablet Take 60 mg by mouth nightly.      No current facility-administered medications for this visit. Facility-Administered Medications Ordered in Other Visits   Medication Dose Route Frequency    0.9% sodium chloride infusion  5-250 mL/hr IntraVENous PRN    DOCEtaxeL (TAXOTERE) 126 mg in 0.9% sodium chloride 250 mL, overfill volume 25 mL chemo infusion  75 mg/m2 (Treatment Plan Recorded) IntraVENous ONCE    cyclophosphamide (CYTOXAN) 1,008 mg in 0.9% sodium chloride 250 mL, overfill volume 25 mL chemo infusion  600 mg/m2 (Treatment Plan Recorded) IntraVENous ONCE    pegfilgrastim (NEULASTA) wearable SQ injector 6 mg  6 mg SubCUTAneous ONCE    sodium chloride (NS) flush 5-40 mL  5-40 mL IntraVENous PRN    0.9% sodium chloride injection 5-40 mL  5-40 mL IntraVENous PRN    0.9% sodium chloride infusion  5-250 mL/hr IntraVENous PRN    heparin (porcine) pf 500 Units  500 Units InterCATHeter PRN    alteplase (CATHFLO) 2 mg in sterile water (preservative free) 2 mL injection  2 mg InterCATHeter PRN    sodium chloride 0.9 % bolus infusion 500 mL  500 mL IntraVENous PRN    diphenhydrAMINE (BENADRYL) injection 25 mg  25 mg IntraVENous PRN    famotidine (PF) (PEPCID) 20 mg in 0.9% sodium chloride 10 mL injection  20 mg IntraVENous PRN    acetaminophen (TYLENOL) tablet 650 mg  650 mg Oral PRN    meperidine (DEMEROL) injection 25 mg  25 mg IntraVENous PRN    ondansetron (ZOFRAN) injection 8 mg  8 mg IntraVENous PRN      Allergies   Allergen Reactions    Blueberry Swelling    Sulfa (Sulfonamide Antibiotics) Anaphylaxis    Caffeine Palpitations    Crab Swelling    Pseudoephedrine Palpitations        Review of Systems: A complete review of systems was obtained, negative except as described above.     Physical Exam:   Visit Vitals  /88 (BP 1 Location: Left upper arm, BP Patient Position: Sitting)   Pulse (!) 102   Temp 97.5 °F (36.4 °C)   Resp 18   Wt 137 lb (62.1 kg)   LMP 10/26/2022 (Exact Date)   SpO2 98%   BMI 22.80 kg/m²     Visit Vitals  /88 (BP 1 Location: Left upper arm, BP Patient Position: Sitting)   Pulse (!) 102   Temp 97.5 °F (36.4 °C)   Resp 18   Wt 137 lb (62.1 kg)   SpO2 98%   BMI 22.80 kg/m²       ECOG PS: 0  General: No distress  Eyes: PERRL, anicteric sclerae  HENT: Atraumatic, PAC in place   Skin: No rashes, ecchymoses, or petechiae. Normal temperature, turgor, and texture. Psych: Alert, oriented, appropriate affect, normal judgment/insight    Results:     Lab Results   Component Value Date/Time    WBC 7.1 11/22/2022 07:43 AM    HGB 11.5 11/22/2022 07:43 AM    HCT 35.2 11/22/2022 07:43 AM    PLATELET 909 93/85/9617 07:43 AM    MCV 92.9 11/22/2022 07:43 AM    ABS. NEUTROPHILS 6.3 11/22/2022 07:43 AM    Hgb, External 10.9 06/30/2011 12:00 AM    Hct, External 33.5 06/30/2011 12:00 AM    Platelet cnt., External 337 02/10/2011 12:00 AM     Lab Results   Component Value Date/Time    Sodium 138 11/22/2022 07:43 AM    Potassium 3.4 (L) 11/22/2022 07:43 AM    Chloride 105 11/22/2022 07:43 AM    CO2 26 11/22/2022 07:43 AM    Glucose 133 (H) 11/22/2022 07:43 AM    BUN 14 11/22/2022 07:43 AM    Creatinine 0.64 11/22/2022 07:43 AM    GFR est AA >60 06/18/2021 12:02 PM    GFR est non-AA >60 06/18/2021 12:02 PM    Calcium 9.4 11/22/2022 07:43 AM     Lab Results   Component Value Date/Time    Bilirubin, total 0.5 11/22/2022 07:43 AM    ALT (SGPT) 19 11/22/2022 07:43 AM    Alk. phosphatase 47 11/22/2022 07:43 AM    Protein, total 7.4 11/22/2022 07:43 AM    Albumin 3.9 11/22/2022 07:43 AM    Globulin 3.5 11/22/2022 07:43 AM     Records reviewed and summarized above. Pathology report(s) reviewed above. 9/27/2022  INVASIVE CARCINOMA OF THE BREAST: Resection    SPECIMEN       Procedure: Total mastectomy       Specimen Laterality: Left    TUMOR       Histologic Type:  Invasive carcinoma of no special type (ductal)       Glandular (Acinar) / Tubular Differentiation: Score 2       Nuclear Pleomorphism: Score 2       Mitotic Rate: Score 2       Overall Grade: Grade 2 (scores of 6 or 7)       Tumor Size: 3 mm       Tumor Focality: Single focus of invasive carcinoma       Ductal Carcinoma In Situ (DCIS): Present           Ductal Carcinoma In Situ (DCIS): Positive for extensive   intraductal component (EIC)           Size (Extent) of DCIS: 90 mm           Architectural Patterns: Micropapillary, Cribriform, Papillary,   Comedo           Nuclear Grade: Grade III (high)           Necrosis: Present, central (expansive \"comedo\" necrosis)       Lobular Carcinoma In Situ (LCIS): Not identified    Tumor Extent       Skin: Present and uninvolved by invasive carcinoma       Nipple DCIS: DCIS does not involve the nipple epidermis       Lymphovascular Invasion: Not identified       Dermal Lymphovascular Invasion: Not identified       Microcalcifications: Present in DCIS, invasive carcinoma, and   non-neoplastic tissue       Treatment Effect in the Breast: No known presurgical therapy    MARGINS       Invasive Carcinoma Margins: Uninvolved by invasive carcinoma           Distance from Closest Margin (Millimeters): Greater than 2 mm       DCIS Margins: Uninvolved by DCIS           Distance from Closest Margin (Millimeters): Less than 1 mm           Closest Margin(s): Anterior, Posterior    LYMPH NODES       Regional Lymph Nodes: Involved by tumor cells           Number of Lymph Nodes with Macrometastases (> 2 mm): 1           Number of Lymph Nodes with Micrometastases (> 0.2 mm to 2 mm and/   or > 200 cells): 0           Size of Largest Metastatic Deposit (Millimeters): 11 mm           Extranodal Extension: Not identified           Total Number of Lymph Nodes Examined: 3           Number of Osgood Nodes Examined: 3    PATHOLOGIC STAGE CLASSIFICATION (pTNM, AJCC 8th Edition)       Primary Tumor (pT): pT1a       Regional Lymph Nodes Modifier: (sn): Osgood node(s) evaluated       Regional Lymph Nodes (pN): pN1a    ADDITIONAL FINDINGS       Additional Findings: Fibroadenomas          6.   Left breast anterior margin, excision: Ductal carcinoma in situ (DCIS), nuclear grade 3   DCIS is 3 mm from designated anterior margin       Radiology report(s) reviewed above. MRI 8/2022  IMPRESSION  1. Multicentric, multifocal, left DCIS involves all quadrants and largely  replaces the inferior breast. BI-RADS 6.  2. DCIS extends to the left nipple, with an enhancing mass in the nipple. 3. Probable left axillary kari metastasis. BI-RADS 4.  4. Linear enhancement in the subareolar right breast, likely DCIS. BI-RADS 4.  5. Bilobed mass at 2:00 in the right breast. BI-RADS 4A. 6. Overall assessment: BI-RADS Assessment Category 4: Suspicious abnormality. 7. Recommendations: Ultrasound-guided left axillary lymph node biopsy, MRI  guided right breast biopsy. CT 11/2022:  IMPRESSION  1. Post bilateral mastectomies. No evidence of metastatic disease    Assessment:   1) Left breast cancer    S/P Bilateral mastectomy and Left SLN on 9/27/2022   3 mm, 1/3 + nodes  Grade 2  Margins negative  9 CM are of DCIS    ER 90% PR10%HER2 raad negative  Ki 67 20%  Mamma print high risk Luminal B     [id number] Stage IA    See prior office note regarding discussion of treatment plan. I have recommended adjuvant chemotherapy. I discussed both dose dense AC followed by T or TC. She is aware that a non inferiority for TC is not proven though with a single positive node it is a reasonable choice     Today is cycle 1. We also reviewed the possibility of early menopause and loss of fertility.  We discussed contraception    2) R breast intraductal papilloma  S/p mastectomy  Post chemotherapy RT    3) FH of breast cancer  Ambry- VUS    4) Encounter for high risk medications like chemotherapy   Reviewed side effects, anti-emetics, dexamethasone  Labs pending     Plan:     Proceed today with cycle 1 of adjuvant TC (docetaxel 75mg/m2, cytoxan 600mg/m2) given once every 3 weeks x 4 cycles   Labs to include CBC with diff, CMP prior to infusions  Zofran / Compazine PRN  Dexamethasone day before & day after  Has zyprexa on hand if needed   Will discuss adjuvant endocrine therapy after radiation. This may involve ovarian function suppression plus AI given high risk disease  Neulasta given high risk for grade IV neutropenia     RTC in 3 weeks for cycle 2     I appreciate the opportunity to participate in Ms. Casanova care. I personally saw and evaluated the patient and performed the key components of medical decision making. The history, physical exam, and documentation were performed by Nancy Castañeda NP. I reviewed and verified the above documentation and modified it as needed.           Signed By: Elis Gupta MD

## 2022-11-22 ENCOUNTER — HOSPITAL ENCOUNTER (OUTPATIENT)
Dept: INFUSION THERAPY | Age: 37
Discharge: HOME | End: 2022-11-22
Payer: COMMERCIAL

## 2022-11-22 ENCOUNTER — OFFICE VISIT (OUTPATIENT)
Dept: ONCOLOGY | Age: 37
End: 2022-11-22

## 2022-11-22 ENCOUNTER — DOCUMENTATION ONLY (OUTPATIENT)
Dept: ONCOLOGY | Age: 37
End: 2022-11-22

## 2022-11-22 VITALS
HEIGHT: 65 IN | BODY MASS INDEX: 22.92 KG/M2 | WEIGHT: 137.6 LBS | HEART RATE: 75 BPM | TEMPERATURE: 97.5 F | OXYGEN SATURATION: 98 % | DIASTOLIC BLOOD PRESSURE: 65 MMHG | SYSTOLIC BLOOD PRESSURE: 133 MMHG

## 2022-11-22 VITALS
WEIGHT: 137 LBS | HEART RATE: 102 BPM | DIASTOLIC BLOOD PRESSURE: 88 MMHG | TEMPERATURE: 97.5 F | BODY MASS INDEX: 22.8 KG/M2 | SYSTOLIC BLOOD PRESSURE: 133 MMHG | RESPIRATION RATE: 18 BRPM | OXYGEN SATURATION: 98 %

## 2022-11-22 DIAGNOSIS — Z17.0 MALIGNANT NEOPLASM OF LEFT BREAST IN FEMALE, ESTROGEN RECEPTOR POSITIVE, UNSPECIFIED SITE OF BREAST (HCC): Primary | ICD-10-CM

## 2022-11-22 DIAGNOSIS — Z95.828 PORT-A-CATH IN PLACE: ICD-10-CM

## 2022-11-22 DIAGNOSIS — C50.912 MALIGNANT NEOPLASM OF LEFT BREAST IN FEMALE, ESTROGEN RECEPTOR POSITIVE, UNSPECIFIED SITE OF BREAST (HCC): Primary | ICD-10-CM

## 2022-11-22 DIAGNOSIS — Z51.11 ENCOUNTER FOR ANTINEOPLASTIC CHEMOTHERAPY: ICD-10-CM

## 2022-11-22 LAB
ALBUMIN SERPL-MCNC: 3.9 G/DL (ref 3.5–5)
ALBUMIN/GLOB SERPL: 1.1 {RATIO} (ref 1.1–2.2)
ALP SERPL-CCNC: 47 U/L (ref 45–117)
ALT SERPL-CCNC: 19 U/L (ref 12–78)
ANION GAP SERPL CALC-SCNC: 7 MMOL/L (ref 5–15)
AST SERPL-CCNC: 7 U/L (ref 15–37)
BASOPHILS # BLD: 0 K/UL (ref 0–0.1)
BASOPHILS NFR BLD: 0 % (ref 0–1)
BILIRUB SERPL-MCNC: 0.5 MG/DL (ref 0.2–1)
BUN SERPL-MCNC: 14 MG/DL (ref 6–20)
BUN/CREAT SERPL: 22 (ref 12–20)
CALCIUM SERPL-MCNC: 9.4 MG/DL (ref 8.5–10.1)
CHLORIDE SERPL-SCNC: 105 MMOL/L (ref 97–108)
CO2 SERPL-SCNC: 26 MMOL/L (ref 21–32)
CREAT SERPL-MCNC: 0.64 MG/DL (ref 0.55–1.02)
DIFFERENTIAL METHOD BLD: ABNORMAL
EOSINOPHIL # BLD: 0 K/UL (ref 0–0.4)
EOSINOPHIL NFR BLD: 0 % (ref 0–7)
ERYTHROCYTE [DISTWIDTH] IN BLOOD BY AUTOMATED COUNT: 12.1 % (ref 11.5–14.5)
GLOBULIN SER CALC-MCNC: 3.5 G/DL (ref 2–4)
GLUCOSE SERPL-MCNC: 133 MG/DL (ref 65–100)
HBV SURFACE AB SER QL: REACTIVE
HBV SURFACE AB SER-ACNC: 324.23 MIU/ML
HBV SURFACE AG SER QL: <0.1 INDEX
HBV SURFACE AG SER QL: NEGATIVE
HCT VFR BLD AUTO: 35.2 % (ref 35–47)
HGB BLD-MCNC: 11.5 G/DL (ref 11.5–16)
IMM GRANULOCYTES # BLD AUTO: 0.1 K/UL (ref 0–0.04)
IMM GRANULOCYTES NFR BLD AUTO: 1 % (ref 0–0.5)
LYMPHOCYTES # BLD: 0.6 K/UL (ref 0.8–3.5)
LYMPHOCYTES NFR BLD: 9 % (ref 12–49)
MCH RBC QN AUTO: 30.3 PG (ref 26–34)
MCHC RBC AUTO-ENTMCNC: 32.7 G/DL (ref 30–36.5)
MCV RBC AUTO: 92.9 FL (ref 80–99)
MONOCYTES # BLD: 0.1 K/UL (ref 0–1)
MONOCYTES NFR BLD: 2 % (ref 5–13)
NEUTS SEG # BLD: 6.3 K/UL (ref 1.8–8)
NEUTS SEG NFR BLD: 88 % (ref 32–75)
NRBC # BLD: 0 K/UL (ref 0–0.01)
NRBC BLD-RTO: 0 PER 100 WBC
PLATELET # BLD AUTO: 314 K/UL (ref 150–400)
PMV BLD AUTO: 11 FL (ref 8.9–12.9)
POTASSIUM SERPL-SCNC: 3.4 MMOL/L (ref 3.5–5.1)
PROT SERPL-MCNC: 7.4 G/DL (ref 6.4–8.2)
RBC # BLD AUTO: 3.79 M/UL (ref 3.8–5.2)
RBC MORPH BLD: ABNORMAL
SODIUM SERPL-SCNC: 138 MMOL/L (ref 136–145)
WBC # BLD AUTO: 7.1 K/UL (ref 3.6–11)

## 2022-11-22 PROCEDURE — 99215 OFFICE O/P EST HI 40 MIN: CPT | Performed by: INTERNAL MEDICINE

## 2022-11-22 PROCEDURE — 74011250636 HC RX REV CODE- 250/636: Performed by: INTERNAL MEDICINE

## 2022-11-22 PROCEDURE — 0663T HC SCALP COOL PLMT MNTR RMVL: CPT

## 2022-11-22 PROCEDURE — 86706 HEP B SURFACE ANTIBODY: CPT

## 2022-11-22 PROCEDURE — 96375 TX/PRO/DX INJ NEW DRUG ADDON: CPT

## 2022-11-22 PROCEDURE — 74011000250 HC RX REV CODE- 250: Performed by: INTERNAL MEDICINE

## 2022-11-22 PROCEDURE — 96413 CHEMO IV INFUSION 1 HR: CPT

## 2022-11-22 PROCEDURE — 77030012965 HC NDL HUBR BBMI -A

## 2022-11-22 PROCEDURE — 36415 COLL VENOUS BLD VENIPUNCTURE: CPT

## 2022-11-22 PROCEDURE — 96377 APPLICATON ON-BODY INJECTOR: CPT

## 2022-11-22 PROCEDURE — 85025 COMPLETE CBC W/AUTO DIFF WBC: CPT

## 2022-11-22 PROCEDURE — 80053 COMPREHEN METABOLIC PANEL: CPT

## 2022-11-22 PROCEDURE — 96417 CHEMO IV INFUS EACH ADDL SEQ: CPT

## 2022-11-22 PROCEDURE — 74011250637 HC RX REV CODE- 250/637: Performed by: INTERNAL MEDICINE

## 2022-11-22 PROCEDURE — 96361 HYDRATE IV INFUSION ADD-ON: CPT

## 2022-11-22 PROCEDURE — 86704 HEP B CORE ANTIBODY TOTAL: CPT

## 2022-11-22 PROCEDURE — 87340 HEPATITIS B SURFACE AG IA: CPT

## 2022-11-22 RX ORDER — PALONOSETRON 0.05 MG/ML
0.25 INJECTION, SOLUTION INTRAVENOUS ONCE
Status: COMPLETED | OUTPATIENT
Start: 2022-11-22 | End: 2022-11-22

## 2022-11-22 RX ORDER — ACETAMINOPHEN 325 MG/1
650 TABLET ORAL AS NEEDED
Status: ACTIVE | OUTPATIENT
Start: 2022-11-22 | End: 2022-11-22

## 2022-11-22 RX ORDER — SODIUM CHLORIDE 9 MG/ML
5-250 INJECTION, SOLUTION INTRAVENOUS AS NEEDED
Status: DISPENSED | OUTPATIENT
Start: 2022-11-22 | End: 2022-11-22

## 2022-11-22 RX ORDER — SODIUM CHLORIDE 0.9 % (FLUSH) 0.9 %
5-40 SYRINGE (ML) INJECTION AS NEEDED
Status: DISPENSED | OUTPATIENT
Start: 2022-11-22 | End: 2022-11-22

## 2022-11-22 RX ORDER — SODIUM CHLORIDE 9 MG/ML
5-40 INJECTION INTRAMUSCULAR; INTRAVENOUS; SUBCUTANEOUS AS NEEDED
Status: ACTIVE | OUTPATIENT
Start: 2022-11-22 | End: 2022-11-22

## 2022-11-22 RX ORDER — DEXAMETHASONE SODIUM PHOSPHATE 10 MG/ML
10 INJECTION INTRAMUSCULAR; INTRAVENOUS ONCE
Status: DISCONTINUED | OUTPATIENT
Start: 2022-11-22 | End: 2022-11-22

## 2022-11-22 RX ORDER — ONDANSETRON 2 MG/ML
8 INJECTION INTRAMUSCULAR; INTRAVENOUS AS NEEDED
Status: ACTIVE | OUTPATIENT
Start: 2022-11-22 | End: 2022-11-22

## 2022-11-22 RX ORDER — DEXAMETHASONE SODIUM PHOSPHATE 10 MG/ML
10 INJECTION INTRAMUSCULAR; INTRAVENOUS ONCE
Status: COMPLETED | OUTPATIENT
Start: 2022-11-22 | End: 2022-11-22

## 2022-11-22 RX ORDER — DIPHENHYDRAMINE HYDROCHLORIDE 50 MG/ML
25 INJECTION, SOLUTION INTRAMUSCULAR; INTRAVENOUS AS NEEDED
Status: DISPENSED | OUTPATIENT
Start: 2022-11-22 | End: 2022-11-22

## 2022-11-22 RX ORDER — POTASSIUM CHLORIDE 750 MG/1
40 TABLET, FILM COATED, EXTENDED RELEASE ORAL ONCE
Status: COMPLETED | OUTPATIENT
Start: 2022-11-22 | End: 2022-11-22

## 2022-11-22 RX ORDER — HEPARIN 100 UNIT/ML
500 SYRINGE INTRAVENOUS AS NEEDED
Status: DISPENSED | OUTPATIENT
Start: 2022-11-22 | End: 2022-11-22

## 2022-11-22 RX ADMIN — SODIUM CHLORIDE, PRESERVATIVE FREE 10 ML: 5 INJECTION INTRAVENOUS at 15:30

## 2022-11-22 RX ADMIN — HEPARIN 500 UNITS: 100 SYRINGE at 15:30

## 2022-11-22 RX ADMIN — SODIUM CHLORIDE 250 ML/HR: 9 INJECTION, SOLUTION INTRAVENOUS at 11:37

## 2022-11-22 RX ADMIN — DIPHENHYDRAMINE HYDROCHLORIDE 25 MG: 50 INJECTION, SOLUTION INTRAMUSCULAR; INTRAVENOUS at 11:41

## 2022-11-22 RX ADMIN — SODIUM CHLORIDE, PRESERVATIVE FREE 10 ML: 5 INJECTION INTRAVENOUS at 10:01

## 2022-11-22 RX ADMIN — CYCLOPHOSPHAMIDE 1008 MG: 1 INJECTION INTRAVENOUS at 13:17

## 2022-11-22 RX ADMIN — DOCETAXEL ANHYDROUS 126 MG: 10 INJECTION, SOLUTION INTRAVENOUS at 11:31

## 2022-11-22 RX ADMIN — DEXAMETHASONE SODIUM PHOSPHATE 10 MG: 10 INJECTION INTRAMUSCULAR; INTRAVENOUS at 10:48

## 2022-11-22 RX ADMIN — POTASSIUM CHLORIDE 40 MEQ: 750 TABLET, FILM COATED, EXTENDED RELEASE ORAL at 10:03

## 2022-11-22 RX ADMIN — SODIUM CHLORIDE 500 ML: 900 INJECTION, SOLUTION INTRAVENOUS at 10:03

## 2022-11-22 RX ADMIN — FAMOTIDINE 20 MG: 10 INJECTION, SOLUTION INTRAVENOUS at 11:41

## 2022-11-22 RX ADMIN — PEGFILGRASTIM 6 MG: KIT SUBCUTANEOUS at 14:20

## 2022-11-22 RX ADMIN — PALONOSETRON 0.25 MG: 0.05 INJECTION, SOLUTION INTRAVENOUS at 10:42

## 2022-11-22 NOTE — PROGRESS NOTES
Kathy Santiago is a 42 y.o. female    Chief Complaint   Patient presents with    Follow-up     Left breast cancer        1. Have you been to the ER, urgent care clinic since your last visit? Hospitalized since your last visit? No    2. Have you seen or consulted any other health care providers outside of the [address] [place] [address] since your last visit? Include any pap smears or colon screening.  Yes, [facility]

## 2022-11-22 NOTE — PROGRESS NOTES
[facility] Chemo Progress Note    Date: November 22, 2022        0742: Ms. Joseph Cruz Arrived to [facility] for  C1 TC (cold cap) ambulatory in stable condition. Assessment was completed and port accessed by [employer], RN. Labs drawn and sent for processing. Went to provider appointment with Medical Oncology. Ms. Ysabel Mario vitals were reviewed. Patient Vitals for the past 12 hrs:   Temp Pulse BP SpO2   11/22/22 1400 -- 75 133/65 --   11/22/22 1144 -- 62 (!) 114/53 --   11/22/22 1140 -- -- (!) 111/51 --   11/22/22 1137 -- 70 (!) 107/52 98 %   11/22/22 0745 97.5 °F (36.4 °C) (!) 102 133/88 --         Lab results were obtained and reviewed. Recent Results (from the past 12 hour(s))   CBC WITH AUTOMATED DIFF    Collection Time: 11/22/22  7:43 AM   Result Value Ref Range    WBC 7.1 3.6 - 11.0 K/uL    RBC 3.79 (L) 3.80 - 5.20 M/uL    HGB 11.5 11.5 - 16.0 g/dL    HCT 35.2 35.0 - 47.0 %    MCV 92.9 80.0 - 99.0 FL    MCH 30.3 26.0 - 34.0 PG    MCHC 32.7 30.0 - 36.5 g/dL    RDW 12.1 11.5 - 14.5 %    PLATELET 889 625 - 698 K/uL    MPV 11.0 8.9 - 12.9 FL    NRBC 0.0 0  WBC    ABSOLUTE NRBC 0.00 0.00 - 0.01 K/uL    NEUTROPHILS 88 (H) 32 - 75 %    LYMPHOCYTES 9 (L) 12 - 49 %    MONOCYTES 2 (L) 5 - 13 %    EOSINOPHILS 0 0 - 7 %    BASOPHILS 0 0 - 1 %    IMMATURE GRANULOCYTES 1 (H) 0.0 - 0.5 %    ABS. NEUTROPHILS 6.3 1.8 - 8.0 K/UL    ABS. LYMPHOCYTES 0.6 (L) 0.8 - 3.5 K/UL    ABS. MONOCYTES 0.1 0.0 - 1.0 K/UL    ABS. EOSINOPHILS 0.0 0.0 - 0.4 K/UL    ABS. BASOPHILS 0.0 0.0 - 0.1 K/UL    ABS. IMM.  GRANS. 0.1 (H) 0.00 - 0.04 K/UL    DF SMEAR SCANNED      RBC COMMENTS OVALOCYTES  PRESENT       METABOLIC PANEL, COMPREHENSIVE    Collection Time: 11/22/22  7:43 AM   Result Value Ref Range    Sodium 138 136 - 145 mmol/L    Potassium 3.4 (L) 3.5 - 5.1 mmol/L    Chloride 105 97 - 108 mmol/L    CO2 26 21 - 32 mmol/L    Anion gap 7 5 - 15 mmol/L    Glucose 133 (H) 65 - 100 mg/dL    BUN 14 6 - 20 MG/DL    Creatinine 0.64 0.55 - 1.02 MG/DL BUN/Creatinine ratio 22 (H) 12 - 20      eGFR >60 >60 ml/min/1.73m2    Calcium 9.4 8.5 - 10.1 MG/DL    Bilirubin, total 0.5 0.2 - 1.0 MG/DL    ALT (SGPT) 19 12 - 78 U/L    AST (SGOT) 7 (L) 15 - 37 U/L    Alk. phosphatase 47 45 - 117 U/L    Protein, total 7.4 6.4 - 8.2 g/dL    Albumin 3.9 3.5 - 5.0 g/dL    Globulin 3.5 2.0 - 4.0 g/dL    A-G Ratio 1.1 1.1 - 2.2     HEP B SURFACE AG    Collection Time: 11/22/22  7:43 AM   Result Value Ref Range    Hepatitis B surface Ag <0.10 Index    Hep B surface Ag Interp. Negative NEG     HEP B SURFACE AB    Collection Time: 11/22/22  7:43 AM   Result Value Ref Range    Hepatitis B surface Ab 324.23 mIU/mL    Hep B surface Ab Interp. REACTIVE (A) NR           Pre-medications  were administered as ordered and chemotherapy was initiated.   Medications Administered       0.9% sodium chloride infusion       Admin Date  11/22/2022 Action  New Bag Dose  250 mL/hr Rate  250 mL/hr Route  IntraVENous Administered By  Wily Breen RN              cyclophosphamide (CYTOXAN) 1,008 mg in 0.9% sodium chloride 250 mL, overfill volume 25 mL chemo infusion       Admin Date  11/22/2022 Action  New Bag Dose  1,008 mg Rate  560.1 mL/hr Route  IntraVENous Administered By  Wily Breen RN              dexamethasone (PF) (DECADRON) 10 mg/mL injection 10 mg       Admin Date  11/22/2022 Action  Given Dose  10 mg Route  IntraVENous Administered By  Wily Breen RN              diphenhydrAMINE (BENADRYL) injection 25 mg       Admin Date  11/22/2022 Action  Given Dose  25 mg Route  IntraVENous Administered By  Apollo Charles RN              DOCEtaxeL (TAXOTERE) 126 mg in 0.9% sodium chloride 250 mL, overfill volume 25 mL chemo infusion       Admin Date  11/22/2022 Action  New Bag Dose  126 mg Rate  287.6 mL/hr Route  IntraVENous Administered By  Wily Breen RN               Admin Date  11/22/2022 Action  Restarted Dose   Rate  144 mL/hr Route  IntraVENous Administered By  Wily Breen RN Admin Date  11/22/2022 Action  Transfusion Rate Change Dose   Rate  287.6 mL/hr Route  IntraVENous Administered By  Minda Horner RN              famotidine (PF) (PEPCID) 20 mg in 0.9% sodium chloride 10 mL injection       Admin Date  11/22/2022 Action  Given Dose  20 mg Route  IntraVENous Administered By  Thelma Amador RN              heparin (porcine) pf 500 Units       Admin Date  11/22/2022 Action  Given Dose  500 Units Route  InterCATHeter Administered By  Minda Horner RN              palonosetron HCl (ALOXI) injection 0.25 mg       Admin Date  11/22/2022 Action  Given Dose  0.25 mg Route  IntraVENous Administered By  Minda Horner RN              pegfilgrastim (NEULASTA) wearable SQ injector 6 mg       Admin Date  11/22/2022 Action  Given Dose  6 mg Route  SubCUTAneous Administered By  Minda Horner RN              potassium chloride SR (KLOR-CON 10) tablet 40 mEq       Admin Date  11/22/2022 Action  Given Dose  40 mEq Route  Oral Administered By  Minda Horner RN              sodium chloride (NS) flush 5-40 mL       Admin Date  11/22/2022 Action  Given Dose  10 mL Route  IntraVENous Administered By  Minda Horner RN               Admin Date  11/22/2022 Action  Given Dose  10 mL Route  IntraVENous Administered By  Minda Horner RN              sodium chloride 0.9 % bolus infusion 500 mL       Admin Date  11/22/2022 Action  New Bag Dose  500 mL Route  IntraVENous Administered By  Minda Horner RN             1137-Pt 6 min into Docetaxel infusion with complaints of chest & back pain, and flushing. Infusion stopped; NS bolus started. 20 mg IV Pepcid and 25 mg IV benadryl given. Vitals stable. 1156-SX subsided Docetaxel restarted at 144 ml/hr for 30 min, pt tolerated well. Infusion increased to 288 ml/hr (max rate). Pt tolerated remaining infusion without difficulties.      Two nurses verified prior to administering: Drug name, Drug dose, Infusion volume or drug volume when prepared in a syringe, Rate of administration, Route of administration, Expiration dates and/or times, Appearance and physical integrity of the drugs, Rate set on infusion pump, when used, and Sequencing of drug administration. Education provided to patient about Neulasta On Body Injector including: side effects, how/when to remove the device, as well as what to do in the event of device malfunction. Opportunity for questions was provided and all questions were answered. Patient verbalized understanding. NOBI SC right arm      1535 Patient tolerated treatment well. Pt remained in [facility] 90 mins  after infusion for cold cap post cooling. Port maintained positive blood return throughout treatment. Port flushed, heparinized and de accessed per protocol. Patient was discharged in stable condition. Patient is aware of next scheduled [facility] appointment on   Future Appointments   Date Time Provider Janice Le   12/1/2022 10:00 AM [address]   12/1/2022  1:00 PM [facility] [place] RM 1 [facility] H   12/15/2022  8:00 AM D1 MINI LONG [place] [facility] H   12/15/2022  8:15 AM Socorro Mckenzie  [address] BS AMB   1/5/2023  8:00 AM F1 MINI LONG [place] [facility] H   1/26/2023  8:00 AM C2 MINI LONG [address] RN, RN  November 22, 2022

## 2022-11-22 NOTE — PROGRESS NOTES
Problem: Chemotherapy Treatment  Goal: *Chemotherapy regimen followed  Outcome: Progressing Towards Goal  Goal: *Hemodynamically stable  Outcome: Progressing Towards Goal  Goal: *Tolerating diet  Outcome: Progressing Towards Goal     Problem: Neutropenia  Goal: *Verbalizes and demonstrates neutropenic precautions  Outcome: Progressing Towards Goal  Goal: *Verbalizes understanding and describes prescribed diet  Outcome: Progressing Towards Goal     Problem: Infection - Risk of, Central Venous Catheter-Associated Bloodstream Infection  Goal: *Absence of infection signs and symptoms  Outcome: Progressing Towards Goal     Problem: Knowledge Deficit  Goal: *Verbalizes understanding of procedures and medications  Outcome: Progressing Towards Goal not examined

## 2022-11-22 NOTE — PROGRESS NOTES
Pharmacy Note- Chemotherapy Education     Elaine Neely is a  42 y. o.female  diagnosed with breast cancer contacted today for Cycle 1, Day 1 chemotherapy counseling and consent. Ms. Tim Brower is decided to change treatment  TC. Provided education on DOCEtaxel, cyclophosphamide and premedications - dexamethasone and palonosetron. Provided and reviewed with Ms. Farfan handouts of supportive care regimen and Neulasta On Pro. Reviewed side effects of chemotherapy to include s/s infection, anemia, appetite changes, thrombocytopenia, fatigue, hair loss/alopecia, bone pain, skin and nail changes, diarrhea/constipation, fluid retention/infusion reactions and peripheral neuropathy. Patient given ways to manage these side effects and when to contact office. [address] Handout of medications provided to patient. Ms. Tim Brower verbalized understanding of the information presented and all of the patient's questions were answered.      Magnolia Coy, PharmD, [facility], [facility] [address] in place: No  Recommendation Provided To: Patient/Caregiver: 1 via In person    Intervention Accepted By: Patient/Caregiver: 1  Time Spent (min): 20

## 2022-11-23 LAB
HBV CORE AB SERPL QL IA: NEGATIVE
HBV CORE AB SERPL QL IA: NEGATIVE
HBV CORE IGM SERPL QL IA: NEGATIVE

## 2022-11-25 ENCOUNTER — DOCUMENTATION ONLY (OUTPATIENT)
Dept: GENETICS | Age: 37
End: 2022-11-25

## 2022-11-25 NOTE — PROGRESS NOTES
Per the billing company, see below         Pt: Justin Prateek    [de-identified]     [id number]         Per review, there is no outstanding balances for the patient at this time. Looks as though we are awaiting responses from the patients insurance.

## 2022-11-30 ENCOUNTER — APPOINTMENT (OUTPATIENT)
Dept: INFUSION THERAPY | Age: 37
End: 2022-11-30

## 2022-12-01 ENCOUNTER — HOSPITAL ENCOUNTER (OUTPATIENT)
Dept: NUCLEAR MEDICINE | Age: 37
Discharge: HOME | End: 2022-12-01
Attending: INTERNAL MEDICINE
Payer: COMMERCIAL

## 2022-12-01 ENCOUNTER — HOSPITAL ENCOUNTER (OUTPATIENT)
Dept: NUCLEAR MEDICINE | Age: 37
End: 2022-12-01
Attending: INTERNAL MEDICINE
Payer: COMMERCIAL

## 2022-12-01 DIAGNOSIS — Z17.0 MALIGNANT NEOPLASM OF LEFT BREAST IN FEMALE, ESTROGEN RECEPTOR POSITIVE, UNSPECIFIED SITE OF BREAST (HCC): ICD-10-CM

## 2022-12-01 DIAGNOSIS — C50.912 MALIGNANT NEOPLASM OF LEFT BREAST IN FEMALE, ESTROGEN RECEPTOR POSITIVE, UNSPECIFIED SITE OF BREAST (HCC): ICD-10-CM

## 2022-12-01 PROCEDURE — 78306 BONE IMAGING WHOLE BODY: CPT

## 2022-12-06 ENCOUNTER — APPOINTMENT (OUTPATIENT)
Dept: INFUSION THERAPY | Age: 37
End: 2022-12-06
Payer: COMMERCIAL

## 2022-12-08 ENCOUNTER — APPOINTMENT (OUTPATIENT)
Dept: INFUSION THERAPY | Age: 37
End: 2022-12-08
Payer: COMMERCIAL

## 2022-12-08 RX ORDER — HYDROCORTISONE SODIUM SUCCINATE 100 MG/2ML
100 INJECTION, POWDER, FOR SOLUTION INTRAMUSCULAR; INTRAVENOUS AS NEEDED
Status: CANCELLED | OUTPATIENT
Start: 2022-12-15

## 2022-12-08 RX ORDER — EPINEPHRINE 1 MG/ML
0.3 INJECTION, SOLUTION, CONCENTRATE INTRAVENOUS AS NEEDED
Status: CANCELLED | OUTPATIENT
Start: 2022-12-15

## 2022-12-08 RX ORDER — SODIUM CHLORIDE 9 MG/ML
5-40 INJECTION INTRAMUSCULAR; INTRAVENOUS; SUBCUTANEOUS AS NEEDED
Status: CANCELLED | OUTPATIENT
Start: 2022-12-15

## 2022-12-08 RX ORDER — DIPHENHYDRAMINE HYDROCHLORIDE 50 MG/ML
50 INJECTION, SOLUTION INTRAMUSCULAR; INTRAVENOUS AS NEEDED
Status: CANCELLED
Start: 2022-12-15

## 2022-12-08 RX ORDER — ALBUTEROL SULFATE 0.83 MG/ML
2.5 SOLUTION RESPIRATORY (INHALATION) AS NEEDED
Status: CANCELLED
Start: 2022-12-15

## 2022-12-08 RX ORDER — SODIUM CHLORIDE 9 MG/ML
5-250 INJECTION, SOLUTION INTRAVENOUS AS NEEDED
Status: CANCELLED | OUTPATIENT
Start: 2022-12-15

## 2022-12-13 ENCOUNTER — APPOINTMENT (OUTPATIENT)
Dept: INFUSION THERAPY | Age: 37
End: 2022-12-13
Payer: COMMERCIAL

## 2022-12-14 ENCOUNTER — APPOINTMENT (OUTPATIENT)
Dept: INFUSION THERAPY | Age: 37
End: 2022-12-14

## 2022-12-15 ENCOUNTER — OFFICE VISIT (OUTPATIENT)
Dept: ONCOLOGY | Age: 37
End: 2022-12-15
Payer: COMMERCIAL

## 2022-12-15 ENCOUNTER — HOSPITAL ENCOUNTER (OUTPATIENT)
Dept: INFUSION THERAPY | Age: 37
Discharge: HOME | End: 2022-12-15
Payer: COMMERCIAL

## 2022-12-15 VITALS
BODY MASS INDEX: 22.49 KG/M2 | HEIGHT: 65 IN | SYSTOLIC BLOOD PRESSURE: 117 MMHG | RESPIRATION RATE: 18 BRPM | OXYGEN SATURATION: 99 % | DIASTOLIC BLOOD PRESSURE: 75 MMHG | WEIGHT: 135 LBS

## 2022-12-15 VITALS
WEIGHT: 135.4 LBS | RESPIRATION RATE: 16 BRPM | HEART RATE: 85 BPM | SYSTOLIC BLOOD PRESSURE: 117 MMHG | DIASTOLIC BLOOD PRESSURE: 75 MMHG | BODY MASS INDEX: 22.56 KG/M2 | TEMPERATURE: 97.5 F | OXYGEN SATURATION: 99 % | HEIGHT: 65 IN

## 2022-12-15 DIAGNOSIS — C50.912 MALIGNANT NEOPLASM OF LEFT BREAST IN FEMALE, ESTROGEN RECEPTOR POSITIVE, UNSPECIFIED SITE OF BREAST (HCC): Primary | ICD-10-CM

## 2022-12-15 DIAGNOSIS — Z95.828 PORT-A-CATH IN PLACE: ICD-10-CM

## 2022-12-15 DIAGNOSIS — Z51.11 ENCOUNTER FOR ANTINEOPLASTIC CHEMOTHERAPY: ICD-10-CM

## 2022-12-15 DIAGNOSIS — Z17.0 MALIGNANT NEOPLASM OF LEFT BREAST IN FEMALE, ESTROGEN RECEPTOR POSITIVE, UNSPECIFIED SITE OF BREAST (HCC): Primary | ICD-10-CM

## 2022-12-15 LAB
ALBUMIN SERPL-MCNC: 3.8 G/DL (ref 3.5–5)
ALBUMIN/GLOB SERPL: 1.2 {RATIO} (ref 1.1–2.2)
ALP SERPL-CCNC: 55 U/L (ref 45–117)
ALT SERPL-CCNC: 16 U/L (ref 12–78)
ANION GAP SERPL CALC-SCNC: 5 MMOL/L (ref 5–15)
AST SERPL-CCNC: 6 U/L (ref 15–37)
BASOPHILS # BLD: 0 K/UL (ref 0–0.1)
BASOPHILS NFR BLD: 0 % (ref 0–1)
BILIRUB SERPL-MCNC: 0.4 MG/DL (ref 0.2–1)
BUN SERPL-MCNC: 13 MG/DL (ref 6–20)
BUN/CREAT SERPL: 20 (ref 12–20)
CALCIUM SERPL-MCNC: 9.5 MG/DL (ref 8.5–10.1)
CHLORIDE SERPL-SCNC: 106 MMOL/L (ref 97–108)
CO2 SERPL-SCNC: 26 MMOL/L (ref 21–32)
CREAT SERPL-MCNC: 0.65 MG/DL (ref 0.55–1.02)
DIFFERENTIAL METHOD BLD: ABNORMAL
EOSINOPHIL # BLD: 0 K/UL (ref 0–0.4)
EOSINOPHIL NFR BLD: 0 % (ref 0–7)
ERYTHROCYTE [DISTWIDTH] IN BLOOD BY AUTOMATED COUNT: 12.7 % (ref 11.5–14.5)
GLOBULIN SER CALC-MCNC: 3.2 G/DL (ref 2–4)
GLUCOSE SERPL-MCNC: 137 MG/DL (ref 65–100)
HCT VFR BLD AUTO: 32.8 % (ref 35–47)
HGB BLD-MCNC: 10.6 G/DL (ref 11.5–16)
IMM GRANULOCYTES # BLD AUTO: 0 K/UL (ref 0–0.04)
IMM GRANULOCYTES NFR BLD AUTO: 1 % (ref 0–0.5)
LYMPHOCYTES # BLD: 0.5 K/UL (ref 0.8–3.5)
LYMPHOCYTES NFR BLD: 12 % (ref 12–49)
MCH RBC QN AUTO: 30.5 PG (ref 26–34)
MCHC RBC AUTO-ENTMCNC: 32.3 G/DL (ref 30–36.5)
MCV RBC AUTO: 94.5 FL (ref 80–99)
MONOCYTES # BLD: 0.1 K/UL (ref 0–1)
MONOCYTES NFR BLD: 3 % (ref 5–13)
NEUTS SEG # BLD: 3.8 K/UL (ref 1.8–8)
NEUTS SEG NFR BLD: 84 % (ref 32–75)
NRBC # BLD: 0 K/UL (ref 0–0.01)
NRBC BLD-RTO: 0 PER 100 WBC
PLATELET # BLD AUTO: 519 K/UL (ref 150–400)
PMV BLD AUTO: 10.3 FL (ref 8.9–12.9)
POTASSIUM SERPL-SCNC: 4.2 MMOL/L (ref 3.5–5.1)
PROT SERPL-MCNC: 7 G/DL (ref 6.4–8.2)
RBC # BLD AUTO: 3.47 M/UL (ref 3.8–5.2)
RBC MORPH BLD: ABNORMAL
RBC MORPH BLD: ABNORMAL
SODIUM SERPL-SCNC: 137 MMOL/L (ref 136–145)
WBC # BLD AUTO: 4.4 K/UL (ref 3.6–11)

## 2022-12-15 PROCEDURE — 96367 TX/PROPH/DG ADDL SEQ IV INF: CPT

## 2022-12-15 PROCEDURE — 74011250636 HC RX REV CODE- 250/636: Performed by: INTERNAL MEDICINE

## 2022-12-15 PROCEDURE — 36415 COLL VENOUS BLD VENIPUNCTURE: CPT

## 2022-12-15 PROCEDURE — 77030012965 HC NDL HUBR BBMI -A

## 2022-12-15 PROCEDURE — 74011000250 HC RX REV CODE- 250: Performed by: INTERNAL MEDICINE

## 2022-12-15 PROCEDURE — 96377 APPLICATON ON-BODY INJECTOR: CPT

## 2022-12-15 PROCEDURE — 96413 CHEMO IV INFUSION 1 HR: CPT

## 2022-12-15 PROCEDURE — 80053 COMPREHEN METABOLIC PANEL: CPT

## 2022-12-15 PROCEDURE — 96417 CHEMO IV INFUS EACH ADDL SEQ: CPT

## 2022-12-15 PROCEDURE — 85025 COMPLETE CBC W/AUTO DIFF WBC: CPT

## 2022-12-15 PROCEDURE — 96375 TX/PRO/DX INJ NEW DRUG ADDON: CPT

## 2022-12-15 RX ORDER — ONDANSETRON 2 MG/ML
8 INJECTION INTRAMUSCULAR; INTRAVENOUS AS NEEDED
Status: ACTIVE | OUTPATIENT
Start: 2022-12-15 | End: 2022-12-15

## 2022-12-15 RX ORDER — SODIUM CHLORIDE 0.9 % (FLUSH) 0.9 %
5-40 SYRINGE (ML) INJECTION AS NEEDED
Status: DISPENSED | OUTPATIENT
Start: 2022-12-15 | End: 2022-12-15

## 2022-12-15 RX ORDER — DIPHENHYDRAMINE HYDROCHLORIDE 50 MG/ML
25 INJECTION, SOLUTION INTRAMUSCULAR; INTRAVENOUS ONCE
Status: COMPLETED | OUTPATIENT
Start: 2022-12-15 | End: 2022-12-15

## 2022-12-15 RX ORDER — HEPARIN 100 UNIT/ML
500 SYRINGE INTRAVENOUS AS NEEDED
Status: DISPENSED | OUTPATIENT
Start: 2022-12-15 | End: 2022-12-15

## 2022-12-15 RX ORDER — DEXAMETHASONE SODIUM PHOSPHATE 10 MG/ML
10 INJECTION INTRAMUSCULAR; INTRAVENOUS ONCE
Status: COMPLETED | OUTPATIENT
Start: 2022-12-15 | End: 2022-12-15

## 2022-12-15 RX ORDER — DIPHENHYDRAMINE HYDROCHLORIDE 50 MG/ML
25 INJECTION, SOLUTION INTRAMUSCULAR; INTRAVENOUS AS NEEDED
Status: ACTIVE | OUTPATIENT
Start: 2022-12-15 | End: 2022-12-15

## 2022-12-15 RX ORDER — PALONOSETRON 0.05 MG/ML
0.25 INJECTION, SOLUTION INTRAVENOUS ONCE
Status: COMPLETED | OUTPATIENT
Start: 2022-12-15 | End: 2022-12-15

## 2022-12-15 RX ORDER — SODIUM CHLORIDE 9 MG/ML
5-250 INJECTION, SOLUTION INTRAVENOUS AS NEEDED
Status: DISPENSED | OUTPATIENT
Start: 2022-12-15 | End: 2022-12-15

## 2022-12-15 RX ORDER — ACETAMINOPHEN 325 MG/1
650 TABLET ORAL AS NEEDED
Status: ACTIVE | OUTPATIENT
Start: 2022-12-15 | End: 2022-12-15

## 2022-12-15 RX ADMIN — FAMOTIDINE 20 MG: 10 INJECTION, SOLUTION INTRAVENOUS at 11:55

## 2022-12-15 RX ADMIN — PALONOSETRON 0.25 MG: 0.05 INJECTION, SOLUTION INTRAVENOUS at 11:52

## 2022-12-15 RX ADMIN — SODIUM CHLORIDE 50 ML/HR: 900 INJECTION, SOLUTION INTRAVENOUS at 11:09

## 2022-12-15 RX ADMIN — DOCETAXEL ANHYDROUS 126 MG: 10 INJECTION, SOLUTION INTRAVENOUS at 12:15

## 2022-12-15 RX ADMIN — SODIUM CHLORIDE 500 ML: 900 INJECTION, SOLUTION INTRAVENOUS at 10:30

## 2022-12-15 RX ADMIN — HEPARIN 500 UNITS: 100 SYRINGE at 14:37

## 2022-12-15 RX ADMIN — PEGFILGRASTIM 6 MG: KIT SUBCUTANEOUS at 14:45

## 2022-12-15 RX ADMIN — DEXAMETHASONE SODIUM PHOSPHATE 10 MG: 10 INJECTION INTRAMUSCULAR; INTRAVENOUS at 11:57

## 2022-12-15 RX ADMIN — CYCLOPHOSPHAMIDE 1008 MG: 1 INJECTION INTRAVENOUS at 13:30

## 2022-12-15 RX ADMIN — DIPHENHYDRAMINE HYDROCHLORIDE 25 MG: 50 INJECTION, SOLUTION INTRAMUSCULAR; INTRAVENOUS at 12:00

## 2022-12-15 NOTE — PROGRESS NOTES
Outpatient [facility]fusion Center - Chemotherapy Progress Note    0820 Pt admit to [facility] for TC/C2 (cold cap) ambulatory in stable condition. Assessment completed by access RN. Port accessed by access RN with positive blood return. Labs drawn per order and sent. Line flushed, clamped, Curos Cap applied to end clave.     [id number] [facility]; pre-hydration infusing    Visit Vitals  /75   Pulse 85   Temp 97.5 °F (36.4 °C)   Resp 16   Ht 5' 5\" (1.651 m)   Wt 61.4 kg (135 lb 6.4 oz)   SpO2 99%   BMI 22.53 kg/m²       Medications:  Medications Administered       0.9% sodium chloride infusion       Admin Date  12/15/2022 Action  New Bag Dose  50 mL/hr Rate  50 mL/hr Route  IntraVENous Administered By  Ryan Avila RN              cyclophosphamide (CYTOXAN) 1,008 mg in 0.9% sodium chloride 250 mL, overfill volume 25 mL chemo infusion       Admin Date  12/15/2022 Action  New Bag Dose  1,008 mg Rate  560.1 mL/hr Route  IntraVENous Administered By  Ryan Avila RN              dexamethasone (PF) (DECADRON) 10 mg/mL injection 10 mg       Admin Date  12/15/2022 Action  Given Dose  10 mg Route  IntraVENous Administered By  Ryan Avila RN              diphenhydrAMINE (BENADRYL) injection 25 mg       Admin Date  12/15/2022 Action  Given Dose  25 mg Route  IntraVENous Administered By  Ryan Avila RN              DOCEtaxeL (TAXOTERE) 126 mg in 0.9% sodium chloride 250 mL, overfill volume 25 mL chemo infusion       Admin Date  12/15/2022 Action  New Bag Dose  126 mg Route  IntraVENous Administered By  Ryan Avila RN              famotidine (PF) (PEPCID) 20 mg in 0.9% sodium chloride 10 mL injection       Admin Date  12/15/2022 Action  Given Dose  20 mg Route  IntraVENous Administered By  Ryan Avila RN              heparin (porcine) pf 500 Units       Admin Date  12/15/2022 Action  Given Dose  500 Units Route  InterCATHeter Administered By  Ryan Avila RN palonosetron HCl (ALOXI) injection 0.25 mg       Admin Date  12/15/2022 Action  Given Dose  0.25 mg Route  IntraVENous Administered By  Sophia Reynoso RN              pegfilgrastim (NEULASTA) wearable SQ injector 6 mg       Admin Date  12/15/2022 Action  Given Dose  6 mg Route  SubCUTAneous Administered By  Sophia Reynoso RN              sodium chloride 0.9 % bolus infusion 500 mL       Admin Date  12/15/2022 Action  New Bag Dose  500 mL Rate  999 mL/hr Route  IntraVENous Administered By  Sophia Reynoso RN                    (OBI, SC R arm)        Two nurses verified prior to administering:, Drug name, Drug dose, Infusion volume or drug volume when prepared in a syringe, Rate of administration, Route of administration, Expiration dates and/or times, Appearance and physical integrity of the drugs, Rate set on infusion pump, when used, Sequencing of drug administration         1500 Pt tolerated treatment well. Pt remained in [facility] x30 minutes for post-cooling; remaining stay uneventful. Port maintained positive blood return throughout treatment, flushed with positive blood return at conclusion, heparinized and de-accessed. Discussed when to remove OBI device; verbalized understanding. D/c home ambulatory in no distress. Pt aware of next [facility] appointment scheduled for 01/05/2023.     Recent Results (from the past 12 hour(s))   CBC WITH AUTOMATED DIFF    Collection Time: 12/15/22  8:28 AM   Result Value Ref Range    WBC 4.4 3.6 - 11.0 K/uL    RBC 3.47 (L) 3.80 - 5.20 M/uL    HGB 10.6 (L) 11.5 - 16.0 g/dL    HCT 32.8 (L) 35.0 - 47.0 %    MCV 94.5 80.0 - 99.0 FL    MCH 30.5 26.0 - 34.0 PG    MCHC 32.3 30.0 - 36.5 g/dL    RDW 12.7 11.5 - 14.5 %    PLATELET 665 (H) 685 - 400 K/uL    MPV 10.3 8.9 - 12.9 FL    NRBC 0.0 0  WBC    ABSOLUTE NRBC 0.00 0.00 - 0.01 K/uL    NEUTROPHILS 84 (H) 32 - 75 %    LYMPHOCYTES 12 12 - 49 %    MONOCYTES 3 (L) 5 - 13 %    EOSINOPHILS 0 0 - 7 %    BASOPHILS 0 0 - 1 % IMMATURE GRANULOCYTES 1 (H) 0.0 - 0.5 %    ABS. NEUTROPHILS 3.8 1.8 - 8.0 K/UL    ABS. LYMPHOCYTES 0.5 (L) 0.8 - 3.5 K/UL    ABS. MONOCYTES 0.1 0.0 - 1.0 K/UL    ABS. EOSINOPHILS 0.0 0.0 - 0.4 K/UL    ABS. BASOPHILS 0.0 0.0 - 0.1 K/UL    ABS. IMM. GRANS. 0.0 0.00 - 0.04 K/UL    DF SMEAR SCANNED      RBC COMMENTS ANISOCYTOSIS  1+        RBC COMMENTS OVALOCYTES  PRESENT       METABOLIC PANEL, COMPREHENSIVE    Collection Time: 12/15/22  8:28 AM   Result Value Ref Range    Sodium 137 136 - 145 mmol/L    Potassium 4.2 3.5 - 5.1 mmol/L    Chloride 106 97 - 108 mmol/L    CO2 26 21 - 32 mmol/L    Anion gap 5 5 - 15 mmol/L    Glucose 137 (H) 65 - 100 mg/dL    BUN 13 6 - 20 MG/DL    Creatinine 0.65 0.55 - 1.02 MG/DL    BUN/Creatinine ratio 20 12 - 20      eGFR >60 >60 ml/min/1.73m2    Calcium 9.5 8.5 - 10.1 MG/DL    Bilirubin, total 0.4 0.2 - 1.0 MG/DL    ALT (SGPT) 16 12 - 78 U/L    AST (SGOT) 6 (L) 15 - 37 U/L    Alk.  phosphatase 55 45 - 117 U/L    Protein, total 7.0 6.4 - 8.2 g/dL    Albumin 3.8 3.5 - 5.0 g/dL    Globulin 3.2 2.0 - 4.0 g/dL    A-G Ratio 1.2 1.1 - 2.2

## 2022-12-15 NOTE — PROGRESS NOTES
Identified pt with two pt identifiers(name and ). Reviewed record in preparation for visit and have obtained necessary documentation. All patient medications has been reviewed. Chief Complaint   Patient presents with    Chemotherapy     Patient stated she had a cough that was wet that triggered by heat onset  2022 she also stated that the mucinex helped        3 most recent PHQ Screens 12/15/2022   Little interest or pleasure in doing things Not at all   Feeling down, depressed, irritable, or hopeless Several days   Total Score PHQ 2 1     Abuse Screening Questionnaire 8/15/2018   Do you ever feel afraid of your partner? N   Are you in a relationship with someone who physically or mentally threatens you? N   Is it safe for you to go home? Y       Health Maintenance Due   Topic    Hepatitis C Screening     Pneumococcal 0-64 years (1 - PCV)    Cervical cancer screen     COVID-19 Vaccine (3 - Moderna risk series)     Health Maintenance Review: Patient reminded of \"due or due soon\" health maintenance. I have asked the patient to contact his/her primary care provider (PCP) for follow-up on his/her health maintenance. Vitals:    12/15/22 0843   BP: 117/75   Resp: 18   SpO2: 99%   Weight: 135 lb (61.2 kg)   Height: 5' 5\" (1.651 m)       Wt Readings from Last 3 Encounters:   12/15/22 135 lb (61.2 kg)   12/15/22 135 lb 6.4 oz (61.4 kg)   22 137 lb 9.6 oz (62.4 kg)     Temp Readings from Last 3 Encounters:   12/15/22 97.5 °F (36.4 °C)   22 97.5 °F (36.4 °C)   22 97.5 °F (36.4 °C)     BP Readings from Last 3 Encounters:   12/15/22 117/75   12/15/22 117/75   22 133/65     Pulse Readings from Last 3 Encounters:   12/15/22 85   22 75   22 (!) 102       1. \"Have you been to the ER, urgent care clinic since your last visit? Hospitalized since your last visit? \" No    2.  \"Have you seen or consulted any other health care providers outside of the [address] [place] [address] since your last visit? \" No

## 2022-12-15 NOTE — PROGRESS NOTES
[facility] [address], [address] [address], [place]: [phone]  F: [phone]    Reason for Visit:   Madhu Rivera is a 42 y.o. female who is seen for follow up of Left breast cancer . Treatment History:   9/27/2022-bilateral mastectomy with left sentinel node biopsy  11/22/22: Adjuvant TC     History of Present Illness:   Patient is a 68-year-old female with a history of anemia and fibrocystic breast disease seen for left breast cancer and DCIS. She had a mammogram in May 2022 that showed diffuse left breast calcifications as well as several masses in the right breast that were thought to be fibroadenomas. Initial biopsy of left breast calcifications in June 2022 was consistent with DCIS. She was referred to Dr. Pati Ren who obtained a breast MRI on 8/3/2022. This confirmed multicentric multifocal left breast DCIS in all quadrants . Probable left axillary kari metastases, bilobed mass at 2:00 in the right breast as well is linear enhancement in the subareolar right breast.  Right breast biopsy of the subareolar region was consistent with intraductal papilloma with focal atypical ductal hyperplasia, right breast 2 cm masslike lesion was consistent with fibroadenoma. Underwent a bilateral mastectomy with sentinel node biopsy on the left on 9/27/2022. Right breast showed intraductal papilloma and fibroadenoma. Left breast showed extensive high-grade DCIS with negative margins as well as a 3 mm focus of invasive carcinoma, grade 2, 1 positive node of 3. ER 90% HI 90% HER2/raad negative, Ki-67 20%. Mother's cancer diagnosed when she was 72, and had negative genetic testing. She comes today for cycle 2 of adjuvant TC. Had mild nausea. Had some taste changes. Had some fatigue. Had a dry mouth and a mouth soreness. Denies diarrhea but did have some constipation. No other complaints. She has 3 children. She has her own business.     She is premenopausal.      Past Medical History:   Diagnosis Date    Adverse effect of anesthesia 1999    Delayed awakening after anesthesia    Anemia     Anxiety     Breast cancer ([facility] [address] 2022    LEFT    HX OTHER MEDICAL 02/10/2011    fibroids    Ill-defined condition     PALPITATIONS, CLEARED BY CARDIOLOGIST    Postpartum depression     with 1st pregnancy    Unspecified breast disorder     fibrocystic breasts, followed at [facility]      Past Surgical History:   Procedure Laterality Date    HX ADENOIDECTOMY  1988    HX BILATERAL MASTECTOMY  09/27/2022    HX BREAST BIOPSY Left     06/2022 AND 08/2022 BIOPSIES    HX BREAST RECONSTRUCTION Bilateral 09/27/2022    . performed by Casa Ludwig MD at [place] [address]    HX [place]    HX MASTECTOMY Bilateral 09/27/2022    BILATERAL SKIN SPARING MASTECTOMIES LEFT BREAST SENTINEL NODE BIOPSY  DR. Ronny Velasco - BILATERAL BREAST RECONSTRUCTION WITH TISSUE EXPANDERS AND ALLODERM performed by Melody Lam MD at [facility]      Social History     Tobacco Use    Smoking status: Never    Smokeless tobacco: Never   Substance Use Topics    Alcohol use: No      Family History   Problem Relation Age of Onset    Hypertension Mother         caused by RA rx    Arthritis-rheumatoid Mother     OSTEOARTHRITIS Mother     No Known Problems Father     Other Sister         UTERINE FIBROIDS    Stroke Maternal Grandfather     Cancer Neg Hx     Heart Disease Neg Hx     Anesth Problems Neg Hx      Current Outpatient Medications   Medication Sig    magic mouthwash solution Magic mouth wash Maalox Lidocaine 2% viscous Diphenhydramine oral solution Swish & swallow 5mL (1 tsp) 4 times daily as needed for mouth sores Pharmacy to mix equal portions of ingredients to a total volume as indicated in the dispense amount.     ondansetron (ZOFRAN ODT) 8 mg disintegrating tablet Take 1 Tablet by mouth every eight (8) hours as needed for Nausea or Vomiting.    escitalopram oxalate (LEXAPRO) 5 mg tablet Take 1 Tablet by mouth daily. fexofenadine (ALLEGRA) 60 mg tablet Take 60 mg by mouth nightly. No current facility-administered medications for this visit. Facility-Administered Medications Ordered in Other Visits   Medication Dose Route Frequency    sodium chloride (NS) flush 5-40 mL  5-40 mL IntraVENous PRN    heparin (porcine) pf 500 Units  500 Units InterCATHeter PRN      Allergies   Allergen Reactions    Blueberry Swelling    Sulfa (Sulfonamide Antibiotics) Anaphylaxis    Caffeine Palpitations    Crab Swelling    Pseudoephedrine Palpitations        Review of Systems: A complete review of systems was obtained, negative except as described above. Physical Exam:   Visit Vitals  /75 (BP Patient Position: Sitting)   Resp 18   Ht 5' 5\" (1.651 m)   Wt 135 lb (61.2 kg)   SpO2 99%   BMI 22.47 kg/m²       Visit Vitals  /75 (BP Patient Position: Sitting)   Resp 18   Ht 5' 5\" (1.651 m)   Wt 135 lb (61.2 kg)   SpO2 99%   BMI 22.47 kg/m²     ECOG PS: 0  General: No distress  Eyes: PERRL, anicteric sclerae  HENT: Atraumatic, PAC in place , OP clear   Skin: No rashes, ecchymoses, or petechiae. Normal temperature, turgor, and texture. Psych: Alert, oriented, appropriate affect, normal judgment/insight    Results:     Lab Results   Component Value Date/Time    WBC 4.4 12/15/2022 08:28 AM    HGB 10.6 (L) 12/15/2022 08:28 AM    HCT 32.8 (L) 12/15/2022 08:28 AM    PLATELET 109 (H) 53/15/9935 08:28 AM    MCV 94.5 12/15/2022 08:28 AM    ABS.  NEUTROPHILS PENDING 12/15/2022 08:28 AM    Hgb, External 10.9 06/30/2011 12:00 AM    Hct, External 33.5 06/30/2011 12:00 AM    Platelet cnt., External 337 02/10/2011 12:00 AM     Lab Results   Component Value Date/Time    Sodium 138 11/22/2022 07:43 AM    Potassium 3.4 (L) 11/22/2022 07:43 AM    Chloride 105 11/22/2022 07:43 AM    CO2 26 11/22/2022 07:43 AM    Glucose 133 (H) 11/22/2022 07:43 AM    BUN 14 11/22/2022 07:43 AM    Creatinine 0.64 11/22/2022 07:43 AM    GFR est AA >60 06/18/2021 12:02 PM    GFR est non-AA >60 06/18/2021 12:02 PM    Calcium 9.4 11/22/2022 07:43 AM     Lab Results   Component Value Date/Time    Bilirubin, total 0.5 11/22/2022 07:43 AM    ALT (SGPT) 19 11/22/2022 07:43 AM    Alk. phosphatase 47 11/22/2022 07:43 AM    Protein, total 7.4 11/22/2022 07:43 AM    Albumin 3.9 11/22/2022 07:43 AM    Globulin 3.5 11/22/2022 07:43 AM     Records reviewed and summarized above. Pathology report(s) reviewed above. 9/27/2022  INVASIVE CARCINOMA OF THE BREAST: Resection    SPECIMEN       Procedure: Total mastectomy       Specimen Laterality: Left    TUMOR       Histologic Type:  Invasive carcinoma of no special type (ductal)       Glandular (Acinar) / Tubular Differentiation: Score 2       Nuclear Pleomorphism: Score 2       Mitotic Rate: Score 2       Overall Grade: Grade 2 (scores of 6 or 7)       Tumor Size: 3 mm       Tumor Focality: Single focus of invasive carcinoma       Ductal Carcinoma In Situ (DCIS): Present           Ductal Carcinoma In Situ (DCIS): Positive for extensive   intraductal component (EIC)           Size (Extent) of DCIS: 90 mm           Architectural Patterns: Micropapillary, Cribriform, Papillary,   [place]edo           Nuclear Grade: Grade III (high)           Necrosis: Present, central (expansive \"comedo\" necrosis)       Lobular Carcinoma In Situ (LCIS): Not identified    Tumor Extent       Skin: Present and uninvolved by invasive carcinoma       Nipple DCIS: DCIS does not involve the nipple epidermis       Lymphovascular Invasion: Not identified       Dermal Lymphovascular Invasion: Not identified       Microcalcifications: Present in DCIS, invasive carcinoma, and   non-neoplastic tissue       Treatment Effect in the Breast: No known presurgical therapy    MARGINS       Invasive Carcinoma Margins: Uninvolved by invasive carcinoma           Distance from Closest Margin (Millimeters): Greater than 2 mm       DCIS Margins: Uninvolved by DCIS           Distance from Closest Margin (Millimeters): Less than 1 mm           Closest Margin(s): Anterior, Posterior    LYMPH NODES       Regional Lymph Nodes: Involved by tumor cells           Number of Lymph Nodes with Macrometastases (> 2 mm): 1           Number of Lymph Nodes with Micrometastases (> 0.2 mm to 2 mm and/   or > 200 cells): 0           Size of Largest Metastatic Deposit (Millimeters): 11 mm           Extranodal Extension: Not identified           Total Number of Lymph Nodes Examined: 3           Number of [place] Nodes Examined: 3    PATHOLOGIC STAGE CLASSIFICATION (pTNM, AJCC 8th Edition)       Primary Tumor (pT): pT1a       Regional Lymph Nodes Modifier: (sn): [place] node(s) evaluated       Regional Lymph Nodes (pN): pN1a    ADDITIONAL FINDINGS       Additional Findings: Fibroadenomas          6. Left breast anterior margin, excision:        Ductal carcinoma in situ (DCIS), nuclear grade 3   DCIS is 3 mm from designated anterior margin       Radiology report(s) reviewed above. MRI 8/2022  IMPRESSION  1. Multicentric, multifocal, left DCIS involves all quadrants and largely  replaces the inferior breast. BI-RADS 6.  2. DCIS extends to the left nipple, with an enhancing mass in the nipple. 3. Probable left axillary kari metastasis. BI-RADS 4.  4. Linear enhancement in the subareolar right breast, likely DCIS. BI-RADS 4.  5. Bilobed mass at 2:00 in the right breast. BI-RADS 4A. 6. Overall assessment: BI-RADS Assessment Category 4: Suspicious abnormality. 7. Recommendations: Ultrasound-guided left axillary lymph node biopsy, MRI  guided right breast biopsy. CT 11/2022:  IMPRESSION  1. Post bilateral mastectomies.  No evidence of metastatic disease    Assessment:   1) Left breast cancer    S/P Bilateral mastectomy and Left SLN on 9/27/2022   3 mm, 1/3 + nodes  Grade 2  Margins negative  9 CM are of DCIS    ER 90% PR10%HER2 raad negative  Ki 67 20%  Mamma print high risk Luminal B     [id number] Stage IA    See prior office note regarding discussion of treatment plan. I have recommended adjuvant chemotherapy. I discussed both dose dense AC followed by T or TC. She is aware that a non inferiority for TC is not proven though with a single positive node it is a reasonable choice     Today is cycle 2. We also reviewed the possibility of early menopause and loss of fertility. We discussed contraception    2) R breast intraductal papilloma  S/p mastectomy  Post chemotherapy RT    3) FH of breast cancer  Ambry- VUS    4) Encounter for high risk medications like chemotherapy   Grade 1 nausea, grade 1 fatigue, grade 1 constipation, dry mouth, grade 1 mucositis   Labs pending     Plan:     Proceed today with cycle 2 of adjuvant TC (docetaxel 75mg/m2, cytoxan 600mg/m2) given once every 3 weeks x 4 cycles   Labs to include CBC with diff, CMP prior to infusions  Zofran / Compazine PRN  Dexamethasone day before & day after  Has zyprexa on hand if needed   Will discuss adjuvant endocrine therapy after radiation. This may involve ovarian function suppression plus AI given high risk disease  Neulasta given high risk for grade IV neutropenia  Magic mouthwash PRN     RTC in 3 weeks for cycle 3     I appreciate the opportunity to participate in Ms. Alina Acharya jayro. I personally saw and evaluated the patient and performed the key components of medical decision making. The history, physical exam, and documentation were performed by Clark Roque NP. I reviewed and verified the above documentation and modified it as needed.       Signed By: Clark Roque NP

## 2022-12-19 DIAGNOSIS — C50.912 MALIGNANT NEOPLASM OF LEFT BREAST IN FEMALE, ESTROGEN RECEPTOR POSITIVE, UNSPECIFIED SITE OF BREAST (HCC): Primary | ICD-10-CM

## 2022-12-19 DIAGNOSIS — Z17.0 MALIGNANT NEOPLASM OF LEFT BREAST IN FEMALE, ESTROGEN RECEPTOR POSITIVE, UNSPECIFIED SITE OF BREAST (HCC): Primary | ICD-10-CM

## 2022-12-19 RX ORDER — DEXAMETHASONE 4 MG/1
TABLET ORAL
Qty: 16 TABLET | Refills: 0 | Status: SHIPPED | OUTPATIENT
Start: 2022-12-19

## 2022-12-19 NOTE — TELEPHONE ENCOUNTER
Oncology Pharmacist Note    Estephania James is a  42 y. o.female  diagnosed with breast cancer  Ms. Arthur Dominguez is being treated with TC. Refill for dexamethasone sent to [facility] pharmacy.     Anton Marie, PharmD, BCPS, [facility] [address] in place: Yes  Recommendation Provided To: Patient/Caregiver: 1 via Telephone  Intervention Detail: Refill(s) Provided    Intervention Accepted By: Patient/Caregiver: 0  Time Spent (min): 10

## 2022-12-20 ENCOUNTER — APPOINTMENT (OUTPATIENT)
Dept: INFUSION THERAPY | Age: 37
End: 2022-12-20
Payer: COMMERCIAL

## 2022-12-22 ENCOUNTER — APPOINTMENT (OUTPATIENT)
Dept: INFUSION THERAPY | Age: 37
End: 2022-12-22
Payer: COMMERCIAL

## 2022-12-28 ENCOUNTER — APPOINTMENT (OUTPATIENT)
Dept: INFUSION THERAPY | Age: 37
End: 2022-12-28

## 2022-12-28 DIAGNOSIS — C50.912 MALIGNANT NEOPLASM OF LEFT BREAST IN FEMALE, ESTROGEN RECEPTOR POSITIVE, UNSPECIFIED SITE OF BREAST (HCC): Primary | ICD-10-CM

## 2022-12-28 DIAGNOSIS — Z17.0 MALIGNANT NEOPLASM OF LEFT BREAST IN FEMALE, ESTROGEN RECEPTOR POSITIVE, UNSPECIFIED SITE OF BREAST (HCC): Primary | ICD-10-CM

## 2022-12-28 RX ORDER — PROCHLORPERAZINE MALEATE 5 MG
5 TABLET ORAL
Qty: 30 TABLET | Refills: 3 | Status: SHIPPED | OUTPATIENT
Start: 2022-12-28

## 2023-01-04 ENCOUNTER — APPOINTMENT (OUTPATIENT)
Dept: INFUSION THERAPY | Age: 38
End: 2023-01-04
Payer: COMMERCIAL

## 2023-01-04 NOTE — PROGRESS NOTES
[facility] [place] [facility] [place] [address] [address], [address], [place]: [phone]  F: [phone]    Reason for Visit:   Majo Chen is a 42 y.o. female who is seen for follow up of Left breast cancer . Treatment History:   9/27/2022-bilateral mastectomy with left sentinel node biopsy  11/22/22: Adjuvant TC     History of Present Illness:   Patient is a 49-year-old female with a history of anemia and fibrocystic breast disease seen for left breast cancer and DCIS. She had a mammogram in May 2022 that showed diffuse left breast calcifications as well as several masses in the right breast that were thought to be fibroadenomas. Initial biopsy of left breast calcifications in June 2022 was consistent with DCIS. She was referred to Dr. Boris Morales who obtained a breast MRI on 8/3/2022. This confirmed multicentric multifocal left breast DCIS in all quadrants . Probable left axillary kari metastases, bilobed mass at 2:00 in the right breast as well is linear enhancement in the subareolar right breast.  Right breast biopsy of the subareolar region was consistent with intraductal papilloma with focal atypical ductal hyperplasia, right breast 2 cm masslike lesion was consistent with fibroadenoma. Underwent a bilateral mastectomy with sentinel node biopsy on the left on 9/27/2022. Right breast showed intraductal papilloma and fibroadenoma. Left breast showed extensive high-grade DCIS with negative margins as well as a 3 mm focus of invasive carcinoma, grade 2, 1 positive node of 3. ER 90% ND 90% HER2/raad negative, Ki-67 20%. Mother's cancer diagnosed when she was 72, and had negative genetic testing. She comes today for cycle 3 of adjuvant TC. Nausea more this time, She takes compazine every 6 hours , nausea is controlled with this, has some at night though. Has not taken Zofran. Transient numbness. No mouth sores. Endorses dry mouth. Constipation better-normal Bms. Appetite poor. Having difficulty staying hydrated too. No swelling noticed. Noticed some nail changes. Feeling well overall. Rad/onc appointment 2/7. She has 3 children. 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      Past Medical History:   Diagnosis Date    Adverse effect of anesthesia 1999    Delayed awakening after anesthesia    Anemia     Anxiety     Breast cancer ([address] 2022    LEFT    HX OTHER MEDICAL 02/10/2011    fibroids    Ill-defined condition     PALPITATIONS, CLEARED BY CARDIOLOGIST    Postpartum depression     with 1st pregnancy    Unspecified breast disorder     fibrocystic breasts, followed at [facility]      Past Surgical History:   Procedure Laterality Date    HX ADENOIDECTOMY  1988    HX BILATERAL MASTECTOMY  09/27/2022    HX BREAST BIOPSY Left     06/2022 AND 08/2022 BIOPSIES    HX BREAST RECONSTRUCTION Bilateral 09/27/2022    . performed by Teresa Mtz MD at [place] [address]    HX One [place]    HX MASTECTOMY Bilateral 09/27/2022    BILATERAL SKIN SPARING MASTECTOMIES LEFT BREAST SENTINEL NODE BIOPSY  DR. Francie Espinoza - BILATERAL BREAST RECONSTRUCTION WITH TISSUE EXPANDERS AND ALLODERM performed by Mojgan Richards MD at [address] [place] [address] History     Tobacco Use    Smoking status: Never    Smokeless tobacco: Never   Substance Use Topics    Alcohol use: No      Family History   Problem Relation Age of Onset    Hypertension Mother         caused by RA rx    Arthritis-rheumatoid Mother     OSTEOARTHRITIS Mother     No Known Problems Father     Other Sister         UTERINE FIBROIDS    Stroke Maternal Grandfather     Cancer Neg Hx     Heart Disease Neg Hx     Anesth Problems Neg Hx      Current Outpatient Medications   Medication Sig    escitalopram oxalate (LEXAPRO) 5 mg tablet Take 1 Tablet by mouth daily. prochlorperazine (Compazine) 5 mg tablet Take 1 Tablet by mouth every six (6) hours as needed for Nausea or Vomiting.     dexAMETHasone (DECADRON) 4 mg tablet Take 2 tablets by mouth twice daily the day before chemotherapy and the day after chemotherapy    magic mouthwash solution Magic mouth wash Maalox Lidocaine 2% viscous Diphenhydramine oral solution Swish & swallow 5mL (1 tsp) 4 times daily as needed for mouth sores Pharmacy to mix equal portions of ingredients to a total volume as indicated in the dispense amount. ondansetron (ZOFRAN ODT) 8 mg disintegrating tablet Take 1 Tablet by mouth every eight (8) hours as needed for Nausea or Vomiting. fexofenadine (ALLEGRA) 60 mg tablet Take 60 mg by mouth nightly. No current facility-administered medications for this visit. Allergies   Allergen Reactions    Blueberry Swelling    Sulfa (Sulfonamide Antibiotics) Anaphylaxis    Caffeine Palpitations    Crab Swelling    Pseudoephedrine Palpitations        Review of Systems: A complete review of systems was obtained, negative except as described above. Physical Exam:       Visit Vitals  /75 (BP 1 Location: Left upper arm, BP Patient Position: Sitting)   Pulse 79   Temp 99 °F (37.2 °C)   Resp 18   Wt 134 lb (60.8 kg)   SpO2 97%   BMI 22.30 kg/m²       ECOG PS: 0  General: No distress  Eyes: PERRL, anicteric sclerae  HENT: Atraumatic, PAC in place , OP clear   Skin: No rashes, ecchymoses, or petechiae. Normal temperature, turgor, and texture. Psych: Alert, oriented, appropriate affect, normal judgment/insight    Results:     Lab Results   Component Value Date/Time    WBC 4.4 12/15/2022 08:28 AM    HGB 10.6 (L) 12/15/2022 08:28 AM    HCT 32.8 (L) 12/15/2022 08:28 AM    PLATELET 187 (H) 31/39/6383 08:28 AM    MCV 94.5 12/15/2022 08:28 AM    ABS.  NEUTROPHILS 3.8 12/15/2022 08:28 AM    Hgb, External 10.9 06/30/2011 12:00 AM    Hct, External 33.5 06/30/2011 12:00 AM    Platelet cnt., External 337 02/10/2011 12:00 AM     Lab Results   Component Value Date/Time    Sodium 137 12/15/2022 08:28 AM    Potassium 4.2 12/15/2022 08:28 AM Chloride 106 12/15/2022 08:28 AM    CO2 26 12/15/2022 08:28 AM    Glucose 137 (H) 12/15/2022 08:28 AM    BUN 13 12/15/2022 08:28 AM    Creatinine 0.65 12/15/2022 08:28 AM    GFR est AA >60 06/18/2021 12:02 PM    GFR est non-AA >60 06/18/2021 12:02 PM    Calcium 9.5 12/15/2022 08:28 AM     Lab Results   Component Value Date/Time    Bilirubin, total 0.4 12/15/2022 08:28 AM    ALT (SGPT) 16 12/15/2022 08:28 AM    Alk. phosphatase 55 12/15/2022 08:28 AM    Protein, total 7.0 12/15/2022 08:28 AM    Albumin 3.8 12/15/2022 08:28 AM    Globulin 3.2 12/15/2022 08:28 AM     Records reviewed and summarized above. Pathology report(s) reviewed above. 9/27/2022  INVASIVE CARCINOMA OF THE BREAST: Resection    SPECIMEN       Procedure: Total mastectomy       Specimen Laterality: Left    TUMOR       Histologic Type:  Invasive carcinoma of no special type (ductal)       Glandular (Acinar) / Tubular Differentiation: Score 2       Nuclear Pleomorphism: Score 2       Mitotic Rate: Score 2       Overall Grade: Grade 2 (scores of 6 or 7)       Tumor Size: 3 mm       Tumor Focality: Single focus of invasive carcinoma       Ductal Carcinoma In Situ (DCIS): Present           Ductal Carcinoma In Situ (DCIS): Positive for extensive   intraductal component (EIC)           Size (Extent) of DCIS: 90 mm           Architectural Patterns: Micropapillary, Cribriform, Papillary,   Comedo           Nuclear Grade: Grade III (high)           Necrosis: Present, central (expansive \"comedo\" necrosis)       Lobular Carcinoma In Situ (LCIS): Not identified    Tumor Extent       Skin: Present and uninvolved by invasive carcinoma       Nipple DCIS: DCIS does not involve the nipple epidermis       Lymphovascular Invasion: Not identified       Dermal Lymphovascular Invasion: Not identified       Microcalcifications: Present in DCIS, invasive carcinoma, and   non-neoplastic tissue       Treatment Effect in the Breast: No known presurgical therapy    MARGINS Invasive Carcinoma Margins: Uninvolved by invasive carcinoma           Distance from Closest Margin (Millimeters): Greater than 2 mm       DCIS Margins: Uninvolved by DCIS           Distance from Closest Margin (Millimeters): Less than 1 mm           Closest Margin(s): Anterior, Posterior    LYMPH NODES       Regional Lymph Nodes: Involved by tumor cells           Number of Lymph Nodes with Macrometastases (> 2 mm): 1           Number of Lymph Nodes with Micrometastases (> 0.2 mm to 2 mm and/   or > 200 cells): 0           Size of Largest Metastatic Deposit (Millimeters): 11 mm           Extranodal Extension: Not identified           Total Number of Lymph Nodes Examined: 3           Number of [place] Nodes Examined: 3    PATHOLOGIC STAGE CLASSIFICATION (pTNM, AJCC 8th Edition)       Primary Tumor (pT): pT1a       Regional Lymph Nodes Modifier: (sn): Mont[place] node(s) evaluated       Regional Lymph Nodes (pN): pN1a    ADDITIONAL FINDINGS       Additional Findings: Fibroadenomas          6. Left breast anterior margin, excision:        Ductal carcinoma in situ (DCIS), nuclear grade 3   DCIS is 3 mm from designated anterior margin       Radiology report(s) reviewed above. MRI 8/2022  IMPRESSION  1. Multicentric, multifocal, left DCIS involves all quadrants and largely  replaces the inferior breast. BI-RADS 6.  2. DCIS extends to the left nipple, with an enhancing mass in the nipple. 3. Probable left axillary kari metastasis. BI-RADS 4.  4. Linear enhancement in the subareolar right breast, likely DCIS. BI-RADS 4.  5. Bilobed mass at 2:00 in the right breast. BI-RADS 4A. 6. Overall assessment: BI-RADS Assessment Category 4: Suspicious abnormality. 7. Recommendations: Ultrasound-guided left axillary lymph node biopsy, MRI  guided right breast biopsy. CT 11/2022:  IMPRESSION  1. Post bilateral mastectomies.  No evidence of metastatic disease    Assessment:   1) Left breast cancer    S/P Bilateral mastectomy and Left SLN on 9/27/2022   3 mm, 1/3 + nodes  Grade 2  Margins negative  9 CM are of DCIS    ER 90% PR10%HER2 raad negative  Ki 67 20%  Mamma print high risk Luminal B     [id number] Stage IA    See prior office note regarding discussion of treatment plan. I have recommended adjuvant chemotherapy. I discussed both dose dense AC followed by T or TC. She is aware that a non inferiority for TC is not proven though with a single positive node it is a reasonable choice     Today is cycle 3. Labs ok to proceed  Post chemoRT and endocrine therapy to be discussed next visit    2) R breast intraductal papilloma  S/p mastectomy      3) FH of breast cancer  [place]    4) Encounter for high risk medications like chemotherapy   Grade 1 nausea, grade 1 fatigue    Labs pending     Plan:     Proceed today with cycle 3 of adjuvant TC (docetaxel 75mg/m2, cytoxan 600mg/m2) given once every 3 weeks x 4 cycles   Labs to include CBC with diff, CMP prior to infusions  Zofran / Compazine scheduled  Add on zyprexa nightly days 1-4  Dexamethasone day before & day after  Will discuss adjuvant endocrine therapy after radiation. This may involve ovarian function suppression plus AI given high risk disease  Neulasta given high risk for grade IV neutropenia  Magic mouthwash PRN   500 ml NS bolus in clinic     RTC in 3 weeks for cycle 3     I appreciate the opportunity to participate in Ms. Edmund dial. I personally saw and evaluated the patient and performed the key components of medical decision making. The history, physical exam, and documentation were performed by Janae Zarate NP. I reviewed and verified the above documentation and modified it as needed.       Signed By: Jennifer Alberto MD

## 2023-01-05 ENCOUNTER — HOSPITAL ENCOUNTER (OUTPATIENT)
Dept: INFUSION THERAPY | Age: 38
Discharge: HOME | End: 2023-01-05
Payer: COMMERCIAL

## 2023-01-05 ENCOUNTER — OFFICE VISIT (OUTPATIENT)
Dept: ONCOLOGY | Age: 38
End: 2023-01-05
Payer: COMMERCIAL

## 2023-01-05 ENCOUNTER — APPOINTMENT (OUTPATIENT)
Dept: INFUSION THERAPY | Age: 38
End: 2023-01-05
Payer: COMMERCIAL

## 2023-01-05 VITALS
RESPIRATION RATE: 16 BRPM | SYSTOLIC BLOOD PRESSURE: 126 MMHG | TEMPERATURE: 99 F | WEIGHT: 134 LBS | DIASTOLIC BLOOD PRESSURE: 75 MMHG | HEART RATE: 79 BPM | BODY MASS INDEX: 22.3 KG/M2

## 2023-01-05 VITALS
HEART RATE: 79 BPM | WEIGHT: 134 LBS | BODY MASS INDEX: 22.3 KG/M2 | SYSTOLIC BLOOD PRESSURE: 126 MMHG | TEMPERATURE: 99 F | OXYGEN SATURATION: 97 % | DIASTOLIC BLOOD PRESSURE: 75 MMHG | RESPIRATION RATE: 18 BRPM

## 2023-01-05 DIAGNOSIS — Z17.0 MALIGNANT NEOPLASM OF LEFT BREAST IN FEMALE, ESTROGEN RECEPTOR POSITIVE, UNSPECIFIED SITE OF BREAST (HCC): ICD-10-CM

## 2023-01-05 DIAGNOSIS — C50.912 MALIGNANT NEOPLASM OF LEFT BREAST IN FEMALE, ESTROGEN RECEPTOR POSITIVE, UNSPECIFIED SITE OF BREAST (HCC): ICD-10-CM

## 2023-01-05 DIAGNOSIS — C50.912 MALIGNANT NEOPLASM OF LEFT BREAST IN FEMALE, ESTROGEN RECEPTOR POSITIVE, UNSPECIFIED SITE OF BREAST (HCC): Primary | ICD-10-CM

## 2023-01-05 DIAGNOSIS — C50.912 MALIGNANT NEOPLASM OF LEFT FEMALE BREAST, UNSPECIFIED ESTROGEN RECEPTOR STATUS, UNSPECIFIED SITE OF BREAST (HCC): Primary | ICD-10-CM

## 2023-01-05 DIAGNOSIS — Z17.0 MALIGNANT NEOPLASM OF LEFT BREAST IN FEMALE, ESTROGEN RECEPTOR POSITIVE, UNSPECIFIED SITE OF BREAST (HCC): Primary | ICD-10-CM

## 2023-01-05 DIAGNOSIS — Z95.828 PORT-A-CATH IN PLACE: ICD-10-CM

## 2023-01-05 DIAGNOSIS — Z51.11 ENCOUNTER FOR ANTINEOPLASTIC CHEMOTHERAPY: ICD-10-CM

## 2023-01-05 DIAGNOSIS — D05.12 DUCTAL CARCINOMA IN SITU (DCIS) OF LEFT BREAST: ICD-10-CM

## 2023-01-05 LAB
ALBUMIN SERPL-MCNC: 3.8 G/DL (ref 3.5–5)
ALBUMIN/GLOB SERPL: 1.2 (ref 1.1–2.2)
ALP SERPL-CCNC: 51 U/L (ref 45–117)
ALT SERPL-CCNC: 27 U/L (ref 12–78)
ANION GAP SERPL CALC-SCNC: 4 MMOL/L (ref 5–15)
AST SERPL-CCNC: 15 U/L (ref 15–37)
BASOPHILS # BLD: 0 K/UL (ref 0–0.1)
BASOPHILS NFR BLD: 0 % (ref 0–1)
BILIRUB SERPL-MCNC: 0.4 MG/DL (ref 0.2–1)
BUN SERPL-MCNC: 12 MG/DL (ref 6–20)
BUN/CREAT SERPL: 21 (ref 12–20)
CALCIUM SERPL-MCNC: 9.5 MG/DL (ref 8.5–10.1)
CHLORIDE SERPL-SCNC: 107 MMOL/L (ref 97–108)
CO2 SERPL-SCNC: 27 MMOL/L (ref 21–32)
CREAT SERPL-MCNC: 0.58 MG/DL (ref 0.55–1.02)
DIFFERENTIAL METHOD BLD: ABNORMAL
EOSINOPHIL # BLD: 0 K/UL (ref 0–0.4)
EOSINOPHIL NFR BLD: 0 % (ref 0–7)
ERYTHROCYTE [DISTWIDTH] IN BLOOD BY AUTOMATED COUNT: 14.4 % (ref 11.5–14.5)
GLOBULIN SER CALC-MCNC: 3.2 G/DL (ref 2–4)
GLUCOSE SERPL-MCNC: 114 MG/DL (ref 65–100)
HCT VFR BLD AUTO: 30.9 % (ref 35–47)
HGB BLD-MCNC: 9.9 G/DL (ref 11.5–16)
IMM GRANULOCYTES # BLD AUTO: 0 K/UL (ref 0–0.04)
IMM GRANULOCYTES NFR BLD AUTO: 0 % (ref 0–0.5)
LYMPHOCYTES # BLD: 0.7 K/UL (ref 0.8–3.5)
LYMPHOCYTES NFR BLD: 9 % (ref 12–49)
MCH RBC QN AUTO: 29.5 PG (ref 26–34)
MCHC RBC AUTO-ENTMCNC: 32 G/DL (ref 30–36.5)
MCV RBC AUTO: 92 FL (ref 80–99)
MONOCYTES # BLD: 0.2 K/UL (ref 0–1)
MONOCYTES NFR BLD: 3 % (ref 5–13)
NEUTS SEG # BLD: 6.5 K/UL (ref 1.8–8)
NEUTS SEG NFR BLD: 88 % (ref 32–75)
NRBC # BLD: 0 K/UL (ref 0–0.01)
NRBC BLD-RTO: 0 PER 100 WBC
PLATELET # BLD AUTO: 383 K/UL (ref 150–400)
PMV BLD AUTO: 10.5 FL (ref 8.9–12.9)
POTASSIUM SERPL-SCNC: 3.9 MMOL/L (ref 3.5–5.1)
PROT SERPL-MCNC: 7 G/DL (ref 6.4–8.2)
RBC # BLD AUTO: 3.36 M/UL (ref 3.8–5.2)
RBC MORPH BLD: ABNORMAL
SODIUM SERPL-SCNC: 138 MMOL/L (ref 136–145)
WBC # BLD AUTO: 7.4 K/UL (ref 3.6–11)

## 2023-01-05 PROCEDURE — 99215 OFFICE O/P EST HI 40 MIN: CPT | Performed by: INTERNAL MEDICINE

## 2023-01-05 PROCEDURE — 74011250636 HC RX REV CODE- 250/636: Performed by: INTERNAL MEDICINE

## 2023-01-05 PROCEDURE — 85025 COMPLETE CBC W/AUTO DIFF WBC: CPT

## 2023-01-05 PROCEDURE — 96377 APPLICATON ON-BODY INJECTOR: CPT

## 2023-01-05 PROCEDURE — 77030012965 HC NDL HUBR BBMI -A

## 2023-01-05 PROCEDURE — 74011250636 HC RX REV CODE- 250/636: Performed by: NURSE PRACTITIONER

## 2023-01-05 PROCEDURE — 80053 COMPREHEN METABOLIC PANEL: CPT

## 2023-01-05 PROCEDURE — 96375 TX/PRO/DX INJ NEW DRUG ADDON: CPT

## 2023-01-05 PROCEDURE — 96413 CHEMO IV INFUSION 1 HR: CPT

## 2023-01-05 PROCEDURE — 0663T HC SCALP COOL PLMT MNTR RMVL: CPT

## 2023-01-05 PROCEDURE — 36415 COLL VENOUS BLD VENIPUNCTURE: CPT

## 2023-01-05 PROCEDURE — 96417 CHEMO IV INFUS EACH ADDL SEQ: CPT

## 2023-01-05 PROCEDURE — 74011000250 HC RX REV CODE- 250: Performed by: INTERNAL MEDICINE

## 2023-01-05 RX ORDER — HYDROCORTISONE SODIUM SUCCINATE 100 MG/2ML
100 INJECTION, POWDER, FOR SOLUTION INTRAMUSCULAR; INTRAVENOUS AS NEEDED
Status: ACTIVE | OUTPATIENT
Start: 2023-01-05 | End: 2023-01-05

## 2023-01-05 RX ORDER — DIPHENHYDRAMINE HYDROCHLORIDE 50 MG/ML
25 INJECTION, SOLUTION INTRAMUSCULAR; INTRAVENOUS AS NEEDED
Status: ACTIVE | OUTPATIENT
Start: 2023-01-05 | End: 2023-01-05

## 2023-01-05 RX ORDER — ACETAMINOPHEN 325 MG/1
650 TABLET ORAL AS NEEDED
Status: ACTIVE | OUTPATIENT
Start: 2023-01-05 | End: 2023-01-05

## 2023-01-05 RX ORDER — SODIUM CHLORIDE 0.9 % (FLUSH) 0.9 %
5-40 SYRINGE (ML) INJECTION AS NEEDED
Status: DISPENSED | OUTPATIENT
Start: 2023-01-05 | End: 2023-01-05

## 2023-01-05 RX ORDER — DEXAMETHASONE SODIUM PHOSPHATE 10 MG/ML
10 INJECTION INTRAMUSCULAR; INTRAVENOUS ONCE
Status: COMPLETED | OUTPATIENT
Start: 2023-01-05 | End: 2023-01-05

## 2023-01-05 RX ORDER — DIPHENHYDRAMINE HYDROCHLORIDE 50 MG/ML
50 INJECTION, SOLUTION INTRAMUSCULAR; INTRAVENOUS AS NEEDED
Status: ACTIVE | OUTPATIENT
Start: 2023-01-05 | End: 2023-01-05

## 2023-01-05 RX ORDER — PALONOSETRON 0.05 MG/ML
0.25 INJECTION, SOLUTION INTRAVENOUS ONCE
Status: COMPLETED | OUTPATIENT
Start: 2023-01-05 | End: 2023-01-05

## 2023-01-05 RX ORDER — SODIUM CHLORIDE 9 MG/ML
5-250 INJECTION, SOLUTION INTRAVENOUS AS NEEDED
Status: DISPENSED | OUTPATIENT
Start: 2023-01-05 | End: 2023-01-05

## 2023-01-05 RX ORDER — DIPHENHYDRAMINE HYDROCHLORIDE 50 MG/ML
25 INJECTION, SOLUTION INTRAMUSCULAR; INTRAVENOUS ONCE
Status: COMPLETED | OUTPATIENT
Start: 2023-01-05 | End: 2023-01-05

## 2023-01-05 RX ORDER — ALBUTEROL SULFATE 0.83 MG/ML
2.5 SOLUTION RESPIRATORY (INHALATION) AS NEEDED
Status: ACTIVE | OUTPATIENT
Start: 2023-01-05 | End: 2023-01-05

## 2023-01-05 RX ORDER — ONDANSETRON 2 MG/ML
8 INJECTION INTRAMUSCULAR; INTRAVENOUS AS NEEDED
Status: ACTIVE | OUTPATIENT
Start: 2023-01-05 | End: 2023-01-05

## 2023-01-05 RX ORDER — OLANZAPINE 5 MG/1
5 TABLET ORAL
Qty: 4 TABLET | Refills: 0 | Status: SHIPPED | OUTPATIENT
Start: 2023-01-05 | End: 2023-01-09

## 2023-01-05 RX ORDER — ONDANSETRON 8 MG/1
8 TABLET, ORALLY DISINTEGRATING ORAL
Qty: 30 TABLET | Refills: 3 | Status: SHIPPED | OUTPATIENT
Start: 2023-01-05

## 2023-01-05 RX ORDER — EPINEPHRINE 1 MG/ML
0.3 INJECTION, SOLUTION, CONCENTRATE INTRAVENOUS AS NEEDED
Status: ACTIVE | OUTPATIENT
Start: 2023-01-05 | End: 2023-01-05

## 2023-01-05 RX ORDER — SODIUM CHLORIDE 9 MG/ML
5-40 INJECTION INTRAVENOUS AS NEEDED
Status: ACTIVE | OUTPATIENT
Start: 2023-01-05 | End: 2023-01-05

## 2023-01-05 RX ORDER — HEPARIN 100 UNIT/ML
500 SYRINGE INTRAVENOUS AS NEEDED
Status: DISPENSED | OUTPATIENT
Start: 2023-01-05 | End: 2023-01-05

## 2023-01-05 RX ADMIN — SODIUM CHLORIDE, PRESERVATIVE FREE 10 ML: 5 INJECTION INTRAVENOUS at 13:54

## 2023-01-05 RX ADMIN — PALONOSETRON 0.25 MG: 0.05 INJECTION, SOLUTION INTRAVENOUS at 10:25

## 2023-01-05 RX ADMIN — DIPHENHYDRAMINE HYDROCHLORIDE 25 MG: 50 INJECTION, SOLUTION INTRAMUSCULAR; INTRAVENOUS at 10:32

## 2023-01-05 RX ADMIN — PEGFILGRASTIM 6 MG: KIT SUBCUTANEOUS at 13:56

## 2023-01-05 RX ADMIN — CYCLOPHOSPHAMIDE 1008 MG: 200 INJECTION, SOLUTION INTRAVENOUS at 11:31

## 2023-01-05 RX ADMIN — HEPARIN SODIUM (PORCINE) LOCK FLUSH IV SOLN 100 UNIT/ML 500 UNITS: 100 SOLUTION at 13:54

## 2023-01-05 RX ADMIN — DEXAMETHASONE SODIUM PHOSPHATE 10 MG: 10 INJECTION INTRAMUSCULAR; INTRAVENOUS at 10:38

## 2023-01-05 RX ADMIN — SODIUM CHLORIDE 500 ML: 900 INJECTION, SOLUTION INTRAVENOUS at 12:03

## 2023-01-05 RX ADMIN — FAMOTIDINE 20 MG: 10 INJECTION, SOLUTION INTRAVENOUS at 10:28

## 2023-01-05 RX ADMIN — DOCETAXEL ANHYDROUS 126 MG: 10 INJECTION, SOLUTION INTRAVENOUS at 12:25

## 2023-01-05 RX ADMIN — SODIUM CHLORIDE 500 ML: 900 INJECTION, SOLUTION INTRAVENOUS at 10:23

## 2023-01-05 NOTE — PROGRESS NOTES
Dawn Ivy is a 42 y.o. female    Chief Complaint   Patient presents with    Follow-up     Left breast cancer . 1. Have you been to the ER, urgent care clinic since your last visit? Hospitalized since your last visit? No    2. Have you seen or consulted any other health care providers outside of the [address] [place] [address] since your last visit? Include any pap smears or colon screening.  No

## 2023-01-05 NOTE — PROGRESS NOTES
\[facility]\"" Chemo Progress Note    Date: 2023    Name: Salena Newton    MRN: [id number]         : 1985    Ms. Bridget Guevara Arrived ambulatory and in no distress for cycle 3 day 1 of Docetaxel and Cytoxan with cold cap therapy. Assessment was completed and documented in flowsheets. No acute concerns at this time. Port accessed without difficulty, labs drawn and processed. Follow Up: Proceed with treatment    Chemotherapy Flowsheet 2023   Cycle C3   Date 2023   Drug / Regimen TC   Pre Hydration -   Pre Meds given   Notes -         Ms. Adamss vitals were reviewed. Patient Vitals for the past 12 hrs:   Temp Pulse Resp BP   23 0808 99 °F (37.2 °C) 79 16 126/75         Lab results were obtained and reviewed. Labs within parameter for treatment. Recent Results (from the past 12 hour(s))   CBC WITH AUTOMATED DIFF    Collection Time: 23  8:21 AM   Result Value Ref Range    WBC 7.4 3.6 - 11.0 K/uL    RBC 3.36 (L) 3.80 - 5.20 M/uL    HGB 9.9 (L) 11.5 - 16.0 g/dL    HCT 30.9 (L) 35.0 - 47.0 %    MCV 92.0 80.0 - 99.0 FL    MCH 29.5 26.0 - 34.0 PG    MCHC 32.0 30.0 - 36.5 g/dL    RDW 14.4 11.5 - 14.5 %    PLATELET 803 742 - 696 K/uL    MPV 10.5 8.9 - 12.9 FL    NRBC 0.0 0  WBC    ABSOLUTE NRBC 0.00 0.00 - 0.01 K/uL    NEUTROPHILS 88 (H) 32 - 75 %    LYMPHOCYTES 9 (L) 12 - 49 %    MONOCYTES 3 (L) 5 - 13 %    EOSINOPHILS 0 0 - 7 %    BASOPHILS 0 0 - 1 %    IMMATURE GRANULOCYTES 0 0.0 - 0.5 %    ABS. NEUTROPHILS 6.5 1.8 - 8.0 K/UL    ABS. LYMPHOCYTES 0.7 (L) 0.8 - 3.5 K/UL    ABS. MONOCYTES 0.2 0.0 - 1.0 K/UL    ABS. EOSINOPHILS 0.0 0.0 - 0.4 K/UL    ABS. BASOPHILS 0.0 0.0 - 0.1 K/UL    ABS. IMM.  GRANS. 0.0 0.00 - 0.04 K/UL    DF SMEAR SCANNED      RBC COMMENTS OVALOCYTES  PRESENT       METABOLIC PANEL, COMPREHENSIVE    Collection Time: 23  8:21 AM   Result Value Ref Range    Sodium 138 136 - 145 mmol/L    Potassium 3.9 3.5 - 5.1 mmol/L    Chloride 107 97 - 108 mmol/L    CO2 27 21 - 32 mmol/L    Anion gap 4 (L) 5 - 15 mmol/L    Glucose 114 (H) 65 - 100 mg/dL    BUN 12 6 - 20 MG/DL    Creatinine 0.58 0.55 - 1.02 MG/DL    BUN/Creatinine ratio 21 (H) 12 - 20      eGFR >60 >60 ml/min/1.73m2    Calcium 9.5 8.5 - 10.1 MG/DL    Bilirubin, total 0.4 0.2 - 1.0 MG/DL    ALT (SGPT) 27 12 - 78 U/L    AST (SGOT) 15 15 - 37 U/L    Alk. phosphatase 51 45 - 117 U/L    Protein, total 7.0 6.4 - 8.2 g/dL    Albumin 3.8 3.5 - 5.0 g/dL    Globulin 3.2 2.0 - 4.0 g/dL    A-G Ratio 1.2 1.1 - 2.2         Pre-medications  were administered as ordered and chemotherapy was initiated.   Medications Administered       cyclophosphamide (CYTOXAN) 1,008 mg in 0.9% sodium chloride 250 mL, overfill volume 25 mL chemo infusion       Admin Date  01/05/2023 Action  New Bag Dose  1,008 mg Rate  560.1 mL/hr Route  IntraVENous Administered By  Waleska Veliz, RN              dexamethasone (PF) (DECADRON) 10 mg/mL injection 10 mg       Admin Date  01/05/2023 Action  Given Dose  10 mg Route  IntraVENous Administered By  Waleska Veliz, RN              diphenhydrAMINE (BENADRYL) injection 25 mg       Admin Date  01/05/2023 Action  Given Dose  25 mg Route  IntraVENous Administered By  Waleska Veliz, RN              DOCEtaxeL (TAXOTERE) 126 mg in 0.9% sodium chloride 250 mL, overfill volume 25 mL chemo infusion       Admin Date  01/05/2023 Action  New Bag Dose  126 mg Route  IntraVENous Administered By  Waleska Veliz, RN              famotidine (PF) (PEPCID) 20 mg in 0.9% sodium chloride 10 mL injection       Admin Date  01/05/2023 Action  Given Dose  20 mg Route  IntraVENous Administered By  Waleska Veliz, RN              heparin (porcine) pf 500 Units       Admin Date  01/05/2023 Action  Given Dose  500 Units Route  InterCATHeter Administered By  Waleska Veliz, RN              palonosetron HCl (ALOXI) injection 0.25 mg       Admin Date  01/05/2023 Action  Given Dose  0.25 mg Route  IntraVENous Administered By  Waleska Veliz, EVELYN              pegfilgrastim (NEULASTA) wearable SQ injector 6 mg       Admin Date  01/05/2023 Action  Given Dose  6 mg Route  SubCUTAneous Administered By  Anya Greco RN              sodium chloride (NS) flush 5-40 mL       Admin Date  01/05/2023 Action  Given Dose  10 mL Route  IntraVENous Administered By  Anya Greco RN              sodium chloride 0.9 % bolus infusion 500 mL       Admin Date  01/05/2023 Action  New Bag Dose  500 mL Rate  1,000 mL/hr Route  IntraVENous Administered By  Anya Greco RN               Admin Date  01/05/2023 Action  New Bag Dose  500 mL Rate   Route  IntraVENous Administered By  Anya Greco RN                    Two nurses verified prior to administering:  Drug name, Drug dose, Infusion volume or drug volume when prepared in a syringe, Rate of administration, Route of administration, Expiration dates and/or times, Appearance and physical integrity of the drugs, Rate set on infusion pump, when used, and Sequencing of drug administration. Ms. Renan Duarte tolerated treatment well, port flushed and de-accessed per protocol. Nuelasta on body placed on right arm. Patient was discharged from [place] [address] in stable condition. Patient was discharged from [facility] in [facility] condition. Patient aware of next appointment.      Future Appointments   Date Time Provider Janice Le   1/26/2023  8:00 AM [facility]2 [place] [facility]   1/26/2023  8:15 AM Harika Mc  [address] [facility] AMB   2/9/2023  8:00 AM [facility] [facility]   2/23/2023  8:00 AM [facility] [facility]   3/9/2023  8:00 AM C2 [place] [facility]   3/23/2023  8:00 AM C2 [place] [id number] Duckworth RN  January 5, 2023

## 2023-01-09 DIAGNOSIS — C50.912 MALIGNANT NEOPLASM OF LEFT BREAST IN FEMALE, ESTROGEN RECEPTOR POSITIVE, UNSPECIFIED SITE OF BREAST (HCC): ICD-10-CM

## 2023-01-09 DIAGNOSIS — Z17.0 MALIGNANT NEOPLASM OF LEFT BREAST IN FEMALE, ESTROGEN RECEPTOR POSITIVE, UNSPECIFIED SITE OF BREAST (HCC): ICD-10-CM

## 2023-01-10 RX ORDER — PROCHLORPERAZINE MALEATE 5 MG
5 TABLET ORAL
Qty: 30 TABLET | Refills: 3 | Status: SHIPPED | OUTPATIENT
Start: 2023-01-10

## 2023-01-19 RX ORDER — SODIUM CHLORIDE 9 MG/ML
5-250 INJECTION, SOLUTION INTRAVENOUS AS NEEDED
Status: CANCELLED | OUTPATIENT
Start: 2023-01-26

## 2023-01-19 RX ORDER — SODIUM CHLORIDE 9 MG/ML
5-40 INJECTION INTRAVENOUS AS NEEDED
Status: CANCELLED | OUTPATIENT
Start: 2023-01-26

## 2023-01-23 DIAGNOSIS — C50.912 MALIGNANT NEOPLASM OF LEFT BREAST IN FEMALE, ESTROGEN RECEPTOR POSITIVE, UNSPECIFIED SITE OF BREAST (HCC): ICD-10-CM

## 2023-01-23 DIAGNOSIS — Z17.0 MALIGNANT NEOPLASM OF LEFT BREAST IN FEMALE, ESTROGEN RECEPTOR POSITIVE, UNSPECIFIED SITE OF BREAST (HCC): ICD-10-CM

## 2023-01-23 RX ORDER — PROCHLORPERAZINE MALEATE 5 MG
5 TABLET ORAL
Qty: 30 TABLET | Refills: 3 | Status: SHIPPED | OUTPATIENT
Start: 2023-01-23

## 2023-01-24 ENCOUNTER — APPOINTMENT (OUTPATIENT)
Dept: INFUSION THERAPY | Age: 38
End: 2023-01-24
Payer: COMMERCIAL

## 2023-01-25 DIAGNOSIS — Z17.0 MALIGNANT NEOPLASM OF LEFT BREAST IN FEMALE, ESTROGEN RECEPTOR POSITIVE, UNSPECIFIED SITE OF BREAST (HCC): ICD-10-CM

## 2023-01-25 DIAGNOSIS — C50.912 MALIGNANT NEOPLASM OF LEFT BREAST IN FEMALE, ESTROGEN RECEPTOR POSITIVE, UNSPECIFIED SITE OF BREAST (HCC): ICD-10-CM

## 2023-01-25 RX ORDER — DEXAMETHASONE 4 MG/1
TABLET ORAL
Qty: 4 TABLET | Refills: 0 | Status: SHIPPED | OUTPATIENT
Start: 2023-01-25 | End: 2023-01-26 | Stop reason: SDUPTHER

## 2023-01-25 NOTE — PROGRESS NOTES
[facility] at [address], [id number] [place] [address] Road, [facility] [phone]  F: [phone]    Reason for Visit:   Nimisha Dick is a 42 y.o. female who is seen for follow up of Left breast cancer . Treatment History:   9/27/2022-bilateral mastectomy with left sentinel node biopsy  11/22/22: Adjuvant TC     History of Present Illness:   Patient is a 59-year-old female with a history of anemia and fibrocystic breast disease seen for left breast cancer and DCIS. She had a mammogram in May 2022 that showed diffuse left breast calcifications as well as several masses in the right breast that were thought to be fibroadenomas. Initial biopsy of left breast calcifications in June 2022 was consistent with DCIS. She was referred to Dr. Ruthie Aguirre who obtained a breast MRI on 8/3/2022. This confirmed multicentric multifocal left breast DCIS in all quadrants . Probable left axillary kari metastases, bilobed mass at 2:00 in the right breast as well is linear enhancement in the subareolar right breast.  Right breast biopsy of the subareolar region was consistent with intraductal papilloma with focal atypical ductal hyperplasia, right breast 2 cm masslike lesion was consistent with fibroadenoma. Underwent a bilateral mastectomy with sentinel node biopsy on the left on 9/27/2022. Right breast showed intraductal papilloma and fibroadenoma. Left breast showed extensive high-grade DCIS with negative margins as well as a 3 mm focus of invasive carcinoma, grade 2, 1 positive node of 3. ER 90% GA 90% HER2/raad negative, Ki-67 20%. Mother's cancer diagnosed when she was 72, and had negative genetic testing. She comes today for cycle 4 of adjuvant TC. On Days 3 and 4, had some bad nausea, too the point of feeling like she wanted to pass out at work. Appetite has been better. Taking in fluids as she can. She did have some muscle soreness. Took some pain medication which helped. No mouth sores. Endorses Dry mouth. Mild ankle swelling. Constipation a few days after treatment. Prune juice helps. No other complaint  Rad/onc consultation 2/7. She has 3 children. 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      Past Medical History:   Diagnosis Date    Adverse effect of anesthesia 1999    Delayed awakening after anesthesia    Anemia     Anxiety     Breast cancer ([facility] [address] 2022    LEFT    HX OTHER MEDICAL 02/10/2011    fibroids    Ill-defined condition     PALPITATIONS, CLEARED BY CARDIOLOGIST    Postpartum depression     with 1st pregnancy    Unspecified breast disorder     fibrocystic breasts, followed at [facility]      Past Surgical History:   Procedure Laterality Date    HX ADENOIDECTOMY  1988    HX BILATERAL MASTECTOMY  09/27/2022    HX BREAST BIOPSY Left     06/2022 AND 08/2022 BIOPSIES    HX BREAST RECONSTRUCTION Bilateral 09/27/2022    .  performed by Roman Mccain MD at [facility] 11    HX One [place]    HX MASTECTOMY Bilateral 09/27/2022    BILATERAL SKIN SPARING MASTECTOMIES LEFT BREAST SENTINEL NODE BIOPSY  DR. Araseli Peralta - BILATERAL BREAST RECONSTRUCTION WITH TISSUE EXPANDERS AND ALLODERM performed by Mau Hassan MD at [address] [place] [address] History     Tobacco Use    Smoking status: Never    Smokeless tobacco: Never   Substance Use Topics    Alcohol use: No      Family History   Problem Relation Age of Onset    Hypertension Mother         caused by RA rx    Arthritis-rheumatoid Mother     OSTEOARTHRITIS Mother     No Known Problems Father     Other Sister         UTERINE FIBROIDS    Stroke Maternal Grandfather     Cancer Neg Hx     Heart Disease Neg Hx     Anesth Problems Neg Hx      Current Outpatient Medications   Medication Sig    dexAMETHasone (DECADRON) 4 mg tablet Take 2 tablets by mouth twice daily the day before chemotherapy and the day after chemotherapy    prochlorperazine (Compazine) 5 mg tablet Take 1 Tablet by mouth every six (6) hours as needed for Nausea or Vomiting. ondansetron (ZOFRAN ODT) 8 mg disintegrating tablet Take 1 Tablet by mouth every eight (8) hours as needed for Nausea or Vomiting.    escitalopram oxalate (LEXAPRO) 5 mg tablet Take 1 Tablet by mouth daily. magic mouthwash solution Magic mouth wash Maalox Lidocaine 2% viscous Diphenhydramine oral solution Swish & swallow 5mL (1 tsp) 4 times daily as needed for mouth sores Pharmacy to mix equal portions of ingredients to a total volume as indicated in the dispense amount. fexofenadine (ALLEGRA) 60 mg tablet Take 60 mg by mouth nightly. No current facility-administered medications for this visit. Allergies   Allergen Reactions    Blueberry Swelling    Sulfa (Sulfonamide Antibiotics) Anaphylaxis    Caffeine Palpitations    Crab Swelling    Pseudoephedrine Palpitations        Review of Systems: A complete review of systems was obtained, negative except as described above. Physical Exam:       Visit Vitals  BP 99/61 (BP 1 Location: Right arm, BP Patient Position: Sitting)   Pulse 68   Temp 97.7 °F (36.5 °C)   Resp 18   Wt 144 lb (65.3 kg)   SpO2 97%   BMI 23.96 kg/m²         ECOG PS: 0  General: No distress, dry mouth  Eyes: PERRL, anicteric sclerae  HENT: Atraumatic, PAC in place , OP clear   Skin: No rashes, ecchymoses, or petechiae. Normal temperature, turgor, and texture. Psych: Alert, oriented, appropriate affect, normal judgment/insight    Results:     Lab Results   Component Value Date/Time    WBC 7.4 01/05/2023 08:21 AM    HGB 9.9 (L) 01/05/2023 08:21 AM    HCT 30.9 (L) 01/05/2023 08:21 AM    PLATELET 834 75/69/2119 08:21 AM    MCV 92.0 01/05/2023 08:21 AM    ABS.  NEUTROPHILS 6.5 01/05/2023 08:21 AM    Hgb, External 10.9 06/30/2011 12:00 AM    Hct, External 33.5 06/30/2011 12:00 AM    Platelet cnt., External 337 02/10/2011 12:00 AM     Lab Results   Component Value Date/Time    Sodium 138 01/05/2023 08:21 AM    Potassium 3.9 01/05/2023 08:21 AM Chloride 107 01/05/2023 08:21 AM    CO2 27 01/05/2023 08:21 AM    Glucose 114 (H) 01/05/2023 08:21 AM    BUN 12 01/05/2023 08:21 AM    Creatinine 0.58 01/05/2023 08:21 AM    GFR est AA >60 06/18/2021 12:02 PM    GFR est non-AA >60 06/18/2021 12:02 PM    Calcium 9.5 01/05/2023 08:21 AM     Lab Results   Component Value Date/Time    Bilirubin, total 0.4 01/05/2023 08:21 AM    ALT (SGPT) 27 01/05/2023 08:21 AM    Alk. phosphatase 51 01/05/2023 08:21 AM    Protein, total 7.0 01/05/2023 08:21 AM    Albumin 3.8 01/05/2023 08:21 AM    Globulin 3.2 01/05/2023 08:21 AM     Records reviewed and summarized above. Pathology report(s) reviewed above. 9/27/2022  INVASIVE CARCINOMA OF THE BREAST: Resection    SPECIMEN       Procedure: Total mastectomy       Specimen Laterality: Left    TUMOR       Histologic Type:  Invasive carcinoma of no special type (ductal)       Glandular (Acinar) / Tubular Differentiation: Score 2       Nuclear Pleomorphism: Score 2       Mitotic Rate: Score 2       Overall Grade: Grade 2 (scores of 6 or 7)       Tumor Size: 3 mm       Tumor Focality: Single focus of invasive carcinoma       Ductal Carcinoma In Situ (DCIS): Present           Ductal Carcinoma In Situ (DCIS): Positive for extensive   intraductal component (EIC)           Size (Extent) of DCIS: 90 mm           Architectural Patterns: Micropapillary, Cribriform, Papillary,   [place]           Nuclear Grade: Grade III (high)           Necrosis: Present, central (expansive \"comedo\" necrosis)       Lobular Carcinoma In Situ (LCIS): Not identified    Tumor Extent       Skin: Present and uninvolved by invasive carcinoma       Nipple DCIS: DCIS does not involve the nipple epidermis       Lymphovascular Invasion: Not identified       Dermal Lymphovascular Invasion: Not identified       Microcalcifications: Present in DCIS, invasive carcinoma, and   non-neoplastic tissue       Treatment Effect in the Breast: No known presurgical therapy    MARGINS Invasive Carcinoma Margins: Uninvolved by invasive carcinoma           Distance from Closest Margin (Millimeters): Greater than 2 mm       DCIS Margins: Uninvolved by DCIS           Distance from Closest Margin (Millimeters): Less than 1 mm           Closest Margin(s): Anterior, Posterior    LYMPH NODES       Regional Lymph Nodes: Involved by tumor cells           Number of Lymph Nodes with Macrometastases (> 2 mm): 1           Number of Lymph Nodes with Micrometastases (> 0.2 mm to 2 mm and/   or > 200 cells): 0           Size of Largest Metastatic Deposit (Millimeters): 11 mm           Extranodal Extension: Not identified           Total Number of Lymph Nodes Examined: 3           Number of [place] Nodes Examined: 3    PATHOLOGIC STAGE CLASSIFICATION (pTNM, AJCC 8th Edition)       Primary Tumor (pT): pT1a       Regional Lymph Nodes Modifier: (sn): [place] node(s) evaluated       Regional Lymph Nodes (pN): pN1a    ADDITIONAL FINDINGS       Additional Findings: Fibroadenomas          6. Left breast anterior margin, excision:        Ductal carcinoma in situ (DCIS), nuclear grade 3   DCIS is 3 mm from designated anterior margin       Radiology report(s) reviewed above. MRI 8/2022  IMPRESSION  1. Multicentric, multifocal, left DCIS involves all quadrants and largely  replaces the inferior breast. BI-RADS 6.  2. DCIS extends to the left nipple, with an enhancing mass in the nipple. 3. Probable left axillary kari metastasis. BI-RADS 4.  4. Linear enhancement in the subareolar right breast, likely DCIS. BI-RADS 4.  5. Bilobed mass at 2:00 in the right breast. BI-RADS 4A. 6. Overall assessment: BI-RADS Assessment Category 4: Suspicious abnormality. 7. Recommendations: Ultrasound-guided left axillary lymph node biopsy, MRI  guided right breast biopsy. CT 11/2022:  IMPRESSION  1. Post bilateral mastectomies.  No evidence of metastatic disease    Assessment:   1) Left breast cancer    S/P Bilateral mastectomy and Left SLN on 9/27/2022   3 mm, 1/3 + nodes  Grade 2  Margins negative  9 CM are of DCIS    ER 90% PR10%HER2 raad negative  Ki 67 20%  Mamma print high risk Luminal B     [id number]- Stage IA    See prior office note regarding discussion of treatment plan. I have recommended adjuvant chemotherapy. I discussed both dose dense AC followed by T or TC. She is aware that a non inferiority for TC is not proven though with a single positive node it is a reasonable choice     Today is cycle 4/4   Labs ok to proceed  Has grade 2 nausea  She will then receive adjuvant RT   We briefly talked about Tamoxifen post RT  In Light of 12 year follow up of SOFT and TEXT data Rissa Villagran et al JCO 2021) which showed an OS benefit of 3.3% with benefit mostly in those < 35 , not leaning toward OFS and AI. 2) R breast intraductal papilloma  S/p mastectomy      3) FH of breast cancer  Ambry- VUS    4) nausea  Chemotherapy induced and grade 2  Zofran schduled every 8 hours  Zyprexa nightly on days 1-4 of chemotherapy  Dexamethasone 4 mg daily on days 3-5   Ativan 0.5 mg every 6 hours as needed      5) Encounter for high risk medications like chemotherapy   Grade 2 nausea, grade 1 fatigue  Labs pending   IVF today and on monday    Plan:     Proceed today with cycle 4 of adjuvant TC (docetaxel 75mg/m2, cytoxan 600mg/m2) given once every 3 weeks x 4 cycles - all labs reviewed, system by system reviewed and toxicities are manageable- continue current dose  Labs to include CBC with diff, CMP prior to infusions  Zofran scheduled  ativan every 6 hours prn   Stop Zyprexa, she is on Lexapro  Dexamethasone days 8 mg days 2 and 3 and 4 mg on days 4 and 5 after chemotherapy  Neulasta given high risk for grade IV neutropenia  Magic mouthwash PRN   IVF today and on monday  Rad/onc consultation 2/7, Will discuss adjuvant endocrine therapy after radiation.   This may involve ovarian function suppression plus AI given high risk disease    RTC after Rad/onc consultation    I appreciate the opportunity to participate in Ms. Lore Carrillo care. I personally saw and evaluated the patient and performed the key components of medical decision making. The history, physical exam, and documentation were performed by Prince Law NP. I reviewed and verified the above documentation and modified it as needed.     Grade 2 nausea, antiemetics reviewed and written instructions provided  I will add IVF  Stop Zyprexa and add ativan  Will discuss adjuvant Tamoxifen as well    As of today continue with current dose of C4   Signed By: Anthony West MD

## 2023-01-25 NOTE — TELEPHONE ENCOUNTER
Oncology Pharmacist Note     Donavon Bobo is a  42 y. o.female  diagnosed with breast cancer  Ms. Rosendo Kenney is being treated with TC. Refill for dexamethasone sent to [facility] pharmacy.      Tomas Gonzalez, PharmD, [facility], [facility] Only    Program: Medical Group  CPA in place: Yes  Recommendation Provided To: Patient/Caregiver: 1 via [employer] Message  Intervention Detail: Refill(s) Provided  Intervention Accepted By: Patient/Caregiver: 1    Time Spent (min): 10

## 2023-01-26 ENCOUNTER — HOSPITAL ENCOUNTER (OUTPATIENT)
Dept: INFUSION THERAPY | Age: 38
Discharge: HOME | End: 2023-01-26
Payer: COMMERCIAL

## 2023-01-26 ENCOUNTER — OFFICE VISIT (OUTPATIENT)
Dept: ONCOLOGY | Age: 38
End: 2023-01-26
Payer: COMMERCIAL

## 2023-01-26 VITALS
HEART RATE: 68 BPM | RESPIRATION RATE: 18 BRPM | TEMPERATURE: 97.7 F | DIASTOLIC BLOOD PRESSURE: 61 MMHG | BODY MASS INDEX: 23.96 KG/M2 | OXYGEN SATURATION: 97 % | SYSTOLIC BLOOD PRESSURE: 99 MMHG | WEIGHT: 144 LBS

## 2023-01-26 VITALS
RESPIRATION RATE: 18 BRPM | TEMPERATURE: 97.7 F | HEART RATE: 52 BPM | SYSTOLIC BLOOD PRESSURE: 116 MMHG | WEIGHT: 144 LBS | DIASTOLIC BLOOD PRESSURE: 74 MMHG | HEIGHT: 65 IN | BODY MASS INDEX: 23.99 KG/M2

## 2023-01-26 DIAGNOSIS — C50.912 MALIGNANT NEOPLASM OF LEFT BREAST IN FEMALE, ESTROGEN RECEPTOR POSITIVE, UNSPECIFIED SITE OF BREAST (HCC): Primary | ICD-10-CM

## 2023-01-26 DIAGNOSIS — R11.0 NAUSEA: ICD-10-CM

## 2023-01-26 DIAGNOSIS — C50.912 MALIGNANT NEOPLASM OF LEFT BREAST IN FEMALE, ESTROGEN RECEPTOR POSITIVE, UNSPECIFIED SITE OF BREAST (HCC): ICD-10-CM

## 2023-01-26 DIAGNOSIS — Z51.11 ENCOUNTER FOR ANTINEOPLASTIC CHEMOTHERAPY: ICD-10-CM

## 2023-01-26 DIAGNOSIS — Z17.0 MALIGNANT NEOPLASM OF LEFT BREAST IN FEMALE, ESTROGEN RECEPTOR POSITIVE, UNSPECIFIED SITE OF BREAST (HCC): ICD-10-CM

## 2023-01-26 DIAGNOSIS — Z17.0 MALIGNANT NEOPLASM OF LEFT BREAST IN FEMALE, ESTROGEN RECEPTOR POSITIVE, UNSPECIFIED SITE OF BREAST (HCC): Primary | ICD-10-CM

## 2023-01-26 DIAGNOSIS — Z95.828 PORT-A-CATH IN PLACE: ICD-10-CM

## 2023-01-26 DIAGNOSIS — D05.12 DUCTAL CARCINOMA IN SITU (DCIS) OF LEFT BREAST: Primary | ICD-10-CM

## 2023-01-26 LAB
ALBUMIN SERPL-MCNC: 3.7 G/DL (ref 3.5–5)
ALBUMIN/GLOB SERPL: 1.3 (ref 1.1–2.2)
ALP SERPL-CCNC: 52 U/L (ref 45–117)
ALT SERPL-CCNC: 22 U/L (ref 12–78)
ANION GAP SERPL CALC-SCNC: 5 MMOL/L (ref 5–15)
AST SERPL-CCNC: 6 U/L (ref 15–37)
BASOPHILS # BLD: 0 K/UL (ref 0–0.1)
BASOPHILS NFR BLD: 0 % (ref 0–1)
BILIRUB SERPL-MCNC: 0.4 MG/DL (ref 0.2–1)
BUN SERPL-MCNC: 15 MG/DL (ref 6–20)
BUN/CREAT SERPL: 29 (ref 12–20)
CALCIUM SERPL-MCNC: 9.1 MG/DL (ref 8.5–10.1)
CHLORIDE SERPL-SCNC: 108 MMOL/L (ref 97–108)
CO2 SERPL-SCNC: 26 MMOL/L (ref 21–32)
CREAT SERPL-MCNC: 0.51 MG/DL (ref 0.55–1.02)
DIFFERENTIAL METHOD BLD: ABNORMAL
EOSINOPHIL # BLD: 0 K/UL (ref 0–0.4)
EOSINOPHIL NFR BLD: 0 % (ref 0–7)
ERYTHROCYTE [DISTWIDTH] IN BLOOD BY AUTOMATED COUNT: 16.3 % (ref 11.5–14.5)
GLOBULIN SER CALC-MCNC: 2.9 G/DL (ref 2–4)
GLUCOSE SERPL-MCNC: 124 MG/DL (ref 65–100)
HCT VFR BLD AUTO: 29.7 % (ref 35–47)
HGB BLD-MCNC: 9.1 G/DL (ref 11.5–16)
IMM GRANULOCYTES # BLD AUTO: 0 K/UL (ref 0–0.04)
IMM GRANULOCYTES NFR BLD AUTO: 0 % (ref 0–0.5)
LYMPHOCYTES # BLD: 0.5 K/UL (ref 0.8–3.5)
LYMPHOCYTES NFR BLD: 7 % (ref 12–49)
MCH RBC QN AUTO: 29.1 PG (ref 26–34)
MCHC RBC AUTO-ENTMCNC: 30.6 G/DL (ref 30–36.5)
MCV RBC AUTO: 94.9 FL (ref 80–99)
MONOCYTES # BLD: 0.2 K/UL (ref 0–1)
MONOCYTES NFR BLD: 2 % (ref 5–13)
NEUTS SEG # BLD: 6.9 K/UL (ref 1.8–8)
NEUTS SEG NFR BLD: 91 % (ref 32–75)
NRBC # BLD: 0 K/UL (ref 0–0.01)
NRBC BLD-RTO: 0 PER 100 WBC
PLATELET # BLD AUTO: 326 K/UL (ref 150–400)
PLATELET COMMENTS,PCOM: ABNORMAL
PMV BLD AUTO: 10.7 FL (ref 8.9–12.9)
POTASSIUM SERPL-SCNC: 4.1 MMOL/L (ref 3.5–5.1)
PROT SERPL-MCNC: 6.6 G/DL (ref 6.4–8.2)
RBC # BLD AUTO: 3.13 M/UL (ref 3.8–5.2)
RBC MORPH BLD: ABNORMAL
SODIUM SERPL-SCNC: 139 MMOL/L (ref 136–145)
WBC # BLD AUTO: 7.6 K/UL (ref 3.6–11)

## 2023-01-26 PROCEDURE — 99215 OFFICE O/P EST HI 40 MIN: CPT | Performed by: INTERNAL MEDICINE

## 2023-01-26 PROCEDURE — 96361 HYDRATE IV INFUSION ADD-ON: CPT

## 2023-01-26 PROCEDURE — 85025 COMPLETE CBC W/AUTO DIFF WBC: CPT

## 2023-01-26 PROCEDURE — 96375 TX/PRO/DX INJ NEW DRUG ADDON: CPT

## 2023-01-26 PROCEDURE — 36415 COLL VENOUS BLD VENIPUNCTURE: CPT

## 2023-01-26 PROCEDURE — 74011250636 HC RX REV CODE- 250/636: Performed by: REGISTERED NURSE

## 2023-01-26 PROCEDURE — 74011250636 HC RX REV CODE- 250/636: Performed by: INTERNAL MEDICINE

## 2023-01-26 PROCEDURE — 74011000250 HC RX REV CODE- 250: Performed by: INTERNAL MEDICINE

## 2023-01-26 PROCEDURE — 74011250636 HC RX REV CODE- 250/636: Performed by: NURSE PRACTITIONER

## 2023-01-26 PROCEDURE — 96413 CHEMO IV INFUSION 1 HR: CPT

## 2023-01-26 PROCEDURE — 96377 APPLICATON ON-BODY INJECTOR: CPT

## 2023-01-26 PROCEDURE — 96367 TX/PROPH/DG ADDL SEQ IV INF: CPT

## 2023-01-26 PROCEDURE — 77030012965 HC NDL HUBR BBMI -A

## 2023-01-26 PROCEDURE — 80053 COMPREHEN METABOLIC PANEL: CPT

## 2023-01-26 PROCEDURE — 74011000258 HC RX REV CODE- 258: Performed by: REGISTERED NURSE

## 2023-01-26 PROCEDURE — 96417 CHEMO IV INFUS EACH ADDL SEQ: CPT

## 2023-01-26 RX ORDER — ALBUTEROL SULFATE 0.83 MG/ML
2.5 SOLUTION RESPIRATORY (INHALATION) AS NEEDED
Status: ACTIVE | OUTPATIENT
Start: 2023-01-26 | End: 2023-01-26

## 2023-01-26 RX ORDER — OLANZAPINE 5 MG/1
5 TABLET ORAL
Qty: 4 TABLET | Refills: 1 | Status: SHIPPED | OUTPATIENT
Start: 2023-01-26 | End: 2023-01-30

## 2023-01-26 RX ORDER — DEXAMETHASONE 4 MG/1
TABLET ORAL
Qty: 16 TABLET | Refills: 0 | Status: SHIPPED | OUTPATIENT
Start: 2023-01-26 | End: 2023-01-26

## 2023-01-26 RX ORDER — HEPARIN 100 UNIT/ML
500 SYRINGE INTRAVENOUS AS NEEDED
Status: ACTIVE | OUTPATIENT
Start: 2023-01-26 | End: 2023-01-26

## 2023-01-26 RX ORDER — HEPARIN 100 UNIT/ML
500 SYRINGE INTRAVENOUS AS NEEDED
Start: 2023-01-30

## 2023-01-26 RX ORDER — LORAZEPAM 0.5 MG/1
0.5 TABLET ORAL
Qty: 90 TABLET | Refills: 0 | Status: SHIPPED | OUTPATIENT
Start: 2023-01-26

## 2023-01-26 RX ORDER — DEXAMETHASONE 4 MG/1
TABLET ORAL
Qty: 20 TABLET | Refills: 0 | Status: SHIPPED | OUTPATIENT
Start: 2023-01-26 | End: 2023-01-26

## 2023-01-26 RX ORDER — EPINEPHRINE 1 MG/ML
0.3 INJECTION, SOLUTION, CONCENTRATE INTRAVENOUS AS NEEDED
Status: ACTIVE | OUTPATIENT
Start: 2023-01-26 | End: 2023-01-26

## 2023-01-26 RX ORDER — SODIUM CHLORIDE 0.9 % (FLUSH) 0.9 %
5-40 SYRINGE (ML) INJECTION AS NEEDED
Status: DISPENSED | OUTPATIENT
Start: 2023-01-26 | End: 2023-01-26

## 2023-01-26 RX ORDER — HYDROCORTISONE SODIUM SUCCINATE 100 MG/2ML
100 INJECTION, POWDER, FOR SOLUTION INTRAMUSCULAR; INTRAVENOUS AS NEEDED
Status: ACTIVE | OUTPATIENT
Start: 2023-01-26 | End: 2023-01-26

## 2023-01-26 RX ORDER — ONDANSETRON 2 MG/ML
8 INJECTION INTRAMUSCULAR; INTRAVENOUS AS NEEDED
Status: ACTIVE | OUTPATIENT
Start: 2023-01-26 | End: 2023-01-26

## 2023-01-26 RX ORDER — DIPHENHYDRAMINE HYDROCHLORIDE 50 MG/ML
25 INJECTION, SOLUTION INTRAMUSCULAR; INTRAVENOUS AS NEEDED
Status: ACTIVE | OUTPATIENT
Start: 2023-01-26 | End: 2023-01-26

## 2023-01-26 RX ORDER — PALONOSETRON 0.05 MG/ML
0.25 INJECTION, SOLUTION INTRAVENOUS ONCE
Status: COMPLETED | OUTPATIENT
Start: 2023-01-26 | End: 2023-01-26

## 2023-01-26 RX ORDER — DIPHENHYDRAMINE HYDROCHLORIDE 50 MG/ML
25 INJECTION, SOLUTION INTRAMUSCULAR; INTRAVENOUS ONCE
Status: COMPLETED | OUTPATIENT
Start: 2023-01-26 | End: 2023-01-26

## 2023-01-26 RX ORDER — DEXAMETHASONE 4 MG/1
TABLET ORAL
Qty: 12 TABLET | Refills: 0 | Status: SHIPPED | OUTPATIENT
Start: 2023-01-26

## 2023-01-26 RX ORDER — ACETAMINOPHEN 325 MG/1
650 TABLET ORAL AS NEEDED
Status: ACTIVE | OUTPATIENT
Start: 2023-01-26 | End: 2023-01-26

## 2023-01-26 RX ORDER — DEXAMETHASONE 4 MG/1
TABLET ORAL
Qty: 7 TABLET | Refills: 0 | Status: SHIPPED | OUTPATIENT
Start: 2023-01-26 | End: 2023-01-26

## 2023-01-26 RX ORDER — DIPHENHYDRAMINE HYDROCHLORIDE 50 MG/ML
50 INJECTION, SOLUTION INTRAMUSCULAR; INTRAVENOUS AS NEEDED
Status: ACTIVE | OUTPATIENT
Start: 2023-01-26 | End: 2023-01-26

## 2023-01-26 RX ORDER — SODIUM CHLORIDE 9 MG/ML
5-250 INJECTION, SOLUTION INTRAVENOUS AS NEEDED
OUTPATIENT
Start: 2023-01-30

## 2023-01-26 RX ORDER — SODIUM CHLORIDE 0.9 % (FLUSH) 0.9 %
5-40 SYRINGE (ML) INJECTION AS NEEDED
OUTPATIENT
Start: 2023-01-30

## 2023-01-26 RX ORDER — DEXAMETHASONE SODIUM PHOSPHATE 10 MG/ML
10 INJECTION INTRAMUSCULAR; INTRAVENOUS ONCE
Status: COMPLETED | OUTPATIENT
Start: 2023-01-26 | End: 2023-01-26

## 2023-01-26 RX ORDER — DEXAMETHASONE 4 MG/1
TABLET ORAL
Qty: 12 TABLET | Refills: 0 | Status: SHIPPED | OUTPATIENT
Start: 2023-01-26 | End: 2023-01-26

## 2023-01-26 RX ORDER — SODIUM CHLORIDE 9 MG/ML
5-250 INJECTION, SOLUTION INTRAVENOUS AS NEEDED
Status: DISPENSED | OUTPATIENT
Start: 2023-01-26 | End: 2023-01-26

## 2023-01-26 RX ORDER — SODIUM CHLORIDE 9 MG/ML
5-40 INJECTION INTRAVENOUS AS NEEDED
OUTPATIENT
Start: 2023-01-30

## 2023-01-26 RX ORDER — ONDANSETRON 8 MG/1
8 TABLET, ORALLY DISINTEGRATING ORAL
Qty: 30 TABLET | Refills: 3 | Status: SHIPPED | OUTPATIENT
Start: 2023-01-26

## 2023-01-26 RX ADMIN — CYCLOPHOSPHAMIDE 1008 MG: 200 INJECTION, SOLUTION INTRAVENOUS at 13:25

## 2023-01-26 RX ADMIN — DOCETAXEL ANHYDROUS 126 MG: 10 INJECTION, SOLUTION INTRAVENOUS at 12:15

## 2023-01-26 RX ADMIN — FAMOTIDINE 20 MG: 10 INJECTION, SOLUTION INTRAVENOUS at 11:17

## 2023-01-26 RX ADMIN — PEGFILGRASTIM 6 MG: KIT SUBCUTANEOUS at 14:02

## 2023-01-26 RX ADMIN — FOSAPREPITANT DIMEGLUMINE 150 MG: 150 INJECTION, POWDER, LYOPHILIZED, FOR SOLUTION INTRAVENOUS at 11:30

## 2023-01-26 RX ADMIN — DEXAMETHASONE SODIUM PHOSPHATE 10 MG: 10 INJECTION INTRAMUSCULAR; INTRAVENOUS at 11:22

## 2023-01-26 RX ADMIN — DIPHENHYDRAMINE HYDROCHLORIDE 25 MG: 50 INJECTION, SOLUTION INTRAMUSCULAR; INTRAVENOUS at 11:20

## 2023-01-26 RX ADMIN — HEPARIN SODIUM (PORCINE) LOCK FLUSH IV SOLN 100 UNIT/ML 500 UNITS: 100 SOLUTION at 14:02

## 2023-01-26 RX ADMIN — SODIUM CHLORIDE 1000 ML: 900 INJECTION, SOLUTION INTRAVENOUS at 10:38

## 2023-01-26 RX ADMIN — SODIUM CHLORIDE, PRESERVATIVE FREE 10 ML: 5 INJECTION INTRAVENOUS at 14:02

## 2023-01-26 RX ADMIN — PALONOSETRON 0.25 MG: 0.05 INJECTION, SOLUTION INTRAVENOUS at 11:19

## 2023-01-26 NOTE — PROGRESS NOTES
[facility] Chemo Progress Note    Date: January 26, 2023        0800: Ms. Bridget Guevara Arrived to [facility] for  TC c 4 ambulatory in stable condition. Assessment was completed and port accessed by assess RN. Labs drawn and sent for processing. Went to provider appointment with Medical Oncology. Returned from provider appointment. Labs reviewed. Criteria for treatment was met. Ms. Kady Cardoza vitals were reviewed. Patient Vitals for the past 12 hrs:   Temp Pulse Resp BP   01/26/23 1530 -- (!) 52 -- 116/74   01/26/23 0816 97.7 °F (36.5 °C) 68 18 (!) 127/50         Lab results were obtained and reviewed. Recent Results (from the past 12 hour(s))   CBC WITH AUTOMATED DIFF    Collection Time: 01/26/23  8:20 AM   Result Value Ref Range    WBC 7.6 3.6 - 11.0 K/uL    RBC 3.13 (L) 3.80 - 5.20 M/uL    HGB 9.1 (L) 11.5 - 16.0 g/dL    HCT 29.7 (L) 35.0 - 47.0 %    MCV 94.9 80.0 - 99.0 FL    MCH 29.1 26.0 - 34.0 PG    MCHC 30.6 30.0 - 36.5 g/dL    RDW 16.3 (H) 11.5 - 14.5 %    PLATELET 872 597 - 454 K/uL    MPV 10.7 8.9 - 12.9 FL    NRBC 0.0 0  WBC    ABSOLUTE NRBC 0.00 0.00 - 0.01 K/uL    NEUTROPHILS 91 (H) 32 - 75 %    LYMPHOCYTES 7 (L) 12 - 49 %    MONOCYTES 2 (L) 5 - 13 %    EOSINOPHILS 0 0 - 7 %    BASOPHILS 0 0 - 1 %    IMMATURE GRANULOCYTES 0 0.0 - 0.5 %    ABS. NEUTROPHILS 6.9 1.8 - 8.0 K/UL    ABS. LYMPHOCYTES 0.5 (L) 0.8 - 3.5 K/UL    ABS. MONOCYTES 0.2 0.0 - 1.0 K/UL    ABS. EOSINOPHILS 0.0 0.0 - 0.4 K/UL    ABS. BASOPHILS 0.0 0.0 - 0.1 K/UL    ABS. IMM.  GRANS. 0.0 0.00 - 0.04 K/UL    DF SMEAR SCANNED      PLATELET COMMENTS Large Platelets      RBC COMMENTS ANISOCYTOSIS  1+       METABOLIC PANEL, COMPREHENSIVE    Collection Time: 01/26/23  8:20 AM   Result Value Ref Range    Sodium 139 136 - 145 mmol/L    Potassium 4.1 3.5 - 5.1 mmol/L    Chloride 108 97 - 108 mmol/L    CO2 26 21 - 32 mmol/L    Anion gap 5 5 - 15 mmol/L    Glucose 124 (H) 65 - 100 mg/dL    BUN 15 6 - 20 MG/DL    Creatinine 0.51 (L) 0.55 - 1.02 MG/DL BUN/Creatinine ratio 29 (H) 12 - 20      eGFR >60 >60 ml/min/1.73m2    Calcium 9.1 8.5 - 10.1 MG/DL    Bilirubin, total 0.4 0.2 - 1.0 MG/DL    ALT (SGPT) 22 12 - 78 U/L    AST (SGOT) 6 (L) 15 - 37 U/L    Alk. phosphatase 52 45 - 117 U/L    Protein, total 6.6 6.4 - 8.2 g/dL    Albumin 3.7 3.5 - 5.0 g/dL    Globulin 2.9 2.0 - 4.0 g/dL    A-G Ratio 1.3 1.1 - 2.2          Pre-medications  were administered as ordered and chemotherapy was initiated.   Medications Administered       cyclophosphamide (CYTOXAN) 1,008 mg in 0.9% sodium chloride 250 mL, overfill volume 25 mL chemo infusion       Admin Date  01/26/2023 Action  New Bag Dose  1,008 mg Rate  560.1 mL/hr Route  IntraVENous Administered By  Jennifer Law RN              dexamethasone (PF) (DECADRON) 10 mg/mL injection 10 mg       Admin Date  01/26/2023 Action  Given Dose  10 mg Route  IntraVENous Administered By  Jennifer Law RN              diphenhydrAMINE (BENADRYL) injection 25 mg       Admin Date  01/26/2023 Action  Given Dose  25 mg Route  IntraVENous Administered By  Jennifer Law RN              DOCEtaxeL (TAXOTERE) 126 mg in 0.9% sodium chloride 250 mL, overfill volume 25 mL chemo infusion       Admin Date  01/26/2023 Action  New Bag Dose  126 mg Route  IntraVENous Administered By  Jennifer Law RN              famotidine (PF) (PEPCID) 20 mg in 0.9% sodium chloride 10 mL injection       Admin Date  01/26/2023 Action  Given Dose  20 mg Route  IntraVENous Administered By  Jennifer Law RN              fosaprepitant (EMEND) 150 mg in 0.9% sodium chloride 150 mL IVPB       Admin Date  01/26/2023 Action  Given Dose  150 mg Rate  450 mL/hr Route  IntraVENous Administered By  Jennifer Law RN              heparin (porcine) pf 500 Units       Admin Date  01/26/2023 Action  Given Dose  500 Units Route  InterCATHeter Administered By  Jennifer Law RN              palonosetron HCl (ALOXI) injection 0.25 mg       Admin Date  01/26/2023 Action  Given Dose  0.25 mg Route  IntraVENous Administered By  Hung Terry RN              pegfilgrastim (NEULASTA) wearable SQ injector 6 mg       Admin Date  01/26/2023 Action  Given Dose  6 mg Route  SubCUTAneous Administered By  Hung Terry RN              sodium chloride (NS) flush 5-40 mL       Admin Date  01/26/2023 Action  Given Dose  10 mL Route  IntraVENous Administered By  Hung Terry RN              sodium chloride 0.9 % bolus infusion 1,000 mL       Admin Date  01/26/2023 Action  New Bag Dose  1,000 mL Rate  1,000 mL/hr Route  IntraVENous Administered By  Hung Terry RN                 OBI placed on right arm    Two nurses verified prior to administering: Drug name, Drug dose, Infusion volume or drug volume when prepared in a syringe, Rate of administration, Route of administration, Expiration dates and/or times, Appearance and physical integrity of the drugs, Rate set on infusion pump, when used, and Sequencing of drug administration. 1530 Patient tolerated treatment well. Port maintained positive blood return throughout treatment. Port flushed, heparinized and de accessed per protocol. Patient was discharged in stable condition.  Patient Xenia Murguia milian as this was her last treatment  Future Appointments   Date Time Provider Janice Le   1/30/2023  2:00 PM G2 MINI [address]   2/9/2023  8:00 AM C2 [facility] [place] [facility]   2/23/2023  8:00 AM C2 MINI LONG TX [facility]   3/9/2023  8:00 AM C2 [facility]I LONG [facility]   3/23/2023  8:00 AM C2 MINI LONG [facility]         Vick Potter, RN, RN  January 26, 2023

## 2023-01-27 DIAGNOSIS — C50.912 MALIGNANT NEOPLASM OF LEFT BREAST IN FEMALE, ESTROGEN RECEPTOR POSITIVE, UNSPECIFIED SITE OF BREAST (HCC): ICD-10-CM

## 2023-01-27 DIAGNOSIS — Z17.0 MALIGNANT NEOPLASM OF LEFT BREAST IN FEMALE, ESTROGEN RECEPTOR POSITIVE, UNSPECIFIED SITE OF BREAST (HCC): ICD-10-CM

## 2023-01-27 RX ORDER — DEXAMETHASONE 4 MG/1
TABLET ORAL
Qty: 12 TABLET | Refills: 0 | Status: CANCELLED | OUTPATIENT
Start: 2023-01-27

## 2023-01-30 ENCOUNTER — HOSPITAL ENCOUNTER (OUTPATIENT)
Dept: INFUSION THERAPY | Age: 38
Discharge: HOME | End: 2023-01-30
Payer: COMMERCIAL

## 2023-01-30 VITALS — TEMPERATURE: 98.6 F | DIASTOLIC BLOOD PRESSURE: 57 MMHG | SYSTOLIC BLOOD PRESSURE: 99 MMHG | HEART RATE: 65 BPM

## 2023-01-30 DIAGNOSIS — C50.912 MALIGNANT NEOPLASM OF LEFT BREAST IN FEMALE, ESTROGEN RECEPTOR POSITIVE, UNSPECIFIED SITE OF BREAST (HCC): ICD-10-CM

## 2023-01-30 DIAGNOSIS — C50.912 MALIGNANT NEOPLASM OF LEFT BREAST IN FEMALE, ESTROGEN RECEPTOR POSITIVE, UNSPECIFIED SITE OF BREAST (HCC): Primary | ICD-10-CM

## 2023-01-30 DIAGNOSIS — Z17.0 MALIGNANT NEOPLASM OF LEFT BREAST IN FEMALE, ESTROGEN RECEPTOR POSITIVE, UNSPECIFIED SITE OF BREAST (HCC): Primary | ICD-10-CM

## 2023-01-30 DIAGNOSIS — Z17.0 MALIGNANT NEOPLASM OF LEFT BREAST IN FEMALE, ESTROGEN RECEPTOR POSITIVE, UNSPECIFIED SITE OF BREAST (HCC): ICD-10-CM

## 2023-01-30 PROCEDURE — 77030012965 HC NDL HUBR BBMI -A

## 2023-01-30 PROCEDURE — 74011250636 HC RX REV CODE- 250/636: Performed by: NURSE PRACTITIONER

## 2023-01-30 PROCEDURE — 96360 HYDRATION IV INFUSION INIT: CPT

## 2023-01-30 PROCEDURE — 74011000250 HC RX REV CODE- 250: Performed by: NURSE PRACTITIONER

## 2023-01-30 RX ORDER — HEPARIN 100 UNIT/ML
500 SYRINGE INTRAVENOUS AS NEEDED
Status: DISCONTINUED | OUTPATIENT
Start: 2023-01-30 | End: 2023-01-31 | Stop reason: HOSPADM

## 2023-01-30 RX ORDER — SODIUM CHLORIDE 0.9 % (FLUSH) 0.9 %
5-40 SYRINGE (ML) INJECTION AS NEEDED
Status: DISCONTINUED | OUTPATIENT
Start: 2023-01-30 | End: 2023-01-31 | Stop reason: HOSPADM

## 2023-01-30 RX ORDER — SODIUM CHLORIDE 9 MG/ML
5-40 INJECTION INTRAVENOUS AS NEEDED
Status: DISCONTINUED | OUTPATIENT
Start: 2023-01-30 | End: 2023-01-31 | Stop reason: HOSPADM

## 2023-01-30 RX ORDER — DEXAMETHASONE 4 MG/1
TABLET ORAL
Qty: 8 TABLET | Refills: 2 | Status: SHIPPED | OUTPATIENT
Start: 2023-01-30

## 2023-01-30 RX ORDER — DEXAMETHASONE 4 MG/1
TABLET ORAL
Qty: 30 TABLET | Refills: 2 | Status: SHIPPED | OUTPATIENT
Start: 2023-01-30 | End: 2023-01-30 | Stop reason: SDUPTHER

## 2023-01-30 RX ORDER — SODIUM CHLORIDE 9 MG/ML
5-250 INJECTION, SOLUTION INTRAVENOUS AS NEEDED
Status: DISCONTINUED | OUTPATIENT
Start: 2023-01-30 | End: 2023-01-31 | Stop reason: HOSPADM

## 2023-01-30 RX ORDER — DEXAMETHASONE 4 MG/1
TABLET ORAL
Qty: 4 TABLET | Refills: 0 | Status: SHIPPED | OUTPATIENT
Start: 2023-01-30 | End: 2023-01-30 | Stop reason: SDUPTHER

## 2023-01-30 RX ADMIN — SODIUM CHLORIDE, PRESERVATIVE FREE 10 ML: 5 INJECTION INTRAVENOUS at 15:14

## 2023-01-30 RX ADMIN — HEPARIN 500 UNITS: 100 SYRINGE at 15:14

## 2023-01-30 RX ADMIN — SODIUM CHLORIDE 1000 ML: 900 INJECTION, SOLUTION INTRAVENOUS at 14:09

## 2023-01-30 NOTE — PROGRESS NOTES
[facility] Progress Note    Date: January 30, 2023      1356: Pt arrived ambulatory to [facility] for hydration in stable condition. Assessment completed. Port accessed with positive blood return. Patient Vitals for the past 12 hrs:   Temp Pulse BP   01/30/23 1512 -- -- (!) 99/57   01/30/23 1359 98.6 °F (37 °C) 65 92/61       Medications Administered       heparin (porcine) pf 500 Units       Admin Date  01/30/2023 Action  Given Dose  500 Units Route  InterCATHeter Administered By  Cortney Wilson, EVELYN              sodium chloride (NS) flush 5-40 mL       Admin Date  01/30/2023 Action  Given Dose  10 mL Route  IntraVENous Administered By  Cortney Wilson, EVELYN              sodium chloride 0.9 % bolus infusion 1,000 mL       Admin Date  01/30/2023 Action  New Bag Dose  1,000 mL Rate  1,000 mL/hr Route  IntraVENous Administered By  Cortney Wilson, EVELYN                        9848: Tolerated treatment well, no adverse reactions noted. Port flushed, heparinized and de- accessed per protocol. D/Cd from [facility] ambulatory and in no distress.     Future Appointments   Date Time Provider Janice Le   2/9/2023  8:00 AM C2 MINI LONG TX [facility]   2/23/2023  8:00 AM C2 MINI LONG [facility]   3/9/2023  8:00 AM C2 MINI LONG [facility]   3/23/2023  8:00 [facility]           Melissa Rich RN  January 30, 2023

## 2023-02-07 ENCOUNTER — HOSPITAL ENCOUNTER (OUTPATIENT)
Dept: RADIATION THERAPY | Age: 38
Discharge: HOME | End: 2023-02-07

## 2023-02-09 ENCOUNTER — APPOINTMENT (OUTPATIENT)
Dept: INFUSION THERAPY | Age: 38
End: 2023-02-09

## 2023-02-16 DIAGNOSIS — Z17.0 MALIGNANT NEOPLASM OF LEFT BREAST IN FEMALE, ESTROGEN RECEPTOR POSITIVE, UNSPECIFIED SITE OF BREAST (HCC): ICD-10-CM

## 2023-02-16 DIAGNOSIS — C50.912 MALIGNANT NEOPLASM OF LEFT BREAST IN FEMALE, ESTROGEN RECEPTOR POSITIVE, UNSPECIFIED SITE OF BREAST (HCC): ICD-10-CM

## 2023-02-16 RX ORDER — ONDANSETRON 8 MG/1
8 TABLET, ORALLY DISINTEGRATING ORAL
Qty: 120 TABLET | Refills: 3 | Status: SHIPPED | OUTPATIENT
Start: 2023-02-16

## 2023-02-20 ENCOUNTER — OFFICE VISIT (OUTPATIENT)
Dept: SURGERY | Age: 38
End: 2023-02-20
Payer: COMMERCIAL

## 2023-02-20 VITALS — HEIGHT: 65 IN | WEIGHT: 144 LBS | BODY MASS INDEX: 23.99 KG/M2

## 2023-02-20 DIAGNOSIS — Z17.0 MALIGNANT NEOPLASM OF LEFT BREAST IN FEMALE, ESTROGEN RECEPTOR POSITIVE, UNSPECIFIED SITE OF BREAST (HCC): Primary | ICD-10-CM

## 2023-02-20 DIAGNOSIS — C50.912 MALIGNANT NEOPLASM OF LEFT BREAST IN FEMALE, ESTROGEN RECEPTOR POSITIVE, UNSPECIFIED SITE OF BREAST (HCC): Primary | ICD-10-CM

## 2023-02-20 PROCEDURE — 99213 OFFICE O/P EST LOW 20 MIN: CPT | Performed by: SURGERY

## 2023-02-20 NOTE — PROGRESS NOTES
HISTORY OF PRESENT ILLNESS  Titi Cruz is a 34 y.o. female. HPI ESTABLISHED Patient here for follow up after chemotherapy. Would like to discuss port removal. Has been doing well other than low energy. Denies pian or changes of concern to the breast area. 6/21/22 biopsy LEFT breast- DCIS   1- 8 o'clock ER 60, KS 60   2- 4 o'clock posterior depth   Right breast papilloma, atypia   9/27/22 bilateral mastectomies, l sln biopsy and recon: T1 (3 mm) 90 mm dcis N1 (1/3 sln no montez) ER positive, Her2 negative   XRT Dr. Ruddy Alex- Dr. Gordo Nixon  Has a history of cysts in both breasts. Family History: Mother- Breast cancer dx at 72     Review of Systems   All other systems reviewed and are negative. Physical Exam  Vitals and nursing note reviewed. Chest:          Comments: Bilateral expanders intact  Port intact  No adenopathy  No chest wall masses      ASSESSMENT and PLAN    ICD-10-CM ICD-9-CM    1. Malignant neoplasm of left breast in female, estrogen receptor positive, unspecified site of breast (HCC)  C50.912 174.9     Z17.0 V86.0       - no evidence local recurrence  - will schedule port removal would like this removed prior to xrt   20 minutes was spent with patient on counseling and coordination of care.

## 2023-02-20 NOTE — PROGRESS NOTES
[facility] [place] [facility] [place] [address] [address], [place]: [phone]  F: [phone]    Reason for Visit:   Kaz Braswell is a 34 y.o. female who is seen for follow up of Left breast cancer . Seen by synchronous (real-time) audio-video technology on 2/22/2023     Treatment History:   9/27/2022-bilateral mastectomy with left sentinel node biopsy  11/22/22-1/26/2023: Adjuvant TC     History of Present Illness:   Patient is a 38-year-old female with a history of anemia and fibrocystic breast disease seen for left breast cancer and DCIS. She had a mammogram in May 2022 that showed diffuse left breast calcifications as well as several masses in the right breast that were thought to be fibroadenomas. Initial biopsy of left breast calcifications in June 2022 was consistent with DCIS. She was referred to Dr. Noy Paul who obtained a breast MRI on 8/3/2022. This confirmed multicentric multifocal left breast DCIS in all quadrants . Probable left axillary kari metastases, bilobed mass at 2:00 in the right breast as well is linear enhancement in the subareolar right breast.  Right breast biopsy of the subareolar region was consistent with intraductal papilloma with focal atypical ductal hyperplasia, right breast 2 cm masslike lesion was consistent with fibroadenoma. Underwent a bilateral mastectomy with sentinel node biopsy on the left on 9/27/2022. Right breast showed intraductal papilloma and fibroadenoma. Left breast showed extensive high-grade DCIS with negative margins as well as a 3 mm focus of invasive carcinoma, grade 2, 1 positive node of 3. ER 90% NH 90% HER2/raad negative, Ki-67 20%. Mother's cancer diagnosed when she was 72, and had negative genetic testing. S/P 4 cycles of adjuvant TC. Radiation to start April. She still has nausea, zofran helps. Better overall. Some tingling. She has 3 children. She has her own business.     She is premenopausal.      Past Medical History:   Diagnosis Date    Adverse effect of anesthesia 1999    Delayed awakening after anesthesia    Anemia     Anxiety     Breast cancer ([facility] [address] 2022    LEFT    HX OTHER MEDICAL 02/10/2011    fibroids    Ill-defined condition     PALPITATIONS, CLEARED BY CARDIOLOGIST    Postpartum depression     with 1st pregnancy    Unspecified breast disorder     fibrocystic breasts, followed at [facility]      Past Surgical History:   Procedure Laterality Date    HX ADENOIDECTOMY  1988    HX BILATERAL MASTECTOMY  09/27/2022    HX BREAST BIOPSY Left     06/2022 AND 08/2022 BIOPSIES    HX BREAST RECONSTRUCTION Bilateral 09/27/2022    . performed by Talha Benjamin MD at [facility]    HX [place]    HX MASTECTOMY Bilateral 09/27/2022    BILATERAL SKIN SPARING MASTECTOMIES LEFT BREAST SENTINEL NODE BIOPSY  DR. Thad Davis - BILATERAL BREAST RECONSTRUCTION WITH TISSUE EXPANDERS AND ALLODERM performed by Chantal Jean MD at [address] [place] [address] History     Tobacco Use    Smoking status: Never    Smokeless tobacco: Never   Substance Use Topics    Alcohol use: No      Family History   Problem Relation Age of Onset    Hypertension Mother         caused by RA rx    Arthritis-rheumatoid Mother     OSTEOARTHRITIS Mother     No Known Problems Father     Other Sister         UTERINE FIBROIDS    Stroke Maternal Grandfather     Cancer Neg Hx     Heart Disease Neg Hx     Anesth Problems Neg Hx      Current Outpatient Medications   Medication Sig    ondansetron (ZOFRAN ODT) 8 mg disintegrating tablet Take 1 Tablet by mouth every eight (8) hours as needed for Nausea or Vomiting.    escitalopram oxalate (LEXAPRO) 10 mg tablet Take 1 Tablet by mouth daily. dexAMETHasone (DECADRON) 4 mg tablet 1 tab 4 mg of dex on days 3, 4, and 5 of chemotherapy. LORazepam (ATIVAN) 0.5 mg tablet Take 1 Tablet by mouth every six (6) hours as needed for Anxiety. Max Daily Amount: 2 mg. prochlorperazine (Compazine) 5 mg tablet Take 1 Tablet by mouth every six (6) hours as needed for Nausea or Vomiting.    magic mouthwash solution Magic mouth wash Maalox Lidocaine 2% viscous Diphenhydramine oral solution Swish & swallow 5mL (1 tsp) 4 times daily as needed for mouth sores Pharmacy to mix equal portions of ingredients to a total volume as indicated in the dispense amount. fexofenadine (ALLEGRA) 60 mg tablet Take 60 mg by mouth nightly. No current facility-administered medications for this visit. Allergies   Allergen Reactions    Blueberry Swelling    Sulfa (Sulfonamide Antibiotics) Anaphylaxis    Caffeine Palpitations    Crab Swelling    Pseudoephedrine Palpitations        Review of Systems: A complete review of systems was obtained, negative except as described above. Physical Exam:     Vitals reviewed        ECOG PS: 0  General: No distress, dry mouth  Eyes: PERRL, anicteric sclerae  HENT: Atraumatic, PAC in place , OP clear   Skin: No rashes, ecchymoses, or petechiae. Normal temperature, turgor, and texture. Psych: Alert, oriented, appropriate affect, normal judgment/insight    Results:     Lab Results   Component Value Date/Time    WBC 7.6 01/26/2023 08:20 AM    HGB 9.1 (L) 01/26/2023 08:20 AM    HCT 29.7 (L) 01/26/2023 08:20 AM    PLATELET 654 83/13/4499 08:20 AM    MCV 94.9 01/26/2023 08:20 AM    ABS.  NEUTROPHILS 6.9 01/26/2023 08:20 AM    Hgb, External 10.9 06/30/2011 12:00 AM    Hct, External 33.5 06/30/2011 12:00 AM    Platelet cnt., External 337 02/10/2011 12:00 AM     Lab Results   Component Value Date/Time    Sodium 139 01/26/2023 08:20 AM    Potassium 4.1 01/26/2023 08:20 AM    Chloride 108 01/26/2023 08:20 AM    CO2 26 01/26/2023 08:20 AM    Glucose 124 (H) 01/26/2023 08:20 AM    BUN 15 01/26/2023 08:20 AM    Creatinine 0.51 (L) 01/26/2023 08:20 AM    GFR est AA >60 06/18/2021 12:02 PM    GFR est non-AA >60 06/18/2021 12:02 PM    Calcium 9.1 01/26/2023 08:20 AM     Lab Results   Component Value Date/Time    Bilirubin, total 0.4 01/26/2023 08:20 AM    ALT (SGPT) 22 01/26/2023 08:20 AM    Alk. phosphatase 52 01/26/2023 08:20 AM    Protein, total 6.6 01/26/2023 08:20 AM    Albumin 3.7 01/26/2023 08:20 AM    Globulin 2.9 01/26/2023 08:20 AM     Records reviewed and summarized above. Pathology report(s) reviewed above. 9/27/2022  INVASIVE CARCINOMA OF THE BREAST: Resection    SPECIMEN       Procedure: Total mastectomy       Specimen Laterality: Left    TUMOR       Histologic Type: Invasive carcinoma of no special type (ductal)       Glandular (Acinar) / Tubular Differentiation: Score 2       Nuclear Pleomorphism: Score 2       Mitotic Rate: Score 2       Overall Grade: Grade 2 (scores of 6 or 7)       Tumor Size: 3 mm       Tumor Focality: Single focus of invasive carcinoma       Ductal Carcinoma In Situ (DCIS): Present           Ductal Carcinoma In Situ (DCIS): Positive for extensive   intraductal component (EIC)           Size (Extent) of DCIS: 90 mm           Architectural Patterns: Micropapillary, Cribriform, Papillary,   Comedo           Nuclear Grade: Grade III (high)           Necrosis: Present, central (expansive \"comedo\" necrosis)       Lobular Carcinoma In Situ (LCIS): Not identified    Tumor Extent       Skin: Present and uninvolved by invasive carcinoma       Nipple DCIS: DCIS does not involve the nipple epidermis       Lymphovascular Invasion: Not identified       Dermal Lymphovascular Invasion: Not identified       Microcalcifications: Present in DCIS, invasive carcinoma, and   non-neoplastic tissue       Treatment Effect in the Breast: No known presurgical therapy    MARGINS       Invasive Carcinoma Margins: Uninvolved by invasive carcinoma           Distance from Closest Margin (Millimeters): Greater than 2 mm       DCIS Margins: Uninvolved by DCIS           Distance from Closest Margin (Millimeters): Less than 1 mm           Closest Margin(s):  Anterior, Posterior LYMPH NODES       Regional Lymph Nodes: Involved by tumor cells           Number of Lymph Nodes with Macrometastases (> 2 mm): 1           Number of Lymph Nodes with Micrometastases (> 0.2 mm to 2 mm and/   or > 200 cells): 0           Size of Largest Metastatic Deposit (Millimeters): 11 mm           Extranodal Extension: Not identified           Total Number of Lymph Nodes Examined: 3           Number of Mitchell Nodes Examined: 3    PATHOLOGIC STAGE CLASSIFICATION (pTNM, AJCC 8th Edition)       Primary Tumor (pT): pT1a       Regional Lymph Nodes Modifier: (sn): Mitchell node(s) evaluated       Regional Lymph Nodes (pN): pN1a    ADDITIONAL FINDINGS       Additional Findings: Fibroadenomas          6. Left breast anterior margin, excision:        Ductal carcinoma in situ (DCIS), nuclear grade 3   DCIS is 3 mm from designated anterior margin       Radiology report(s) reviewed above. MRI 8/2022  IMPRESSION  1. Multicentric, multifocal, left DCIS involves all quadrants and largely  replaces the inferior breast. BI-RADS 6.  2. DCIS extends to the left nipple, with an enhancing mass in the nipple. 3. Probable left axillary kari metastasis. BI-RADS 4.  4. Linear enhancement in the subareolar right breast, likely DCIS. BI-RADS 4.  5. Bilobed mass at 2:00 in the right breast. BI-RADS 4A. 6. Overall assessment: BI-RADS Assessment Category 4: Suspicious abnormality. 7. Recommendations: Ultrasound-guided left axillary lymph node biopsy, MRI  guided right breast biopsy. CT 11/2022:  IMPRESSION  1. Post bilateral mastectomies.  No evidence of metastatic disease    Assessment:   1) Left breast cancer    S/P Bilateral mastectomy and Left SLN on 9/27/2022   3 mm, 1/3 + nodes  Grade 2  Margins negative  9 CM are of DCIS    ER 90% PR10%HER2 raad negative  Ki 67 20%  Mamma print high risk Luminal B     [id number] Stage IA  S/P 4 cycles of Adjuvant TC as of 1/26/2023  RT planned April  Today we discussed adjuvant endocrine treatments. Is pre menopausal  By definition is High risk given + node and Ki of 20%  Options reviewed were Tamoxifen, OFS+ AI, OFS+AI+ CDK inhibitors. 12 year follow up of SOFT and TEXT data ( [facility] 6 2021)  showed an OS benefit of 3.3% with benefit mostly in those < 35. We also discussed the monarchE trial. Women with hormone receptor-positive, HER2-negative, high-risk early breast cancer who had completed surgery were randomly assigned to abemaciclib plus endocrine therapy or endocrine therapy alone . High risk was defined as having ? 4 positive nodes; or one to three positive nodes and at least one of the following: tumor size ? 5 cm, histologic grade 3, or central Ki-67 ? 20 percent. Patients in the abemaciclib/endocrine therapy group had improvement in invasive disease-free survival (IDFS) relative to the endocrine therapy alone group (three-year IDFS rates of 89 versus 83 percent, respectively; HR 0.70, 95% CI 0.59-0.82). Distant recurrence-free survival rates at three years were 80 versus 86 percent (HR 0.69, 95% CI 0.57-0.83), respectively . Benefit was sustained at four years . Duration of abemaciclib was 2 years. . Side effects inclue but are not limited to alopecia, arthralgias, GI, Low counts, infections. Also discussed OFS and side effects of menopause, bone health, cardiac health. After discussions Felicie Hodgkins decided to proceed with OFS+ AI  Discussed short and long term side effects that include but not limited to hot flashes, mood changes, decrease bone mineral density. 2) R breast intraductal papilloma  S/p mastectomy      3) FH of breast cancer  Ambry- VUS    4) nausea  Chemotherapy induced and resolved       5) Encounter for high risk disease     Plan:     Zoladex 3.6 mg every 28 days for ~5 years  Will add exemestane after first injection  Port removal with Dr. Marcelino Summers  RT as scheduled  See me in 1 month        I appreciate the opportunity to participate in Ms. Nelsonsina Matt care.          Signed By: Dao Santana MD      The patient was evaluated through a synchronous (real-time) audio-video encounter. The patient (or guardian if applicable) is aware that this is a billable service, which includes applicable co-pays. This Virtual Visit was conducted with patient's (and/or legal guardian's) consent. The visit was conducted pursuant to the emergency declaration under the [address] [place] [address] authority and the [employer] and [place] Self Representation Document Preparation General Act. Patient identification was verified, and a caregiver was present when appropriate. The patient was located in a state where the provider was licensed to provide care.

## 2023-02-22 ENCOUNTER — VIRTUAL VISIT (OUTPATIENT)
Dept: ONCOLOGY | Age: 38
End: 2023-02-22
Payer: COMMERCIAL

## 2023-02-22 DIAGNOSIS — C50.912 MALIGNANT NEOPLASM OF LEFT BREAST IN FEMALE, ESTROGEN RECEPTOR POSITIVE, UNSPECIFIED SITE OF BREAST (HCC): Primary | ICD-10-CM

## 2023-02-22 DIAGNOSIS — Z17.0 MALIGNANT NEOPLASM OF LEFT BREAST IN FEMALE, ESTROGEN RECEPTOR POSITIVE, UNSPECIFIED SITE OF BREAST (HCC): Primary | ICD-10-CM

## 2023-02-22 PROCEDURE — 99215 OFFICE O/P EST HI 40 MIN: CPT | Performed by: INTERNAL MEDICINE

## 2023-02-23 ENCOUNTER — APPOINTMENT (OUTPATIENT)
Dept: INFUSION THERAPY | Age: 38
End: 2023-02-23

## 2023-03-01 ENCOUNTER — HOSPITAL ENCOUNTER (OUTPATIENT)
Dept: PREADMISSION TESTING | Age: 38
Discharge: HOME | End: 2023-03-01
Payer: COMMERCIAL

## 2023-03-01 ENCOUNTER — HOSPITAL ENCOUNTER (OUTPATIENT)
Dept: INFUSION THERAPY | Age: 38
End: 2023-03-01

## 2023-03-01 VITALS
SYSTOLIC BLOOD PRESSURE: 104 MMHG | TEMPERATURE: 98.7 F | BODY MASS INDEX: 23.19 KG/M2 | OXYGEN SATURATION: 98 % | HEART RATE: 69 BPM | WEIGHT: 139.2 LBS | DIASTOLIC BLOOD PRESSURE: 64 MMHG | HEIGHT: 65 IN

## 2023-03-01 LAB
BASOPHILS # BLD: 0 K/UL (ref 0–0.1)
BASOPHILS NFR BLD: 1 % (ref 0–1)
DIFFERENTIAL METHOD BLD: ABNORMAL
EOSINOPHIL # BLD: 0.1 K/UL (ref 0–0.4)
EOSINOPHIL NFR BLD: 2 % (ref 0–7)
ERYTHROCYTE [DISTWIDTH] IN BLOOD BY AUTOMATED COUNT: 16 % (ref 11.5–14.5)
HCT VFR BLD AUTO: 33.9 % (ref 35–47)
HGB BLD-MCNC: 10.3 G/DL (ref 11.5–16)
IMM GRANULOCYTES # BLD AUTO: 0 K/UL (ref 0–0.04)
IMM GRANULOCYTES NFR BLD AUTO: 0 % (ref 0–0.5)
LYMPHOCYTES # BLD: 1.2 K/UL (ref 0.8–3.5)
LYMPHOCYTES NFR BLD: 24 % (ref 12–49)
MCH RBC QN AUTO: 29.8 PG (ref 26–34)
MCHC RBC AUTO-ENTMCNC: 30.4 G/DL (ref 30–36.5)
MCV RBC AUTO: 98 FL (ref 80–99)
MONOCYTES # BLD: 0.4 K/UL (ref 0–1)
MONOCYTES NFR BLD: 7 % (ref 5–13)
NEUTS SEG # BLD: 3.3 K/UL (ref 1.8–8)
NEUTS SEG NFR BLD: 66 % (ref 32–75)
NRBC # BLD: 0 K/UL (ref 0–0.01)
NRBC BLD-RTO: 0 PER 100 WBC
PLATELET # BLD AUTO: 315 K/UL (ref 150–400)
PMV BLD AUTO: 10.7 FL (ref 8.9–12.9)
RBC # BLD AUTO: 3.46 M/UL (ref 3.8–5.2)
WBC # BLD AUTO: 5 K/UL (ref 3.6–11)

## 2023-03-01 PROCEDURE — 85025 COMPLETE CBC W/AUTO DIFF WBC: CPT

## 2023-03-01 PROCEDURE — 36415 COLL VENOUS BLD VENIPUNCTURE: CPT

## 2023-03-01 NOTE — PERIOP NOTES
[address] INSTRUCTIONS    Surgery Date:   3/7/23    Your surgeon's office or [facility] staff will call you between 4 PM- 8 PM the day before surgery with your arrival time. If your surgery is on a Monday, you will receive a call the preceding Friday. Please report to [facility] Patient Access/Admitting on the 1st floor. Bring your insurance card, photo identification, and any copayment ( if applicable). If you are going home the same day of your surgery, you must have a responsible adult to drive you home. You need to have a responsible adult to stay with you the first 24 hours after surgery and you should not drive a car for 24 hours following your surgery. Do NOT eat any solid foods after midnight the night before surgery including candy, mint or gum. You may drink clear liquids from midnight until 1 hour prior to your arrival. You may drink up to 12 ounces at one time every 4 hours. Please note special instructions, if applicable, below for medications. Do NOT drink alcohol or smoke 24 hours before surgery. STOP smoking for 14 days prior as it helps with breathing and healing after surgery. If you are being admitted to the hospital, please leave personal belongings/luggage in your car until you have an assigned hospital room number. Please wear comfortable clothes. Wear your glasses instead of contacts. We ask that all money, jewelry and valuables be left at home. Wear no make up, particularly mascara, the day of surgery. All body piercings, rings, and jewelry need to be removed and left at home. Please remove any nail polish or artifical nails from your fingernails. Please wear your hair loose or down. Please no pony-tails, buns, or any metal hair accessories. If you shower the morning of surgery, please do not apply any lotions or powders afterwards. You may wear deodorant, unless having breast surgery. Do not shave any body area within 24 hours of your surgery.   Please follow all instructions to avoid any potential surgical cancellation. Should your physical condition change, (i.e. fever, cold, flu, etc.) please notify your surgeon as soon as possible. It is important to be on time. If a situation occurs where you may be delayed, please call:  [phone] / [phone] on the day of surgery. The Preadmission Testing staff can be reached at [phone]. Special instructions: NONE      Current Outpatient Medications   Medication Sig    escitalopram oxalate (LEXAPRO) 10 mg tablet Take 1 Tablet by mouth daily. fexofenadine (ALLEGRA) 60 mg tablet Take 60 mg by mouth nightly. No current facility-administered medications for this encounter. YOU MUST ONLY TAKE THESE MEDICATIONS THE MORNING OF SURGERY WITH A SIP OF WATER: Susana Qualia  MEDICATIONS TO TAKE THE MORNING OF SURGERY ONLY IF NEEDED: NONE  HOLD these prescription medications BEFORE Surgery: NONE  Ask your surgeon/prescribing physician about when/if to STOP taking these medications: NONE  Stop all vitamins, herbal medicines and Aspirin containing products 7 days prior to surgery. Stop any non-steroidal anti-inflammatory drugs (i.e. Ibuprofen, Naproxen, Advil, Aleve) 3 days before surgery. You may take Tylenol. If you are currently taking Plavix, Coumadin,or any other blood-thinning/anticoagulant medication contact your prescribing physician for instructions. Eating and Drinking Before Surgery    You may eat a regular dinner at the usual time on the day before your surgery. Do NOT eat any solid foods after midnight unless your arrival time at the hospital is 3pm or later. You may drink clear liquids only from 12 midnight until 1 hours prior to your arrival time at the hospital on the day of your surgery. Do NOT drink alcohol.   Clear liquids include:  Water  Fruit juices without pulp( i.e. apple juice)  Carbonated beverages  Black coffee (no cream/milk)  Tea (no cream/milk)  Gatorade  You may drink up to 12-16 ounces at one time every 4 hours between the hours of midnight and 1 hour before your arrival time at the hospital. Example- if your arrival time at the hospital is 6am, you may drink 12-16 ounces of clear liquids no later than 5am.  If your arrival time at the hospital is 3pm or later, you may eat a light breakfast before 8am.  A light breakfast includes: Toast or bagel (no butter)  Black coffee (no cream/milk)  Tea (no cream/milk)  Fruit juices without pulp ( i.e. apple juice)  Do NOT eat meat, eggs, vegetables or fruit  If you have any questions, please contact your surgeon's office. Preventing Infections Before and After - Your Surgery    IMPORTANT INSTRUCTIONS    You play an important role in your health and preparation for surgery. To reduce the germs on your skin you will need to shower with CHG soap (Chorhexidine gluconate 4%) two times before surgery. CHG soap (Hibiclens, Hex-A-Clens or store brand)  CHG soap will be provided at your Preadmission Testing (PAT) appointment. If you do not have a PAT appointment before surgery, you may arrange to  CHG soap from our office or purchase CHG soap at a pharmacy, grocery or department store. You need to purchase TWO 4 ounce bottles to use for your 2 showers. Steps to follow:  Cristy Dose your hair with your normal shampoo and your body with regular soap and rinse well to remove shampoo and soap from your skin. Wet a clean washcloth and turn off the shower. Put CHG soap on washcloth and apply to your entire body from the neck down. Do not use on your head, face or private parts(genitals). Do not use CHG soap on open sores, wounds or areas of skin irritation. Wash you body gently for 5 minutes. Do not wash your skin too hard. This soap does not create lather. Pay special attention to your underarms and from your belly button to your feet. Turn the shower back on and rinse well to get CHG soap off your body.   Pat your skin dry with a clean, dry towel. Do not apply lotions or moisturizer. Put on clean clothes and sleep on fresh bed sheets and do not allow pets to sleep with you. Shower with CHG soap 2 times before your surgery  The evening before your surgery  The morning of your surgery      Tips to help prevent infections after your surgery:  Protect your surgical wound from germs:  Hand washing is the most important thing you and your caregivers can do to prevent infections. Keep your bandage clean and dry! Do not touch your surgical wound. Use clean, freshly washed towels and washcloths every time you shower; do not share bath linens with others. Until your surgical wound is healed, wear clothing and sleep on bed linens each day that are clean and freshly washed. Do not allow pets to sleep in your bed with you or touch your surgical wound. Do not smoke - smoking delays wound healing. This may be a good time to stop smoking. If you have diabetes, it is important for you to manage your blood sugar levels properly before your surgery as well as after your surgery. Poorly managed blood sugar levels slow down wound healing and prevent you from healing completely. Patient Information Regarding COVID Restrictions      Day of Procedure    Please park in the parking deck or any designated visitor parking lot. Enter the facility through the Main Entrance of the hospital.  On the day of surgery, please provide the cell phone number of the person who will be waiting for you to the Patient Access representative at the time of registration. Masks are highly recommended in the hospital, but not required. Once your procedure and the immediate recovery period is completed, a nurse in the recovery area will contact your designated visitor to inform them of your room number or to otherwise review other pertinent information regarding your care. Social distancing practices are strongly encouraged in waiting areas and the cafeteria. The patient was contacted  in person. She verbalized understanding of all instructions and does not  need reinforcement.

## 2023-03-02 ENCOUNTER — HOSPITAL ENCOUNTER (OUTPATIENT)
Dept: INFUSION THERAPY | Age: 38
Discharge: HOME | End: 2023-03-02
Payer: COMMERCIAL

## 2023-03-02 VITALS — HEART RATE: 70 BPM | SYSTOLIC BLOOD PRESSURE: 101 MMHG | TEMPERATURE: 97.4 F | DIASTOLIC BLOOD PRESSURE: 60 MMHG

## 2023-03-02 DIAGNOSIS — Z17.0 MALIGNANT NEOPLASM OF LEFT BREAST IN FEMALE, ESTROGEN RECEPTOR POSITIVE, UNSPECIFIED SITE OF BREAST (HCC): Primary | ICD-10-CM

## 2023-03-02 DIAGNOSIS — C50.912 MALIGNANT NEOPLASM OF LEFT BREAST IN FEMALE, ESTROGEN RECEPTOR POSITIVE, UNSPECIFIED SITE OF BREAST (HCC): Primary | ICD-10-CM

## 2023-03-02 PROCEDURE — 74011250636 HC RX REV CODE- 250/636: Performed by: INTERNAL MEDICINE

## 2023-03-02 PROCEDURE — 96402 CHEMO HORMON ANTINEOPL SQ/IM: CPT

## 2023-03-02 RX ADMIN — GOSERELIN ACETATE 3.6 MG: 3.6 IMPLANT SUBCUTANEOUS at 09:01

## 2023-03-02 NOTE — PROGRESS NOTES
[facility] Chemo Progress Note    Date: March 2, 2023      0854 Ms. Nain Joseph Arrived to [facility] for  Zoladex ambulatory in stable condition. Assessment was completed, documented in flowsheet        Ms. Farfan's vitals were reviewed. Visit Vitals  /60   Pulse 70   Temp 97.4 °F (36.3 °C)   Breastfeeding No              Pre-medications  were administered as ordered and chemotherapy was initiated. Medications Administered       goserelin (ZOLADEX) implant 3.6 mg       Admin Date  03/02/2023 Action  Given Dose  3.6 mg Route  SubCUTAneous Administered By  Carol Valdes RN                     Given Zoladex given in [facility]: Patient tolerated treatment well. Patient was discharged in stable condition. Patient is aware of next scheduled [facility] appointment on 3/29/23.     Future Appointments   Date Time Provider Janice Le   3/21/2023  9:30 AM Lynn Antoine NP [facility] BS AMB   3/29/2023 10:00 AM G1 MINI [facility]STRACK [facility]   3/29/2023 10:15 AM Marysol Adrian  N [facility]  BS AMB   4/26/2023  3:00 PM G2 MINI FASTRACK [facility]   5/24/2023  3:00 PM G1 MINI FASTRACK [facility]   6/21/2023  2:30 PM G1 MINI FASTRACK [facility] [address], RN  March 2, 2023

## 2023-03-06 ENCOUNTER — ANESTHESIA EVENT (OUTPATIENT)
Dept: MEDSURG UNIT | Age: 38
End: 2023-03-06
Payer: COMMERCIAL

## 2023-03-07 ENCOUNTER — HOSPITAL ENCOUNTER (OUTPATIENT)
Age: 38
Setting detail: OUTPATIENT SURGERY
Discharge: HOME | End: 2023-03-07
Attending: SURGERY | Admitting: SURGERY
Payer: COMMERCIAL

## 2023-03-07 ENCOUNTER — ANESTHESIA (OUTPATIENT)
Dept: MEDSURG UNIT | Age: 38
End: 2023-03-07
Payer: COMMERCIAL

## 2023-03-07 VITALS
RESPIRATION RATE: 17 BRPM | DIASTOLIC BLOOD PRESSURE: 77 MMHG | BODY MASS INDEX: 23.49 KG/M2 | TEMPERATURE: 98.2 F | SYSTOLIC BLOOD PRESSURE: 127 MMHG | OXYGEN SATURATION: 99 % | HEART RATE: 58 BPM | WEIGHT: 141 LBS | HEIGHT: 65 IN

## 2023-03-07 DIAGNOSIS — C77.3 BREAST CANCER METASTASIZED TO AXILLARY LYMPH NODE, LEFT (HCC): ICD-10-CM

## 2023-03-07 DIAGNOSIS — Z17.0 MALIGNANT NEOPLASM OF LEFT BREAST IN FEMALE, ESTROGEN RECEPTOR POSITIVE, UNSPECIFIED SITE OF BREAST (HCC): ICD-10-CM

## 2023-03-07 DIAGNOSIS — C50.812 MALIGNANT NEOPLASM OF OVERLAPPING SITES OF LEFT FEMALE BREAST, UNSPECIFIED ESTROGEN RECEPTOR STATUS (HCC): Primary | ICD-10-CM

## 2023-03-07 DIAGNOSIS — C50.912 MALIGNANT NEOPLASM OF LEFT BREAST IN FEMALE, ESTROGEN RECEPTOR POSITIVE, UNSPECIFIED SITE OF BREAST (HCC): ICD-10-CM

## 2023-03-07 DIAGNOSIS — C50.912 BREAST CANCER METASTASIZED TO AXILLARY LYMPH NODE, LEFT (HCC): ICD-10-CM

## 2023-03-07 DIAGNOSIS — R92.8 ABNORMAL MRI, BREAST: ICD-10-CM

## 2023-03-07 LAB — HCG UR QL: NEGATIVE

## 2023-03-07 PROCEDURE — 36590 REMOVAL TUNNELED CV CATH: CPT | Performed by: SURGERY

## 2023-03-07 PROCEDURE — 77030002933 HC SUT MCRYL J&J -A: Performed by: SURGERY

## 2023-03-07 PROCEDURE — 77030031139 HC SUT VCRL2 J&J -A: Performed by: SURGERY

## 2023-03-07 PROCEDURE — 2709999900 HC NON-CHARGEABLE SUPPLY: Performed by: SURGERY

## 2023-03-07 PROCEDURE — 74011250636 HC RX REV CODE- 250/636: Performed by: ANESTHESIOLOGY

## 2023-03-07 PROCEDURE — 76030000000 HC AMB SURG OR TIME 0.5 TO 1: Performed by: SURGERY

## 2023-03-07 PROCEDURE — 77030040361 HC SLV COMPR DVT MDII -B: Performed by: SURGERY

## 2023-03-07 PROCEDURE — 74011250636 HC RX REV CODE- 250/636: Performed by: NURSE ANESTHETIST, CERTIFIED REGISTERED

## 2023-03-07 PROCEDURE — 74011000250 HC RX REV CODE- 250: Performed by: NURSE ANESTHETIST, CERTIFIED REGISTERED

## 2023-03-07 PROCEDURE — 81025 URINE PREGNANCY TEST: CPT

## 2023-03-07 PROCEDURE — 76060000061 HC AMB SURG ANES 0.5 TO 1 HR: Performed by: SURGERY

## 2023-03-07 PROCEDURE — 76210000035 HC AMBSU PH I REC 1 TO 1.5 HR: Performed by: SURGERY

## 2023-03-07 PROCEDURE — 74011000250 HC RX REV CODE- 250: Performed by: SURGERY

## 2023-03-07 PROCEDURE — 74011000258 HC RX REV CODE- 258: Performed by: NURSE ANESTHETIST, CERTIFIED REGISTERED

## 2023-03-07 PROCEDURE — 77030010507 HC ADH SKN DERMBND J&J -B: Performed by: SURGERY

## 2023-03-07 PROCEDURE — 74011250637 HC RX REV CODE- 250/637: Performed by: ANESTHESIOLOGY

## 2023-03-07 RX ORDER — MIDAZOLAM HYDROCHLORIDE 1 MG/ML
1 INJECTION, SOLUTION INTRAMUSCULAR; INTRAVENOUS AS NEEDED
Status: DISCONTINUED | OUTPATIENT
Start: 2023-03-07 | End: 2023-03-07 | Stop reason: HOSPADM

## 2023-03-07 RX ORDER — SODIUM CHLORIDE 9 MG/ML
25 INJECTION, SOLUTION INTRAVENOUS CONTINUOUS
Status: DISCONTINUED | OUTPATIENT
Start: 2023-03-07 | End: 2023-03-07 | Stop reason: HOSPADM

## 2023-03-07 RX ORDER — SODIUM CHLORIDE 0.9 % (FLUSH) 0.9 %
5-40 SYRINGE (ML) INJECTION AS NEEDED
Status: DISCONTINUED | OUTPATIENT
Start: 2023-03-07 | End: 2023-03-07 | Stop reason: HOSPADM

## 2023-03-07 RX ORDER — LIDOCAINE HYDROCHLORIDE 10 MG/ML
0.1 INJECTION, SOLUTION EPIDURAL; INFILTRATION; INTRACAUDAL; PERINEURAL AS NEEDED
Status: DISCONTINUED | OUTPATIENT
Start: 2023-03-07 | End: 2023-03-07 | Stop reason: HOSPADM

## 2023-03-07 RX ORDER — SODIUM CHLORIDE 0.9 % (FLUSH) 0.9 %
5-40 SYRINGE (ML) INJECTION EVERY 8 HOURS
Status: DISCONTINUED | OUTPATIENT
Start: 2023-03-07 | End: 2023-03-07 | Stop reason: HOSPADM

## 2023-03-07 RX ORDER — FENTANYL CITRATE 50 UG/ML
25 INJECTION, SOLUTION INTRAMUSCULAR; INTRAVENOUS
Status: DISCONTINUED | OUTPATIENT
Start: 2023-03-07 | End: 2023-03-07 | Stop reason: HOSPADM

## 2023-03-07 RX ORDER — ONDANSETRON 2 MG/ML
4 INJECTION INTRAMUSCULAR; INTRAVENOUS AS NEEDED
Status: DISCONTINUED | OUTPATIENT
Start: 2023-03-07 | End: 2023-03-07 | Stop reason: HOSPADM

## 2023-03-07 RX ORDER — DIPHENHYDRAMINE HYDROCHLORIDE 50 MG/ML
12.5 INJECTION, SOLUTION INTRAMUSCULAR; INTRAVENOUS AS NEEDED
Status: DISCONTINUED | OUTPATIENT
Start: 2023-03-07 | End: 2023-03-07 | Stop reason: HOSPADM

## 2023-03-07 RX ORDER — SODIUM CHLORIDE, SODIUM LACTATE, POTASSIUM CHLORIDE, CALCIUM CHLORIDE 600; 310; 30; 20 MG/100ML; MG/100ML; MG/100ML; MG/100ML
75 INJECTION, SOLUTION INTRAVENOUS CONTINUOUS
Status: DISCONTINUED | OUTPATIENT
Start: 2023-03-07 | End: 2023-03-07 | Stop reason: HOSPADM

## 2023-03-07 RX ORDER — CEFAZOLIN SODIUM 1 G/3ML
INJECTION, POWDER, FOR SOLUTION INTRAMUSCULAR; INTRAVENOUS AS NEEDED
Status: DISCONTINUED | OUTPATIENT
Start: 2023-03-07 | End: 2023-03-07 | Stop reason: HOSPADM

## 2023-03-07 RX ORDER — ACETAMINOPHEN 325 MG/1
650 TABLET ORAL ONCE
Status: COMPLETED | OUTPATIENT
Start: 2023-03-07 | End: 2023-03-07

## 2023-03-07 RX ORDER — FENTANYL CITRATE 50 UG/ML
INJECTION, SOLUTION INTRAMUSCULAR; INTRAVENOUS AS NEEDED
Status: DISCONTINUED | OUTPATIENT
Start: 2023-03-07 | End: 2023-03-07 | Stop reason: HOSPADM

## 2023-03-07 RX ORDER — MIDAZOLAM HYDROCHLORIDE 1 MG/ML
INJECTION, SOLUTION INTRAMUSCULAR; INTRAVENOUS AS NEEDED
Status: DISCONTINUED | OUTPATIENT
Start: 2023-03-07 | End: 2023-03-07 | Stop reason: HOSPADM

## 2023-03-07 RX ORDER — SODIUM CHLORIDE, SODIUM LACTATE, POTASSIUM CHLORIDE, CALCIUM CHLORIDE 600; 310; 30; 20 MG/100ML; MG/100ML; MG/100ML; MG/100ML
125 INJECTION, SOLUTION INTRAVENOUS CONTINUOUS
Status: DISCONTINUED | OUTPATIENT
Start: 2023-03-07 | End: 2023-03-07 | Stop reason: HOSPADM

## 2023-03-07 RX ORDER — LIDOCAINE HYDROCHLORIDE 20 MG/ML
INJECTION, SOLUTION EPIDURAL; INFILTRATION; INTRACAUDAL; PERINEURAL AS NEEDED
Status: DISCONTINUED | OUTPATIENT
Start: 2023-03-07 | End: 2023-03-07 | Stop reason: HOSPADM

## 2023-03-07 RX ORDER — HYDROMORPHONE HYDROCHLORIDE 1 MG/ML
0.2 INJECTION, SOLUTION INTRAMUSCULAR; INTRAVENOUS; SUBCUTANEOUS
Status: DISCONTINUED | OUTPATIENT
Start: 2023-03-07 | End: 2023-03-07 | Stop reason: HOSPADM

## 2023-03-07 RX ORDER — PROPOFOL 10 MG/ML
INJECTION, EMULSION INTRAVENOUS
Status: DISCONTINUED | OUTPATIENT
Start: 2023-03-07 | End: 2023-03-07 | Stop reason: HOSPADM

## 2023-03-07 RX ORDER — ROPIVACAINE HYDROCHLORIDE 5 MG/ML
30 INJECTION, SOLUTION EPIDURAL; INFILTRATION; PERINEURAL ONCE
Status: DISCONTINUED | OUTPATIENT
Start: 2023-03-07 | End: 2023-03-07 | Stop reason: HOSPADM

## 2023-03-07 RX ORDER — PROPOFOL 10 MG/ML
INJECTION, EMULSION INTRAVENOUS AS NEEDED
Status: DISCONTINUED | OUTPATIENT
Start: 2023-03-07 | End: 2023-03-07 | Stop reason: HOSPADM

## 2023-03-07 RX ORDER — MIDAZOLAM HYDROCHLORIDE 1 MG/ML
0.5 INJECTION, SOLUTION INTRAMUSCULAR; INTRAVENOUS
Status: DISCONTINUED | OUTPATIENT
Start: 2023-03-07 | End: 2023-03-07 | Stop reason: HOSPADM

## 2023-03-07 RX ORDER — MORPHINE SULFATE 2 MG/ML
2 INJECTION, SOLUTION INTRAMUSCULAR; INTRAVENOUS
Status: DISCONTINUED | OUTPATIENT
Start: 2023-03-07 | End: 2023-03-07 | Stop reason: HOSPADM

## 2023-03-07 RX ORDER — FENTANYL CITRATE 50 UG/ML
50 INJECTION, SOLUTION INTRAMUSCULAR; INTRAVENOUS AS NEEDED
Status: DISCONTINUED | OUTPATIENT
Start: 2023-03-07 | End: 2023-03-07 | Stop reason: HOSPADM

## 2023-03-07 RX ADMIN — FENTANYL CITRATE 75 MCG: 50 INJECTION, SOLUTION INTRAMUSCULAR; INTRAVENOUS at 09:51

## 2023-03-07 RX ADMIN — CEFAZOLIN 2 G: 330 INJECTION, POWDER, FOR SOLUTION INTRAMUSCULAR; INTRAVENOUS at 09:45

## 2023-03-07 RX ADMIN — ACETAMINOPHEN 650 MG: 325 TABLET ORAL at 08:10

## 2023-03-07 RX ADMIN — FENTANYL CITRATE 25 MCG: 50 INJECTION, SOLUTION INTRAMUSCULAR; INTRAVENOUS at 09:42

## 2023-03-07 RX ADMIN — SODIUM CHLORIDE, POTASSIUM CHLORIDE, SODIUM LACTATE AND CALCIUM CHLORIDE 125 ML/HR: 600; 310; 30; 20 INJECTION, SOLUTION INTRAVENOUS at 08:40

## 2023-03-07 RX ADMIN — LIDOCAINE HYDROCHLORIDE 80 MG: 20 INJECTION, SOLUTION EPIDURAL; INFILTRATION; INTRACAUDAL; PERINEURAL at 09:42

## 2023-03-07 RX ADMIN — PROPOFOL 50 MG: 10 INJECTION, EMULSION INTRAVENOUS at 09:42

## 2023-03-07 RX ADMIN — PROPOFOL 50 MCG/KG/MIN: 10 INJECTION, EMULSION INTRAVENOUS at 09:42

## 2023-03-07 RX ADMIN — DEXMEDETOMIDINE HYDROCHLORIDE 10 MCG: 100 INJECTION, SOLUTION, CONCENTRATE INTRAVENOUS at 09:42

## 2023-03-07 RX ADMIN — MIDAZOLAM 2 MG: 1 INJECTION INTRAMUSCULAR; INTRAVENOUS at 09:39

## 2023-03-07 NOTE — DISCHARGE INSTRUCTIONS
Discharge Instructions from Dr. Brandyn Collins may shower, but no hot tubs, swimming pools, or baths until your incision is healed. No heavy lifting with the affected extremity (nothing greater than 5 pounds), and limit its use for the next 4-5 days. You may use an ice pack for comfort for the next couple of days, but do not place ice directly on the skin. Rather, use a towel or clothing to serve as a barrier between skin and ice to prevent injury. If I placed a drain, follow the drain instructions provided, especially as you keep a record of the drain output. Follow medication instructions carefully. No narcotics given for port removal may take tylenol or advil      You will have bruising and swelling  Watch for signs of infection as listed below. Redness  Swelling  Drainage from the incision or from your nipple that appears infected  Fever over 101.5 degrees for consecutive readings, or over 99.5 if you are currently undergoing chemotherapy. Call our office (number is below) for a follow-up appointment.   If you have any problems, our phone number is [phone]

## 2023-03-07 NOTE — H&P
History and Physical    HISTORY OF PRESENT ILLNESS  Roque Moyer is a 34 y.o. female. HPI ESTABLISHED Patient here for follow up after chemotherapy. Would like to discuss port removal. Has been doing well other than low energy. Denies pian or changes of concern to the breast area. 6/21/22 biopsy LEFT breast- DCIS   1- 8 o'clock ER 60, AL 60   2- 4 o'clock posterior depth   Right breast papilloma, atypia   9/27/22 bilateral mastectomies, l sln biopsy and recon: T1 (3 mm) 90 mm dcis N1 (1/3 sln no montez) ER positive, Her2 negative   XRT Dr. Aramis Adan- Dr. Lillian Garcia  Has a history of cysts in both breasts. Family History: Mother- Breast cancer dx at 72      Review of Systems   All other systems reviewed and are negative. Physical Exam  Vitals and nursing note reviewed. Chest:         Comments: Bilateral expanders intact  Port intact  No adenopathy  No chest wall masses        ASSESSMENT and PLAN      ICD-10-CM ICD-9-CM     1. Malignant neoplasm of left breast in female, estrogen receptor positive, unspecified site of breast (HCC)  C50.912 174.9       Z17.0 V86.0         - no evidence local recurrence  - will schedule port removal would like this removed prior to xrt   20 minutes was spent with patient on counseling and coordination of care. NEPHROLOGY CONSULT NOTE           Patient: Ashish Nguyen MRN: [id number]  PCP: Racheal Ruffin MD   :     1961  Age:   62 y.o. Sex:  female      Referring physician: Kamari Fuentes MD      REASON FOR CONSULT:  Hyponatremia    HPI:    This is a 62 y.o. female with hx ETOH dependence, nicotine dependence, COPD, depression, and HTN who admitted to the hospital for hyponatremia,ETOH intoxication with altered mental status. Patient reported she was drinking 12 beers + 5 cups of wine for 5 days d/t family stress. In the ER, her labs were significant for sodium of 117, Cl 85. AST was 221,and , albumin 3.4, T. Bili 0.4, and ETOH 261. CT head was negative. She received total of 3L LR. Na rapidly overcorrected to 131 in 12 hrs.        ROS:     All systems reviewed and were negative except for above    Past Medical History:   Diagnosis Date    Alcohol abuse     States she has quit    Anxiety     Chronic obstructive pulmonary disease (HCC)     Depression     Essential hypertension     Hypertension     Psychiatric disorder     Tobacco abuse        Past Surgical History:   Procedure Laterality Date    COLONOSCOPY N/A 2018    COLONOSCOPY performed by Asia Vasquez MD at [address] [place] [address] GYN             Allergies   Allergen Reactions    Codeine Nausea and Vomiting    Naproxen Nausea and Vomiting       Current Facility-Administered Medications   Medication Dose Route Frequency    sodium chloride (NS) flush 5-40 mL  5-40 mL IntraVENous Q8H    sodium chloride (NS) flush 5-40 mL  5-40 mL IntraVENous PRN    LORazepam (ATIVAN) injection 2 mg  2 mg IntraVENous Q1H PRN    LORazepam (ATIVAN) injection 4 mg  4 mg IntraVENous Q1H PRN    pantoprazole (PROTONIX) 40 mg in 0.9% sodium chloride 10 mL injection  40 mg IntraVENous Q12H    desmopressin (DDAVP) 4 mcg/mL injection 4 mcg  4 mcg SubCUTAneous ONCE    dextrose 5% infusion  250 mL/hr IntraVENous CONTINUOUS    dextrose 5% 1,000 mL with mvi, adult no. 4 with vit K 10 mL, thiamine 697 mg, folic acid 1 mg infusion   IntraVENous ONCE       Family History   Problem Relation Age of Onset    Diabetes Mother     Hypertension Mother     Heart Attack Mother     Hypertension Father     Diabetes Brother        Social History     Socioeconomic History    Marital status:      Spouse name: Not on file    Number of children: Not on file    Years of education: Not on file    Highest education level: Not on file   Occupational History    Not on file   Social Needs    Financial resource strain: Not on file    Food insecurity     Worry: Not on file     Inability: Not on file    Transportation needs     Medical: Not on file     Non-medical: Not on file   Tobacco Use    Smoking status: Current Every Day Smoker     Packs/day: 0.50    Smokeless tobacco: Never Used   Substance and Sexual Activity    Alcohol use: Yes     Comment: \"as much as I want\"    Drug use: No    Sexual activity: Not on file   Lifestyle    Physical activity     Days per week: Not on file     Minutes per session: Not on file    Stress: Not on file   Relationships    Social connections     Talks on phone: Not on file     Gets together: Not on file     Attends Hinduism service: Not on file     Active member of club or organization: Not on file     Attends meetings of clubs or organizations: Not on file     Relationship status: Not on file    Intimate partner violence     Fear of current or ex partner: Not on file     Emotionally abused: Not on file     Physically abused: Not on file     Forced sexual activity: Not on file   Other Topics Concern    Not on file   Social History Narrative    Not on file       Vitals:    03/23/21 0615 03/23/21 0630 03/23/21 0655 03/23/21 0700   BP: 106/77 137/75 137/81 123/72   Pulse: 72 72 86 74   Resp: 16 18 19 16   Temp:       SpO2: 97% 100% 94% 99%   Weight:       Height:             Intake/Output Summary (Last 24 hours) at 3/23/2021 0810  Last data filed at 3/23/2021 0600  Gross per 24 hour   Intake --   Output 200 ml   Net -200 ml       PHYSICAL EXAM:  GENERAL : Lying down in bed with no acute distress  HEENT: AT NC PEERLA   NECK: Supple no JVP  CVS: S1 S2 RRR, no murmur or gallops heard  RS: CTABL, no rhonchi,or wheezing heard  ABDOMEN: soft NT ND positive BS  EXTREMITY: No edema clubbing or cyanosis, pedal pulse +  NEUROLOGY: AAA X3, no focal deficit or asterixis        LABS/STUDIES:  BMP:   Lab Results   Component Value Date/Time     (L) 03/23/2021 06:31 AM    K 4.2 03/23/2021 06:31 AM    CL 98 03/23/2021 06:31 AM    CO2 25 03/23/2021 06:31 AM    AGAP 8 03/23/2021 06:31 AM     (H) 03/23/2021 06:31 AM    BUN 7 03/23/2021 06:31 AM    CREA 0.60 03/23/2021 06:31 AM    GFRAA >60 03/23/2021 06:31 AM    GFRNA >60 03/23/2021 06:31 AM        CBC:    Lab Results   Component Value Date/Time    WBC 3.9 03/23/2021 06:31 AM    HGB 13.0 03/23/2021 06:31 AM    HCT 37.2 03/23/2021 06:31 AM     03/23/2021 06:31 AM       No results found for: MCACR, MCA1, MCA2, MCA3, MCAU, MCAU2, MCALPOCT    Lab Results   Component Value Date/Time    Color YELLOW 03/22/2021 06:44 PM    Appearance CLEAR 03/22/2021 06:44 PM    Specific gravity 1.005 03/22/2021 06:44 PM    pH (UA) 6.5 03/22/2021 06:44 PM    Protein Negative 03/22/2021 06:44 PM    Glucose Negative 03/22/2021 06:44 PM    Ketone Negative 03/22/2021 06:44 PM    Bilirubin Negative 03/22/2021 06:44 PM    Urobilinogen 0.2 03/22/2021 06:44 PM    Nitrites Negative 03/22/2021 06:44 PM    Leukocyte Esterase Negative 03/22/2021 06:44 PM    Epithelial cells FEW 05/17/2018 02:40 PM    Bacteria NEGATIVE  05/17/2018 02:40 PM    WBC 0 to 2 05/17/2018 02:40 PM    RBC 0 05/17/2018 02:40 PM         ASSESSMENT/PLAN:    Hypo-osmolar hyponatremia d/t beer potomania Hypotension  Hypochloremia    Plan/discussion    NA overcorrected 14 meq in 12 hr from 117>131  Stop NS+ Banana bag  Give DDVAP 4 MCG SQX1  Give D5W @250 ml/hr for 2L  BMP Q4hr  Goal of NA correction 6 meq in 24 hr  Plan is discussed with patient, nurse and Pharmacist        Betty Jiang MD  3/23/2021    [address] [place] [address] [facility]:  [email or web address]  Shelby Herrmann office:  [address] [address]  [address]  Phone: [phone]  Fax :     [phone]     [address] [place] [address] office:  [address], [address] [place] [address]  Phone - [phone]  Fax - [phone]

## 2023-03-07 NOTE — PERIOP NOTES
I have reviewed discharge instructions with the spouse-deisy. The spouse-deisy verbalized understanding. All questions addressed at this time. A paper copy of these instructions have been given to the patient to take home.

## 2023-03-07 NOTE — ANESTHESIA POSTPROCEDURE EVALUATION
Post-Anesthesia Evaluation and Assessment    Patient: Mario Barkley MRN: [id number]  SSN: [id number]    YOB: 1985  Age: 34 y.o. Sex: female      I have evaluated the patient and they are stable and ready for discharge from the PACU. Cardiovascular Function/Vital Signs  Visit Vitals  BP (!) 90/45   Pulse 60   Temp 36.8 °C (98.2 °F)   Resp 12   Ht 5' 5\" (1.651 m)   Wt 64 kg (141 lb)   SpO2 100%   BMI 23.46 kg/m²       Patient is status post MAC anesthesia for Procedure(s):  PORT REMOVAL. Nausea/Vomiting: None    Postoperative hydration reviewed and adequate. Pain:  Pain Scale 1: FLACC (03/07/23 1018)  Pain Intensity 1: 0 (03/07/23 1018)   Managed    Neurological Status:   Neuro (WDL): Exceptions to WDL (03/07/23 1018)  Neuro  Neurologic State: Anesthetized (03/07/23 1018)   At baseline    Mental Status, Level of Consciousness: Alert and  oriented to person, place, and time    Pulmonary Status:   O2 Device: Nasal cannula (03/07/23 1023)   Adequate oxygenation and airway patent    Complications related to anesthesia: None    Post-anesthesia assessment completed. No concerns    Signed By: Zac Reece MD     March 7, 2023              Procedure(s):  PORT REMOVAL. MAC    <[email or web address]>    INITIAL Post-op Vital signs:   Vitals Value Taken Time   BP 92/44 03/07/23 1035   Temp 36.8 °C (98.2 °F) 03/07/23 1018   Pulse 59 03/07/23 1037   Resp 12 03/07/23 1037   SpO2 100 % 03/07/23 1037   Vitals shown include unvalidated device data.

## 2023-03-07 NOTE — OP NOTES
[address]   OPERATIVE REPORT    Name:  Payam Velez  MR#:  [id number]  :  1985  ACCOUNT #:  [de-identified]  DATE OF SERVICE:  2023      PREOPERATIVE DIAGNOSIS:  Left breast cancer. POSTOPERATIVE DIAGNOSIS:  Left breast cancer. PROCEDURE PERFORMED:  Port removal.    SURGEON:  Nelson Delaney MD    ASSISTANT:  Sav Temple    ANESTHESIA:  MAC.    COMPLICATIONS:  None. SPECIMENS REMOVED:  None. IMPLANTS:  A 8-[place] PowerPort. ESTIMATED BLOOD LOSS:  Minimal.    DRAINS:  None. FINDINGS:  Port removed intact. INDICATIONS FOR PROCEDURE:  A 42-year-old female with left breast cancer who had a port for adjuvant therapy and needed this removed. PROCEDURE IN DETAIL:  The patient was seen in preop holding area where surgical site was marked by surgeon. Informed consent was obtained. She was taken to the operating room and laid in supine position where MAC anesthesia was induced. Bilateral chest prepped and draped in the usual fashion and a time-out was performed. Attention was turned to the right chest wall where 10 mL of local anesthetic was injected. A 15-blade was used to excise the old scar. Bovie cautery was used to dissect through the tissue. Prolene suture was cut on either side. The port was removed intact. Port tract was oversewn due to backbleeding with 3-0 Vicryl and then another 10 mL of local anesthetic was injected in the tissue and skin. The incision was closed with interrupted 3-0 Vicryl and 4-0 subcuticular Monocryl. Skin glue was placed on the incision. All sponge, needle, and instrument counts were correct. The patient went to Recovery in stable condition.       MD DERIC [email or web address]  D:  2023 10:11  T:  2023 11:20  JOB #:  [id number]

## 2023-03-07 NOTE — BRIEF OP NOTE
Brief Postoperative Note    Patient: Faiza Baumann  YOB: 1985  MRN: [id number]    Date of Procedure: 3/7/2023     Pre-Op Diagnosis: LEFT BREAST CANCER    Post-Op Diagnosis: Same as preoperative diagnosis. Procedure(s):  PORT REMOVAL    Surgeon(s): Michael Franz MD    Surgical Assistant: Surg Asst-1: Eileen Babcock    Anesthesia: MAC     Estimated Blood Loss (mL): Minimal    Complications: None    Specimens: * No specimens in log *     Implants:   Implant Name Type Inv.  Item Serial No.  Lot No. LRB No. Used Action   PORT SL POWERPORT 8FR TI -- CONVERT TO ITEM [id number] - SNA  PORT SL POWERPORT 8FR TI -- CONVERT TO ITEM [id number] NA [employer]ARD [employer]_WD [id number] Explanted       Drains: * No LDAs found *    Findings: port removed intact    Electronically Signed by Zain Corona MD on 3/7/2023 at 10:08 AM    Dictated stat

## 2023-03-07 NOTE — ANESTHESIA PREPROCEDURE EVALUATION
Relevant Problems   PERSONAL HX & FAMILY HX OF CANCER   (+) Malignant neoplasm of left breast in female, estrogen receptor positive (HCC)       Anesthetic History   No history of anesthetic complications            Review of Systems / Medical History  Patient summary reviewed, nursing notes reviewed and pertinent labs reviewed    Pulmonary  Within defined limits                 Neuro/Psych         Psychiatric history     Cardiovascular  Within defined limits                Exercise tolerance: >4 METS     GI/Hepatic/Renal  Within defined limits              Endo/Other        Cancer     Other Findings              Physical Exam    Airway  Mallampati: I  TM Distance: 4 - 6 cm  Neck ROM: normal range of motion   Mouth opening: Normal     Cardiovascular  Regular rate and rhythm,  S1 and S2 normal,  no murmur, click, rub, or gallop             Dental  No notable dental hx       Pulmonary  Breath sounds clear to auscultation               Abdominal  GI exam deferred       Other Findings            Anesthetic Plan    ASA: 2  Anesthesia type: MAC          Induction: Intravenous  Anesthetic plan and risks discussed with: Patient

## 2023-03-09 ENCOUNTER — APPOINTMENT (OUTPATIENT)
Dept: INFUSION THERAPY | Age: 38
End: 2023-03-09

## 2023-03-21 ENCOUNTER — OFFICE VISIT (OUTPATIENT)
Dept: SURGERY | Age: 38
End: 2023-03-21
Payer: COMMERCIAL

## 2023-03-21 VITALS — HEIGHT: 65 IN | WEIGHT: 141 LBS | BODY MASS INDEX: 23.49 KG/M2

## 2023-03-21 DIAGNOSIS — Z90.13 STATUS POST BILATERAL MASTECTOMY: ICD-10-CM

## 2023-03-21 DIAGNOSIS — C77.3 BREAST CANCER METASTASIZED TO AXILLARY LYMPH NODE, LEFT (HCC): Primary | ICD-10-CM

## 2023-03-21 DIAGNOSIS — C50.912 BREAST CANCER METASTASIZED TO AXILLARY LYMPH NODE, LEFT (HCC): Primary | ICD-10-CM

## 2023-03-21 PROCEDURE — 99024 POSTOP FOLLOW-UP VISIT: CPT | Performed by: NURSE PRACTITIONER

## 2023-03-21 NOTE — PROGRESS NOTES
HISTORY OF PRESENT ILLNESS  Prakash Mckinnon is a 34 y.o. female. HPI Established patient presents for a post-op visit. Breast history -   Referring - [facility]  History of breast cysts  5/25/22 - mammogram at  [facility] - suspicious calcs at LEFT 4:00 and 8:00  6/21/22 - LEFT breast biopsy x 2 -   Both areas DCIS - ER pos, MT pos  8/3/22 - breast MRI:  IMPRESSION  1. Multicentric, multifocal, left DCIS involves all quadrants and largely replaces the inferior breast. BI-RADS 6.  2. DCIS extends to the left nipple, with an enhancing mass in the nipple. 3. Probable left axillary kari metastasis. BI-RADS 4.  4. Linear enhancement in the subareolar right breast, likely DCIS. BI-RADS 4.  5. Bilobed mass at 2:00 in the right breast. BI-RADS 4A. 6. Overall assessment: BI-RADS Assessment Category 4: Suspicious abnormality. 7. Recommendations: Ultrasound-guided left axillary lymph node biopsy, MRI guided right breast biopsy. 8/11/22 - RIGHT breast biopsy x 2 -   1. Breast, right site A, core biopsy:   Intraductal papilloma with focal atypical ductal hyperplasia (see Comment). 2. Breast, right site B, core biopsy:   Benign breast tissue with fibroadenomatoid changes. 9/27/22 - BILATERAL mastectomy and LEFT SLNB -  RIGHT breast - benign tissue, no atypia or malignancy  LEFT breast - 0.3cm IDC; extensive DCIS; node positive - 1/3; ER pos, MT pos, HER2 neg; T1aN1a  Mammaprint - high risk  Adjuvant chemo - TC x 4 - Dr. Joselin Ocampo  3/7/23 - port removal - Dr. Layton      Family history -   Mom - breast cancer at 72  2022 - [facility] genetic testing - clinically negative with VUS on LEOBARDO      ROS    Physical Exam  Chest:           ASSESSMENT and PLAN    ICD-10-CM ICD-9-CM    1. Breast cancer metastasized to axillary lymph node, left (HCC)  C50.912 174.9     C77.3 196.3       2. Status post bilateral mastectomy  Z90.13 V45.71           Port site healing well. Plans to start radiation in 4/2023.   Continues care with Dr. Joselin Ocampo - Zoladex 3.6 mg every 28 days for ~5 years. Will add exemestane after first injection  Follow-up here in 6 months or sooner PRN. She is comfortable with this plan. All questions answered and she stated understanding.

## 2023-03-23 ENCOUNTER — APPOINTMENT (OUTPATIENT)
Dept: INFUSION THERAPY | Age: 38
End: 2023-03-23

## 2023-03-29 ENCOUNTER — DOCUMENTATION ONLY (OUTPATIENT)
Dept: ONCOLOGY | Age: 38
End: 2023-03-29

## 2023-03-29 ENCOUNTER — HOSPITAL ENCOUNTER (OUTPATIENT)
Dept: INFUSION THERAPY | Age: 38
Discharge: HOME | End: 2023-03-29
Payer: COMMERCIAL

## 2023-03-29 VITALS
HEART RATE: 53 BPM | RESPIRATION RATE: 16 BRPM | TEMPERATURE: 98 F | SYSTOLIC BLOOD PRESSURE: 109 MMHG | DIASTOLIC BLOOD PRESSURE: 72 MMHG

## 2023-03-29 DIAGNOSIS — Z17.0 MALIGNANT NEOPLASM OF LEFT BREAST IN FEMALE, ESTROGEN RECEPTOR POSITIVE, UNSPECIFIED SITE OF BREAST (HCC): Primary | ICD-10-CM

## 2023-03-29 DIAGNOSIS — C50.912 MALIGNANT NEOPLASM OF LEFT BREAST IN FEMALE, ESTROGEN RECEPTOR POSITIVE, UNSPECIFIED SITE OF BREAST (HCC): Primary | ICD-10-CM

## 2023-03-29 DIAGNOSIS — D05.12 DUCTAL CARCINOMA IN SITU (DCIS) OF LEFT BREAST: Primary | ICD-10-CM

## 2023-03-29 PROCEDURE — 96402 CHEMO HORMON ANTINEOPL SQ/IM: CPT

## 2023-03-29 PROCEDURE — 74011250636 HC RX REV CODE- 250/636: Performed by: INTERNAL MEDICINE

## 2023-03-29 RX ORDER — EXEMESTANE 25 MG/1
25 TABLET ORAL DAILY
Qty: 30 TABLET | Refills: 2 | Status: SHIPPED | OUTPATIENT
Start: 2023-03-29 | End: 2023-06-27

## 2023-03-29 RX ADMIN — GOSERELIN ACETATE 3.6 MG: 3.6 IMPLANT SUBCUTANEOUS at 10:32

## 2023-03-29 NOTE — PROGRESS NOTES
Called patient today to discuss starting exemestane daily. We discussed possible side effects including hot flashes and body aches. Sent to her pharmacy on file. We will discuss further at her next appointment. Instructed patient to call with any questions or concerns in the meantime.     Ham Weldon NP

## 2023-03-29 NOTE — PROGRESS NOTES
[facility] Progress Note  1025: Pt arrived ambulatory to [facility] for Zoladex in stable condition. Assessment completed. Visit Vitals  /72 (BP 1 Location: Right upper arm, BP Patient Position: Sitting)   Pulse (!) 53   Temp 98 °F (36.7 °C)   Resp 16      Medications Administered       goserelin (ZOLADEX) implant 3.6 mg       Admin Date  03/29/2023 Action  Given Dose  3.6 mg Route  SubCUTAneous Administered By  Tere Bryson RN             LLQ    Ms. Farfan tolerated the treatment well, and had no complaints. Ms. Matthew Enciso was discharged from [address] [address] in stable condition at 1035. Patient is aware of next scheduled [facility] appointment.     Future Appointments   Date Time Provider Janice Le   4/26/2023  3:00 PM G2 MINI FASTRACK [facility]   5/24/2023  3:00 PM G1 MINI FASTRACK [facility]   6/21/2023  2:30 PM G1 MINI FASTRACK [facility]   9/26/2023  4:30 PM Martha Landaverde NP [facility]

## 2023-04-14 ENCOUNTER — HOSPITAL ENCOUNTER (OUTPATIENT)
Dept: RADIATION THERAPY | Age: 38
Discharge: HOME | End: 2023-04-14
Payer: COMMERCIAL

## 2023-04-14 PROCEDURE — 77300 RADIATION THERAPY DOSE PLAN: CPT

## 2023-04-14 PROCEDURE — 77293 RESPIRATOR MOTION MGMT SIMUL: CPT

## 2023-04-14 PROCEDURE — 77334 RADIATION TREATMENT AID(S): CPT

## 2023-04-14 PROCEDURE — 77295 3-D RADIOTHERAPY PLAN: CPT

## 2023-04-17 ENCOUNTER — HOSPITAL ENCOUNTER (OUTPATIENT)
Dept: RADIATION THERAPY | Age: 38
Discharge: HOME | End: 2023-04-17
Payer: COMMERCIAL

## 2023-04-17 PROCEDURE — 77280 THER RAD SIMULAJ FIELD SMPL: CPT

## 2023-04-18 ENCOUNTER — HOSPITAL ENCOUNTER (OUTPATIENT)
Dept: RADIATION THERAPY | Age: 38
Discharge: HOME | End: 2023-04-18
Payer: COMMERCIAL

## 2023-04-18 PROCEDURE — 77387 GUIDANCE FOR RADJ TX DLVR: CPT

## 2023-04-18 PROCEDURE — 77412 RADIATION TX DELIVERY LVL 3: CPT

## 2023-04-19 ENCOUNTER — HOSPITAL ENCOUNTER (OUTPATIENT)
Dept: RADIATION THERAPY | Age: 38
Discharge: HOME | End: 2023-04-19
Payer: COMMERCIAL

## 2023-04-19 PROCEDURE — 77412 RADIATION TX DELIVERY LVL 3: CPT

## 2023-04-19 PROCEDURE — 77417 THER RADIOLOGY PORT IMAGE(S): CPT

## 2023-04-20 ENCOUNTER — HOSPITAL ENCOUNTER (OUTPATIENT)
Dept: RADIATION THERAPY | Age: 38
End: 2023-04-20
Payer: COMMERCIAL

## 2023-04-21 ENCOUNTER — HOSPITAL ENCOUNTER (OUTPATIENT)
Dept: RADIATION THERAPY | Age: 38
Discharge: HOME | End: 2023-04-21
Payer: COMMERCIAL

## 2023-04-21 PROCEDURE — 77412 RADIATION TX DELIVERY LVL 3: CPT

## 2023-04-21 PROCEDURE — 77417 THER RADIOLOGY PORT IMAGE(S): CPT

## 2023-04-24 ENCOUNTER — HOSPITAL ENCOUNTER (OUTPATIENT)
Dept: RADIATION THERAPY | Age: 38
Discharge: HOME | End: 2023-04-24
Payer: COMMERCIAL

## 2023-04-24 PROCEDURE — 77417 THER RADIOLOGY PORT IMAGE(S): CPT

## 2023-04-24 PROCEDURE — 77412 RADIATION TX DELIVERY LVL 3: CPT

## 2023-04-25 ENCOUNTER — HOSPITAL ENCOUNTER (OUTPATIENT)
Dept: RADIATION THERAPY | Age: 38
Discharge: HOME | End: 2023-04-25
Payer: COMMERCIAL

## 2023-04-25 PROCEDURE — 77336 RADIATION PHYSICS CONSULT: CPT

## 2023-04-25 PROCEDURE — 77417 THER RADIOLOGY PORT IMAGE(S): CPT

## 2023-04-25 PROCEDURE — 77412 RADIATION TX DELIVERY LVL 3: CPT

## 2023-04-26 ENCOUNTER — HOSPITAL ENCOUNTER (OUTPATIENT)
Dept: INFUSION THERAPY | Age: 38
Discharge: HOME | End: 2023-04-26
Payer: COMMERCIAL

## 2023-04-26 ENCOUNTER — HOSPITAL ENCOUNTER (OUTPATIENT)
Dept: RADIATION THERAPY | Age: 38
Discharge: HOME | End: 2023-04-26
Payer: COMMERCIAL

## 2023-04-26 ENCOUNTER — OFFICE VISIT (OUTPATIENT)
Dept: ONCOLOGY | Age: 38
End: 2023-04-26
Payer: COMMERCIAL

## 2023-04-26 VITALS
TEMPERATURE: 98.2 F | DIASTOLIC BLOOD PRESSURE: 73 MMHG | WEIGHT: 141 LBS | BODY MASS INDEX: 23.46 KG/M2 | HEART RATE: 64 BPM | RESPIRATION RATE: 18 BRPM | SYSTOLIC BLOOD PRESSURE: 109 MMHG | OXYGEN SATURATION: 100 %

## 2023-04-26 VITALS
TEMPERATURE: 97.5 F | DIASTOLIC BLOOD PRESSURE: 73 MMHG | RESPIRATION RATE: 16 BRPM | HEART RATE: 64 BPM | SYSTOLIC BLOOD PRESSURE: 109 MMHG

## 2023-04-26 DIAGNOSIS — Z95.828 PORT-A-CATH IN PLACE: ICD-10-CM

## 2023-04-26 DIAGNOSIS — C50.912 MALIGNANT NEOPLASM OF LEFT BREAST IN FEMALE, ESTROGEN RECEPTOR POSITIVE, UNSPECIFIED SITE OF BREAST (HCC): Primary | ICD-10-CM

## 2023-04-26 DIAGNOSIS — Z17.0 MALIGNANT NEOPLASM OF LEFT BREAST IN FEMALE, ESTROGEN RECEPTOR POSITIVE, UNSPECIFIED SITE OF BREAST (HCC): Primary | ICD-10-CM

## 2023-04-26 DIAGNOSIS — Z51.11 ENCOUNTER FOR ANTINEOPLASTIC CHEMOTHERAPY: ICD-10-CM

## 2023-04-26 PROCEDURE — 77412 RADIATION TX DELIVERY LVL 3: CPT

## 2023-04-26 PROCEDURE — 96402 CHEMO HORMON ANTINEOPL SQ/IM: CPT

## 2023-04-26 PROCEDURE — 74011250636 HC RX REV CODE- 250/636: Performed by: INTERNAL MEDICINE

## 2023-04-26 PROCEDURE — 77417 THER RADIOLOGY PORT IMAGE(S): CPT

## 2023-04-26 PROCEDURE — 99214 OFFICE O/P EST MOD 30 MIN: CPT | Performed by: INTERNAL MEDICINE

## 2023-04-26 RX ADMIN — GOSERELIN ACETATE 3.6 MG: 3.6 IMPLANT SUBCUTANEOUS at 15:19

## 2023-04-26 NOTE — PROGRESS NOTES
Dede Diaz is a 34 y.o. female    Chief Complaint   Patient presents with    Follow-up     Left breast cancer        1. Have you been to the ER, urgent care clinic since your last visit? Hospitalized since your last visit? No    2. Have you seen or consulted any other health care providers outside of the [address] [place] [address] since your last visit? Include any pap smears or colon screening.  No

## 2023-04-26 NOTE — PROGRESS NOTES
[facility] [place] [address] Joanne Baez, [address], [place]: [phone]  F: [phone]    Reason for Visit:   Hari Gallego is a 34 y.o. female who is seen for follow up of Left breast cancer . Treatment History:   9/27/2022-bilateral mastectomy with left sentinel node biopsy  11/22/22-1/26/2023: Adjuvant TC   3/2/2023: Zoladex  3/30/2023: Aromasin     History of Present Illness:   Patient is a 68-year-old female with a history of anemia and fibrocystic breast disease seen for left breast cancer and DCIS. She had a mammogram in May 2022 that showed diffuse left breast calcifications as well as several masses in the right breast that were thought to be fibroadenomas. Initial biopsy of left breast calcifications in June 2022 was consistent with DCIS. She was referred to Dr. Vinod Gonzales who obtained a breast MRI on 8/3/2022. This confirmed multicentric multifocal left breast DCIS in all quadrants . Probable left axillary kari metastases, bilobed mass at 2:00 in the right breast as well is linear enhancement in the subareolar right breast.  Right breast biopsy of the subareolar region was consistent with intraductal papilloma with focal atypical ductal hyperplasia, right breast 2 cm masslike lesion was consistent with fibroadenoma. Underwent a bilateral mastectomy with sentinel node biopsy on the left on 9/27/2022. Right breast showed intraductal papilloma and fibroadenoma. Left breast showed extensive high-grade DCIS with negative margins as well as a 3 mm focus of invasive carcinoma, grade 2, 1 positive node of 3. ER 90% MS 90% HER2/raad negative, Ki-67 20%. Mother's cancer diagnosed when she was 72, and had negative genetic testing. S/P 4 cycles of adjuvant TC. Started zoladex and aromasin, Radiation to start April. Here for fu. Radiation Started about a week and half ago. She has mild hot flashes. Does have some hip pain.  Still has some numbness in the tips of her fingers. Denies n/v and diarrhea. She has 3 children. 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      Past Medical History:   Diagnosis Date    Adverse effect of anesthesia 1999    Delayed awakening after anesthesia    Anemia     Anxiety     Breast cancer ([address] 2022    LEFT    HX OTHER MEDICAL 02/10/2011    fibroids    Ill-defined condition     PALPITATIONS, CLEARED BY CARDIOLOGIST    Postpartum depression     with 1st pregnancy    Unspecified breast disorder     fibrocystic breasts, followed at [facility]      Past Surgical History:   Procedure Laterality Date    HX ADENOIDECTOMY  1988    HX BILATERAL MASTECTOMY  09/27/2022    HX BREAST BIOPSY Left     06/2022 AND 08/2022 BIOPSIES    HX BREAST RECONSTRUCTION Bilateral 09/27/2022    . performed by Julianna Cade MD at [facility]    HX [place]    HX MASTECTOMY Bilateral 09/27/2022    BILATERAL SKIN SPARING MASTECTOMIES LEFT BREAST SENTINEL NODE BIOPSY  DR. Georgina Osborn - BILATERAL BREAST RECONSTRUCTION WITH TISSUE EXPANDERS AND ALLODERM performed by Mal Amaya MD at [address] [place] [address] History     Tobacco Use    Smoking status: Never    Smokeless tobacco: Never   Substance Use Topics    Alcohol use: No      Family History   Problem Relation Age of Onset    Hypertension Mother         caused by RA rx    Arthritis-rheumatoid Mother     OSTEOARTHRITIS Mother     No Known Problems Father     Other Sister         UTERINE FIBROIDS    Stroke Maternal Grandfather     Cancer Neg Hx     Heart Disease Neg Hx     Anesth Problems Neg Hx      Current Outpatient Medications   Medication Sig    exemestane (AROMASIN) 25 mg tablet Take 1 Tablet by mouth daily for 90 days. escitalopram oxalate (LEXAPRO) 10 mg tablet Take 1 Tablet by mouth daily. fexofenadine (ALLEGRA) 60 mg tablet Take 1 Tablet by mouth nightly. No current facility-administered medications for this visit.       Allergies   Allergen Reactions    Blueberry Swelling    Sulfa (Sulfonamide Antibiotics) Anaphylaxis    Caffeine Palpitations    Crab Swelling    Pseudoephedrine Palpitations        Review of Systems: A complete review of systems was obtained, negative except as described above. Physical Exam:     Vitals reviewed        ECOG PS: 0  General: No distress, dry mouth  Eyes: PERRL, anicteric sclerae  HENT: Atraumatic, PAC in place , OP clear   Skin: No rashes, ecchymoses, or petechiae. Normal temperature, turgor, and texture. Psych: Alert, oriented, appropriate affect, normal judgment/insight    Results:     Lab Results   Component Value Date/Time    WBC 5.0 03/01/2023 02:37 PM    HGB 10.3 (L) 03/01/2023 02:37 PM    HCT 33.9 (L) 03/01/2023 02:37 PM    PLATELET 135 21/52/1528 02:37 PM    MCV 98.0 03/01/2023 02:37 PM    ABS. NEUTROPHILS 3.3 03/01/2023 02:37 PM    Hgb, External 10.9 06/30/2011 12:00 AM    Hct, External 33.5 06/30/2011 12:00 AM    Platelet cnt., External 337 02/10/2011 12:00 AM     Lab Results   Component Value Date/Time    Sodium 139 01/26/2023 08:20 AM    Potassium 4.1 01/26/2023 08:20 AM    Chloride 108 01/26/2023 08:20 AM    CO2 26 01/26/2023 08:20 AM    Glucose 124 (H) 01/26/2023 08:20 AM    BUN 15 01/26/2023 08:20 AM    Creatinine 0.51 (L) 01/26/2023 08:20 AM    GFR est AA >60 06/18/2021 12:02 PM    GFR est non-AA >60 06/18/2021 12:02 PM    Calcium 9.1 01/26/2023 08:20 AM     Lab Results   Component Value Date/Time    Bilirubin, total 0.4 01/26/2023 08:20 AM    ALT (SGPT) 22 01/26/2023 08:20 AM    Alk. phosphatase 52 01/26/2023 08:20 AM    Protein, total 6.6 01/26/2023 08:20 AM    Albumin 3.7 01/26/2023 08:20 AM    Globulin 2.9 01/26/2023 08:20 AM     Records reviewed and summarized above. Pathology report(s) reviewed above. 9/27/2022  INVASIVE CARCINOMA OF THE BREAST: Resection    SPECIMEN       Procedure: Total mastectomy       Specimen Laterality: Left    TUMOR       Histologic Type:  Invasive carcinoma of no special type (ductal)       Glandular (Acinar) / Tubular Differentiation: Score 2       Nuclear Pleomorphism: Score 2       Mitotic Rate: Score 2       Overall Grade: Grade 2 (scores of 6 or 7)       Tumor Size: 3 mm       Tumor Focality: Single focus of invasive carcinoma       Ductal Carcinoma In Situ (DCIS): Present           Ductal Carcinoma In Situ (DCIS): Positive for extensive   intraductal component (EIC)           Size (Extent) of DCIS: 90 mm           Architectural Patterns: Micropapillary, Cribriform, Papillary,   [place]           Nuclear Grade: Grade III (high)           Necrosis: Present, central (expansive \"comedo\" necrosis)       Lobular Carcinoma In Situ (LCIS): Not identified    Tumor Extent       Skin: Present and uninvolved by invasive carcinoma       Nipple DCIS: DCIS does not involve the nipple epidermis       Lymphovascular Invasion: Not identified       Dermal Lymphovascular Invasion: Not identified       Microcalcifications: Present in DCIS, invasive carcinoma, and   non-neoplastic tissue       Treatment Effect in the Breast: No known presurgical therapy    MARGINS       Invasive Carcinoma Margins: Uninvolved by invasive carcinoma           Distance from Closest Margin (Millimeters): Greater than 2 mm       DCIS Margins: Uninvolved by DCIS           Distance from Closest Margin (Millimeters): Less than 1 mm           Closest Margin(s):  Anterior, Posterior    LYMPH NODES       Regional Lymph Nodes: Involved by tumor cells           Number of Lymph Nodes with Macrometastases (> 2 mm): 1           Number of Lymph Nodes with Micrometastases (> 0.2 mm to 2 mm and/   or > 200 cells): 0           Size of Largest Metastatic Deposit (Millimeters): 11 mm           Extranodal Extension: Not identified           Total Number of Lymph Nodes Examined: 3           Number of [place] Nodes Examined: 3    PATHOLOGIC STAGE CLASSIFICATION (pTNM, AJCC 8th Edition)       Primary Tumor (pT): pT1a       Regional Lymph Nodes Modifier: (sn): [place] node(s) evaluated       Regional Lymph Nodes (pN): pN1a    ADDITIONAL FINDINGS       Additional Findings: Fibroadenomas          6. Left breast anterior margin, excision:        Ductal carcinoma in situ (DCIS), nuclear grade 3   DCIS is 3 mm from designated anterior margin       Radiology report(s) reviewed above. MRI 8/2022  IMPRESSION  1. Multicentric, multifocal, left DCIS involves all quadrants and largely  replaces the inferior breast. BI-RADS 6.  2. DCIS extends to the left nipple, with an enhancing mass in the nipple. 3. Probable left axillary kari metastasis. BI-RADS 4.  4. Linear enhancement in the subareolar right breast, likely DCIS. BI-RADS 4.  5. Bilobed mass at 2:00 in the right breast. BI-RADS 4A. 6. Overall assessment: BI-RADS Assessment Category 4: Suspicious abnormality. 7. Recommendations: Ultrasound-guided left axillary lymph node biopsy, MRI  guided right breast biopsy. CT 11/2022:  IMPRESSION  1. Post bilateral mastectomies. No evidence of metastatic disease    Assessment:   1) Left breast cancer    S/P Bilateral mastectomy and Left SLN on 9/27/2022   3 mm, 1/3 + nodes  Grade 2  Margins negative  9 CM are of DCIS    ER 90% PR10%HER2 raad negative  Ki 67 20%  Mamma print high risk Luminal B     [id number] Stage IA  S/P 4 cycles of Adjuvant TC as of 1/26/2023  Is pre menopausal  By definition is High risk given + node and Ki of 20%  Options reviewed were Tamoxifen, OFS+ AI, OFS+AI+ CDK inhibitors. 12 year follow up of SOFT and TEXT data ( [address] 2021)  showed an OS benefit of 3.3% with benefit mostly in those < 35. We also discussed the monarchE trial. Women with hormone receptor-positive, HER2-negative, high-risk early breast cancer who had completed surgery were randomly assigned to abemaciclib plus endocrine therapy or endocrine therapy alone . High risk was defined as having ? 4 positive nodes; or one to three positive nodes and at least one of the following: tumor size ? 5 cm, histologic grade 3, or central Ki-67 ? 20 percent. Patients in the abemaciclib/endocrine therapy group had improvement in invasive disease-free survival (IDFS) relative to the endocrine therapy alone group (three-year IDFS rates of 89 versus 83 percent, respectively; HR 0.70, 95% CI 0.59-0.82). Distant recurrence-free survival rates at three years were 80 versus 86 percent (HR 0.69, 95% CI 0.57-0.83), respectively . Benefit was sustained at four years . Duration of abemaciclib was 2 years. . Side effects inclue but are not limited to alopecia, arthralgias, GI, Low counts, infections. Also discussed OFS and side effects of menopause, bone health, cardiac health. After discussions Shana Rebollar decided to proceed with OFS+ AI. Patient started zoladex in 3/2/2023 and started Aromasin 3/30/2023. Started radiation on 4/14. 2) R breast intraductal papilloma  S/p mastectomy      3) FH of breast cancer  Ambry- S      4) Encounter for high risk disease   Tolerating well     Plan:     Zoladex 3.6 mg every 28 days for ~5 years  Aromasin daily   RT as scheduled  VD and Ca  Will discuss DEXA      RTC 3 months     I appreciate the opportunity to participate in Ms. Sorenson [facility]. I personally saw and evaluated the patient and performed the key components of medical decision making. The history, physical exam, and documentation were performed by Anthony Kent NP.   I reviewed and verified the above documentation and modified it as needed   Seen for follow up of HR ER + [place] cancer  She is tolerating OFS+ AI  She is tolerating RT  Continue adjuvant Endocrine t/t  VD, DEXA, Mammogram reviewed     Signed By: Jade Heimlich, MD

## 2023-04-26 NOTE — PROGRESS NOTES
[facility] Chemo Progress Note    Date: 2023    Name: Dipika Dean    MRN: [id number]         : 1985    Ms. Mahin Richey Arrived ambulatory and in no distress for Zoladex     Assessment was completed and documented in flowsheets. No acute concerns at this time. Follow Up: Proceed with treatment    Chemotherapy Flowsheet 2023   Cycle -   Date 2023   Drug / Regimen Zoladex   Pre Hydration -   Pre Meds -   Notes RLQ       Ms. Farfan's vitals were reviewed. Patient Vitals for the past 12 hrs:   Temp Pulse Resp BP   23 1519 97.5 °F (36.4 °C) 64 16 109/73           Pre-medications  were administered as ordered and chemotherapy was initiated. Medications Administered       goserelin (ZOLADEX) implant 3.6 mg       Admin Date  2023 Action  Given Dose  3.6 mg Route  SubCUTAneous Administered By  Everardo Davis, RN                  RLQ         Patient tolerated treatment well. Patient was discharged from [facility] in stable condition. Patient aware of next appointment.      Future Appointments   Date Time Provider Janice Le   2023  4:00 PM RAD ONC THERAPY [address] [place] [address]. [facility]   2023  4:00 PM RAD ONC THERAPY [address] [place] [address]. [facility]   2023  4:00 PM RAD ONC THERAPY [address] [place] [address]. [facility]   2023  4:00 PM RAD ONC THERAPY [address] [place] [address]. [facility]   2023  4:00 PM RAD ONC THERAPY [address] [place] [address]. [facility]   5/3/2023  4:00 PM RAD ONC THERAPY [address] [place] [address]. [facility]   2023  4:00 PM RAD [facility]AP[facility]   2023  4:00 PM RAD ONC THERAPY [facility]   2023  4:00 PM RAD ONC THERAPY [address] [place] [address]. [facility]   2023  4:00 PM RAD ONC THERAPY [facility]   5/10/2023  4:00 PM RAD ONC THERAPY [address] [place] [address]. [facility]   2023  4:00 PM RAD ONC THERAPY [address] [facility]. [facility]   2023  4:00 PM RAD ONC THERAPY [address]   5/15/2023  4:00 PM RAD ONC THERAPY [address] 5/16/2023  4:00 PM RAD ONC THERAPY [address]   5/17/2023  4:00 PM RAD ONC THERAPY [address] [place] [address]. [facility]   5/18/2023  4:00 PM RAD ONC THERAPY [address] [place] [address]. [facility]   5/19/2023  4:00 PM RAD ONC THERAPY [address] [place] [address]. [facility]   5/22/2023  4:00 PM RAD ONC THERAPY [address] [facility]   5/23/2023  4:00 PM RAD ONC THERAPY [address]   5/24/2023  3:00 PM G1 MINI Hodges. [facility]   5/24/2023  4:00 PM RAD ONC THERAPY [address] [place] [address]. [facility]   6/21/2023  2:30 PM G1 MINI Damon RN  April 26, 2023

## 2023-04-27 ENCOUNTER — HOSPITAL ENCOUNTER (OUTPATIENT)
Dept: RADIATION THERAPY | Age: 38
Discharge: HOME | End: 2023-04-27
Payer: COMMERCIAL

## 2023-04-27 PROCEDURE — 77417 THER RADIOLOGY PORT IMAGE(S): CPT

## 2023-04-27 PROCEDURE — 77412 RADIATION TX DELIVERY LVL 3: CPT

## 2023-04-28 ENCOUNTER — HOSPITAL ENCOUNTER (OUTPATIENT)
Dept: RADIATION THERAPY | Age: 38
End: 2023-04-28
Payer: COMMERCIAL

## 2023-05-01 ENCOUNTER — HOSPITAL ENCOUNTER (OUTPATIENT)
Dept: RADIATION THERAPY | Age: 38
Discharge: HOME | End: 2023-05-01
Payer: COMMERCIAL

## 2023-05-01 PROCEDURE — 77417 THER RADIOLOGY PORT IMAGE(S): CPT

## 2023-05-01 PROCEDURE — 77412 RADIATION TX DELIVERY LVL 3: CPT

## 2023-05-02 ENCOUNTER — HOSPITAL ENCOUNTER (OUTPATIENT)
Dept: RADIATION THERAPY | Age: 38
Discharge: HOME | End: 2023-05-02
Payer: COMMERCIAL

## 2023-05-02 PROCEDURE — 77417 THER RADIOLOGY PORT IMAGE(S): CPT

## 2023-05-02 PROCEDURE — 77412 RADIATION TX DELIVERY LVL 3: CPT

## 2023-05-03 ENCOUNTER — HOSPITAL ENCOUNTER (OUTPATIENT)
Dept: RADIATION THERAPY | Age: 38
Discharge: HOME | End: 2023-05-03
Payer: COMMERCIAL

## 2023-05-03 PROCEDURE — 77336 RADIATION PHYSICS CONSULT: CPT

## 2023-05-03 PROCEDURE — 77412 RADIATION TX DELIVERY LVL 3: CPT

## 2023-05-03 PROCEDURE — 77417 THER RADIOLOGY PORT IMAGE(S): CPT

## 2023-05-04 ENCOUNTER — DOCUMENTATION ONLY (OUTPATIENT)
Dept: SURGERY | Age: 38
End: 2023-05-04

## 2023-05-04 ENCOUNTER — HOSPITAL ENCOUNTER (OUTPATIENT)
Dept: RADIATION THERAPY | Age: 38
Discharge: HOME | End: 2023-05-04
Payer: COMMERCIAL

## 2023-05-04 PROCEDURE — 77417 THER RADIOLOGY PORT IMAGE(S): CPT

## 2023-05-04 PROCEDURE — 77412 RADIATION TX DELIVERY LVL 3: CPT

## 2023-05-05 ENCOUNTER — HOSPITAL ENCOUNTER (OUTPATIENT)
Dept: RADIATION THERAPY | Age: 38
Discharge: HOME | End: 2023-05-05
Payer: COMMERCIAL

## 2023-05-05 PROCEDURE — 77417 THER RADIOLOGY PORT IMAGE(S): CPT

## 2023-05-05 PROCEDURE — 77412 RADIATION TX DELIVERY LVL 3: CPT

## 2023-05-08 ENCOUNTER — HOSPITAL ENCOUNTER (OUTPATIENT)
Facility: HOSPITAL | Age: 38
Discharge: HOME | End: 2023-05-11
Attending: RADIOLOGY
Payer: COMMERCIAL

## 2023-05-08 PROCEDURE — 77412 RADIATION TX DELIVERY LVL 3: CPT

## 2023-05-08 PROCEDURE — 77417 THER RADIOLOGY PORT IMAGE(S): CPT

## 2023-05-09 ENCOUNTER — HOSPITAL ENCOUNTER (OUTPATIENT)
Facility: HOSPITAL | Age: 38
End: 2023-05-09
Attending: RADIOLOGY
Payer: COMMERCIAL

## 2023-05-11 ENCOUNTER — APPOINTMENT (OUTPATIENT)
Dept: RADIATION THERAPY | Age: 38
End: 2023-05-11
Payer: COMMERCIAL

## 2023-05-11 ENCOUNTER — HOSPITAL ENCOUNTER (OUTPATIENT)
Facility: HOSPITAL | Age: 38
End: 2023-05-11
Attending: RADIOLOGY
Payer: COMMERCIAL

## 2023-05-11 ENCOUNTER — HOSPITAL ENCOUNTER (OUTPATIENT)
Facility: HOSPITAL | Age: 38
Discharge: HOME | End: 2023-05-14
Attending: RADIOLOGY

## 2023-05-11 RX ORDER — ALBUTEROL SULFATE 90 UG/1
4 AEROSOL, METERED RESPIRATORY (INHALATION) PRN
OUTPATIENT
Start: 2023-05-24

## 2023-05-11 RX ORDER — EPINEPHRINE 1 MG/ML
0.3 INJECTION, SOLUTION, CONCENTRATE INTRAVENOUS PRN
OUTPATIENT
Start: 2023-05-24

## 2023-05-11 RX ORDER — ONDANSETRON 2 MG/ML
8 INJECTION INTRAMUSCULAR; INTRAVENOUS
OUTPATIENT
Start: 2023-05-24

## 2023-05-11 RX ORDER — DIPHENHYDRAMINE HYDROCHLORIDE 50 MG/ML
50 INJECTION INTRAMUSCULAR; INTRAVENOUS
OUTPATIENT
Start: 2023-05-24

## 2023-05-11 RX ORDER — SODIUM CHLORIDE 9 MG/ML
INJECTION, SOLUTION INTRAVENOUS CONTINUOUS
OUTPATIENT
Start: 2023-05-24

## 2023-05-11 RX ORDER — ACETAMINOPHEN 325 MG/1
650 TABLET ORAL
OUTPATIENT
Start: 2023-05-24

## 2023-05-12 ENCOUNTER — APPOINTMENT (OUTPATIENT)
Dept: RADIATION THERAPY | Age: 38
End: 2023-05-12
Payer: COMMERCIAL

## 2023-05-12 ENCOUNTER — HOSPITAL ENCOUNTER (OUTPATIENT)
Facility: HOSPITAL | Age: 38
End: 2023-05-12
Attending: RADIOLOGY
Payer: COMMERCIAL

## 2023-05-15 ENCOUNTER — APPOINTMENT (OUTPATIENT)
Dept: RADIATION THERAPY | Age: 38
End: 2023-05-15
Payer: COMMERCIAL

## 2023-05-16 ENCOUNTER — HOSPITAL ENCOUNTER (OUTPATIENT)
Facility: HOSPITAL | Age: 38
Discharge: HOME | End: 2023-05-19
Attending: RADIOLOGY

## 2023-05-16 ENCOUNTER — APPOINTMENT (OUTPATIENT)
Dept: RADIATION THERAPY | Age: 38
End: 2023-05-16
Payer: COMMERCIAL

## 2023-05-17 ENCOUNTER — APPOINTMENT (OUTPATIENT)
Dept: RADIATION THERAPY | Age: 38
End: 2023-05-17
Payer: COMMERCIAL

## 2023-05-17 ENCOUNTER — HOSPITAL ENCOUNTER (OUTPATIENT)
Facility: HOSPITAL | Age: 38
Discharge: HOME | End: 2023-05-20
Attending: RADIOLOGY

## 2023-05-17 DIAGNOSIS — F41.9 ANXIETY DISORDER, UNSPECIFIED: ICD-10-CM

## 2023-05-17 RX ORDER — ESCITALOPRAM OXALATE 10 MG/1
TABLET ORAL
Qty: 90 TABLET | Refills: 0 | Status: SHIPPED | OUTPATIENT
Start: 2023-05-17

## 2023-05-18 ENCOUNTER — HOSPITAL ENCOUNTER (OUTPATIENT)
Facility: HOSPITAL | Age: 38
Discharge: HOME | End: 2023-05-21
Attending: RADIOLOGY

## 2023-05-18 ENCOUNTER — APPOINTMENT (OUTPATIENT)
Dept: RADIATION THERAPY | Age: 38
End: 2023-05-18
Payer: COMMERCIAL

## 2023-05-19 ENCOUNTER — APPOINTMENT (OUTPATIENT)
Dept: RADIATION THERAPY | Age: 38
End: 2023-05-19
Payer: COMMERCIAL

## 2023-05-19 ENCOUNTER — HOSPITAL ENCOUNTER (OUTPATIENT)
Facility: HOSPITAL | Age: 38
Discharge: HOME | End: 2023-05-22
Attending: RADIOLOGY

## 2023-05-19 LAB
RAD ONC ARIA COURSE FIRST TREATMENT DATE: NORMAL
RAD ONC ARIA COURSE ID: NORMAL
RAD ONC ARIA COURSE INTENT: NORMAL
RAD ONC ARIA COURSE LAST TREATMENT DATE: NORMAL
RAD ONC ARIA COURSE SESSION NUMBER: 1
RAD ONC ARIA COURSE START DATE: NORMAL
RAD ONC ARIA COURSE TREATMENT ELAPSED DAYS: 3
RAD ONC ARIA PLAN FRACTIONS TREATED TO DATE: 4
RAD ONC ARIA PLAN ID: NORMAL
RAD ONC ARIA PLAN PRESCRIBED DOSE PER FRACTION: 1 GY
RAD ONC ARIA PLAN PRIMARY REFERENCE POINT: NORMAL
RAD ONC ARIA PLAN TOTAL FRACTIONS PRESCRIBED: 1
RAD ONC ARIA PLAN TOTAL PRESCRIBED DOSE: 2 CGY
RAD ONC ARIA REFERENCE POINT DOSAGE GIVEN TO DATE: 7 GY
RAD ONC ARIA REFERENCE POINT ID: NORMAL
RAD ONC ARIA REFERENCE POINT SESSION DOSAGE GIVEN: 1 GY

## 2023-05-22 ENCOUNTER — HOSPITAL ENCOUNTER (OUTPATIENT)
Facility: HOSPITAL | Age: 38
Discharge: HOME | End: 2023-05-25
Attending: RADIOLOGY

## 2023-05-22 ENCOUNTER — APPOINTMENT (OUTPATIENT)
Dept: RADIATION THERAPY | Age: 38
End: 2023-05-22
Payer: COMMERCIAL

## 2023-05-23 ENCOUNTER — HOSPITAL ENCOUNTER (OUTPATIENT)
Facility: HOSPITAL | Age: 38
Discharge: HOME | End: 2023-05-26
Attending: RADIOLOGY

## 2023-05-23 ENCOUNTER — APPOINTMENT (OUTPATIENT)
Dept: RADIATION THERAPY | Age: 38
End: 2023-05-23
Payer: COMMERCIAL

## 2023-05-23 LAB
RAD ONC ARIA COURSE FIRST TREATMENT DATE: NORMAL
RAD ONC ARIA COURSE ID: NORMAL
RAD ONC ARIA COURSE INTENT: NORMAL
RAD ONC ARIA COURSE LAST TREATMENT DATE: NORMAL
RAD ONC ARIA COURSE SESSION NUMBER: 1
RAD ONC ARIA COURSE START DATE: NORMAL
RAD ONC ARIA COURSE TREATMENT ELAPSED DAYS: 3
RAD ONC ARIA PLAN FRACTIONS TREATED TO DATE: 6
RAD ONC ARIA PLAN ID: NORMAL
RAD ONC ARIA PLAN PRESCRIBED DOSE PER FRACTION: 1 GY
RAD ONC ARIA PLAN PRIMARY REFERENCE POINT: NORMAL
RAD ONC ARIA PLAN TOTAL FRACTIONS PRESCRIBED: 1
RAD ONC ARIA PLAN TOTAL PRESCRIBED DOSE: 2 CGY
RAD ONC ARIA REFERENCE POINT DOSAGE GIVEN TO DATE: 1 GY
RAD ONC ARIA REFERENCE POINT ID: NORMAL
RAD ONC ARIA REFERENCE POINT SESSION DOSAGE GIVEN: 1 GY

## 2023-05-24 ENCOUNTER — APPOINTMENT (OUTPATIENT)
Dept: INFUSION THERAPY | Age: 38
End: 2023-05-24

## 2023-05-24 ENCOUNTER — APPOINTMENT (OUTPATIENT)
Dept: RADIATION THERAPY | Age: 38
End: 2023-05-24
Payer: COMMERCIAL

## 2023-05-24 ENCOUNTER — HOSPITAL ENCOUNTER (OUTPATIENT)
Facility: HOSPITAL | Age: 38
Discharge: HOME | End: 2023-05-27
Attending: RADIOLOGY

## 2023-05-25 ENCOUNTER — HOSPITAL ENCOUNTER (OUTPATIENT)
Facility: HOSPITAL | Age: 38
Discharge: HOME | End: 2023-05-28
Attending: RADIOLOGY

## 2023-05-25 LAB
RAD ONC ARIA COURSE FIRST TREATMENT DATE: NORMAL
RAD ONC ARIA COURSE ID: NORMAL
RAD ONC ARIA COURSE INTENT: NORMAL
RAD ONC ARIA COURSE LAST TREATMENT DATE: NORMAL
RAD ONC ARIA COURSE SESSION NUMBER: 2
RAD ONC ARIA COURSE START DATE: NORMAL
RAD ONC ARIA COURSE TREATMENT ELAPSED DAYS: 3
RAD ONC ARIA PLAN FRACTIONS TREATED TO DATE: 8
RAD ONC ARIA PLAN ID: NORMAL
RAD ONC ARIA PLAN PRESCRIBED DOSE PER FRACTION: 1 GY
RAD ONC ARIA PLAN PRIMARY REFERENCE POINT: NORMAL
RAD ONC ARIA PLAN TOTAL FRACTIONS PRESCRIBED: 1
RAD ONC ARIA PLAN TOTAL PRESCRIBED DOSE: 2 CGY
RAD ONC ARIA REFERENCE POINT DOSAGE GIVEN TO DATE: 1 GY
RAD ONC ARIA REFERENCE POINT ID: NORMAL
RAD ONC ARIA REFERENCE POINT SESSION DOSAGE GIVEN: 1 GY

## 2023-05-26 ENCOUNTER — HOSPITAL ENCOUNTER (OUTPATIENT)
Facility: HOSPITAL | Age: 38
Discharge: HOME | End: 2023-05-29
Attending: RADIOLOGY

## 2023-05-26 LAB
RAD ONC ARIA COURSE FIRST TREATMENT DATE: NORMAL
RAD ONC ARIA COURSE ID: NORMAL
RAD ONC ARIA COURSE INTENT: NORMAL
RAD ONC ARIA COURSE LAST TREATMENT DATE: NORMAL
RAD ONC ARIA COURSE SESSION NUMBER: 22
RAD ONC ARIA COURSE START DATE: NORMAL
RAD ONC ARIA COURSE TREATMENT ELAPSED DAYS: 38
RAD ONC ARIA PLAN FRACTIONS TREATED TO DATE: 9
RAD ONC ARIA PLAN ID: NORMAL
RAD ONC ARIA PLAN PRESCRIBED DOSE PER FRACTION: 1.8 GY
RAD ONC ARIA PLAN PRIMARY REFERENCE POINT: NORMAL
RAD ONC ARIA PLAN TOTAL FRACTIONS PRESCRIBED: 15
RAD ONC ARIA PLAN TOTAL PRESCRIBED DOSE: 2700 CGY
RAD ONC ARIA REFERENCE POINT DOSAGE GIVEN TO DATE: 16.2 GY
RAD ONC ARIA REFERENCE POINT DOSAGE GIVEN TO DATE: 16.5 GY
RAD ONC ARIA REFERENCE POINT DOSAGE GIVEN TO DATE: 17.21 GY
RAD ONC ARIA REFERENCE POINT DOSAGE GIVEN TO DATE: 39.6 GY
RAD ONC ARIA REFERENCE POINT ID: NORMAL
RAD ONC ARIA REFERENCE POINT SESSION DOSAGE GIVEN: 1.8 GY
RAD ONC ARIA REFERENCE POINT SESSION DOSAGE GIVEN: 1.8 GY
RAD ONC ARIA REFERENCE POINT SESSION DOSAGE GIVEN: 1.83 GY
RAD ONC ARIA REFERENCE POINT SESSION DOSAGE GIVEN: 1.91 GY

## 2023-05-29 RX ORDER — FEXOFENADINE HCL 60 MG/1
1 TABLET, FILM COATED ORAL NIGHTLY
COMMUNITY

## 2023-05-29 RX ORDER — EXEMESTANE 25 MG/1
25 TABLET ORAL DAILY
Qty: 30 TABLET | Refills: 2 | COMMUNITY
Start: 2023-03-29 | End: 2023-06-12 | Stop reason: SDUPTHER

## 2023-05-30 ENCOUNTER — APPOINTMENT (OUTPATIENT)
Facility: HOSPITAL | Age: 38
End: 2023-05-30
Attending: RADIOLOGY
Payer: COMMERCIAL

## 2023-05-30 ENCOUNTER — HOSPITAL ENCOUNTER (OUTPATIENT)
Facility: HOSPITAL | Age: 38
Discharge: HOME | End: 2023-06-02
Attending: RADIOLOGY

## 2023-05-30 LAB
RAD ONC ARIA COURSE FIRST TREATMENT DATE: NORMAL
RAD ONC ARIA COURSE ID: NORMAL
RAD ONC ARIA COURSE INTENT: NORMAL
RAD ONC ARIA COURSE LAST TREATMENT DATE: NORMAL
RAD ONC ARIA COURSE SESSION NUMBER: 23
RAD ONC ARIA COURSE START DATE: NORMAL
RAD ONC ARIA COURSE TREATMENT ELAPSED DAYS: 42
RAD ONC ARIA PLAN FRACTIONS TREATED TO DATE: 10
RAD ONC ARIA PLAN ID: NORMAL
RAD ONC ARIA PLAN PRESCRIBED DOSE PER FRACTION: 1.8 GY
RAD ONC ARIA PLAN PRIMARY REFERENCE POINT: NORMAL
RAD ONC ARIA PLAN TOTAL FRACTIONS PRESCRIBED: 15
RAD ONC ARIA PLAN TOTAL PRESCRIBED DOSE: 2700 CGY
RAD ONC ARIA REFERENCE POINT DOSAGE GIVEN TO DATE: 18 GY
RAD ONC ARIA REFERENCE POINT DOSAGE GIVEN TO DATE: 18.34 GY
RAD ONC ARIA REFERENCE POINT DOSAGE GIVEN TO DATE: 19.12 GY
RAD ONC ARIA REFERENCE POINT DOSAGE GIVEN TO DATE: 41.4 GY
RAD ONC ARIA REFERENCE POINT ID: NORMAL
RAD ONC ARIA REFERENCE POINT SESSION DOSAGE GIVEN: 1.8 GY
RAD ONC ARIA REFERENCE POINT SESSION DOSAGE GIVEN: 1.8 GY
RAD ONC ARIA REFERENCE POINT SESSION DOSAGE GIVEN: 1.83 GY
RAD ONC ARIA REFERENCE POINT SESSION DOSAGE GIVEN: 1.91 GY

## 2023-05-31 ENCOUNTER — APPOINTMENT (OUTPATIENT)
Facility: HOSPITAL | Age: 38
End: 2023-05-31
Attending: RADIOLOGY
Payer: COMMERCIAL

## 2023-05-31 ENCOUNTER — HOSPITAL ENCOUNTER (OUTPATIENT)
Facility: HOSPITAL | Age: 38
Discharge: HOME | End: 2023-06-03
Attending: RADIOLOGY

## 2023-05-31 LAB
RAD ONC ARIA COURSE FIRST TREATMENT DATE: NORMAL
RAD ONC ARIA COURSE ID: NORMAL
RAD ONC ARIA COURSE INTENT: NORMAL
RAD ONC ARIA COURSE LAST TREATMENT DATE: NORMAL
RAD ONC ARIA COURSE SESSION NUMBER: 24
RAD ONC ARIA COURSE START DATE: NORMAL
RAD ONC ARIA COURSE TREATMENT ELAPSED DAYS: 43
RAD ONC ARIA PLAN FRACTIONS TREATED TO DATE: 11
RAD ONC ARIA PLAN ID: NORMAL
RAD ONC ARIA PLAN PRESCRIBED DOSE PER FRACTION: 1.8 GY
RAD ONC ARIA PLAN PRIMARY REFERENCE POINT: NORMAL
RAD ONC ARIA PLAN TOTAL FRACTIONS PRESCRIBED: 15
RAD ONC ARIA PLAN TOTAL PRESCRIBED DOSE: 2700 CGY
RAD ONC ARIA REFERENCE POINT DOSAGE GIVEN TO DATE: 19.8 GY
RAD ONC ARIA REFERENCE POINT DOSAGE GIVEN TO DATE: 20.17 GY
RAD ONC ARIA REFERENCE POINT DOSAGE GIVEN TO DATE: 21.04 GY
RAD ONC ARIA REFERENCE POINT DOSAGE GIVEN TO DATE: 43.2 GY
RAD ONC ARIA REFERENCE POINT ID: NORMAL
RAD ONC ARIA REFERENCE POINT SESSION DOSAGE GIVEN: 1.8 GY
RAD ONC ARIA REFERENCE POINT SESSION DOSAGE GIVEN: 1.8 GY
RAD ONC ARIA REFERENCE POINT SESSION DOSAGE GIVEN: 1.83 GY
RAD ONC ARIA REFERENCE POINT SESSION DOSAGE GIVEN: 1.91 GY

## 2023-06-01 ENCOUNTER — HOSPITAL ENCOUNTER (OUTPATIENT)
Facility: HOSPITAL | Age: 38
Discharge: HOME | End: 2023-06-04
Attending: RADIOLOGY

## 2023-06-01 LAB
RAD ONC ARIA COURSE FIRST TREATMENT DATE: NORMAL
RAD ONC ARIA COURSE ID: NORMAL
RAD ONC ARIA COURSE INTENT: NORMAL
RAD ONC ARIA COURSE LAST TREATMENT DATE: NORMAL
RAD ONC ARIA COURSE SESSION NUMBER: 25
RAD ONC ARIA COURSE START DATE: NORMAL
RAD ONC ARIA COURSE TREATMENT ELAPSED DAYS: 44
RAD ONC ARIA PLAN FRACTIONS TREATED TO DATE: 12
RAD ONC ARIA PLAN ID: NORMAL
RAD ONC ARIA PLAN PRESCRIBED DOSE PER FRACTION: 1.8 GY
RAD ONC ARIA PLAN PRIMARY REFERENCE POINT: NORMAL
RAD ONC ARIA PLAN TOTAL FRACTIONS PRESCRIBED: 15
RAD ONC ARIA PLAN TOTAL PRESCRIBED DOSE: 2700 CGY
RAD ONC ARIA REFERENCE POINT DOSAGE GIVEN TO DATE: 21.6 GY
RAD ONC ARIA REFERENCE POINT DOSAGE GIVEN TO DATE: 22.01 GY
RAD ONC ARIA REFERENCE POINT DOSAGE GIVEN TO DATE: 22.95 GY
RAD ONC ARIA REFERENCE POINT DOSAGE GIVEN TO DATE: 45 GY
RAD ONC ARIA REFERENCE POINT ID: NORMAL
RAD ONC ARIA REFERENCE POINT SESSION DOSAGE GIVEN: 1.8 GY
RAD ONC ARIA REFERENCE POINT SESSION DOSAGE GIVEN: 1.8 GY
RAD ONC ARIA REFERENCE POINT SESSION DOSAGE GIVEN: 1.83 GY
RAD ONC ARIA REFERENCE POINT SESSION DOSAGE GIVEN: 1.91 GY

## 2023-06-02 ENCOUNTER — HOSPITAL ENCOUNTER (OUTPATIENT)
Facility: HOSPITAL | Age: 38
Discharge: HOME | End: 2023-06-05
Attending: RADIOLOGY

## 2023-06-02 ENCOUNTER — HOSPITAL ENCOUNTER (OUTPATIENT)
Facility: HOSPITAL | Age: 38
Setting detail: INFUSION SERIES
End: 2023-06-02
Payer: COMMERCIAL

## 2023-06-02 VITALS
SYSTOLIC BLOOD PRESSURE: 112 MMHG | HEART RATE: 63 BPM | RESPIRATION RATE: 16 BRPM | TEMPERATURE: 97.7 F | DIASTOLIC BLOOD PRESSURE: 75 MMHG

## 2023-06-02 DIAGNOSIS — Z17.0 MALIGNANT NEOPLASM OF LEFT BREAST IN FEMALE, ESTROGEN RECEPTOR POSITIVE, UNSPECIFIED SITE OF BREAST (HCC): Primary | ICD-10-CM

## 2023-06-02 DIAGNOSIS — C50.912 MALIGNANT NEOPLASM OF LEFT BREAST IN FEMALE, ESTROGEN RECEPTOR POSITIVE, UNSPECIFIED SITE OF BREAST (HCC): Primary | ICD-10-CM

## 2023-06-02 LAB
RAD ONC ARIA COURSE FIRST TREATMENT DATE: NORMAL
RAD ONC ARIA COURSE ID: NORMAL
RAD ONC ARIA COURSE INTENT: NORMAL
RAD ONC ARIA COURSE LAST TREATMENT DATE: NORMAL
RAD ONC ARIA COURSE SESSION NUMBER: 26
RAD ONC ARIA COURSE START DATE: NORMAL
RAD ONC ARIA COURSE TREATMENT ELAPSED DAYS: 45
RAD ONC ARIA PLAN FRACTIONS TREATED TO DATE: 13
RAD ONC ARIA PLAN ID: NORMAL
RAD ONC ARIA PLAN PRESCRIBED DOSE PER FRACTION: 1.8 GY
RAD ONC ARIA PLAN PRIMARY REFERENCE POINT: NORMAL
RAD ONC ARIA PLAN TOTAL FRACTIONS PRESCRIBED: 15
RAD ONC ARIA PLAN TOTAL PRESCRIBED DOSE: 2700 CGY
RAD ONC ARIA REFERENCE POINT DOSAGE GIVEN TO DATE: 21.79 GY
RAD ONC ARIA REFERENCE POINT DOSAGE GIVEN TO DATE: 22.3 GY
RAD ONC ARIA REFERENCE POINT DOSAGE GIVEN TO DATE: 22.95 GY
RAD ONC ARIA REFERENCE POINT DOSAGE GIVEN TO DATE: 45.14 GY
RAD ONC ARIA REFERENCE POINT ID: NORMAL
RAD ONC ARIA REFERENCE POINT SESSION DOSAGE GIVEN: 0 GY
RAD ONC ARIA REFERENCE POINT SESSION DOSAGE GIVEN: 0.14 GY
RAD ONC ARIA REFERENCE POINT SESSION DOSAGE GIVEN: 0.19 GY
RAD ONC ARIA REFERENCE POINT SESSION DOSAGE GIVEN: 0.29 GY

## 2023-06-02 PROCEDURE — 96402 CHEMO HORMON ANTINEOPL SQ/IM: CPT

## 2023-06-02 PROCEDURE — 6360000002 HC RX W HCPCS: Performed by: INTERNAL MEDICINE

## 2023-06-02 RX ORDER — SODIUM CHLORIDE 9 MG/ML
INJECTION, SOLUTION INTRAVENOUS CONTINUOUS
Status: CANCELLED | OUTPATIENT
Start: 2023-06-28

## 2023-06-02 RX ORDER — DIPHENHYDRAMINE HYDROCHLORIDE 50 MG/ML
50 INJECTION INTRAMUSCULAR; INTRAVENOUS
Status: CANCELLED | OUTPATIENT
Start: 2023-06-28

## 2023-06-02 RX ORDER — ALBUTEROL SULFATE 90 UG/1
4 AEROSOL, METERED RESPIRATORY (INHALATION) PRN
Status: CANCELLED | OUTPATIENT
Start: 2023-06-28

## 2023-06-02 RX ORDER — ACETAMINOPHEN 325 MG/1
650 TABLET ORAL
Status: CANCELLED | OUTPATIENT
Start: 2023-06-28

## 2023-06-02 RX ORDER — EPINEPHRINE 1 MG/ML
0.3 INJECTION, SOLUTION, CONCENTRATE INTRAVENOUS PRN
Status: CANCELLED | OUTPATIENT
Start: 2023-06-28

## 2023-06-02 RX ORDER — ONDANSETRON 2 MG/ML
8 INJECTION INTRAMUSCULAR; INTRAVENOUS
Status: CANCELLED | OUTPATIENT
Start: 2023-06-28

## 2023-06-02 RX ADMIN — GOSERELIN ACETATE 3.6 MG: 3.6 IMPLANT SUBCUTANEOUS at 08:36

## 2023-06-02 NOTE — PROGRESS NOTES
hospitals Progress Note    Date: 2023    Name: Gerry Maldonado    MRN: [id number]         : 1985    Ms. Isaura Francis Arrived ambulatory and in no distress for Zoladex SQ. Assessment was completed and documented in flowsheets. No acute concerns at this time. Ms. Huizar Asp vital signs for this visit. Vitals:    23 0820   BP: 112/75   Pulse: 63   Resp: 16   Temp: 97.7 °F (36.5 °C)        Medications given:   Medications Administered         goserelin (ZOLADEX) injection 3.6 mg Admin Date  2023 Action  Given Dose  3.6 mg Route  SubCUTAneous Administered By  Daria Tripathi RN            Given in the LLQ SQ.     Ms. Isaura Francis tolerated the injection, and had no complaints. Ms. Isaura Francis was discharged from [place] [address] in stable condition.      Future Appointments   Date Time Provider Daniel Calixtoisti   2023  3:45 PM [id number] [facility]   2023  3:45 PM [id number] [facility]   2023  3:45 [id number]   2023  8:00 AM [facility]   2023  8:00 AM H[facility]   2023  8:30 AM Ibis Acuña  N Sariah  BS AMB   2023  8:00 AM H2 TRAVIS FASTRACK [facility] [place]   2023  8:00 AM [facility]   2023  4:30 PM ISIAH Solitario - NP [facility] BS AMB       Daria Tripathi RN  2023  8:42 AM

## 2023-06-05 ENCOUNTER — HOSPITAL ENCOUNTER (OUTPATIENT)
Facility: HOSPITAL | Age: 38
Discharge: HOME | End: 2023-06-08
Attending: RADIOLOGY

## 2023-06-05 LAB
RAD ONC ARIA COURSE FIRST TREATMENT DATE: NORMAL
RAD ONC ARIA COURSE ID: NORMAL
RAD ONC ARIA COURSE INTENT: NORMAL
RAD ONC ARIA COURSE LAST TREATMENT DATE: NORMAL
RAD ONC ARIA COURSE SESSION NUMBER: 27
RAD ONC ARIA COURSE START DATE: NORMAL
RAD ONC ARIA COURSE TREATMENT ELAPSED DAYS: 48
RAD ONC ARIA PLAN FRACTIONS TREATED TO DATE: 1
RAD ONC ARIA PLAN ID: NORMAL
RAD ONC ARIA PLAN PRESCRIBED DOSE PER FRACTION: 1.8 GY
RAD ONC ARIA PLAN PRIMARY REFERENCE POINT: NORMAL
RAD ONC ARIA PLAN TOTAL FRACTIONS PRESCRIBED: 3
RAD ONC ARIA PLAN TOTAL PRESCRIBED DOSE: 540 CGY
RAD ONC ARIA REFERENCE POINT DOSAGE GIVEN TO DATE: 23.6 GY
RAD ONC ARIA REFERENCE POINT DOSAGE GIVEN TO DATE: 24.14 GY
RAD ONC ARIA REFERENCE POINT DOSAGE GIVEN TO DATE: 24.86 GY
RAD ONC ARIA REFERENCE POINT ID: NORMAL
RAD ONC ARIA REFERENCE POINT SESSION DOSAGE GIVEN: 1.8 GY
RAD ONC ARIA REFERENCE POINT SESSION DOSAGE GIVEN: 1.83 GY
RAD ONC ARIA REFERENCE POINT SESSION DOSAGE GIVEN: 1.91 GY

## 2023-06-06 ENCOUNTER — HOSPITAL ENCOUNTER (OUTPATIENT)
Facility: HOSPITAL | Age: 38
Discharge: HOME | End: 2023-06-09
Attending: RADIOLOGY

## 2023-06-06 DIAGNOSIS — F41.9 ANXIETY DISORDER, UNSPECIFIED: ICD-10-CM

## 2023-06-06 LAB
RAD ONC ARIA COURSE FIRST TREATMENT DATE: NORMAL
RAD ONC ARIA COURSE ID: NORMAL
RAD ONC ARIA COURSE INTENT: NORMAL
RAD ONC ARIA COURSE LAST TREATMENT DATE: NORMAL
RAD ONC ARIA COURSE SESSION NUMBER: 28
RAD ONC ARIA COURSE START DATE: NORMAL
RAD ONC ARIA COURSE TREATMENT ELAPSED DAYS: 49
RAD ONC ARIA PLAN FRACTIONS TREATED TO DATE: 2
RAD ONC ARIA PLAN ID: NORMAL
RAD ONC ARIA PLAN PRESCRIBED DOSE PER FRACTION: 1.8 GY
RAD ONC ARIA PLAN PRIMARY REFERENCE POINT: NORMAL
RAD ONC ARIA PLAN TOTAL FRACTIONS PRESCRIBED: 3
RAD ONC ARIA PLAN TOTAL PRESCRIBED DOSE: 540 CGY
RAD ONC ARIA REFERENCE POINT DOSAGE GIVEN TO DATE: 25.4 GY
RAD ONC ARIA REFERENCE POINT DOSAGE GIVEN TO DATE: 25.98 GY
RAD ONC ARIA REFERENCE POINT DOSAGE GIVEN TO DATE: 26.78 GY
RAD ONC ARIA REFERENCE POINT ID: NORMAL
RAD ONC ARIA REFERENCE POINT SESSION DOSAGE GIVEN: 1.8 GY
RAD ONC ARIA REFERENCE POINT SESSION DOSAGE GIVEN: 1.83 GY
RAD ONC ARIA REFERENCE POINT SESSION DOSAGE GIVEN: 1.91 GY

## 2023-06-07 ENCOUNTER — HOSPITAL ENCOUNTER (OUTPATIENT)
Facility: HOSPITAL | Age: 38
Discharge: HOME | End: 2023-06-10
Attending: RADIOLOGY

## 2023-06-07 LAB
RAD ONC ARIA COURSE FIRST TREATMENT DATE: NORMAL
RAD ONC ARIA COURSE ID: NORMAL
RAD ONC ARIA COURSE INTENT: NORMAL
RAD ONC ARIA COURSE LAST TREATMENT DATE: NORMAL
RAD ONC ARIA COURSE SESSION NUMBER: 29
RAD ONC ARIA COURSE START DATE: NORMAL
RAD ONC ARIA COURSE TREATMENT ELAPSED DAYS: 50
RAD ONC ARIA PLAN FRACTIONS TREATED TO DATE: 3
RAD ONC ARIA PLAN ID: NORMAL
RAD ONC ARIA PLAN PRESCRIBED DOSE PER FRACTION: 1.8 GY
RAD ONC ARIA PLAN PRIMARY REFERENCE POINT: NORMAL
RAD ONC ARIA PLAN TOTAL FRACTIONS PRESCRIBED: 3
RAD ONC ARIA PLAN TOTAL PRESCRIBED DOSE: 540 CGY
RAD ONC ARIA REFERENCE POINT DOSAGE GIVEN TO DATE: 27.2 GY
RAD ONC ARIA REFERENCE POINT DOSAGE GIVEN TO DATE: 27.81 GY
RAD ONC ARIA REFERENCE POINT DOSAGE GIVEN TO DATE: 28.69 GY
RAD ONC ARIA REFERENCE POINT ID: NORMAL
RAD ONC ARIA REFERENCE POINT SESSION DOSAGE GIVEN: 1.8 GY
RAD ONC ARIA REFERENCE POINT SESSION DOSAGE GIVEN: 1.83 GY
RAD ONC ARIA REFERENCE POINT SESSION DOSAGE GIVEN: 1.91 GY

## 2023-06-07 RX ORDER — ESCITALOPRAM OXALATE 10 MG/1
10 TABLET ORAL DAILY
Qty: 90 TABLET | Refills: 0 | Status: SHIPPED | OUTPATIENT
Start: 2023-06-07

## 2023-06-21 ENCOUNTER — APPOINTMENT (OUTPATIENT)
Facility: HOSPITAL | Age: 38
End: 2023-06-21
Payer: COMMERCIAL

## 2023-06-21 ENCOUNTER — APPOINTMENT (OUTPATIENT)
Dept: INFUSION THERAPY | Age: 38
End: 2023-06-21

## 2023-06-30 ENCOUNTER — HOSPITAL ENCOUNTER (OUTPATIENT)
Facility: HOSPITAL | Age: 38
Setting detail: INFUSION SERIES
End: 2023-06-30
Payer: COMMERCIAL

## 2023-06-30 VITALS
RESPIRATION RATE: 16 BRPM | BODY MASS INDEX: 24.43 KG/M2 | HEART RATE: 63 BPM | WEIGHT: 146.8 LBS | DIASTOLIC BLOOD PRESSURE: 67 MMHG | SYSTOLIC BLOOD PRESSURE: 108 MMHG | TEMPERATURE: 97.9 F

## 2023-06-30 DIAGNOSIS — C50.912 MALIGNANT NEOPLASM OF LEFT BREAST IN FEMALE, ESTROGEN RECEPTOR POSITIVE, UNSPECIFIED SITE OF BREAST (HCC): Primary | ICD-10-CM

## 2023-06-30 DIAGNOSIS — Z17.0 MALIGNANT NEOPLASM OF LEFT BREAST IN FEMALE, ESTROGEN RECEPTOR POSITIVE, UNSPECIFIED SITE OF BREAST (HCC): Primary | ICD-10-CM

## 2023-06-30 PROCEDURE — 96402 CHEMO HORMON ANTINEOPL SQ/IM: CPT

## 2023-06-30 PROCEDURE — 6360000002 HC RX W HCPCS: Performed by: INTERNAL MEDICINE

## 2023-06-30 RX ORDER — ACETAMINOPHEN 325 MG/1
650 TABLET ORAL
Status: CANCELLED | OUTPATIENT
Start: 2023-07-28

## 2023-06-30 RX ORDER — DIPHENHYDRAMINE HYDROCHLORIDE 50 MG/ML
50 INJECTION INTRAMUSCULAR; INTRAVENOUS
Status: CANCELLED | OUTPATIENT
Start: 2023-07-28

## 2023-06-30 RX ORDER — ALBUTEROL SULFATE 90 UG/1
4 AEROSOL, METERED RESPIRATORY (INHALATION) PRN
Status: CANCELLED | OUTPATIENT
Start: 2023-07-28

## 2023-06-30 RX ORDER — EPINEPHRINE 1 MG/ML
0.3 INJECTION, SOLUTION, CONCENTRATE INTRAVENOUS PRN
Status: CANCELLED | OUTPATIENT
Start: 2023-07-28

## 2023-06-30 RX ORDER — SODIUM CHLORIDE 9 MG/ML
INJECTION, SOLUTION INTRAVENOUS CONTINUOUS
Status: CANCELLED | OUTPATIENT
Start: 2023-07-28

## 2023-06-30 RX ORDER — ONDANSETRON 2 MG/ML
8 INJECTION INTRAMUSCULAR; INTRAVENOUS
Status: CANCELLED | OUTPATIENT
Start: 2023-07-28

## 2023-06-30 RX ADMIN — GOSERELIN ACETATE 3.6 MG: 3.6 IMPLANT SUBCUTANEOUS at 08:30

## 2023-07-01 NOTE — PROGRESS NOTES
Lindsey Talley is a 42 y.o. female    Chief Complaint   Patient presents with    Follow-up      Left breast cancer           1. Have you been to the ER, urgent care clinic since your last visit? Hospitalized since your last visit? No    2. Have you seen or consulted any other health care providers outside of the [address] [place] [address] since your last visit? Include any pap smears or colon screening.  No Pt removed IV earlier in day inadvertently while sleeping and is refusing to allow us to place a new one. MD notified.

## 2023-07-28 ENCOUNTER — OFFICE VISIT (OUTPATIENT)
Age: 38
End: 2023-07-28
Payer: COMMERCIAL

## 2023-07-28 ENCOUNTER — HOSPITAL ENCOUNTER (OUTPATIENT)
Facility: HOSPITAL | Age: 38
Setting detail: INFUSION SERIES
End: 2023-07-28
Payer: COMMERCIAL

## 2023-07-28 VITALS
BODY MASS INDEX: 24.26 KG/M2 | DIASTOLIC BLOOD PRESSURE: 71 MMHG | OXYGEN SATURATION: 99 % | SYSTOLIC BLOOD PRESSURE: 110 MMHG | HEIGHT: 65 IN | RESPIRATION RATE: 16 BRPM | TEMPERATURE: 97.7 F | WEIGHT: 145.6 LBS | HEART RATE: 62 BPM

## 2023-07-28 VITALS
RESPIRATION RATE: 16 BRPM | BODY MASS INDEX: 24.13 KG/M2 | SYSTOLIC BLOOD PRESSURE: 110 MMHG | DIASTOLIC BLOOD PRESSURE: 71 MMHG | HEART RATE: 62 BPM | WEIGHT: 145 LBS | OXYGEN SATURATION: 99 % | TEMPERATURE: 97.7 F

## 2023-07-28 DIAGNOSIS — C50.912 MALIGNANT NEOPLASM OF LEFT BREAST IN FEMALE, ESTROGEN RECEPTOR POSITIVE, UNSPECIFIED SITE OF BREAST (HCC): Primary | ICD-10-CM

## 2023-07-28 DIAGNOSIS — E88.09 AROMATASE DEFICIENCY: ICD-10-CM

## 2023-07-28 DIAGNOSIS — Z17.0 MALIGNANT NEOPLASM OF LEFT BREAST IN FEMALE, ESTROGEN RECEPTOR POSITIVE, UNSPECIFIED SITE OF BREAST (HCC): Primary | ICD-10-CM

## 2023-07-28 PROBLEM — F33.0 MAJOR DEPRESSIVE DISORDER, RECURRENT, MILD (HCC): Status: ACTIVE | Noted: 2023-07-28

## 2023-07-28 PROBLEM — F33.9 MAJOR DEPRESSIVE DISORDER, RECURRENT, UNSPECIFIED (HCC): Status: ACTIVE | Noted: 2023-07-28

## 2023-07-28 PROBLEM — F33.1 MAJOR DEPRESSIVE DISORDER, RECURRENT, MODERATE (HCC): Status: ACTIVE | Noted: 2023-07-28

## 2023-07-28 PROCEDURE — 96401 CHEMO ANTI-NEOPL SQ/IM: CPT

## 2023-07-28 PROCEDURE — 96402 CHEMO HORMON ANTINEOPL SQ/IM: CPT

## 2023-07-28 PROCEDURE — 99213 OFFICE O/P EST LOW 20 MIN: CPT | Performed by: INTERNAL MEDICINE

## 2023-07-28 PROCEDURE — 6360000002 HC RX W HCPCS: Performed by: INTERNAL MEDICINE

## 2023-07-28 RX ORDER — ONDANSETRON 2 MG/ML
8 INJECTION INTRAMUSCULAR; INTRAVENOUS
Status: CANCELLED | OUTPATIENT
Start: 2023-08-25

## 2023-07-28 RX ORDER — ACETAMINOPHEN 325 MG/1
650 TABLET ORAL
Status: CANCELLED | OUTPATIENT
Start: 2023-08-25

## 2023-07-28 RX ORDER — ALBUTEROL SULFATE 90 UG/1
4 AEROSOL, METERED RESPIRATORY (INHALATION) PRN
Status: CANCELLED | OUTPATIENT
Start: 2023-08-25

## 2023-07-28 RX ORDER — EPINEPHRINE 1 MG/ML
0.3 INJECTION, SOLUTION, CONCENTRATE INTRAVENOUS PRN
Status: CANCELLED | OUTPATIENT
Start: 2023-08-25

## 2023-07-28 RX ORDER — SODIUM CHLORIDE 9 MG/ML
INJECTION, SOLUTION INTRAVENOUS CONTINUOUS
Status: CANCELLED | OUTPATIENT
Start: 2023-08-25

## 2023-07-28 RX ORDER — DIPHENHYDRAMINE HYDROCHLORIDE 50 MG/ML
50 INJECTION INTRAMUSCULAR; INTRAVENOUS
Status: CANCELLED | OUTPATIENT
Start: 2023-08-25

## 2023-07-28 RX ADMIN — GOSERELIN ACETATE 3.6 MG: 3.6 IMPLANT SUBCUTANEOUS at 08:01

## 2023-07-28 NOTE — PROGRESS NOTES
Adriana Villafana is a 34 y.o. female    Chief Complaint   Patient presents with    Follow-up     Left breast cancer . 1. Have you been to the ER, urgent care clinic since your last visit? Hospitalized since your last visit? No    2. Have you seen or consulted any other health care providers outside of the [address] [place] [address] since your last visit? Include any pap smears or colon screening.  No
menstruation since 11/2023. No new lumps, sob, HA. 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            Review of Systems: A complete review of systems was obtained, negative except as described above. Physical Exam:        Vitals reviewed            ECOG PS: 0   General: No distress, dry mouth   Eyes:  PERRL, anicteric sclerae   HENT: Atraumatic, PAC in place , OP clear    Skin: No rashes, ecchymoses, or petechiae. Normal temperature, turgor, and texture. Psych: Alert, oriented, appropriate affect, normal judgment/insight          Results:       Lab Results   Component Value Date/Time    WBC 5.0 03/01/2023 02:37 PM    HGB 10.3 03/01/2023 02:37 PM    HCT 33.9 03/01/2023 02:37 PM     03/01/2023 02:37 PM    MCV 98.0 03/01/2023 02:37 PM     Lab Results   Component Value Date/Time     01/26/2023 08:20 AM    K 4.1 01/26/2023 08:20 AM     01/26/2023 08:20 AM    CO2 26 01/26/2023 08:20 AM    BUN 15 01/26/2023 08:20 AM    GFRAA >60 06/18/2021 12:02 PM     Lab Results   Component Value Date/Time    ALT 22 01/26/2023 08:20 AM    AST 6 01/26/2023 08:20 AM    GLOB 2.9 01/26/2023 08:20 AM            Records reviewed and summarized above. Pathology report(s) reviewed above. 9/27/2022   INVASIVE CARCINOMA OF THE BREAST: Resection    SPECIMEN       Procedure: Total mastectomy       Specimen Laterality: Left    TUMOR       Histologic  Type:  Invasive carcinoma of no special type (ductal)       Glandular (Acinar) / Tubular Differentiation: Score 2       Nuclear Pleomorphism: Score 2       Mitotic Rate: Score 2       Overall Grade: Grade 2 (scores of 6 or 7)        Tumor Size: 3 mm       Tumor Focality: Single focus of invasive carcinoma       Ductal Carcinoma In Situ (DCIS): Present           Ductal Carcinoma In Situ (DCIS): Positive for extensive   intraductal component (EIC)            Size (Extent) of DCIS: 90 mm           Architectural

## 2023-08-09 ENCOUNTER — HOSPITAL ENCOUNTER (OUTPATIENT)
Age: 38
Discharge: HOME | End: 2023-08-12
Payer: COMMERCIAL

## 2023-08-09 DIAGNOSIS — E88.09 AROMATASE DEFICIENCY: ICD-10-CM

## 2023-08-09 PROCEDURE — 77080 DXA BONE DENSITY AXIAL: CPT

## 2023-08-11 ENCOUNTER — TELEPHONE (OUTPATIENT)
Age: 38
End: 2023-08-11

## 2023-08-11 NOTE — TELEPHONE ENCOUNTER
1315  Called pt and left VM to r/t call to the office to discuss recent dexa scan    1027  Called pt and left VM with the follow:  Dexa with no osteoporosis but has decreased bone density   Ensure pt  is taking Ca and VD   Weight bearing exercises   Number to office left VM if pt has any questions or concerns.

## 2023-08-25 ENCOUNTER — HOSPITAL ENCOUNTER (OUTPATIENT)
Facility: HOSPITAL | Age: 38
Setting detail: INFUSION SERIES
End: 2023-08-25
Payer: COMMERCIAL

## 2023-08-25 VITALS
HEART RATE: 57 BPM | RESPIRATION RATE: 16 BRPM | TEMPERATURE: 97.4 F | DIASTOLIC BLOOD PRESSURE: 75 MMHG | SYSTOLIC BLOOD PRESSURE: 118 MMHG

## 2023-08-25 DIAGNOSIS — Z17.0 MALIGNANT NEOPLASM OF LEFT BREAST IN FEMALE, ESTROGEN RECEPTOR POSITIVE, UNSPECIFIED SITE OF BREAST (HCC): Primary | ICD-10-CM

## 2023-08-25 DIAGNOSIS — C50.912 MALIGNANT NEOPLASM OF LEFT BREAST IN FEMALE, ESTROGEN RECEPTOR POSITIVE, UNSPECIFIED SITE OF BREAST (HCC): Primary | ICD-10-CM

## 2023-08-25 PROCEDURE — 96402 CHEMO HORMON ANTINEOPL SQ/IM: CPT

## 2023-08-25 PROCEDURE — 96401 CHEMO ANTI-NEOPL SQ/IM: CPT

## 2023-08-25 PROCEDURE — 6360000002 HC RX W HCPCS: Performed by: INTERNAL MEDICINE

## 2023-08-25 RX ORDER — EPINEPHRINE 1 MG/ML
0.3 INJECTION, SOLUTION, CONCENTRATE INTRAVENOUS PRN
Status: CANCELLED | OUTPATIENT
Start: 2023-09-22

## 2023-08-25 RX ORDER — ALBUTEROL SULFATE 90 UG/1
4 AEROSOL, METERED RESPIRATORY (INHALATION) PRN
Status: CANCELLED | OUTPATIENT
Start: 2023-09-22

## 2023-08-25 RX ORDER — SODIUM CHLORIDE 9 MG/ML
INJECTION, SOLUTION INTRAVENOUS CONTINUOUS
Status: CANCELLED | OUTPATIENT
Start: 2023-09-22

## 2023-08-25 RX ORDER — DIPHENHYDRAMINE HYDROCHLORIDE 50 MG/ML
50 INJECTION INTRAMUSCULAR; INTRAVENOUS
Status: CANCELLED | OUTPATIENT
Start: 2023-09-22

## 2023-08-25 RX ORDER — ONDANSETRON 2 MG/ML
8 INJECTION INTRAMUSCULAR; INTRAVENOUS
Status: CANCELLED | OUTPATIENT
Start: 2023-09-22

## 2023-08-25 RX ORDER — ACETAMINOPHEN 325 MG/1
650 TABLET ORAL
Status: CANCELLED | OUTPATIENT
Start: 2023-09-22

## 2023-08-25 RX ADMIN — GOSERELIN ACETATE 3.6 MG: 3.6 IMPLANT SUBCUTANEOUS at 08:43

## 2023-09-08 RX ORDER — EXEMESTANE 25 MG/1
25 TABLET ORAL DAILY
Qty: 90 TABLET | Refills: 3 | Status: SHIPPED | OUTPATIENT
Start: 2023-09-08

## 2023-09-11 ENCOUNTER — PATIENT MESSAGE (OUTPATIENT)
Age: 38
End: 2023-09-11

## 2023-09-11 RX ORDER — EXEMESTANE 25 MG/1
25 TABLET ORAL DAILY
Qty: 90 TABLET | Refills: 3 | Status: CANCELLED | OUTPATIENT
Start: 2023-09-11

## 2023-09-11 NOTE — TELEPHONE ENCOUNTER
Ms. Juani Ramirez,     I received your MyChart request for refill of exemestane.   This was refilled Friday 9/8 to [facility].     Demetrio Romeo [facility]   Nurse Practitioner

## 2023-09-22 ENCOUNTER — HOSPITAL ENCOUNTER (OUTPATIENT)
Facility: HOSPITAL | Age: 38
Setting detail: INFUSION SERIES
End: 2023-09-22
Payer: COMMERCIAL

## 2023-09-22 VITALS
SYSTOLIC BLOOD PRESSURE: 112 MMHG | RESPIRATION RATE: 18 BRPM | TEMPERATURE: 97.9 F | DIASTOLIC BLOOD PRESSURE: 74 MMHG | HEIGHT: 65 IN | HEART RATE: 90 BPM | WEIGHT: 147 LBS | OXYGEN SATURATION: 96 % | BODY MASS INDEX: 24.49 KG/M2

## 2023-09-22 DIAGNOSIS — C50.912 MALIGNANT NEOPLASM OF LEFT BREAST IN FEMALE, ESTROGEN RECEPTOR POSITIVE, UNSPECIFIED SITE OF BREAST (HCC): Primary | ICD-10-CM

## 2023-09-22 DIAGNOSIS — Z17.0 MALIGNANT NEOPLASM OF LEFT BREAST IN FEMALE, ESTROGEN RECEPTOR POSITIVE, UNSPECIFIED SITE OF BREAST (HCC): Primary | ICD-10-CM

## 2023-09-22 PROCEDURE — 6360000002 HC RX W HCPCS: Performed by: INTERNAL MEDICINE

## 2023-09-22 PROCEDURE — 96402 CHEMO HORMON ANTINEOPL SQ/IM: CPT

## 2023-09-22 PROCEDURE — 96401 CHEMO ANTI-NEOPL SQ/IM: CPT

## 2023-09-22 RX ORDER — ACETAMINOPHEN 325 MG/1
650 TABLET ORAL
OUTPATIENT
Start: 2023-10-20

## 2023-09-22 RX ORDER — ALBUTEROL SULFATE 90 UG/1
4 AEROSOL, METERED RESPIRATORY (INHALATION) PRN
OUTPATIENT
Start: 2023-10-20

## 2023-09-22 RX ORDER — EPINEPHRINE 1 MG/ML
0.3 INJECTION, SOLUTION, CONCENTRATE INTRAVENOUS PRN
OUTPATIENT
Start: 2023-10-20

## 2023-09-22 RX ORDER — SODIUM CHLORIDE 9 MG/ML
INJECTION, SOLUTION INTRAVENOUS CONTINUOUS
OUTPATIENT
Start: 2023-10-20

## 2023-09-22 RX ORDER — DIPHENHYDRAMINE HYDROCHLORIDE 50 MG/ML
50 INJECTION INTRAMUSCULAR; INTRAVENOUS
OUTPATIENT
Start: 2023-10-20

## 2023-09-22 RX ORDER — ONDANSETRON 2 MG/ML
8 INJECTION INTRAMUSCULAR; INTRAVENOUS
OUTPATIENT
Start: 2023-10-20

## 2023-09-22 RX ADMIN — GOSERELIN ACETATE 3.6 MG: 3.6 IMPLANT SUBCUTANEOUS at 08:55

## 2023-09-24 DIAGNOSIS — F41.9 ANXIETY DISORDER, UNSPECIFIED: ICD-10-CM

## 2023-09-25 RX ORDER — ESCITALOPRAM OXALATE 10 MG/1
10 TABLET ORAL DAILY
Qty: 90 TABLET | Refills: 0 | Status: SHIPPED | OUTPATIENT
Start: 2023-09-25

## 2023-09-26 ENCOUNTER — OFFICE VISIT (OUTPATIENT)
Age: 38
End: 2023-09-26
Payer: COMMERCIAL

## 2023-09-26 VITALS — BODY MASS INDEX: 24.49 KG/M2 | HEIGHT: 65 IN | WEIGHT: 147 LBS

## 2023-09-26 DIAGNOSIS — Z90.13 STATUS POST BILATERAL MASTECTOMY: ICD-10-CM

## 2023-09-26 DIAGNOSIS — Z17.0 MALIGNANT NEOPLASM OF LEFT BREAST IN FEMALE, ESTROGEN RECEPTOR POSITIVE, UNSPECIFIED SITE OF BREAST (HCC): Primary | ICD-10-CM

## 2023-09-26 DIAGNOSIS — C50.912 MALIGNANT NEOPLASM OF LEFT BREAST IN FEMALE, ESTROGEN RECEPTOR POSITIVE, UNSPECIFIED SITE OF BREAST (HCC): Primary | ICD-10-CM

## 2023-09-26 DIAGNOSIS — Z85.3 HISTORY OF BREAST CANCER IN FEMALE: ICD-10-CM

## 2023-09-26 PROCEDURE — 99213 OFFICE O/P EST LOW 20 MIN: CPT | Performed by: NURSE PRACTITIONER

## 2023-09-26 NOTE — PROGRESS NOTES
HISTORY OF PRESENT ILLNESS  Rosa Blancas is a 34 y.o. female     HPI Established patient presents for follow-up for LEFT breast cancer. S/P BILATERAL mastectomy. No breast concerns at this time. Breast history -    Referring - [place] [address]   History of breast cysts   22 - mammogram at  [place] [address] - suspicious calcs at LEFT 4:00 and 8:00   22 - LEFT breast biopsy x 2 -    Both areas DCIS - ER pos, TN pos   8/3/22 - breast MRI:   IMPRESSION   1. Multicentric, multifocal, left DCIS involves all quadrants and largely replaces the inferior breast. BI-RADS 6.   2. DCIS extends to the left nipple, with an enhancing mass in the nipple. 3. 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 BI-RADS 4.   4. Linear enhancement in the subareolar right breast, likely DCIS. BI-RADS 4.   5. Bilobed mass at 2:00 in the right breast. BI-RADS 4A. 6. Overall assessment: BI-RADS Assessment Category 4: Suspicious abnormality. 7. Recommendations: Ultrasound-guided left axillary lymph node biopsy, MRI guided right breast biopsy. 22 - RIGHT breast biopsy x 2 -    1. Breast, right site A, core biopsy:   Intraductal papilloma with focal atypical ductal hyperplasia (see Comment). 2. Breast, right site B, core biopsy:    Benign breast tissue with fibroadenomatoid changes.     22 - BILATERAL mastectomy and LEFT SLNB -   RIGHT breast - benign tissue, no atypia or malignancy   LEFT breast - 0.3cm IDC; extensive DCIS; node positive - 1/3; ER pos, TN pos, HER2 neg; T1aN1a   Mammaprint - high risk   Adjuvant chemo - TC x 4 - Dr. Luci Kong   3/7/23 - port removal - Dr. Lauren Guillory  2023 - completed XRT  Taking exemestane - Dr. Luci Kong         Family history -    Mom - breast cancer at 72    - [facility] genetic testing - clinically negative with VUS on JUAN      OB History          4    Para        Term   3            AB        Living   3         SAB        IAB        Ectopic        Molar        Multiple        Live Births

## 2023-10-20 ENCOUNTER — HOSPITAL ENCOUNTER (OUTPATIENT)
Facility: HOSPITAL | Age: 38
Setting detail: INFUSION SERIES
End: 2023-10-20
Payer: COMMERCIAL

## 2023-10-20 VITALS
TEMPERATURE: 97.7 F | HEART RATE: 62 BPM | DIASTOLIC BLOOD PRESSURE: 64 MMHG | SYSTOLIC BLOOD PRESSURE: 119 MMHG | RESPIRATION RATE: 18 BRPM

## 2023-10-20 DIAGNOSIS — Z17.0 MALIGNANT NEOPLASM OF LEFT BREAST IN FEMALE, ESTROGEN RECEPTOR POSITIVE, UNSPECIFIED SITE OF BREAST (HCC): Primary | ICD-10-CM

## 2023-10-20 DIAGNOSIS — C50.912 MALIGNANT NEOPLASM OF LEFT BREAST IN FEMALE, ESTROGEN RECEPTOR POSITIVE, UNSPECIFIED SITE OF BREAST (HCC): Primary | ICD-10-CM

## 2023-10-20 PROCEDURE — 96402 CHEMO HORMON ANTINEOPL SQ/IM: CPT

## 2023-10-20 PROCEDURE — 6360000002 HC RX W HCPCS: Performed by: INTERNAL MEDICINE

## 2023-10-20 RX ORDER — ALBUTEROL SULFATE 90 UG/1
4 AEROSOL, METERED RESPIRATORY (INHALATION) PRN
OUTPATIENT
Start: 2023-11-17

## 2023-10-20 RX ORDER — EPINEPHRINE 1 MG/ML
0.3 INJECTION, SOLUTION, CONCENTRATE INTRAVENOUS PRN
OUTPATIENT
Start: 2023-11-17

## 2023-10-20 RX ORDER — ACETAMINOPHEN 325 MG/1
650 TABLET ORAL
OUTPATIENT
Start: 2023-11-17

## 2023-10-20 RX ORDER — ONDANSETRON 2 MG/ML
8 INJECTION INTRAMUSCULAR; INTRAVENOUS
OUTPATIENT
Start: 2023-11-17

## 2023-10-20 RX ORDER — DIPHENHYDRAMINE HYDROCHLORIDE 50 MG/ML
50 INJECTION INTRAMUSCULAR; INTRAVENOUS
OUTPATIENT
Start: 2023-11-17

## 2023-10-20 RX ORDER — SODIUM CHLORIDE 9 MG/ML
INJECTION, SOLUTION INTRAVENOUS CONTINUOUS
OUTPATIENT
Start: 2023-11-17

## 2023-10-20 RX ADMIN — GOSERELIN ACETATE 3.6 MG: 3.6 IMPLANT SUBCUTANEOUS at 08:34

## 2023-11-17 ENCOUNTER — HOSPITAL ENCOUNTER (OUTPATIENT)
Facility: HOSPITAL | Age: 38
Setting detail: INFUSION SERIES
Discharge: HOME | End: 2023-11-17
Payer: COMMERCIAL

## 2023-11-17 VITALS
OXYGEN SATURATION: 96 % | HEART RATE: 58 BPM | DIASTOLIC BLOOD PRESSURE: 84 MMHG | RESPIRATION RATE: 16 BRPM | TEMPERATURE: 97.8 F | SYSTOLIC BLOOD PRESSURE: 129 MMHG

## 2023-11-17 DIAGNOSIS — Z17.0 MALIGNANT NEOPLASM OF LEFT BREAST IN FEMALE, ESTROGEN RECEPTOR POSITIVE, UNSPECIFIED SITE OF BREAST (HCC): Primary | ICD-10-CM

## 2023-11-17 DIAGNOSIS — C50.912 MALIGNANT NEOPLASM OF LEFT BREAST IN FEMALE, ESTROGEN RECEPTOR POSITIVE, UNSPECIFIED SITE OF BREAST (HCC): Primary | ICD-10-CM

## 2023-11-17 PROCEDURE — 96402 CHEMO HORMON ANTINEOPL SQ/IM: CPT

## 2023-11-17 PROCEDURE — 6360000002 HC RX W HCPCS: Performed by: INTERNAL MEDICINE

## 2023-11-17 RX ORDER — SODIUM CHLORIDE 9 MG/ML
INJECTION, SOLUTION INTRAVENOUS CONTINUOUS
OUTPATIENT
Start: 2023-12-15

## 2023-11-17 RX ORDER — EPINEPHRINE 1 MG/ML
0.3 INJECTION, SOLUTION, CONCENTRATE INTRAVENOUS PRN
OUTPATIENT
Start: 2023-12-15

## 2023-11-17 RX ORDER — ACETAMINOPHEN 325 MG/1
650 TABLET ORAL
OUTPATIENT
Start: 2023-12-15

## 2023-11-17 RX ORDER — ONDANSETRON 2 MG/ML
8 INJECTION INTRAMUSCULAR; INTRAVENOUS
OUTPATIENT
Start: 2023-12-15

## 2023-11-17 RX ORDER — ALBUTEROL SULFATE 90 UG/1
4 AEROSOL, METERED RESPIRATORY (INHALATION) PRN
OUTPATIENT
Start: 2023-12-15

## 2023-11-17 RX ORDER — DIPHENHYDRAMINE HYDROCHLORIDE 50 MG/ML
50 INJECTION INTRAMUSCULAR; INTRAVENOUS
OUTPATIENT
Start: 2023-12-15

## 2023-11-17 RX ADMIN — GOSERELIN ACETATE 3.6 MG: 3.6 IMPLANT SUBCUTANEOUS at 08:35

## 2023-12-07 DIAGNOSIS — R53.83 FATIGUE, UNSPECIFIED TYPE: ICD-10-CM

## 2023-12-07 DIAGNOSIS — Z17.0 MALIGNANT NEOPLASM OF LEFT BREAST IN FEMALE, ESTROGEN RECEPTOR POSITIVE, UNSPECIFIED SITE OF BREAST (HCC): Primary | ICD-10-CM

## 2023-12-07 DIAGNOSIS — C50.912 MALIGNANT NEOPLASM OF LEFT BREAST IN FEMALE, ESTROGEN RECEPTOR POSITIVE, UNSPECIFIED SITE OF BREAST (HCC): Primary | ICD-10-CM

## 2023-12-07 DIAGNOSIS — C50.912 MALIGNANT NEOPLASM OF LEFT BREAST IN FEMALE, ESTROGEN RECEPTOR POSITIVE, UNSPECIFIED SITE OF BREAST (HCC): ICD-10-CM

## 2023-12-07 DIAGNOSIS — Z17.0 MALIGNANT NEOPLASM OF LEFT BREAST IN FEMALE, ESTROGEN RECEPTOR POSITIVE, UNSPECIFIED SITE OF BREAST (HCC): ICD-10-CM

## 2023-12-15 ENCOUNTER — HOSPITAL ENCOUNTER (OUTPATIENT)
Facility: HOSPITAL | Age: 38
Setting detail: INFUSION SERIES
Discharge: HOME | End: 2023-12-15
Payer: COMMERCIAL

## 2023-12-15 VITALS
WEIGHT: 152 LBS | RESPIRATION RATE: 16 BRPM | HEART RATE: 64 BPM | SYSTOLIC BLOOD PRESSURE: 126 MMHG | TEMPERATURE: 97.7 F | DIASTOLIC BLOOD PRESSURE: 76 MMHG | BODY MASS INDEX: 25.29 KG/M2

## 2023-12-15 DIAGNOSIS — C50.912 MALIGNANT NEOPLASM OF LEFT BREAST IN FEMALE, ESTROGEN RECEPTOR POSITIVE, UNSPECIFIED SITE OF BREAST (HCC): Primary | ICD-10-CM

## 2023-12-15 DIAGNOSIS — Z17.0 MALIGNANT NEOPLASM OF LEFT BREAST IN FEMALE, ESTROGEN RECEPTOR POSITIVE, UNSPECIFIED SITE OF BREAST (HCC): Primary | ICD-10-CM

## 2023-12-15 PROCEDURE — 6360000002 HC RX W HCPCS: Performed by: INTERNAL MEDICINE

## 2023-12-15 PROCEDURE — 96402 CHEMO HORMON ANTINEOPL SQ/IM: CPT

## 2023-12-15 RX ORDER — ONDANSETRON 2 MG/ML
8 INJECTION INTRAMUSCULAR; INTRAVENOUS
OUTPATIENT
Start: 2024-01-12

## 2023-12-15 RX ORDER — ALBUTEROL SULFATE 90 UG/1
4 AEROSOL, METERED RESPIRATORY (INHALATION) PRN
OUTPATIENT
Start: 2024-01-12

## 2023-12-15 RX ORDER — DIPHENHYDRAMINE HYDROCHLORIDE 50 MG/ML
50 INJECTION INTRAMUSCULAR; INTRAVENOUS
OUTPATIENT
Start: 2024-01-12

## 2023-12-15 RX ORDER — EPINEPHRINE 1 MG/ML
0.3 INJECTION, SOLUTION INTRAMUSCULAR; SUBCUTANEOUS PRN
OUTPATIENT
Start: 2024-01-12

## 2023-12-15 RX ORDER — ACETAMINOPHEN 325 MG/1
650 TABLET ORAL
OUTPATIENT
Start: 2024-01-12

## 2023-12-15 RX ORDER — SODIUM CHLORIDE 9 MG/ML
INJECTION, SOLUTION INTRAVENOUS CONTINUOUS
OUTPATIENT
Start: 2024-01-12

## 2023-12-15 RX ADMIN — GOSERELIN ACETATE 3.6 MG: 3.6 IMPLANT SUBCUTANEOUS at 08:51

## 2024-01-01 DIAGNOSIS — F41.9 ANXIETY DISORDER, UNSPECIFIED: ICD-10-CM

## 2024-01-02 RX ORDER — ESCITALOPRAM OXALATE 10 MG/1
10 TABLET ORAL DAILY
Qty: 90 TABLET | Refills: 0 | Status: SHIPPED | OUTPATIENT
Start: 2024-01-02

## 2024-01-12 ENCOUNTER — OFFICE VISIT (OUTPATIENT)
Age: 39
End: 2024-01-12
Payer: COMMERCIAL

## 2024-01-12 ENCOUNTER — HOSPITAL ENCOUNTER (OUTPATIENT)
Facility: HOSPITAL | Age: 39
Setting detail: INFUSION SERIES
Discharge: HOME | End: 2024-01-12
Payer: COMMERCIAL

## 2024-01-12 VITALS
OXYGEN SATURATION: 95 % | HEART RATE: 75 BPM | RESPIRATION RATE: 16 BRPM | WEIGHT: 156 LBS | DIASTOLIC BLOOD PRESSURE: 66 MMHG | SYSTOLIC BLOOD PRESSURE: 99 MMHG | TEMPERATURE: 97.9 F | BODY MASS INDEX: 25.96 KG/M2

## 2024-01-12 VITALS
TEMPERATURE: 97.9 F | OXYGEN SATURATION: 95 % | DIASTOLIC BLOOD PRESSURE: 66 MMHG | RESPIRATION RATE: 16 BRPM | HEART RATE: 75 BPM | SYSTOLIC BLOOD PRESSURE: 99 MMHG

## 2024-01-12 DIAGNOSIS — Z17.0 MALIGNANT NEOPLASM OF LEFT BREAST IN FEMALE, ESTROGEN RECEPTOR POSITIVE, UNSPECIFIED SITE OF BREAST (HCC): Primary | ICD-10-CM

## 2024-01-12 DIAGNOSIS — Z17.0 MALIGNANT NEOPLASM OF LEFT BREAST IN FEMALE, ESTROGEN RECEPTOR POSITIVE, UNSPECIFIED SITE OF BREAST (HCC): ICD-10-CM

## 2024-01-12 DIAGNOSIS — D50.8 IRON DEFICIENCY ANEMIA SECONDARY TO INADEQUATE DIETARY IRON INTAKE: Primary | ICD-10-CM

## 2024-01-12 DIAGNOSIS — C50.912 MALIGNANT NEOPLASM OF LEFT BREAST IN FEMALE, ESTROGEN RECEPTOR POSITIVE, UNSPECIFIED SITE OF BREAST (HCC): Primary | ICD-10-CM

## 2024-01-12 DIAGNOSIS — C50.912 MALIGNANT NEOPLASM OF LEFT BREAST IN FEMALE, ESTROGEN RECEPTOR POSITIVE, UNSPECIFIED SITE OF BREAST (HCC): ICD-10-CM

## 2024-01-12 PROCEDURE — 96401 CHEMO ANTI-NEOPL SQ/IM: CPT

## 2024-01-12 PROCEDURE — 6360000002 HC RX W HCPCS: Performed by: INTERNAL MEDICINE

## 2024-01-12 PROCEDURE — 99213 OFFICE O/P EST LOW 20 MIN: CPT | Performed by: INTERNAL MEDICINE

## 2024-01-12 PROCEDURE — 96402 CHEMO HORMON ANTINEOPL SQ/IM: CPT

## 2024-01-12 RX ORDER — ALBUTEROL SULFATE 90 UG/1
4 AEROSOL, METERED RESPIRATORY (INHALATION) PRN
OUTPATIENT
Start: 2024-02-09

## 2024-01-12 RX ORDER — SODIUM CHLORIDE 9 MG/ML
INJECTION, SOLUTION INTRAVENOUS CONTINUOUS
OUTPATIENT
Start: 2024-02-09

## 2024-01-12 RX ORDER — DIPHENHYDRAMINE HYDROCHLORIDE 50 MG/ML
50 INJECTION INTRAMUSCULAR; INTRAVENOUS
OUTPATIENT
Start: 2024-02-09

## 2024-01-12 RX ORDER — EPINEPHRINE 1 MG/ML
0.3 INJECTION, SOLUTION INTRAMUSCULAR; SUBCUTANEOUS PRN
OUTPATIENT
Start: 2024-02-09

## 2024-01-12 RX ORDER — ACETAMINOPHEN 325 MG/1
650 TABLET ORAL
OUTPATIENT
Start: 2024-02-09

## 2024-01-12 RX ORDER — ONDANSETRON 2 MG/ML
8 INJECTION INTRAMUSCULAR; INTRAVENOUS
OUTPATIENT
Start: 2024-02-09

## 2024-01-12 RX ADMIN — GOSERELIN ACETATE 3.6 MG: 3.6 IMPLANT SUBCUTANEOUS at 14:18

## 2024-01-12 NOTE — PROGRESS NOTES
[facility]   [address] [place] [address], [address] [place] [id number]   W: [phone]  F: [phone]     Reason for Visit:     Cely Givens is a 38 y.o.  female who is seen for follow up of Left breast cancer .             Treatment History:   9/27/2022-bilateral mastectomy with left sentinel node biopsy  11/22/22-1/26/2023: Adjuvant TC   3/2/2023: Zoladex  3/30/2023: Aromasin   4/2023: RT        History of Present Illness:   Patient is a 38 y.o. female with a history of anemia and fibrocystic breast disease seen for left breast cancer and DCIS.     She had a mammogram in May 2022 that showed diffuse left breast calcifications as well as several masses in the right breast that were thought to be fibroadenomas.  Initial biopsy of left breast calcifications in June 2022 was consistent with DCIS.  She  was referred to Dr. Rivas who obtained a breast MRI on 8/3/2022.  This confirmed multicentric multifocal left breast DCIS in all quadrants .  Probable left axillary delvin metastases, bilobed mass at 2:00 in the right breast as well is linear enhancement  in the subareolar right breast.  Right breast biopsy of the subareolar region was consistent with intraductal papilloma with focal atypical ductal hyperplasia, right breast 2 cm masslike lesion was consistent with fibroadenoma.  Underwent a bilateral  mastectomy with sentinel node biopsy on the left on 9/27/2022.  Right breast showed intraductal papilloma and fibroadenoma.  Left breast showed extensive high-grade DCIS with negative margins as well as a 3 mm focus of invasive carcinoma, grade 2, 1 positive  node of 3.  ER 90% ID 90% HER2/karen negative, Ki-67 20%. Mother's cancer diagnosed when she was 65, and had negative genetic testing. S/P 4 cycles of adjuvant TC. On adjuvant Zoladex and Aromasin.     Interval history: Presents to clinic for f/u. Feeling well. Still has some morning stiffness with mild joint pain. Getting

## 2024-01-12 NOTE — PLAN OF CARE
[facility] Progress Note    Date: 2024    Name: Cely Givens    MRN: [id number]         : 1985    Ms. Givens Arrived ambulatory and in no distress for Zoladex.      Assessment was completed and documented in flowsheets. No acute concerns at this time.    Ms. Givens's vital signs for this visit.  Vitals:    24 1400   BP: 99/66   Pulse: 75   Resp: 16   Temp: 97.9 °F (36.6 °C)   SpO2: 95%      Two nurses verified prior to administering:  Drug name, Drug dose, Infusion volume or drug volume when prepared in a syringe, Rate of administration, Route of administration, Expiration dates and/or times, Appearance and physical integrity of the drugs, Rate set on infusion pump, when used, and Sequencing of drug administration.  Medications given:   Medications Administered         goserelin (ZOLADEX) injection 3.6 mg Admin Date  2024 Action  Given Dose  3.6 mg Route  SubCUTAneous Administered By  Annemarie Hall RN            Given in the LLQ ABD.     Ms. Givens tolerated the injection, and had no complaints.    [facility].     Future Appointments   Date Time Provider Department Center   2024  1:30 PM [facility] PAT EXAM RM 1 [facility]   2024  2:30 PM H2 TRAVIS [facility]   3/8/2024  2:30 PM H2 TRAVIS [facility]STRACK [facility]   3/26/2024  4:30 PM Adeline Stern, APRN - NP [facility] BS AMB   2024  2:30 PM Irasema Morris MD [facility] BS [facility]       ANNEMARIE HALL RN  2024  4:12 PM    Problem: Safety - Adult  Goal: Free from fall injury  Outcome: Progressing

## 2024-01-12 NOTE — PROGRESS NOTES
Celydebra Givens is a 38 y.o. female    Chief Complaint   Patient presents with    Follow-up      Left breast cancer .            1. Have you been to the ER, urgent care clinic since your last visit?  Hospitalized since your last visit?No    2. Have you seen or consulted any other health care providers outside of the [facility] since your last visit?  Include any pap smears or colon screening. No

## 2024-01-15 ENCOUNTER — TELEPHONE (OUTPATIENT)
Age: 39
End: 2024-01-15

## 2024-01-15 NOTE — TELEPHONE ENCOUNTER
Called patient back, discussed moving opic appt to 2/5 instead of 2/9. Added labs to ov in July. Patient confirmed.

## 2024-01-30 ENCOUNTER — HOSPITAL ENCOUNTER (OUTPATIENT)
Facility: HOSPITAL | Age: 39
Discharge: HOME | End: 2024-02-02
Payer: COMMERCIAL

## 2024-01-30 VITALS
WEIGHT: 154 LBS | HEART RATE: 60 BPM | SYSTOLIC BLOOD PRESSURE: 128 MMHG | BODY MASS INDEX: 25.66 KG/M2 | HEIGHT: 65 IN | DIASTOLIC BLOOD PRESSURE: 87 MMHG | TEMPERATURE: 97.9 F

## 2024-01-30 LAB
BASOPHILS # BLD: 0 K/UL (ref 0–0.1)
BASOPHILS NFR BLD: 1 % (ref 0–1)
DIFFERENTIAL METHOD BLD: NORMAL
EOSINOPHIL # BLD: 0.2 K/UL (ref 0–0.4)
EOSINOPHIL NFR BLD: 4 % (ref 0–7)
ERYTHROCYTE [DISTWIDTH] IN BLOOD BY AUTOMATED COUNT: 13 % (ref 11.5–14.5)
HCT VFR BLD AUTO: 36.7 % (ref 35–47)
HGB BLD-MCNC: 11.8 G/DL (ref 11.5–16)
IMM GRANULOCYTES # BLD AUTO: 0 K/UL (ref 0–0.04)
IMM GRANULOCYTES NFR BLD AUTO: 0 % (ref 0–0.5)
LYMPHOCYTES # BLD: 1.4 K/UL (ref 0.8–3.5)
LYMPHOCYTES NFR BLD: 29 % (ref 12–49)
MCH RBC QN AUTO: 30.1 PG (ref 26–34)
MCHC RBC AUTO-ENTMCNC: 32.2 G/DL (ref 30–36.5)
MCV RBC AUTO: 93.6 FL (ref 80–99)
MONOCYTES # BLD: 0.4 K/UL (ref 0–1)
MONOCYTES NFR BLD: 8 % (ref 5–13)
NEUTS SEG # BLD: 2.9 K/UL (ref 1.8–8)
NEUTS SEG NFR BLD: 58 % (ref 32–75)
NRBC # BLD: 0 K/UL (ref 0–0.01)
NRBC BLD-RTO: 0 PER 100 WBC
PLATELET # BLD AUTO: 241 K/UL (ref 150–400)
PMV BLD AUTO: 11.2 FL (ref 8.9–12.9)
RBC # BLD AUTO: 3.92 M/UL (ref 3.8–5.2)
WBC # BLD AUTO: 4.9 K/UL (ref 3.6–11)

## 2024-01-30 PROCEDURE — 85025 COMPLETE CBC W/AUTO DIFF WBC: CPT

## 2024-01-30 PROCEDURE — 36415 COLL VENOUS BLD VENIPUNCTURE: CPT

## 2024-01-30 NOTE — PERIOP NOTE
CARDIOLOGY NOTES FROM DR. QUAN NOTED IN CC FROM 9/19/2022.  THIS IS THE LAST TIME PT WAS SEEN BY DR. QUAN.  PT SAW HIM THEN FOR SOME PALPITATIONS AND \"RAPID BEATS\".  STATES SHE HAS NOT FELT THAT \"IN SOME TIME\".

## 2024-01-30 NOTE — PERIOP NOTE
[facility]  PREOPERATIVE INSTRUCTIONS    Surgery Date:   2/6/24    Your surgeon's office or [facility] staff will call you between 4 PM- 8 PM the day before surgery with your arrival time. If your surgery is on a Monday, you will receive a call the preceding Friday. If your surgeon;s office has given you arrival time, then go by that time.    Please report to [facility] Patient Access/Admitting on the 1st floor.  Bring your insurance card, photo identification, and any copayment ( if applicable).   If you are going home the same day of your surgery, you must have a responsible adult to drive you home. You need to have a responsible adult to stay with you the first 24 hours after surgery and you should not drive a car for 24 hours following your surgery.  If you are being admitted to the hospital, please leave personal belongings/luggage in your car until you have an assigned hospital room number.  Nothing to eat or drink after midnight the night before surgery. This includes no water, gum, mints, coffee, juice, etc.  Please note special instructions, if applicable, below for medications.  Do NOT drink alcohol or smoke 24 hours before surgery. STOP smoking for 14 days prior as it helps with breathing and healing after surgery.  Please wear comfortable clothes. Wear glasses instead of contacts. We ask that all money and jewelry and valuables to be left at home. Wear no makeup, particularly mascara the day of surgery.  All body piercings, rings, and jewelry need to be removed and left at home. Remove all nail polish except for clear. Please wear your hair loose or down. Please no pony-tails, buns, or any metal hair accessories. You may wear deodorant, unless having breast surgery. Do not shave any body area within 24 hours of your surgery.  Please follow all instructions to avoid any potential surgical cancellation.  Should your physical condition change, (i.e. fever, cold, flu, etc.) please notify your

## 2024-02-05 ENCOUNTER — HOSPITAL ENCOUNTER (OUTPATIENT)
Facility: HOSPITAL | Age: 39
Setting detail: INFUSION SERIES
Discharge: HOME | End: 2024-02-05
Payer: COMMERCIAL

## 2024-02-05 ENCOUNTER — ANESTHESIA EVENT (OUTPATIENT)
Facility: HOSPITAL | Age: 39
End: 2024-02-05
Payer: COMMERCIAL

## 2024-02-05 VITALS
RESPIRATION RATE: 16 BRPM | HEART RATE: 63 BPM | WEIGHT: 154 LBS | TEMPERATURE: 97.8 F | DIASTOLIC BLOOD PRESSURE: 76 MMHG | BODY MASS INDEX: 25.63 KG/M2 | SYSTOLIC BLOOD PRESSURE: 115 MMHG

## 2024-02-05 DIAGNOSIS — C50.912 MALIGNANT NEOPLASM OF LEFT BREAST IN FEMALE, ESTROGEN RECEPTOR POSITIVE, UNSPECIFIED SITE OF BREAST (HCC): Primary | ICD-10-CM

## 2024-02-05 DIAGNOSIS — Z17.0 MALIGNANT NEOPLASM OF LEFT BREAST IN FEMALE, ESTROGEN RECEPTOR POSITIVE, UNSPECIFIED SITE OF BREAST (HCC): Primary | ICD-10-CM

## 2024-02-05 DIAGNOSIS — D50.8 IRON DEFICIENCY ANEMIA SECONDARY TO INADEQUATE DIETARY IRON INTAKE: ICD-10-CM

## 2024-02-05 LAB
FERRITIN SERPL-MCNC: 54 NG/ML (ref 26–388)
IRON SATN MFR SERPL: 23 % (ref 20–50)
IRON SERPL-MCNC: 78 UG/DL (ref 35–150)
TIBC SERPL-MCNC: 334 UG/DL (ref 250–450)

## 2024-02-05 PROCEDURE — 83540 ASSAY OF IRON: CPT

## 2024-02-05 PROCEDURE — 96402 CHEMO HORMON ANTINEOPL SQ/IM: CPT

## 2024-02-05 PROCEDURE — 83550 IRON BINDING TEST: CPT

## 2024-02-05 PROCEDURE — 82728 ASSAY OF FERRITIN: CPT

## 2024-02-05 PROCEDURE — 6360000002 HC RX W HCPCS: Performed by: INTERNAL MEDICINE

## 2024-02-05 PROCEDURE — 36415 COLL VENOUS BLD VENIPUNCTURE: CPT

## 2024-02-05 RX ORDER — ACETAMINOPHEN 325 MG/1
650 TABLET ORAL
OUTPATIENT
Start: 2024-03-04

## 2024-02-05 RX ORDER — EPINEPHRINE 1 MG/ML
0.3 INJECTION, SOLUTION INTRAMUSCULAR; SUBCUTANEOUS PRN
OUTPATIENT
Start: 2024-03-04

## 2024-02-05 RX ORDER — SODIUM CHLORIDE 9 MG/ML
INJECTION, SOLUTION INTRAVENOUS CONTINUOUS
OUTPATIENT
Start: 2024-03-04

## 2024-02-05 RX ORDER — ALBUTEROL SULFATE 90 UG/1
4 AEROSOL, METERED RESPIRATORY (INHALATION) PRN
OUTPATIENT
Start: 2024-03-04

## 2024-02-05 RX ORDER — ONDANSETRON 2 MG/ML
8 INJECTION INTRAMUSCULAR; INTRAVENOUS
OUTPATIENT
Start: 2024-03-04

## 2024-02-05 RX ORDER — DIPHENHYDRAMINE HYDROCHLORIDE 50 MG/ML
50 INJECTION INTRAMUSCULAR; INTRAVENOUS
OUTPATIENT
Start: 2024-03-04

## 2024-02-05 RX ADMIN — GOSERELIN ACETATE 3.6 MG: 3.6 IMPLANT SUBCUTANEOUS at 15:10

## 2024-02-05 NOTE — PROGRESS NOTES
[facility] Chemo Progress Note    Date: February 5, 2024      1500 Ms. Givens Arrived to [facility] for  Zoladez ambulatory in stable condition.  Assessment was completed, no acute issues at this time, no new complaints voiced. Labs drawn peripherally from right ac and sent for processing.    Ms. Givens's vitals were reviewed.  Vitals:    02/05/24 1500   BP: 115/76   Pulse: 63   Resp: 16              Given RLQ     Medications Administered         goserelin (ZOLADEX) injection 3.6 mg Admin Date  02/05/2024 Action  Given Dose  3.6 mg Route  SubCUTAneous Administered By  Adelaida Blanco, RN              Two nurses verified prior to administering: Drug name, Drug dose, Infusion volume or drug volume when prepared in a syringe, Rate of administration, Route of administration, Expiration dates and/or times, Appearance and physical integrity of the drugs, Rate set on infusion pump, when used, and Sequencing of drug administration.     Patient tolerated treatment well.  Patient was discharged in stable condition. Patient is aware of next scheduled [facility] appointment.    Future Appointments   Date Time Provider Department Center   3/8/2024  2:30 PM [facility]   3/26/2024  4:30 PM Adeline Stern, APRN - NP [facility] BS AMB   7/12/2024  2:00 PM H3 TRAVIS LAB [facility]   7/12/2024  2:15 PM Irasema Morris MD [facility] BS AMB         Adelaida Blanco, RN, RN  February 5, 2024

## 2024-02-06 ENCOUNTER — APPOINTMENT (OUTPATIENT)
Facility: HOSPITAL | Age: 39
End: 2024-02-06
Attending: PLASTIC SURGERY
Payer: COMMERCIAL

## 2024-02-06 ENCOUNTER — ANESTHESIA (OUTPATIENT)
Facility: HOSPITAL | Age: 39
End: 2024-02-06
Payer: COMMERCIAL

## 2024-02-06 ENCOUNTER — HOSPITAL ENCOUNTER (OUTPATIENT)
Facility: HOSPITAL | Age: 39
Setting detail: OUTPATIENT SURGERY
Discharge: HOME | End: 2024-02-06
Attending: PLASTIC SURGERY | Admitting: PLASTIC SURGERY
Payer: COMMERCIAL

## 2024-02-06 VITALS
RESPIRATION RATE: 25 BRPM | HEART RATE: 66 BPM | SYSTOLIC BLOOD PRESSURE: 99 MMHG | DIASTOLIC BLOOD PRESSURE: 67 MMHG | OXYGEN SATURATION: 98 % | TEMPERATURE: 97.9 F

## 2024-02-06 DIAGNOSIS — Z90.13 STATUS POST BILATERAL MASTECTOMY: Primary | ICD-10-CM

## 2024-02-06 PROCEDURE — 2580000003 HC RX 258: Performed by: STUDENT IN AN ORGANIZED HEALTH CARE EDUCATION/TRAINING PROGRAM

## 2024-02-06 PROCEDURE — 6370000000 HC RX 637 (ALT 250 FOR IP): Performed by: STUDENT IN AN ORGANIZED HEALTH CARE EDUCATION/TRAINING PROGRAM

## 2024-02-06 PROCEDURE — 7100000000 HC PACU RECOVERY - FIRST 15 MIN: Performed by: PLASTIC SURGERY

## 2024-02-06 PROCEDURE — 2720000010 HC SURG SUPPLY STERILE: Performed by: PLASTIC SURGERY

## 2024-02-06 PROCEDURE — 3600000004 HC SURGERY LEVEL 4 BASE: Performed by: PLASTIC SURGERY

## 2024-02-06 PROCEDURE — 7100000001 HC PACU RECOVERY - ADDTL 15 MIN: Performed by: PLASTIC SURGERY

## 2024-02-06 PROCEDURE — 3700000000 HC ANESTHESIA ATTENDED CARE: Performed by: PLASTIC SURGERY

## 2024-02-06 PROCEDURE — 2580000003 HC RX 258: Performed by: PLASTIC SURGERY

## 2024-02-06 PROCEDURE — 3700000001 HC ADD 15 MINUTES (ANESTHESIA): Performed by: PLASTIC SURGERY

## 2024-02-06 PROCEDURE — 3600000014 HC SURGERY LEVEL 4 ADDTL 15MIN: Performed by: PLASTIC SURGERY

## 2024-02-06 PROCEDURE — 2709999900 HC NON-CHARGEABLE SUPPLY: Performed by: PLASTIC SURGERY

## 2024-02-06 PROCEDURE — 6360000002 HC RX W HCPCS: Performed by: PLASTIC SURGERY

## 2024-02-06 PROCEDURE — 2500000003 HC RX 250 WO HCPCS: Performed by: PLASTIC SURGERY

## 2024-02-06 PROCEDURE — C1789 PROSTHESIS, BREAST, IMP: HCPCS | Performed by: PLASTIC SURGERY

## 2024-02-06 PROCEDURE — 2500000003 HC RX 250 WO HCPCS: Performed by: NURSE ANESTHETIST, CERTIFIED REGISTERED

## 2024-02-06 PROCEDURE — 71045 X-RAY EXAM CHEST 1 VIEW: CPT

## 2024-02-06 PROCEDURE — 6360000002 HC RX W HCPCS: Performed by: STUDENT IN AN ORGANIZED HEALTH CARE EDUCATION/TRAINING PROGRAM

## 2024-02-06 PROCEDURE — 6360000002 HC RX W HCPCS: Performed by: NURSE ANESTHETIST, CERTIFIED REGISTERED

## 2024-02-06 PROCEDURE — A4217 STERILE WATER/SALINE, 500 ML: HCPCS | Performed by: PLASTIC SURGERY

## 2024-02-06 PROCEDURE — L8000 MASTECTOMY BRA: HCPCS | Performed by: PLASTIC SURGERY

## 2024-02-06 DEVICE — IMPLANTABLE DEVICE: Type: IMPLANTABLE DEVICE | Site: BREAST | Status: FUNCTIONAL

## 2024-02-06 RX ORDER — DEXAMETHASONE SODIUM PHOSPHATE 4 MG/ML
INJECTION, SOLUTION INTRA-ARTICULAR; INTRALESIONAL; INTRAMUSCULAR; INTRAVENOUS; SOFT TISSUE PRN
Status: DISCONTINUED | OUTPATIENT
Start: 2024-02-06 | End: 2024-02-06 | Stop reason: SDUPTHER

## 2024-02-06 RX ORDER — PROPOFOL 10 MG/ML
INJECTION, EMULSION INTRAVENOUS PRN
Status: DISCONTINUED | OUTPATIENT
Start: 2024-02-06 | End: 2024-02-06 | Stop reason: SDUPTHER

## 2024-02-06 RX ORDER — ROCURONIUM BROMIDE 10 MG/ML
INJECTION, SOLUTION INTRAVENOUS PRN
Status: DISCONTINUED | OUTPATIENT
Start: 2024-02-06 | End: 2024-02-06 | Stop reason: SDUPTHER

## 2024-02-06 RX ORDER — SODIUM CHLORIDE 9 MG/ML
INJECTION, SOLUTION INTRAVENOUS PRN
Status: DISCONTINUED | OUTPATIENT
Start: 2024-02-06 | End: 2024-02-06 | Stop reason: HOSPADM

## 2024-02-06 RX ORDER — ONDANSETRON 2 MG/ML
INJECTION INTRAMUSCULAR; INTRAVENOUS PRN
Status: DISCONTINUED | OUTPATIENT
Start: 2024-02-06 | End: 2024-02-06 | Stop reason: SDUPTHER

## 2024-02-06 RX ORDER — HYDROCODONE BITARTRATE AND ACETAMINOPHEN 5; 325 MG/1; MG/1
1 TABLET ORAL EVERY 6 HOURS PRN
Qty: 30 TABLET | Refills: 0 | Status: SHIPPED | OUTPATIENT
Start: 2024-02-06 | End: 2024-02-14

## 2024-02-06 RX ORDER — LIDOCAINE HYDROCHLORIDE 20 MG/ML
INJECTION, SOLUTION EPIDURAL; INFILTRATION; INTRACAUDAL; PERINEURAL PRN
Status: DISCONTINUED | OUTPATIENT
Start: 2024-02-06 | End: 2024-02-06 | Stop reason: SDUPTHER

## 2024-02-06 RX ORDER — MIDAZOLAM HYDROCHLORIDE 2 MG/2ML
2 INJECTION, SOLUTION INTRAMUSCULAR; INTRAVENOUS
Status: DISCONTINUED | OUTPATIENT
Start: 2024-02-06 | End: 2024-02-06 | Stop reason: HOSPADM

## 2024-02-06 RX ORDER — KETOROLAC TROMETHAMINE 30 MG/ML
INJECTION, SOLUTION INTRAMUSCULAR; INTRAVENOUS PRN
Status: DISCONTINUED | OUTPATIENT
Start: 2024-02-06 | End: 2024-02-06 | Stop reason: SDUPTHER

## 2024-02-06 RX ORDER — OXYCODONE HYDROCHLORIDE 5 MG/1
5 TABLET ORAL
Status: DISCONTINUED | OUTPATIENT
Start: 2024-02-06 | End: 2024-02-06 | Stop reason: HOSPADM

## 2024-02-06 RX ORDER — SCOLOPAMINE TRANSDERMAL SYSTEM 1 MG/1
1 PATCH, EXTENDED RELEASE TRANSDERMAL
Status: DISCONTINUED | OUTPATIENT
Start: 2024-02-06 | End: 2024-02-06 | Stop reason: HOSPADM

## 2024-02-06 RX ORDER — HYDROMORPHONE HYDROCHLORIDE 2 MG/ML
INJECTION, SOLUTION INTRAMUSCULAR; INTRAVENOUS; SUBCUTANEOUS PRN
Status: DISCONTINUED | OUTPATIENT
Start: 2024-02-06 | End: 2024-02-06 | Stop reason: SDUPTHER

## 2024-02-06 RX ORDER — HYDROMORPHONE HYDROCHLORIDE 1 MG/ML
0.5 INJECTION, SOLUTION INTRAMUSCULAR; INTRAVENOUS; SUBCUTANEOUS EVERY 5 MIN PRN
Status: DISCONTINUED | OUTPATIENT
Start: 2024-02-06 | End: 2024-02-06 | Stop reason: HOSPADM

## 2024-02-06 RX ORDER — GLYCOPYRROLATE 0.2 MG/ML
INJECTION INTRAMUSCULAR; INTRAVENOUS PRN
Status: DISCONTINUED | OUTPATIENT
Start: 2024-02-06 | End: 2024-02-06 | Stop reason: SDUPTHER

## 2024-02-06 RX ORDER — PROCHLORPERAZINE EDISYLATE 5 MG/ML
5 INJECTION INTRAMUSCULAR; INTRAVENOUS
Status: COMPLETED | OUTPATIENT
Start: 2024-02-06 | End: 2024-02-06

## 2024-02-06 RX ORDER — SODIUM CHLORIDE 0.9 % (FLUSH) 0.9 %
5-40 SYRINGE (ML) INJECTION EVERY 12 HOURS SCHEDULED
Status: DISCONTINUED | OUTPATIENT
Start: 2024-02-06 | End: 2024-02-06 | Stop reason: HOSPADM

## 2024-02-06 RX ORDER — SODIUM CHLORIDE, SODIUM LACTATE, POTASSIUM CHLORIDE, CALCIUM CHLORIDE 600; 310; 30; 20 MG/100ML; MG/100ML; MG/100ML; MG/100ML
INJECTION, SOLUTION INTRAVENOUS CONTINUOUS
Status: DISCONTINUED | OUTPATIENT
Start: 2024-02-06 | End: 2024-02-06 | Stop reason: HOSPADM

## 2024-02-06 RX ORDER — LIDOCAINE HYDROCHLORIDE 10 MG/ML
1 INJECTION, SOLUTION INFILTRATION; PERINEURAL
Status: DISCONTINUED | OUTPATIENT
Start: 2024-02-06 | End: 2024-02-06 | Stop reason: HOSPADM

## 2024-02-06 RX ORDER — ACETAMINOPHEN 500 MG
1000 TABLET ORAL ONCE
Status: DISCONTINUED | OUTPATIENT
Start: 2024-02-06 | End: 2024-02-06 | Stop reason: HOSPADM

## 2024-02-06 RX ORDER — FENTANYL CITRATE 50 UG/ML
100 INJECTION, SOLUTION INTRAMUSCULAR; INTRAVENOUS
Status: DISCONTINUED | OUTPATIENT
Start: 2024-02-06 | End: 2024-02-06 | Stop reason: HOSPADM

## 2024-02-06 RX ORDER — CEFAZOLIN SODIUM 1 G/3ML
INJECTION, POWDER, FOR SOLUTION INTRAMUSCULAR; INTRAVENOUS PRN
Status: DISCONTINUED | OUTPATIENT
Start: 2024-02-06 | End: 2024-02-06 | Stop reason: SDUPTHER

## 2024-02-06 RX ORDER — LIDOCAINE HYDROCHLORIDE AND EPINEPHRINE 15; 5 MG/ML; UG/ML
INJECTION, SOLUTION EPIDURAL PRN
Status: DISCONTINUED | OUTPATIENT
Start: 2024-02-06 | End: 2024-02-06 | Stop reason: HOSPADM

## 2024-02-06 RX ORDER — MIDAZOLAM HYDROCHLORIDE 1 MG/ML
INJECTION INTRAMUSCULAR; INTRAVENOUS PRN
Status: DISCONTINUED | OUTPATIENT
Start: 2024-02-06 | End: 2024-02-06 | Stop reason: SDUPTHER

## 2024-02-06 RX ORDER — FENTANYL CITRATE 50 UG/ML
25 INJECTION, SOLUTION INTRAMUSCULAR; INTRAVENOUS EVERY 5 MIN PRN
Status: DISCONTINUED | OUTPATIENT
Start: 2024-02-06 | End: 2024-02-06 | Stop reason: HOSPADM

## 2024-02-06 RX ORDER — PHENYLEPHRINE HCL IN 0.9% NACL 0.4MG/10ML
SYRINGE (ML) INTRAVENOUS PRN
Status: DISCONTINUED | OUTPATIENT
Start: 2024-02-06 | End: 2024-02-06 | Stop reason: SDUPTHER

## 2024-02-06 RX ORDER — SODIUM CHLORIDE 0.9 % (FLUSH) 0.9 %
5-40 SYRINGE (ML) INJECTION PRN
Status: DISCONTINUED | OUTPATIENT
Start: 2024-02-06 | End: 2024-02-06 | Stop reason: HOSPADM

## 2024-02-06 RX ORDER — ONDANSETRON 2 MG/ML
4 INJECTION INTRAMUSCULAR; INTRAVENOUS
Status: DISCONTINUED | OUTPATIENT
Start: 2024-02-06 | End: 2024-02-06 | Stop reason: HOSPADM

## 2024-02-06 RX ORDER — DEXMEDETOMIDINE HYDROCHLORIDE 100 UG/ML
INJECTION, SOLUTION INTRAVENOUS PRN
Status: DISCONTINUED | OUTPATIENT
Start: 2024-02-06 | End: 2024-02-06 | Stop reason: SDUPTHER

## 2024-02-06 RX ORDER — SUCCINYLCHOLINE/SOD CL,ISO/PF 200MG/10ML
SYRINGE (ML) INTRAVENOUS PRN
Status: DISCONTINUED | OUTPATIENT
Start: 2024-02-06 | End: 2024-02-06 | Stop reason: SDUPTHER

## 2024-02-06 RX ORDER — FENTANYL CITRATE 50 UG/ML
INJECTION, SOLUTION INTRAMUSCULAR; INTRAVENOUS PRN
Status: DISCONTINUED | OUTPATIENT
Start: 2024-02-06 | End: 2024-02-06 | Stop reason: SDUPTHER

## 2024-02-06 RX ORDER — HYDRALAZINE HYDROCHLORIDE 20 MG/ML
10 INJECTION INTRAMUSCULAR; INTRAVENOUS
Status: DISCONTINUED | OUTPATIENT
Start: 2024-02-06 | End: 2024-02-06 | Stop reason: HOSPADM

## 2024-02-06 RX ORDER — CEPHALEXIN 500 MG/1
500 CAPSULE ORAL 3 TIMES DAILY
Qty: 15 CAPSULE | Refills: 0 | Status: SHIPPED | OUTPATIENT
Start: 2024-02-06 | End: 2024-02-11

## 2024-02-06 RX ADMIN — HYDROMORPHONE HYDROCHLORIDE 1 MG: 2 INJECTION, SOLUTION INTRAMUSCULAR; INTRAVENOUS; SUBCUTANEOUS at 12:45

## 2024-02-06 RX ADMIN — FENTANYL CITRATE 100 MCG: 50 INJECTION INTRAMUSCULAR; INTRAVENOUS at 11:08

## 2024-02-06 RX ADMIN — ONDANSETRON 4 MG: 2 INJECTION INTRAMUSCULAR; INTRAVENOUS at 12:29

## 2024-02-06 RX ADMIN — PROCHLORPERAZINE EDISYLATE 5 MG: 5 INJECTION INTRAMUSCULAR; INTRAVENOUS at 14:32

## 2024-02-06 RX ADMIN — Medication 100 MG: at 11:15

## 2024-02-06 RX ADMIN — Medication 80 MCG: at 13:00

## 2024-02-06 RX ADMIN — MIDAZOLAM 2 MG: 1 INJECTION INTRAMUSCULAR; INTRAVENOUS at 11:08

## 2024-02-06 RX ADMIN — DEXMEDETOMIDINE HYDROCHLORIDE 20 MCG: 100 INJECTION, SOLUTION, CONCENTRATE INTRAVENOUS at 11:25

## 2024-02-06 RX ADMIN — ROCURONIUM BROMIDE 5 MG: 50 INJECTION, SOLUTION INTRAVENOUS at 11:15

## 2024-02-06 RX ADMIN — CEFAZOLIN 2 G: 330 INJECTION, POWDER, FOR SOLUTION INTRAMUSCULAR; INTRAVENOUS at 11:19

## 2024-02-06 RX ADMIN — Medication 40 MCG: at 11:45

## 2024-02-06 RX ADMIN — SODIUM CHLORIDE, POTASSIUM CHLORIDE, SODIUM LACTATE AND CALCIUM CHLORIDE: 600; 310; 30; 20 INJECTION, SOLUTION INTRAVENOUS at 11:08

## 2024-02-06 RX ADMIN — LIDOCAINE HYDROCHLORIDE 80 MG: 20 INJECTION, SOLUTION EPIDURAL; INFILTRATION; INTRACAUDAL; PERINEURAL at 11:15

## 2024-02-06 RX ADMIN — DEXAMETHASONE SODIUM PHOSPHATE 8 MG: 4 INJECTION INTRA-ARTICULAR; INTRALESIONAL; INTRAMUSCULAR; INTRAVENOUS; SOFT TISSUE at 11:20

## 2024-02-06 RX ADMIN — HYDROMORPHONE HYDROCHLORIDE 0.5 MG: 2 INJECTION, SOLUTION INTRAMUSCULAR; INTRAVENOUS; SUBCUTANEOUS at 12:05

## 2024-02-06 RX ADMIN — GLYCOPYRROLATE 0.2 MG: 0.2 INJECTION INTRAMUSCULAR; INTRAVENOUS at 12:20

## 2024-02-06 RX ADMIN — KETOROLAC TROMETHAMINE 30 MG: 30 INJECTION, SOLUTION INTRAMUSCULAR at 12:45

## 2024-02-06 RX ADMIN — PROPOFOL 200 MG: 10 INJECTION, EMULSION INTRAVENOUS at 11:15

## 2024-02-06 RX ADMIN — HYDROMORPHONE HYDROCHLORIDE 0.5 MG: 2 INJECTION, SOLUTION INTRAMUSCULAR; INTRAVENOUS; SUBCUTANEOUS at 13:16

## 2024-02-06 ASSESSMENT — PAIN SCALES - GENERAL
PAINLEVEL_OUTOF10: 0
PAINLEVEL_OUTOF10: 0

## 2024-02-06 NOTE — BRIEF OP NOTE
Brief Postoperative Note      Patient: Cely Givens  YOB: 1985  MRN: [id number]    Date of Procedure: 2/6/2024    Pre-Op Diagnosis Codes:     * Intraductal carcinoma in situ of left breast [D05.12]     * Disorder of the skin, subcu related to radiation, unsp [L59.9]     * Status post bilateral mastectomy [Z90.13]    Post-Op Diagnosis: Same       Procedure(s):  REMOVAL OF BILATERAL BREAST EXPANDERS, BILATERAL REVISION OF RECONSTRUCTED BREAST,  PLACEMENT OF BILATERAL BREAST IMPLANTS, BILATERAL CAPSULOTOMY    Surgeon(s):  Leilani Moraes MD    Assistant:  Surgical Assistant: Josias Romero    Anesthesia: General    Estimated Blood Loss (mL): Minimal    Complications: None    Specimens:   * No specimens in log *    Implants:  Implant Name Type Inv. Item Serial No.  Lot No. LRB No. Used Action   IMPLANT BRST 400CC P5.8CM DIA11.5CM COHESIVE ALAN GEL SMOOTH - [id number]  IMPLANT BRST 400CC P5.8CM DIA11.5CM COHESIVE ALAN GEL SMOOTH [id number] [employer] NA Left 1 Implanted   [id number] - [id number]   [id number]  [id number] Right 1 Implanted         Drains: * No LDAs found *    Findings: None      Electronically signed by Leilani Moraes MD on 2/6/2024 at 12:53 PM

## 2024-02-06 NOTE — ANESTHESIA PRE PROCEDURE
Department of Anesthesiology  Preprocedure Note       Name:  Cely Givens   Age:  38 y.o.  :  1985                                          MRN:  [id number]         Date:  2024      Surgeon: Surgeon(s):  Leilani Moraes MD    Procedure: Procedure(s):  REMOVAL OF BILATERAL BREAST EXPANDERS, BILATERAL REVISION OF RECONSTRUCTED BREAST,  PLACEMENT OF BILATERAL BREAST IMPLANTS, BILATERAL CAPSULOTOMY    Medications prior to admission:   Prior to Admission medications    Medication Sig Start Date End Date Taking? Authorizing Provider   escitalopram (LEXAPRO) 10 MG tablet Take 1 tablet by mouth daily 24   Addie Yates MD   exemestane (AROMASIN) 25 MG tablet Take 1 tablet by mouth daily 23   Shelbie Rubin APRN - NP   fexofenadine (ALLEGRA) 60 MG tablet Take 1 tablet by mouth nightly    Automatic Reconciliation, Ar       Current medications:    No current facility-administered medications for this encounter.     Facility-Administered Medications Ordered in Other Encounters   Medication Dose Route Frequency Provider Last Rate Last Admin   • goserelin (ZOLADEX) injection 3.6 mg  3.6 mg SubCUTAneous Once Irasema Morris MD           Allergies:    Allergies   Allergen Reactions   • Blueberry Swelling   • Sulfa Antibiotics Anaphylaxis   • Crab Extract Allergy Skin Test Swelling   • Caffeine Palpitations   • Pseudoephedrine Palpitations       Problem List:    Patient Active Problem List   Diagnosis Code   • Delivery normal O80   • Normal labor O80, Z37.9   • 39 weeks gestation of pregnancy Z3A.39   • Uterine contractions during pregnancy O47.9   • S/P mastectomy Z90.10   • Malignant neoplasm of left breast in female, estrogen receptor positive (HCC) C50.912, Z17.0   • Major depressive disorder, recurrent, mild F33.0   • Major depressive disorder, recurrent, moderate F33.1   • Major depressive disorder, recurrent, unspecified F33.9   • 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       Past Surgical History:        Procedure Laterality Date   • ADENOIDECTOMY  1988   • BREAST BIOPSY Left     06/2022 AND 08/2022 BIOPSIES   • BREAST RECONSTRUCTION Bilateral 09/27/2022    . performed by Leilani Ramesh MD at [facility] AMBULATORY OR   • HEENT      WISDOM TEETH   • MASTECTOMY Bilateral 09/27/2022    BILATERAL SKIN SPARING MASTECTOMIES LEFT BREAST SENTINEL NODE BIOPSY  DR. RAMESH - BILATERAL BREAST RECONSTRUCTION WITH TISSUE EXPANDERS AND ALLODERM performed by Marjorie Rivas MD at [facility] AMBULATORY OR   • MASTECTOMY, BILATERAL  09/27/2022   • US BREAST BIOPSY NEEDLE ADDITIONAL RIGHT Right 8/11/2022    US BREAST BIOPSY NEEDLE ADDITIONAL RIGHT 8/11/2022 [facility] RAD MAMMO   • US BREAST BIOPSY W LOC DEVICE 1ST LESION RIGHT Right 8/11/2022    US BREAST BIOPSY W LOC DEVICE 1ST LESION RIGHT 8/11/2022 [facility] RAD MAMMO       Social History:    Social History     Tobacco Use   • Smoking status: Never   • Smokeless tobacco: Never   Substance Use Topics   • Alcohol use: No                                Counseling given: Not Answered      Vital Signs (Current): There were no vitals filed for this visit.                                           BP Readings from Last 3 Encounters:   02/05/24 115/76   01/30/24 128/87   01/12/24 99/66       NPO Status: Time of last liquid consumption: 2330                        Time of last solid consumption: 1800                        Date of last liquid consumption: 02/05/24

## 2024-02-06 NOTE — OP NOTE
She was given preoperative antibiotics which consisted of IV Ancef.  She was prepped and draped in a sterile surgical fashion using Betadine paint.  I went through the same incisions of her mastectomy and infiltrated this area prior to incision with a local anesthesia mixture of 1% lidocaine with epinephrine and 0.5% Marcaine plain.  After this took effect, incision was performed using a #10 blade.  The capsule was entered in a stair-step fashion in the superior direction using electrocautery.  The expander was identified and removed in its entirety.  The capsule was contracted in the superior medial aspect.  This was released significantly as much as possible on the right as this expander was deflated in the last couple months.  This was elevated using electrocautery and the pocket was revised in a more superior and medial direction.  Capsulotomy was also performed in the form of radial incisions into the capsule with electrocautery.  The temporary sizer was chosen at 400 mL and this was placed into the breast pocket and temporarily closed using surgical staples.  The contralateral side was done in a similar fashion.  This also had contracture in the superior medial areas and also in the upper outer quadrant near the axilla.  This was all released using electrocautery.  The temporary sizer was also placed and the patient was closed temporarily using surgical staples.  She was then placed into the upright and sitting position to evaluate for size and symmetry, which appeared to be very similar in terms of volume and both breasts were aesthetically pleasing.  She was placed into the supine position and the temporary staples were removed.  The temporary sizers were removed.  The operative site was irrigated using Ancef irrigation.  Excellent hemostasis was achieved.  New gloves were donned and the permanent implant was placed into the breast pocket using a Escamilla funnel for a no-touch technique.  At this point, a fish  retractor was utilized in order to close the capsule and multiple layers of the soft tissues before the skin.  I could not close the capsule on the right, but I was able to do that on the left which was the side with radiation.    Closure was performed using #3-0 Vicryl suture.  The skin was closed using a running subcuticular 4-0 Monocryl.  The fish retractor was removed prior to complete closure.  The skin was then dressed using Dermabond, 4x4s, ABD, and a surgical bra.  The patient was extubated and transferred to PACU.  There were no complications during the procedure.  The patient tolerated the procedure without complication.  The suture and instrument count was correct at the conclusion of the case.  There was a miscount with the sponges at the end of the case; however, plain film confirmed that there was nothing left in the surgical site.      MAURO RAMESH MD      [id number]  D:  02/06/2024 14:21  T:  02/06/2024 16:12  JOB #:  [id number]

## 2024-02-06 NOTE — DISCHARGE INSTRUCTIONS
Leilani Moraes MD, FAAP, [facility]  [phone]    BREAST IMPLANT POST-OPERATIVE INSTRUCTIONS      SURGICAL BRA:  You may be in a surgical bra following the procedure.  This bra, or another soft, front-fastening sports bra (with no underwire) should be worn at all times except when showering for the first two weeks.  You may wash the surgical bra if it gets soiled, but allow it to air-dry, do not place it in the dryer.    BANDAGES:  If any bandages were placed during your surgery, they may be removed two days after surgery.  There may be a small amount of oozing from the incisions for the first day or two.  This is normal.  You may replace gauze bandages or pads as needed if this occurs.  If drainage is significant or persistent, please call the office.  If there is no spotting or drainage, then you do not need to place any more bandages.  DO NOT use a heating pad or ice under any circumstances.    SWELLING:  Mild to moderate swelling is normal after surgery and will usually resolve over a couple weeks.  Excessive swelling, to the point where one side is much bigger and more obvious than the other side, is not normal, and may be a sign of bleeding underneath the skin.  If excessive swelling occurs, it is usually within the first 24 hours after surgery.  You should call your surgeon or the on-call doctor immediately if you experience excessive swelling.    ACTIVITY:  Take it easy for the first several days.  No cleaning, housework, or strenuous activity.  Do not lift anything over 10 pounds, including children.  You may resume non-vigorous activities at two weeks, then normal activities at four weeks.  You should avoid bench pressing, push-ups, and “pec deck” exercises if your implant(s) is (are) under the pectoralis muscle.    BATHING:  You may shower two days after the surgery.  No tub baths, hot tubs, or swimming for two weeks.  Remove any gauze or other bandages before showering.

## 2024-02-06 NOTE — H&P
Mother     Rheum Arthritis Mother     Hypertension Mother         caused by RA rx    Breast Cancer Mother     No Known Problems Father     Other Sister         UTERINE FIBROIDS    Stroke Maternal Grandfather     Cancer Neg Hx     Heart Disease Neg Hx     Anesth Problems Neg Hx       Social history:   Social History     Socioeconomic History    Marital status:      Spouse name: Not on file    Number of children: Not on file    Years of education: Not on file    Highest education level: Not on file   Occupational History    Not on file   Tobacco Use    Smoking status: Never    Smokeless tobacco: Never   Vaping Use    Vaping Use: Never used   Substance and Sexual Activity    Alcohol use: No    Drug use: No    Sexual activity: Not on file   Other Topics Concern    Not on file   Social History Narrative    Not on file     Social Determinants of Health     Financial Resource Strain: Not on file   Food Insecurity: Not on file   Transportation Needs: Not on file   Physical Activity: Not on file   Stress: Not on file   Social Connections: Not on file   Intimate Partner Violence: Not on file   Housing Stability: Not on file      Home Medications:   Prior to Admission medications    Medication Sig Start Date End Date Taking? Authorizing Provider   escitalopram (LEXAPRO) 10 MG tablet Take 1 tablet by mouth daily 1/2/24   Addie Yates MD   exemestane (AROMASIN) 25 MG tablet Take 1 tablet by mouth daily 9/8/23   Shelbie Rubin, ISIAH - NP   fexofenadine (ALLEGRA) 60 MG tablet Take 1 tablet by mouth nightly    Automatic Reconciliation, Ar      Allergies:   Allergies   Allergen Reactions    Blueberry Swelling    Sulfa Antibiotics Anaphylaxis    Crab Extract Allergy Skin Test Swelling    Caffeine Palpitations    Pseudoephedrine Palpitations      Review of systems:  Denies headache, fever, chills, weight change, congestion, sore throat, chest pain, shortness of breath, nausea, vomiting, diarrhea, constipation,

## 2024-02-06 NOTE — ANESTHESIA POSTPROCEDURE EVALUATION
Department of Anesthesiology  Postprocedure Note    Patient: Cely Givens  MRN: [id number]  YOB: 1985  Date of evaluation: 2/6/2024    Procedure Summary       Date: 02/06/24 Room / Location: [facility] ASU A1 / [facility] AMBULATORY OR    Anesthesia Start: 1108 Anesthesia Stop: 1339    Procedure: REMOVAL OF BILATERAL BREAST EXPANDERS, BILATERAL REVISION OF RECONSTRUCTED BREAST,  PLACEMENT OF BILATERAL BREAST IMPLANTS, BILATERAL CAPSULOTOMY (Bilateral: Breast) Diagnosis:       Intraductal carcinoma in situ of left breast      Disorder of the skin, subcu related to radiation, unsp      Status post bilateral mastectomy      (Intraductal carcinoma in situ of left breast [D05.12])      (Disorder of the skin, subcu related to radiation, unsp [L59.9])      (Status post bilateral mastectomy [Z90.13])    Surgeons: Leilani Moraes MD Responsible Provider: Astrid Antonio DO    Anesthesia Type: General ASA Status: 3            Anesthesia Type: General    Flaca Phase I: Flaca Score: 10    Flaca Phase II:      Anesthesia Post Evaluation    Patient location during evaluation: PACU  Patient participation: complete - patient participated  Level of consciousness: awake and alert  Pain score: 0  Airway patency: patent  Nausea & Vomiting: no nausea and no vomiting  Cardiovascular status: blood pressure returned to baseline and hemodynamically stable  Respiratory status: room air  Hydration status: euvolemic  Multimodal analgesia pain management approach  Pain management: adequate and satisfactory to patient    No notable events documented.

## 2024-02-09 ENCOUNTER — APPOINTMENT (OUTPATIENT)
Facility: HOSPITAL | Age: 39
End: 2024-02-09
Payer: COMMERCIAL

## 2024-03-08 ENCOUNTER — HOSPITAL ENCOUNTER (OUTPATIENT)
Facility: HOSPITAL | Age: 39
Setting detail: INFUSION SERIES
Discharge: HOME | End: 2024-03-08
Payer: COMMERCIAL

## 2024-03-08 VITALS
HEART RATE: 75 BPM | SYSTOLIC BLOOD PRESSURE: 108 MMHG | TEMPERATURE: 97.8 F | RESPIRATION RATE: 18 BRPM | DIASTOLIC BLOOD PRESSURE: 65 MMHG

## 2024-03-08 DIAGNOSIS — C50.912 MALIGNANT NEOPLASM OF LEFT BREAST IN FEMALE, ESTROGEN RECEPTOR POSITIVE, UNSPECIFIED SITE OF BREAST (HCC): Primary | ICD-10-CM

## 2024-03-08 DIAGNOSIS — D50.8 IRON DEFICIENCY ANEMIA SECONDARY TO INADEQUATE DIETARY IRON INTAKE: ICD-10-CM

## 2024-03-08 DIAGNOSIS — F41.9 ANXIETY DISORDER, UNSPECIFIED: ICD-10-CM

## 2024-03-08 DIAGNOSIS — Z17.0 MALIGNANT NEOPLASM OF LEFT BREAST IN FEMALE, ESTROGEN RECEPTOR POSITIVE, UNSPECIFIED SITE OF BREAST (HCC): Primary | ICD-10-CM

## 2024-03-08 PROCEDURE — 96402 CHEMO HORMON ANTINEOPL SQ/IM: CPT

## 2024-03-08 PROCEDURE — 6360000002 HC RX W HCPCS: Performed by: INTERNAL MEDICINE

## 2024-03-08 RX ORDER — ONDANSETRON 2 MG/ML
8 INJECTION INTRAMUSCULAR; INTRAVENOUS
OUTPATIENT
Start: 2024-04-05

## 2024-03-08 RX ORDER — DIPHENHYDRAMINE HYDROCHLORIDE 50 MG/ML
50 INJECTION INTRAMUSCULAR; INTRAVENOUS
OUTPATIENT
Start: 2024-04-05

## 2024-03-08 RX ORDER — ACETAMINOPHEN 325 MG/1
650 TABLET ORAL
OUTPATIENT
Start: 2024-04-05

## 2024-03-08 RX ORDER — SODIUM CHLORIDE 9 MG/ML
INJECTION, SOLUTION INTRAVENOUS CONTINUOUS
OUTPATIENT
Start: 2024-04-05

## 2024-03-08 RX ORDER — ALBUTEROL SULFATE 90 UG/1
4 AEROSOL, METERED RESPIRATORY (INHALATION) PRN
OUTPATIENT
Start: 2024-04-05

## 2024-03-08 RX ORDER — ESCITALOPRAM OXALATE 10 MG/1
10 TABLET ORAL DAILY
Qty: 90 TABLET | Refills: 1 | Status: SHIPPED | OUTPATIENT
Start: 2024-03-08

## 2024-03-08 RX ORDER — EPINEPHRINE 1 MG/ML
0.3 INJECTION, SOLUTION INTRAMUSCULAR; SUBCUTANEOUS PRN
OUTPATIENT
Start: 2024-04-05

## 2024-03-08 RX ADMIN — GOSERELIN ACETATE 3.6 MG: 3.6 IMPLANT SUBCUTANEOUS at 14:45

## 2024-03-08 NOTE — PROGRESS NOTES
[facility] Chemo Progress Note    Date: March 8, 2024      Ms. Givens Arrived to \[facility]"" for  Zoladez ambulatory in stable condition.  Assessment was completed, no acute issues at this time, no new complaints voiced. No labs ordered this visit.    Ms. Givens's vitals were reviewed.  Vitals:    03/08/24 1430   BP: 108/65   Pulse: 75   Resp: 18   Temp: 97.8 °F (36.6 °C)      Given LLQ     Medications Administered         goserelin (ZOLADEX) injection 3.6 mg Admin Date  03/08/2024 Action  Given Dose  3.6 mg Route  SubCUTAneous Administered By  Magui Thornton, RN              Two nurses verified prior to administering: Drug name, Drug dose, Infusion volume or drug volume when prepared in a syringe, Rate of administration, Route of administration, Expiration dates and/or times, Appearance and physical integrity of the drugs, Rate set on infusion pump, when used, and Sequencing of drug administration.    Patient tolerated treatment well.  Patient was discharged in stable condition. Patient is aware of next scheduled \[facility]\"" appointment.    Future Appointments   Date Time Provider Department Center   3/26/2024  4:30 PM Adeline Stern, APRN - NP [facility] BS [facility]   4/5/2024  1:30 PM H2 TRAVIS FASTRACK [facility]   5/3/2024  2:30 PM H2 TRAVIS FASTRACK [facility]   5/31/2024  2:30 PM H2 TRAVIS FASTRACK [facility]   6/28/2024  2:30 PM G1 TRAVIS FASTRACK [facility]   7/12/2024  2:00 PM H3 TRAVIS LAB [facility]   7/12/2024  2:15 PM Irasema Morris MD [facility] BS [facility]   7/26/2024  2:30 PM G1 TRAVIS FASTRACK [facility]         MAGUI THORNTON, CHADWICK, RN  March 8, 2024

## 2024-04-04 ENCOUNTER — OFFICE VISIT (OUTPATIENT)
Age: 39
End: 2024-04-04
Payer: COMMERCIAL

## 2024-04-04 VITALS — HEIGHT: 65 IN | WEIGHT: 154 LBS | BODY MASS INDEX: 25.66 KG/M2

## 2024-04-04 DIAGNOSIS — Z85.3 HISTORY OF BREAST CANCER IN FEMALE: ICD-10-CM

## 2024-04-04 DIAGNOSIS — Z17.0 MALIGNANT NEOPLASM OF LEFT BREAST IN FEMALE, ESTROGEN RECEPTOR POSITIVE, UNSPECIFIED SITE OF BREAST (HCC): Primary | ICD-10-CM

## 2024-04-04 DIAGNOSIS — C50.912 MALIGNANT NEOPLASM OF LEFT BREAST IN FEMALE, ESTROGEN RECEPTOR POSITIVE, UNSPECIFIED SITE OF BREAST (HCC): Primary | ICD-10-CM

## 2024-04-04 DIAGNOSIS — Z90.13 STATUS POST BILATERAL MASTECTOMY: ICD-10-CM

## 2024-04-04 PROCEDURE — 99213 OFFICE O/P EST LOW 20 MIN: CPT | Performed by: NURSE PRACTITIONER

## 2024-04-04 NOTE — PROGRESS NOTES
HISTORY OF PRESENT ILLNESS  Cely Givens is a 39 y.o. female     HPI  Established patient presents for follow-up for LEFT breast cancer.  S/P BILATERAL mastectomy.  No breast concerns at this time.         Breast history -    Referring - [facility]   History of breast cysts   22 - mammogram at  [facility] - suspicious calcs at LEFT 4:00 and 8:00   22 - LEFT breast biopsy x 2 -    Both areas DCIS - ER pos, CT pos   8/3/22 - breast MRI:   IMPRESSION   1. Multicentric, multifocal, left DCIS involves all quadrants and largely replaces the inferior breast. BI-RADS 6.   2. DCIS extends to the left nipple, with an enhancing mass in the nipple.   3. 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 BI-RADS 4.   4. Linear enhancement in the subareolar right breast, likely DCIS. BI-RADS 4.   5. Bilobed mass at 2:00 in the right breast. BI-RADS 4A.   6. Overall assessment: BI-RADS Assessment Category 4: Suspicious abnormality.    7. Recommendations: Ultrasound-guided left axillary lymph node biopsy, MRI guided right breast biopsy.   22 - RIGHT breast biopsy x 2 -    1. Breast, right site A, core biopsy:   Intraductal papilloma with focal atypical ductal hyperplasia (see Comment).   2. Breast, right site B, core biopsy:    Benign breast tissue with fibroadenomatoid changes.    22 - BILATERAL mastectomy and LEFT SLNB -   RIGHT breast - benign tissue, no atypia or malignancy   LEFT breast - 0.3cm IDC; extensive DCIS; node positive - 1/3; ER pos, CT pos, HER2 neg; T1aN1a   Mammaprint - high risk   Adjuvant chemo - TC x 4 - Dr. Morris   3/7/23 - port removal - Dr. Rivas  2023 - completed XRT  Taking exemestane - Dr. Morris  24 - implants placed; Dr. Moraes         Family history -    Mom - breast cancer at 65    [facility]        OB History          4    Para        Term   3            AB        Living   3         SAB        IAB        Ectopic

## 2024-04-05 ENCOUNTER — HOSPITAL ENCOUNTER (OUTPATIENT)
Facility: HOSPITAL | Age: 39
Setting detail: INFUSION SERIES
Discharge: HOME | End: 2024-04-05
Payer: COMMERCIAL

## 2024-04-05 VITALS
WEIGHT: 154 LBS | HEART RATE: 70 BPM | DIASTOLIC BLOOD PRESSURE: 77 MMHG | TEMPERATURE: 97.4 F | SYSTOLIC BLOOD PRESSURE: 116 MMHG | BODY MASS INDEX: 25.63 KG/M2 | RESPIRATION RATE: 16 BRPM | OXYGEN SATURATION: 98 %

## 2024-04-05 DIAGNOSIS — Z17.0 MALIGNANT NEOPLASM OF LEFT BREAST IN FEMALE, ESTROGEN RECEPTOR POSITIVE, UNSPECIFIED SITE OF BREAST (HCC): Primary | ICD-10-CM

## 2024-04-05 DIAGNOSIS — C50.912 MALIGNANT NEOPLASM OF LEFT BREAST IN FEMALE, ESTROGEN RECEPTOR POSITIVE, UNSPECIFIED SITE OF BREAST (HCC): Primary | ICD-10-CM

## 2024-04-05 DIAGNOSIS — D50.8 IRON DEFICIENCY ANEMIA SECONDARY TO INADEQUATE DIETARY IRON INTAKE: ICD-10-CM

## 2024-04-05 PROCEDURE — 96402 CHEMO HORMON ANTINEOPL SQ/IM: CPT

## 2024-04-05 PROCEDURE — 96401 CHEMO ANTI-NEOPL SQ/IM: CPT

## 2024-04-05 PROCEDURE — 6360000002 HC RX W HCPCS: Performed by: INTERNAL MEDICINE

## 2024-04-05 RX ORDER — DIPHENHYDRAMINE HYDROCHLORIDE 50 MG/ML
50 INJECTION INTRAMUSCULAR; INTRAVENOUS
OUTPATIENT
Start: 2024-05-03

## 2024-04-05 RX ORDER — SODIUM CHLORIDE 9 MG/ML
INJECTION, SOLUTION INTRAVENOUS CONTINUOUS
OUTPATIENT
Start: 2024-05-03

## 2024-04-05 RX ORDER — ACETAMINOPHEN 325 MG/1
650 TABLET ORAL
OUTPATIENT
Start: 2024-05-03

## 2024-04-05 RX ORDER — ONDANSETRON 2 MG/ML
8 INJECTION INTRAMUSCULAR; INTRAVENOUS
OUTPATIENT
Start: 2024-05-03

## 2024-04-05 RX ORDER — ALBUTEROL SULFATE 90 UG/1
4 AEROSOL, METERED RESPIRATORY (INHALATION) PRN
OUTPATIENT
Start: 2024-05-03

## 2024-04-05 RX ORDER — EPINEPHRINE 1 MG/ML
0.3 INJECTION, SOLUTION INTRAMUSCULAR; SUBCUTANEOUS PRN
OUTPATIENT
Start: 2024-05-03

## 2024-04-05 RX ADMIN — GOSERELIN ACETATE 3.6 MG: 3.6 IMPLANT SUBCUTANEOUS at 13:46

## 2024-04-05 ASSESSMENT — PAIN SCALES - GENERAL: PAINLEVEL_OUTOF10: 0

## 2024-04-05 NOTE — PROGRESS NOTES
[facility] Short Note                       Date: 2024    Name: Cely Givens    MRN: [id number]         : 1985      1330 Pt admit to [facility] for Zoladex ambulatory in stable condition. Assessment completed. No new concerns voiced.      Ms. Givens's vitals were reviewedPatient Vitals for the past 12 hrs:   Temp Pulse Resp BP SpO2   24 1330 97.4 °F (36.3 °C) 70 16 116/77 98 %         Medications given:   Medications Administered         goserelin (ZOLADEX) injection 3.6 mg Admin Date  2024 Action  Given Dose  3.6 mg Route  SubCUTAneous Administered By  Aleah Naranjo RN              Ms. Givens tolerated the injection RLQ, and had no complaints.    Ms. Givens [facility] condition. Pt aware of next appt    Future Appointments   Date Time Provider Department Center   5/3/2024  2:30 PM H2 TRAVIS FASTRACK [facility]   2024  2:30 PM H2 [facility] FASTRACK [facility]   2024  2:30 PM G1 [facility] FASTRACK [facility]   2024  2:00 PM H3 TRAVIS LAB [facility]   2024  2:15 PM Irasema Morris MD [facility] BS [facility]   2024  2:30 PM G1 TRAVIS FASTRACK [facility]   10/10/2024  9:45 AM Adeline Stern, APRN - NP [facility] BS AMB       Aleah Naranjo RN  2024  1:51 PM

## 2024-04-19 DIAGNOSIS — F41.9 ANXIETY DISORDER, UNSPECIFIED: ICD-10-CM

## 2024-04-21 RX ORDER — ESCITALOPRAM OXALATE 10 MG/1
10 TABLET ORAL DAILY
Qty: 90 TABLET | Refills: 1 | OUTPATIENT
Start: 2024-04-21

## 2024-04-25 ENCOUNTER — TELEPHONE (OUTPATIENT)
Age: 39
End: 2024-04-25

## 2024-04-25 NOTE — TELEPHONE ENCOUNTER
The patient is requesting a call back to discuss her refill request. (escitalopram (LEXAPRO) 10 MG tablet) . She stated that she wanted to know if it was a problem due to medication not being refill. [phone]   [id number] [place] [id number] - P [phone] - F [phone]

## 2024-04-29 PROBLEM — F33.1 MAJOR DEPRESSIVE DISORDER, RECURRENT, MODERATE (HCC): Status: RESOLVED | Noted: 2023-07-28 | Resolved: 2024-04-29

## 2024-04-29 PROBLEM — F33.9 MAJOR DEPRESSIVE DISORDER, RECURRENT, UNSPECIFIED (HCC): Status: RESOLVED | Noted: 2023-07-28 | Resolved: 2024-04-29

## 2024-04-29 PROBLEM — F33.0 MAJOR DEPRESSIVE DISORDER, RECURRENT, MILD (HCC): Status: RESOLVED | Noted: 2023-07-28 | Resolved: 2024-04-29

## 2024-04-29 NOTE — PROGRESS NOTES
rest during sleep, with the left wrist taking slightly longer to reactlimate itself. She experiences severe pain upon waking, which intensifies with slight touch, causing her to almost fall to her knees. She denies any burning or tingling pain, but describes a sensation of skin sensitivity and a protruding bone in her left wrist. She has been using a brace on her left wrist and finds relief in cold and warm compresses. These symptoms have been present for approximately a month. She has been on Aromasin for a year.  Her  strength is satisfactory, and she does not  excessively when wearing the brace. Her hand is swollen in the morning, which she attributes to her medication, but the rest of her arm remains unaffected.    The patient's depression has been well-managed this week, attributing it to the anniversary of her wedding. She has been off Lexapro for 1 week and 2 days. She believes she should resume the medication, but believes the 5 mg dosage may have been higher. She reports experiencing withdrawal symptoms, including twitching, agitation, and restlessness, which disrupted her sleep last night.       Review of Systems   All other systems reviewed and are negative.         Objective   Physical Exam  Vitals and nursing note reviewed.   Constitutional:       Appearance: Normal appearance.   HENT:      Head: Normocephalic and atraumatic.      Right Ear: Tympanic membrane and external ear normal.      Left Ear: Tympanic membrane and external ear normal.      Nose: Nose normal.      Mouth/Throat:      Mouth: Mucous membranes are moist.   Eyes:      Extraocular Movements: Extraocular movements intact.      Conjunctiva/sclera: Conjunctivae normal.   Cardiovascular:      Rate and Rhythm: Normal rate and regular rhythm.   Pulmonary:      Effort: Pulmonary effort is normal.      Breath sounds: Normal breath sounds.   Abdominal:      General: Bowel sounds are normal.      Palpations: Abdomen is soft.

## 2024-05-01 ENCOUNTER — OFFICE VISIT (OUTPATIENT)
Age: 39
End: 2024-05-01
Payer: COMMERCIAL

## 2024-05-01 VITALS
TEMPERATURE: 98.2 F | OXYGEN SATURATION: 98 % | RESPIRATION RATE: 12 BRPM | WEIGHT: 158 LBS | DIASTOLIC BLOOD PRESSURE: 72 MMHG | BODY MASS INDEX: 26.33 KG/M2 | SYSTOLIC BLOOD PRESSURE: 110 MMHG | HEART RATE: 72 BPM | HEIGHT: 65 IN

## 2024-05-01 DIAGNOSIS — C50.912 MALIGNANT NEOPLASM OF LEFT BREAST IN FEMALE, ESTROGEN RECEPTOR POSITIVE, UNSPECIFIED SITE OF BREAST (HCC): Primary | ICD-10-CM

## 2024-05-01 DIAGNOSIS — F33.1 MAJOR DEPRESSIVE DISORDER, RECURRENT, MODERATE (HCC): ICD-10-CM

## 2024-05-01 DIAGNOSIS — M25.531 BILATERAL WRIST PAIN: ICD-10-CM

## 2024-05-01 DIAGNOSIS — M25.532 BILATERAL WRIST PAIN: ICD-10-CM

## 2024-05-01 DIAGNOSIS — Z17.0 MALIGNANT NEOPLASM OF LEFT BREAST IN FEMALE, ESTROGEN RECEPTOR POSITIVE, UNSPECIFIED SITE OF BREAST (HCC): Primary | ICD-10-CM

## 2024-05-01 PROCEDURE — 99214 OFFICE O/P EST MOD 30 MIN: CPT | Performed by: INTERNAL MEDICINE

## 2024-05-01 RX ORDER — ESCITALOPRAM OXALATE 10 MG/1
10 TABLET ORAL DAILY
Qty: 90 TABLET | Refills: 1 | Status: SHIPPED | OUTPATIENT
Start: 2024-05-01

## 2024-05-01 SDOH — ECONOMIC STABILITY: TRANSPORTATION INSECURITY
IN THE PAST 12 MONTHS, HAS LACK OF TRANSPORTATION KEPT YOU FROM MEETINGS, WORK, OR FROM GETTING THINGS NEEDED FOR DAILY LIVING?: NO

## 2024-05-01 SDOH — ECONOMIC STABILITY: FOOD INSECURITY: WITHIN THE PAST 12 MONTHS, YOU WORRIED THAT YOUR FOOD WOULD RUN OUT BEFORE YOU GOT MONEY TO BUY MORE.: NEVER TRUE

## 2024-05-01 SDOH — ECONOMIC STABILITY: FOOD INSECURITY: WITHIN THE PAST 12 MONTHS, THE FOOD YOU BOUGHT JUST DIDN'T LAST AND YOU DIDN'T HAVE MONEY TO GET MORE.: NEVER TRUE

## 2024-05-01 SDOH — ECONOMIC STABILITY: HOUSING INSECURITY
IN THE LAST 12 MONTHS, WAS THERE A TIME WHEN YOU DID NOT HAVE A STEADY PLACE TO SLEEP OR SLEPT IN A SHELTER (INCLUDING NOW)?: NO

## 2024-05-01 SDOH — ECONOMIC STABILITY: INCOME INSECURITY: HOW HARD IS IT FOR YOU TO PAY FOR THE VERY BASICS LIKE FOOD, HOUSING, MEDICAL CARE, AND HEATING?: NOT HARD AT ALL

## 2024-05-01 ASSESSMENT — PATIENT HEALTH QUESTIONNAIRE - PHQ9
1. LITTLE INTEREST OR PLEASURE IN DOING THINGS: NOT AT ALL
SUM OF ALL RESPONSES TO PHQ QUESTIONS 1-9: 14
5. POOR APPETITE OR OVEREATING: SEVERAL DAYS
10. IF YOU CHECKED OFF ANY PROBLEMS, HOW DIFFICULT HAVE THESE PROBLEMS MADE IT FOR YOU TO DO YOUR WORK, TAKE CARE OF THINGS AT HOME, OR GET ALONG WITH OTHER PEOPLE: VERY DIFFICULT
2. FEELING DOWN, DEPRESSED OR HOPELESS: NEARLY EVERY DAY
8. MOVING OR SPEAKING SO SLOWLY THAT OTHER PEOPLE COULD HAVE NOTICED. OR THE OPPOSITE, BEING SO FIGETY OR RESTLESS THAT YOU HAVE BEEN MOVING AROUND A LOT MORE THAN USUAL: NEARLY EVERY DAY
SUM OF ALL RESPONSES TO PHQ QUESTIONS 1-9: 14
3. TROUBLE FALLING OR STAYING ASLEEP: NOT AT ALL
9. THOUGHTS THAT YOU WOULD BE BETTER OFF DEAD, OR OF HURTING YOURSELF: NOT AT ALL
SUM OF ALL RESPONSES TO PHQ9 QUESTIONS 1 & 2: 3
7. TROUBLE CONCENTRATING ON THINGS, SUCH AS READING THE NEWSPAPER OR WATCHING TELEVISION: NEARLY EVERY DAY
SUM OF ALL RESPONSES TO PHQ QUESTIONS 1-9: 14
4. FEELING TIRED OR HAVING LITTLE ENERGY: NEARLY EVERY DAY
SUM OF ALL RESPONSES TO PHQ QUESTIONS 1-9: 14
6. FEELING BAD ABOUT YOURSELF - OR THAT YOU ARE A FAILURE OR HAVE LET YOURSELF OR YOUR FAMILY DOWN: SEVERAL DAYS

## 2024-05-03 ENCOUNTER — HOSPITAL ENCOUNTER (OUTPATIENT)
Facility: HOSPITAL | Age: 39
Setting detail: INFUSION SERIES
Discharge: HOME | End: 2024-05-03
Payer: COMMERCIAL

## 2024-05-03 VITALS
DIASTOLIC BLOOD PRESSURE: 77 MMHG | TEMPERATURE: 98.6 F | SYSTOLIC BLOOD PRESSURE: 118 MMHG | RESPIRATION RATE: 18 BRPM | HEART RATE: 70 BPM

## 2024-05-03 DIAGNOSIS — Z17.0 MALIGNANT NEOPLASM OF LEFT BREAST IN FEMALE, ESTROGEN RECEPTOR POSITIVE, UNSPECIFIED SITE OF BREAST (HCC): Primary | ICD-10-CM

## 2024-05-03 DIAGNOSIS — D50.8 IRON DEFICIENCY ANEMIA SECONDARY TO INADEQUATE DIETARY IRON INTAKE: ICD-10-CM

## 2024-05-03 DIAGNOSIS — C50.912 MALIGNANT NEOPLASM OF LEFT BREAST IN FEMALE, ESTROGEN RECEPTOR POSITIVE, UNSPECIFIED SITE OF BREAST (HCC): Primary | ICD-10-CM

## 2024-05-03 PROCEDURE — 6360000002 HC RX W HCPCS: Performed by: INTERNAL MEDICINE

## 2024-05-03 PROCEDURE — 96402 CHEMO HORMON ANTINEOPL SQ/IM: CPT

## 2024-05-03 RX ORDER — LIDOCAINE AND PRILOCAINE 25; 25 MG/G; MG/G
CREAM TOPICAL
Qty: 30 G | Refills: 1 | Status: SHIPPED | OUTPATIENT
Start: 2024-05-03

## 2024-05-03 RX ORDER — DIPHENHYDRAMINE HYDROCHLORIDE 50 MG/ML
50 INJECTION INTRAMUSCULAR; INTRAVENOUS
OUTPATIENT
Start: 2024-05-03

## 2024-05-03 RX ORDER — ONDANSETRON 2 MG/ML
8 INJECTION INTRAMUSCULAR; INTRAVENOUS
OUTPATIENT
Start: 2024-05-03

## 2024-05-03 RX ORDER — ALBUTEROL SULFATE 90 UG/1
4 AEROSOL, METERED RESPIRATORY (INHALATION) PRN
OUTPATIENT
Start: 2024-05-03

## 2024-05-03 RX ORDER — EPINEPHRINE 1 MG/ML
0.3 INJECTION, SOLUTION INTRAMUSCULAR; SUBCUTANEOUS PRN
OUTPATIENT
Start: 2024-05-03

## 2024-05-03 RX ORDER — SODIUM CHLORIDE 9 MG/ML
INJECTION, SOLUTION INTRAVENOUS CONTINUOUS
OUTPATIENT
Start: 2024-05-03

## 2024-05-03 RX ORDER — ACETAMINOPHEN 325 MG/1
650 TABLET ORAL
OUTPATIENT
Start: 2024-05-03

## 2024-05-03 RX ADMIN — GOSERELIN ACETATE 3.6 MG: 3.6 IMPLANT SUBCUTANEOUS at 14:47

## 2024-05-03 ASSESSMENT — PAIN SCALES - GENERAL: PAINLEVEL_OUTOF10: 0

## 2024-05-03 NOTE — PLAN OF CARE
hospitals Short Note                       Date: May 3, 2024    Name: Cely Givens    MRN: [id number]         : 1985      Pt admit to hospitals for Zoladex ambulatory in stable condition. Assessment completed and documented in flowsheets.     Ms. Givens's vitals were reviewed:  Patient Vitals for the past 12 hrs:   Temp Pulse Resp BP   24 1443 98.6 °F (37 °C) 70 18 118/77       Medications given: Zoladex given in LLQ of abdomen  Medications Administered         goserelin (ZOLADEX) injection 3.6 mg Admin Date  2024 Action  Given Dose  3.6 mg Route  SubCUTAneous Administered By  Estefania Reza, CHADWICK            Ms. Givens tolerated the injection and was [facility] in stable condition. Patient is aware if future appointments.    Future Appointments   Date Time Provider Department Center   2024  2:30 PM TRAVIS FASTTRACK 3 [facility]   2024  2:30 PM TRAVIS FASTTRACK 2 [facility]   2024  2:00 PM TRAVIS LAB CHAIR 1 [facility]   2024  2:15 PM Irasema Morris MD [facility] BS [facility]   2024  2:30 PM TRAVIS FASTTRACK 2 [facility]   10/10/2024  9:45 AM Adeline Stern APRN - [facility] BS [facility]       Estefania Reza RN  May 3, 2024  4:17 PM   Problem: Pain  Goal: Verbalizes/displays adequate comfort level or baseline comfort level  Outcome: Progressing     Problem: Safety - Adult  Goal: Free from fall injury  Outcome: Progressing

## 2024-05-06 RX ORDER — DIPHENHYDRAMINE HYDROCHLORIDE 50 MG/ML
50 INJECTION INTRAMUSCULAR; INTRAVENOUS
OUTPATIENT
Start: 2024-05-31

## 2024-05-06 RX ORDER — SODIUM CHLORIDE 0.9 % (FLUSH) 0.9 %
5-40 SYRINGE (ML) INJECTION PRN
OUTPATIENT
Start: 2024-05-31

## 2024-05-06 RX ORDER — SODIUM CHLORIDE 9 MG/ML
INJECTION, SOLUTION INTRAVENOUS CONTINUOUS
OUTPATIENT
Start: 2024-05-31

## 2024-05-06 RX ORDER — SODIUM CHLORIDE 9 MG/ML
5-250 INJECTION, SOLUTION INTRAVENOUS PRN
OUTPATIENT
Start: 2024-05-31

## 2024-05-06 RX ORDER — HEPARIN 100 UNIT/ML
500 SYRINGE INTRAVENOUS PRN
OUTPATIENT
Start: 2024-05-31

## 2024-05-06 RX ORDER — ALBUTEROL SULFATE 90 UG/1
4 AEROSOL, METERED RESPIRATORY (INHALATION) PRN
OUTPATIENT
Start: 2024-05-31

## 2024-05-06 RX ORDER — EPINEPHRINE 1 MG/ML
0.3 INJECTION, SOLUTION, CONCENTRATE INTRAVENOUS PRN
OUTPATIENT
Start: 2024-05-31

## 2024-05-06 RX ORDER — ACETAMINOPHEN 325 MG/1
650 TABLET ORAL
OUTPATIENT
Start: 2024-05-31

## 2024-05-06 RX ORDER — ONDANSETRON 2 MG/ML
8 INJECTION INTRAMUSCULAR; INTRAVENOUS
OUTPATIENT
Start: 2024-05-31

## 2024-05-06 RX ORDER — 0.9 % SODIUM CHLORIDE 0.9 %
1000 INTRAVENOUS SOLUTION INTRAVENOUS ONCE
OUTPATIENT
Start: 2024-05-31 | End: 2024-05-31

## 2024-05-24 RX ORDER — DIPHENHYDRAMINE HYDROCHLORIDE 50 MG/ML
50 INJECTION INTRAMUSCULAR; INTRAVENOUS
OUTPATIENT
Start: 2024-05-24

## 2024-05-24 RX ORDER — SODIUM CHLORIDE 9 MG/ML
INJECTION, SOLUTION INTRAVENOUS CONTINUOUS
OUTPATIENT
Start: 2024-05-24

## 2024-05-24 RX ORDER — ALBUTEROL SULFATE 90 UG/1
4 AEROSOL, METERED RESPIRATORY (INHALATION) PRN
OUTPATIENT
Start: 2024-05-24

## 2024-05-24 RX ORDER — ONDANSETRON 2 MG/ML
8 INJECTION INTRAMUSCULAR; INTRAVENOUS
OUTPATIENT
Start: 2024-05-24

## 2024-05-24 RX ORDER — ACETAMINOPHEN 325 MG/1
650 TABLET ORAL
OUTPATIENT
Start: 2024-05-24

## 2024-05-24 RX ORDER — EPINEPHRINE 1 MG/ML
0.3 INJECTION, SOLUTION, CONCENTRATE INTRAVENOUS PRN
OUTPATIENT
Start: 2024-05-24

## 2024-05-31 ENCOUNTER — HOSPITAL ENCOUNTER (OUTPATIENT)
Facility: HOSPITAL | Age: 39
Setting detail: INFUSION SERIES
Discharge: HOME | End: 2024-05-31
Payer: COMMERCIAL

## 2024-05-31 VITALS
DIASTOLIC BLOOD PRESSURE: 74 MMHG | RESPIRATION RATE: 16 BRPM | TEMPERATURE: 98.4 F | OXYGEN SATURATION: 98 % | SYSTOLIC BLOOD PRESSURE: 112 MMHG | HEART RATE: 65 BPM

## 2024-05-31 DIAGNOSIS — Z17.0 MALIGNANT NEOPLASM OF LEFT BREAST IN FEMALE, ESTROGEN RECEPTOR POSITIVE, UNSPECIFIED SITE OF BREAST (HCC): Primary | ICD-10-CM

## 2024-05-31 DIAGNOSIS — D50.8 IRON DEFICIENCY ANEMIA SECONDARY TO INADEQUATE DIETARY IRON INTAKE: ICD-10-CM

## 2024-05-31 DIAGNOSIS — C50.912 MALIGNANT NEOPLASM OF LEFT BREAST IN FEMALE, ESTROGEN RECEPTOR POSITIVE, UNSPECIFIED SITE OF BREAST (HCC): Primary | ICD-10-CM

## 2024-05-31 PROCEDURE — 6360000002 HC RX W HCPCS

## 2024-05-31 PROCEDURE — 96402 CHEMO HORMON ANTINEOPL SQ/IM: CPT

## 2024-05-31 RX ORDER — EPINEPHRINE 1 MG/ML
0.3 INJECTION, SOLUTION INTRAMUSCULAR; SUBCUTANEOUS PRN
OUTPATIENT
Start: 2024-06-28

## 2024-05-31 RX ORDER — DIPHENHYDRAMINE HYDROCHLORIDE 50 MG/ML
50 INJECTION INTRAMUSCULAR; INTRAVENOUS
OUTPATIENT
Start: 2024-06-28

## 2024-05-31 RX ORDER — ACETAMINOPHEN 325 MG/1
650 TABLET ORAL
OUTPATIENT
Start: 2024-06-28

## 2024-05-31 RX ORDER — ONDANSETRON 2 MG/ML
8 INJECTION INTRAMUSCULAR; INTRAVENOUS
OUTPATIENT
Start: 2024-06-28

## 2024-05-31 RX ORDER — SODIUM CHLORIDE 9 MG/ML
INJECTION, SOLUTION INTRAVENOUS CONTINUOUS
OUTPATIENT
Start: 2024-06-28

## 2024-05-31 RX ORDER — ALBUTEROL SULFATE 90 UG/1
4 AEROSOL, METERED RESPIRATORY (INHALATION) PRN
OUTPATIENT
Start: 2024-06-28

## 2024-05-31 RX ADMIN — GOSERELIN ACETATE 3.6 MG: 3.6 IMPLANT SUBCUTANEOUS at 14:55

## 2024-05-31 ASSESSMENT — PAIN SCALES - GENERAL: PAINLEVEL_OUTOF10: 2

## 2024-05-31 ASSESSMENT — PAIN DESCRIPTION - ORIENTATION: ORIENTATION: RIGHT;LEFT

## 2024-05-31 ASSESSMENT — PAIN DESCRIPTION - LOCATION: LOCATION: ARM

## 2024-05-31 NOTE — PROGRESS NOTES
[facility] Chemo Progress Note    [facility]                                                                              Date: May 31, 2024    Name: Cely Givens    MRN: [id number]         : 1985    Ms. Givens arrived ambulatory and in no distress for Zoladex.    Assessment was completed and documented in flowsheets.    Follow Up: Proceed with treatment    Ms. Givens's vitals were reviewed.  Patient Vitals for the past 12 hrs:   Temp Pulse Resp BP SpO2   24 1445 98.4 °F (36.9 °C) 65 16 112/74 98 %     Two nurses verified prior to administering:  Drug name, Drug dose, Infusion volume or drug volume when prepared in a syringe, Rate of administration, Route of administration, Expiration dates and/or times, Appearance and physical integrity of the drugs, Rate set on infusion pump, when used, and Sequencing of drug administration.    Given in the RLQ abdomen SQ    Medications Administered         goserelin (ZOLADEX) injection 3.6 mg Admin Date  2024 Action  Given Dose  3.6 mg Route  SubCUTAneous Administered By  Annemarie Bazan, CHADWICK          Patient tolerated treatment well. Patient was discharged from [facility] in stable condition. Patient aware of next appointment.     Future Appointments   Date Time Provider Department Center   2024  2[facility]30 [facility]   2024  2:00 PM TRAVIS LAB CHAIR 1 [facility]   2024  2:15 PM Irasema Morris MD [facility] BS AMB   2024  2:30 PM TRAVIS FASTTRACK 2 [facility]   10/10/2024  9:45 AM Adeline Stern, APRN - NP [facility] BS AMB         ANNEMARIE BAZAN, CHADWICK  May 31, 2024

## 2024-06-09 NOTE — PROGRESS NOTES
[facility] fax a request for medical records on 09/28/22.  Fax ov,op,  pathology, radiology and [facility] results to [phone] ([phone]) spontaneous

## 2024-06-28 ENCOUNTER — HOSPITAL ENCOUNTER (OUTPATIENT)
Facility: HOSPITAL | Age: 39
Setting detail: INFUSION SERIES
Discharge: HOME | End: 2024-06-28
Payer: COMMERCIAL

## 2024-06-28 VITALS
WEIGHT: 153 LBS | BODY MASS INDEX: 25.46 KG/M2 | SYSTOLIC BLOOD PRESSURE: 116 MMHG | DIASTOLIC BLOOD PRESSURE: 60 MMHG | RESPIRATION RATE: 16 BRPM | TEMPERATURE: 97.9 F | HEART RATE: 61 BPM

## 2024-06-28 DIAGNOSIS — C50.912 MALIGNANT NEOPLASM OF LEFT BREAST IN FEMALE, ESTROGEN RECEPTOR POSITIVE, UNSPECIFIED SITE OF BREAST (HCC): Primary | ICD-10-CM

## 2024-06-28 DIAGNOSIS — Z17.0 MALIGNANT NEOPLASM OF LEFT BREAST IN FEMALE, ESTROGEN RECEPTOR POSITIVE, UNSPECIFIED SITE OF BREAST (HCC): Primary | ICD-10-CM

## 2024-06-28 DIAGNOSIS — D50.8 IRON DEFICIENCY ANEMIA SECONDARY TO INADEQUATE DIETARY IRON INTAKE: ICD-10-CM

## 2024-06-28 PROCEDURE — 96402 CHEMO HORMON ANTINEOPL SQ/IM: CPT

## 2024-06-28 PROCEDURE — 6360000002 HC RX W HCPCS

## 2024-06-28 RX ORDER — ONDANSETRON 2 MG/ML
8 INJECTION INTRAMUSCULAR; INTRAVENOUS
OUTPATIENT
Start: 2024-07-26

## 2024-06-28 RX ORDER — DIPHENHYDRAMINE HYDROCHLORIDE 50 MG/ML
50 INJECTION INTRAMUSCULAR; INTRAVENOUS
OUTPATIENT
Start: 2024-07-26

## 2024-06-28 RX ORDER — EPINEPHRINE 1 MG/ML
0.3 INJECTION, SOLUTION INTRAMUSCULAR; SUBCUTANEOUS PRN
OUTPATIENT
Start: 2024-07-26

## 2024-06-28 RX ORDER — ALBUTEROL SULFATE 90 UG/1
4 AEROSOL, METERED RESPIRATORY (INHALATION) PRN
OUTPATIENT
Start: 2024-07-26

## 2024-06-28 RX ORDER — SODIUM CHLORIDE 9 MG/ML
INJECTION, SOLUTION INTRAVENOUS CONTINUOUS
OUTPATIENT
Start: 2024-07-26

## 2024-06-28 RX ORDER — ACETAMINOPHEN 325 MG/1
650 TABLET ORAL
OUTPATIENT
Start: 2024-07-26

## 2024-06-28 RX ADMIN — GOSERELIN ACETATE 3.6 MG: 3.6 IMPLANT SUBCUTANEOUS at 14:35

## 2024-06-28 NOTE — PROGRESS NOTES
[facility] Chemo Progress Note    Date: June 28, 2024      1430 Ms. Givens Arrived to [facility] for  Zoladex ambulatory in stable condition.  Assessment was completed, no acute issues at this time, no new complaints voiced.       Ms. Givens's vitals were reviewed.  Vitals:    06/28/24 1430   BP: 116/60   Pulse: 61   Resp: 16   Temp: 97.9 °F (36.6 °C)        Medications Administered         goserelin (ZOLADEX) injection 3.6 mg Admin Date  06/28/2024 Action  Given Dose  3.6 mg Route  SubCUTAneous Administered By  Adelaida Blanco, RN             Given LLQ of abdomen     Two nurses verified prior to administering: Drug name, Drug dose, Infusion volume or drug volume when prepared in a syringe, Rate of administration, Route of administration, Expiration dates and/or times, Appearance and physical integrity of the drugs, Rate set on infusion pump, when used, and Sequencing of drug administration.     Patient tolerated treatment well.  Patient was discharged in stable condition. Patient is aware of next scheduled [facility] appointment.    Future Appointments   Date Time Provider Department Center   7/12/2024  2:15 PM Irasema Morris MD [facility] BS AMB   7/26/2024  2:30 PM TRAVIS FASTTRACK 2 [facility]   8/23/2024  2:30 PM TRAVIS FASTTRACK 2 [facility]   9/20/2024  2:30 PM TRAVIS FASTTRACK 2 [facility]   10/10/2024  9:45 AM Adeline Stern, APRN - NP [facility] BS AMB         Adelaida Blanco RN, RN  June 28, 2024

## 2024-07-26 ENCOUNTER — HOSPITAL ENCOUNTER (OUTPATIENT)
Facility: HOSPITAL | Age: 39
Setting detail: INFUSION SERIES
Discharge: HOME | End: 2024-07-26
Payer: COMMERCIAL

## 2024-07-26 ENCOUNTER — OFFICE VISIT (OUTPATIENT)
Age: 39
End: 2024-07-26
Payer: COMMERCIAL

## 2024-07-26 VITALS
HEART RATE: 55 BPM | OXYGEN SATURATION: 98 % | DIASTOLIC BLOOD PRESSURE: 86 MMHG | TEMPERATURE: 98 F | SYSTOLIC BLOOD PRESSURE: 132 MMHG | BODY MASS INDEX: 25.79 KG/M2 | WEIGHT: 155 LBS | RESPIRATION RATE: 16 BRPM

## 2024-07-26 VITALS — RESPIRATION RATE: 16 BRPM | TEMPERATURE: 97.6 F

## 2024-07-26 DIAGNOSIS — C50.912 MALIGNANT NEOPLASM OF LEFT BREAST IN FEMALE, ESTROGEN RECEPTOR POSITIVE, UNSPECIFIED SITE OF BREAST (HCC): Primary | ICD-10-CM

## 2024-07-26 DIAGNOSIS — Z17.0 MALIGNANT NEOPLASM OF LEFT BREAST IN FEMALE, ESTROGEN RECEPTOR POSITIVE, UNSPECIFIED SITE OF BREAST (HCC): Primary | ICD-10-CM

## 2024-07-26 DIAGNOSIS — C50.912 MALIGNANT NEOPLASM OF LEFT BREAST IN FEMALE, ESTROGEN RECEPTOR POSITIVE, UNSPECIFIED SITE OF BREAST (HCC): ICD-10-CM

## 2024-07-26 DIAGNOSIS — D50.8 IRON DEFICIENCY ANEMIA SECONDARY TO INADEQUATE DIETARY IRON INTAKE: ICD-10-CM

## 2024-07-26 DIAGNOSIS — D50.8 IRON DEFICIENCY ANEMIA SECONDARY TO INADEQUATE DIETARY IRON INTAKE: Primary | ICD-10-CM

## 2024-07-26 DIAGNOSIS — Z17.0 MALIGNANT NEOPLASM OF LEFT BREAST IN FEMALE, ESTROGEN RECEPTOR POSITIVE, UNSPECIFIED SITE OF BREAST (HCC): ICD-10-CM

## 2024-07-26 PROCEDURE — 96402 CHEMO HORMON ANTINEOPL SQ/IM: CPT

## 2024-07-26 PROCEDURE — 6360000002 HC RX W HCPCS

## 2024-07-26 PROCEDURE — 99214 OFFICE O/P EST MOD 30 MIN: CPT | Performed by: INTERNAL MEDICINE

## 2024-07-26 RX ORDER — DIPHENHYDRAMINE HYDROCHLORIDE 50 MG/ML
50 INJECTION INTRAMUSCULAR; INTRAVENOUS
OUTPATIENT
Start: 2024-08-23

## 2024-07-26 RX ORDER — ONDANSETRON 2 MG/ML
8 INJECTION INTRAMUSCULAR; INTRAVENOUS
OUTPATIENT
Start: 2024-08-23

## 2024-07-26 RX ORDER — ALBUTEROL SULFATE 90 UG/1
4 AEROSOL, METERED RESPIRATORY (INHALATION) PRN
OUTPATIENT
Start: 2024-08-23

## 2024-07-26 RX ORDER — SODIUM CHLORIDE 9 MG/ML
INJECTION, SOLUTION INTRAVENOUS CONTINUOUS
OUTPATIENT
Start: 2024-08-23

## 2024-07-26 RX ORDER — ACETAMINOPHEN 325 MG/1
650 TABLET ORAL
OUTPATIENT
Start: 2024-08-23

## 2024-07-26 RX ORDER — EPINEPHRINE 1 MG/ML
0.3 INJECTION, SOLUTION INTRAMUSCULAR; SUBCUTANEOUS PRN
OUTPATIENT
Start: 2024-08-23

## 2024-07-26 RX ADMIN — GOSERELIN ACETATE 3.6 MG: 3.6 IMPLANT SUBCUTANEOUS at 14:45

## 2024-07-26 ASSESSMENT — PAIN SCALES - GENERAL: PAINLEVEL_OUTOF10: 0

## 2024-07-26 NOTE — PROGRESS NOTES
Cely MARGARITO Givens is a 39 y.o. female    Chief Complaint   Patient presents with    Follow-up     Left breast cancer     1. Have you been to the ER, urgent care clinic since your last visit?  Hospitalized since your last visit?No    2. Have you seen or consulted any other health care providers outside of the [facility] since your last visit?  Include any pap smears or colon screening. No      
opportunity to participate in Ms. Cely Givens's care.       Irasema Morris MD, MS [facility]

## 2024-07-26 NOTE — PROGRESS NOTES
[facility] Progress Note    2:30 Ms. Givens Arrived ambulatory and in no distress for ZOLADEX regimen.  Assessment was completed, no acute issues at this time, no new complaints voiced.          Ms. Givens's vitals were reviewed.  Patient Vitals for the past 12 hrs:   Temp Resp   07/26/24 1445 97.6 °F (36.4 °C) 16       Pre-medications  were administered as ordered and chemotherapy was initiated.  Medications Administered         goserelin (ZOLADEX) injection 3.6 mg Admin Date  07/26/2024 Action  Given Dose  3.6 mg Route  SubCUTAneous Administered By  Sandra Donovan RN          GIVEN SQ RLQ    Two nurses verified prior to administering: Drug name, Drug dose, Infusion volume or drug volume when prepared in a syringe, Rate of administration, Route of administration, Expiration dates and/or times, Appearance and physical integrity of the drugs, Rate set on infusion pump, when used, and Sequencing of drug administration.    Ms. Givens tolerated treatment well. Patient was [facility] in stable condition at 1450.     Future Appointments   Date Time Provider Department Center   8/23/2024  2:30 PM TRAVIS FASTTRACK 2 [facility]   9/20/2024  2:30 PM TRAVIS FASTTRACK 2 [facility]   10/10/2024  9:45 AM Adeline Stern, APRN - NP [facility] BS CHUYITA Donovan RN  July 26, 2024        
no concerns

## 2024-08-23 ENCOUNTER — HOSPITAL ENCOUNTER (OUTPATIENT)
Facility: HOSPITAL | Age: 39
Setting detail: INFUSION SERIES
Discharge: HOME | End: 2024-08-23
Payer: COMMERCIAL

## 2024-08-23 VITALS
SYSTOLIC BLOOD PRESSURE: 127 MMHG | RESPIRATION RATE: 16 BRPM | DIASTOLIC BLOOD PRESSURE: 75 MMHG | HEART RATE: 68 BPM | TEMPERATURE: 97.4 F

## 2024-08-23 DIAGNOSIS — D50.8 IRON DEFICIENCY ANEMIA SECONDARY TO INADEQUATE DIETARY IRON INTAKE: Primary | ICD-10-CM

## 2024-08-23 DIAGNOSIS — Z17.0 MALIGNANT NEOPLASM OF LEFT BREAST IN FEMALE, ESTROGEN RECEPTOR POSITIVE, UNSPECIFIED SITE OF BREAST (HCC): ICD-10-CM

## 2024-08-23 DIAGNOSIS — C50.912 MALIGNANT NEOPLASM OF LEFT BREAST IN FEMALE, ESTROGEN RECEPTOR POSITIVE, UNSPECIFIED SITE OF BREAST (HCC): ICD-10-CM

## 2024-08-23 LAB
BASOPHILS # BLD: 0 K/UL (ref 0–0.1)
BASOPHILS NFR BLD: 0 % (ref 0–1)
DIFFERENTIAL METHOD BLD: ABNORMAL
EOSINOPHIL # BLD: 0.1 K/UL (ref 0–0.4)
EOSINOPHIL NFR BLD: 2 % (ref 0–7)
ERYTHROCYTE [DISTWIDTH] IN BLOOD BY AUTOMATED COUNT: 13.3 % (ref 11.5–14.5)
FERRITIN SERPL-MCNC: 39 NG/ML (ref 26–388)
HCT VFR BLD AUTO: 34 % (ref 35–47)
HGB BLD-MCNC: 11.2 G/DL (ref 11.5–16)
IMM GRANULOCYTES # BLD AUTO: 0 K/UL (ref 0–0.04)
IMM GRANULOCYTES NFR BLD AUTO: 0 % (ref 0–0.5)
IRON SATN MFR SERPL: 27 % (ref 20–50)
IRON SERPL-MCNC: 94 UG/DL (ref 35–150)
LYMPHOCYTES # BLD: 1.7 K/UL (ref 0.8–3.5)
LYMPHOCYTES NFR BLD: 33 % (ref 12–49)
MCH RBC QN AUTO: 29.5 PG (ref 26–34)
MCHC RBC AUTO-ENTMCNC: 32.9 G/DL (ref 30–36.5)
MCV RBC AUTO: 89.5 FL (ref 80–99)
MONOCYTES # BLD: 0.3 K/UL (ref 0–1)
MONOCYTES NFR BLD: 6 % (ref 5–13)
NEUTS SEG # BLD: 2.9 K/UL (ref 1.8–8)
NEUTS SEG NFR BLD: 59 % (ref 32–75)
NRBC # BLD: 0 K/UL (ref 0–0.01)
NRBC BLD-RTO: 0 PER 100 WBC
PLATELET # BLD AUTO: 248 K/UL (ref 150–400)
PMV BLD AUTO: 10.6 FL (ref 8.9–12.9)
RBC # BLD AUTO: 3.8 M/UL (ref 3.8–5.2)
TIBC SERPL-MCNC: 345 UG/DL (ref 250–450)
WBC # BLD AUTO: 5 K/UL (ref 3.6–11)

## 2024-08-23 PROCEDURE — 36415 COLL VENOUS BLD VENIPUNCTURE: CPT

## 2024-08-23 PROCEDURE — 6360000002 HC RX W HCPCS

## 2024-08-23 PROCEDURE — 82728 ASSAY OF FERRITIN: CPT

## 2024-08-23 PROCEDURE — 83550 IRON BINDING TEST: CPT

## 2024-08-23 PROCEDURE — 85025 COMPLETE CBC W/AUTO DIFF WBC: CPT

## 2024-08-23 PROCEDURE — 96402 CHEMO HORMON ANTINEOPL SQ/IM: CPT

## 2024-08-23 PROCEDURE — 83540 ASSAY OF IRON: CPT

## 2024-08-23 RX ORDER — ALBUTEROL SULFATE 90 UG/1
4 AEROSOL, METERED RESPIRATORY (INHALATION) PRN
OUTPATIENT
Start: 2024-09-20

## 2024-08-23 RX ORDER — ACETAMINOPHEN 325 MG/1
650 TABLET ORAL
OUTPATIENT
Start: 2024-09-20

## 2024-08-23 RX ORDER — EPINEPHRINE 1 MG/ML
0.3 INJECTION, SOLUTION INTRAMUSCULAR; SUBCUTANEOUS PRN
OUTPATIENT
Start: 2024-09-20

## 2024-08-23 RX ORDER — DIPHENHYDRAMINE HYDROCHLORIDE 50 MG/ML
50 INJECTION INTRAMUSCULAR; INTRAVENOUS
OUTPATIENT
Start: 2024-09-20

## 2024-08-23 RX ORDER — ONDANSETRON 2 MG/ML
8 INJECTION INTRAMUSCULAR; INTRAVENOUS
OUTPATIENT
Start: 2024-09-20

## 2024-08-23 RX ORDER — SODIUM CHLORIDE 9 MG/ML
INJECTION, SOLUTION INTRAVENOUS CONTINUOUS
OUTPATIENT
Start: 2024-09-20

## 2024-08-23 RX ADMIN — GOSERELIN ACETATE 3.6 MG: 3.6 IMPLANT SUBCUTANEOUS at 13:44

## 2024-08-23 ASSESSMENT — PAIN SCALES - GENERAL: PAINLEVEL_OUTOF10: 0

## 2024-08-23 NOTE — PROGRESS NOTES
hospitals Short Note                       Date: 2024    Name: Cely Givens    MRN: [id number]         : 1985      Pt admit to hospitals for Zoladex ambulatory in stable condition. Assessment completed and documented in flowsheets. No acute concerns at this time.  Please review pending lab results in CC.      Ms. Givens's vitals were reviewed:  Patient Vitals for the past 12 hrs:   Temp Pulse Resp BP   24 1330 97.4 °F (36.3 °C) 68 16 127/75         Lab results were obtained and sent for processing.    Medications given: LLQ  Medications Administered         goserelin (ZOLADEX) injection 3.6 mg Admin Date  2024 Action  Given Dose  3.6 mg Route  SubCUTAneous Documented By  Prudencio Travis RN            Medication education reinforced with patient and they verbalize understanding.    Two nurses verified prior to administering: Drug name, Drug dose, Infusion volume or drug volume when prepared in a syringe, Rate of administration, Route of administration, Expiration dates and/or times, Appearance and physical integrity of the drugs, Rate set on infusion pump, when used, and Sequencing of drug administration.     Ms. Givens tolerated the injection and was discharged [facility] in stable condition. Patient is aware if future appointments.    Future Appointments   Date Time Provider Department Center   2024  2:30 PM TRAVIS FASTTRACK 2 [facility]   10/10/2024  9:45 AM Adeline Stern, APRN - NP [facility] BS AMB   10/18/2024  2:30 PM TRAVIS FASTTRACK 2 [facility]   11/15/2024  2:30 PM TRAVIS FASTTRACK 2 [facility]   2024  2:30 PM TRAVIS FASTTRACK 2 [facility]   1/10/2025  2:30 PM TRAVIS FASTTRACK 2 [facility]   2025  2:30 PM TRAVIS FASTTRACK 2 [facility]   2025  2:45 PM Irasema Morris MD [facility] BS AMB       PRUDENCIO TRAVIS RN  2024  2:04 PM

## 2024-09-03 RX ORDER — EXEMESTANE 25 MG/1
25 TABLET ORAL DAILY
Qty: 90 TABLET | Refills: 3 | Status: SHIPPED | OUTPATIENT
Start: 2024-09-03

## 2024-09-20 ENCOUNTER — HOSPITAL ENCOUNTER (OUTPATIENT)
Facility: HOSPITAL | Age: 39
Setting detail: INFUSION SERIES
Discharge: HOME | End: 2024-09-20
Payer: COMMERCIAL

## 2024-09-20 VITALS
DIASTOLIC BLOOD PRESSURE: 77 MMHG | RESPIRATION RATE: 16 BRPM | HEART RATE: 59 BPM | WEIGHT: 158.2 LBS | SYSTOLIC BLOOD PRESSURE: 122 MMHG | BODY MASS INDEX: 26.36 KG/M2 | TEMPERATURE: 97.8 F | HEIGHT: 65 IN

## 2024-09-20 DIAGNOSIS — Z17.0 MALIGNANT NEOPLASM OF LEFT BREAST IN FEMALE, ESTROGEN RECEPTOR POSITIVE, UNSPECIFIED SITE OF BREAST (HCC): Primary | ICD-10-CM

## 2024-09-20 DIAGNOSIS — D50.8 IRON DEFICIENCY ANEMIA SECONDARY TO INADEQUATE DIETARY IRON INTAKE: ICD-10-CM

## 2024-09-20 DIAGNOSIS — C50.912 MALIGNANT NEOPLASM OF LEFT BREAST IN FEMALE, ESTROGEN RECEPTOR POSITIVE, UNSPECIFIED SITE OF BREAST (HCC): Primary | ICD-10-CM

## 2024-09-20 PROCEDURE — 6360000002 HC RX W HCPCS: Performed by: INTERNAL MEDICINE

## 2024-09-20 PROCEDURE — 96402 CHEMO HORMON ANTINEOPL SQ/IM: CPT

## 2024-09-20 RX ORDER — EPINEPHRINE 1 MG/ML
0.3 INJECTION, SOLUTION INTRAMUSCULAR; SUBCUTANEOUS PRN
OUTPATIENT
Start: 2024-10-18

## 2024-09-20 RX ORDER — ACETAMINOPHEN 325 MG/1
650 TABLET ORAL
OUTPATIENT
Start: 2024-10-18

## 2024-09-20 RX ORDER — ONDANSETRON 2 MG/ML
8 INJECTION INTRAMUSCULAR; INTRAVENOUS
OUTPATIENT
Start: 2024-10-18

## 2024-09-20 RX ORDER — ALBUTEROL SULFATE 90 UG/1
4 INHALANT RESPIRATORY (INHALATION) PRN
OUTPATIENT
Start: 2024-10-18

## 2024-09-20 RX ORDER — DIPHENHYDRAMINE HYDROCHLORIDE 50 MG/ML
50 INJECTION INTRAMUSCULAR; INTRAVENOUS
OUTPATIENT
Start: 2024-10-18

## 2024-09-20 RX ORDER — SODIUM CHLORIDE 9 MG/ML
INJECTION, SOLUTION INTRAVENOUS CONTINUOUS
OUTPATIENT
Start: 2024-10-18

## 2024-09-20 RX ADMIN — GOSERELIN ACETATE 3.6 MG: 3.6 IMPLANT SUBCUTANEOUS at 14:36

## 2024-09-20 ASSESSMENT — PAIN SCALES - GENERAL: PAINLEVEL_OUTOF10: 0

## 2024-10-10 ENCOUNTER — OFFICE VISIT (OUTPATIENT)
Age: 39
End: 2024-10-10
Payer: COMMERCIAL

## 2024-10-10 VITALS — HEIGHT: 65 IN | WEIGHT: 158 LBS | BODY MASS INDEX: 26.33 KG/M2

## 2024-10-10 DIAGNOSIS — Z85.3 HISTORY OF BREAST CANCER IN FEMALE: ICD-10-CM

## 2024-10-10 DIAGNOSIS — Z90.13 STATUS POST BILATERAL MASTECTOMY: ICD-10-CM

## 2024-10-10 DIAGNOSIS — Z17.0 MALIGNANT NEOPLASM OF LEFT BREAST IN FEMALE, ESTROGEN RECEPTOR POSITIVE, UNSPECIFIED SITE OF BREAST (HCC): Primary | ICD-10-CM

## 2024-10-10 DIAGNOSIS — C50.912 MALIGNANT NEOPLASM OF LEFT BREAST IN FEMALE, ESTROGEN RECEPTOR POSITIVE, UNSPECIFIED SITE OF BREAST (HCC): Primary | ICD-10-CM

## 2024-10-10 PROCEDURE — 99213 OFFICE O/P EST LOW 20 MIN: CPT | Performed by: NURSE PRACTITIONER

## 2024-10-10 NOTE — PROGRESS NOTES
[facility]        Multiple        Live Births              Obstetric Comments   Menarche:  9.   LMP: 12/10/2012.  # of Children:  3.  Age at Delivery of First Child:  26.   Hysterectomy/oophorectomy:  NO/NO.  Breast Bx:  Yes, left .  Hx of Breast Feeding:  yes.  BCP:  yes. Hormone therapy:  no.                   Past Surgical History:   Procedure Laterality Date    ADENOIDECTOMY  1988    BREAST BIOPSY Left     06/2022 AND 08/2022 BIOPSIES    BREAST RECONSTRUCTION Bilateral 09/27/2022    . performed by Leilani Ramesh MD at [facility] AMBULATORY OR    BREAST RECONSTRUCTION Bilateral 2/6/2024    REMOVAL OF BILATERAL BREAST EXPANDERS, BILATERAL REVISION OF RECONSTRUCTED BREAST,  PLACEMENT OF BILATERAL BREAST IMPLANTS, BILATERAL CAPSULOTOMY performed by Leilani Ramesh MD at [facility] AMBULATORY OR    HEENT      WISDOM TEETH    MASTECTOMY Bilateral 09/27/2022    BILATERAL SKIN SPARING MASTECTOMIES LEFT BREAST SENTINEL NODE BIOPSY  DR. RAMESH - BILATERAL BREAST RECONSTRUCTION WITH TISSUE EXPANDERS AND ALLODERM performed by Marjorie Rivas MD at [facility] AMBULATORY OR    MASTECTOMY, BILATERAL  09/27/2022    US BREAST BIOPSY NEEDLE ADDITIONAL RIGHT Right 8/11/2022    US BREAST BIOPSY NEEDLE ADDITIONAL RIGHT 8/11/2022 [facility] RAD MAMMO    US BREAST BIOPSY W LOC DEVICE 1ST LESION RIGHT Right 8/11/2022    US BREAST BIOPSY W LOC DEVICE 1ST LESION RIGHT 8/11/2022 [facility] RAD MAMMO         Review of Systems      Physical Exam  Constitutional:       Appearance: Normal appearance.   Chest:   Breasts:     Right: Absent.      Left: Absent.      Comments: BILATERAL mastectomy with reconstruction  Musculoskeletal:      Comments: FROM - UE x 2   Lymphadenopathy:      Upper Body:      Right upper body: No supraclavicular or axillary adenopathy.      Left upper body: No supraclavicular or axillary adenopathy.   Neurological:      Mental Status: She is alert.   Psychiatric:         Attention and Perception: Attention normal.         Mood and Affect:

## 2024-10-18 ENCOUNTER — HOSPITAL ENCOUNTER (OUTPATIENT)
Facility: HOSPITAL | Age: 39
Setting detail: INFUSION SERIES
Discharge: HOME | End: 2024-10-18
Payer: COMMERCIAL

## 2024-10-18 VITALS
TEMPERATURE: 97.7 F | HEART RATE: 67 BPM | DIASTOLIC BLOOD PRESSURE: 75 MMHG | RESPIRATION RATE: 18 BRPM | SYSTOLIC BLOOD PRESSURE: 118 MMHG

## 2024-10-18 DIAGNOSIS — Z17.0 MALIGNANT NEOPLASM OF LEFT BREAST IN FEMALE, ESTROGEN RECEPTOR POSITIVE, UNSPECIFIED SITE OF BREAST (HCC): Primary | ICD-10-CM

## 2024-10-18 DIAGNOSIS — D50.8 IRON DEFICIENCY ANEMIA SECONDARY TO INADEQUATE DIETARY IRON INTAKE: ICD-10-CM

## 2024-10-18 DIAGNOSIS — C50.912 MALIGNANT NEOPLASM OF LEFT BREAST IN FEMALE, ESTROGEN RECEPTOR POSITIVE, UNSPECIFIED SITE OF BREAST (HCC): Primary | ICD-10-CM

## 2024-10-18 PROCEDURE — 6360000002 HC RX W HCPCS: Performed by: INTERNAL MEDICINE

## 2024-10-18 PROCEDURE — 96402 CHEMO HORMON ANTINEOPL SQ/IM: CPT

## 2024-10-18 RX ORDER — ACETAMINOPHEN 325 MG/1
650 TABLET ORAL
OUTPATIENT
Start: 2024-11-15

## 2024-10-18 RX ORDER — DIPHENHYDRAMINE HYDROCHLORIDE 50 MG/ML
50 INJECTION INTRAMUSCULAR; INTRAVENOUS
OUTPATIENT
Start: 2024-11-15

## 2024-10-18 RX ORDER — SODIUM CHLORIDE 9 MG/ML
INJECTION, SOLUTION INTRAVENOUS CONTINUOUS
OUTPATIENT
Start: 2024-11-15

## 2024-10-18 RX ORDER — ALBUTEROL SULFATE 90 UG/1
4 INHALANT RESPIRATORY (INHALATION) PRN
OUTPATIENT
Start: 2024-11-15

## 2024-10-18 RX ORDER — ONDANSETRON 2 MG/ML
8 INJECTION INTRAMUSCULAR; INTRAVENOUS
OUTPATIENT
Start: 2024-11-15

## 2024-10-18 RX ORDER — EPINEPHRINE 1 MG/ML
0.3 INJECTION, SOLUTION INTRAMUSCULAR; SUBCUTANEOUS PRN
OUTPATIENT
Start: 2024-11-15

## 2024-10-18 RX ADMIN — GOSERELIN ACETATE 3.6 MG: 3.6 IMPLANT SUBCUTANEOUS at 14:41

## 2024-10-18 ASSESSMENT — PAIN SCALES - GENERAL: PAINLEVEL_OUTOF10: 0

## 2024-10-18 NOTE — PROGRESS NOTES
[facility] [place] [facility] Short Note                       Date: 2024    Name: Cely Givens    MRN: [id number]         : 1985      Pt admit to [facility] [place] for Zoladex ambulatory in stable condition. Assessment completed and documented in flowsheets. No acute complaints at this time.    Ms. Givens's vitals were reviewed:  Patient Vitals for the past 12 hrs:   Temp Pulse Resp BP   10/18/24 1437 97.7 °F (36.5 °C) 67 18 118/75     Medications given: Zoladex given SC in LLQ of abdomen.  Medications Administered         goserelin (ZOLADEX) injection 3.6 mg Admin Date  10/18/2024 Action  Given Dose  3.6 mg Route  SubCUTAneous Documented By  Magui Thornton, CHADWICK          Ms. Givens tolerated the injection and was [facility] in [facility] condition. Patient is aware if future appointments.    Future Appointments   Date Time Provider Department Center   11/15/2024  2:30 PM TRAVIS FASTTRACK 2 [facility]   2024  2:30 PM TRAVIS FASTTRACK 2 [facility]   1/10/2025  2:30 PM TRAVIS FASTTRACK 2 [facility]   2025  2:30 PM TRVAIS FASTTRACK 2 [facility]   2025  2:45 PM Irasema Morris MD [facility] BS AMB   4/15/2025 10:15 AM Adeline Stern, APRN - NP [facility] BS AMB       MAGUI THORNTON RN  2024  2:48 PM

## 2024-11-15 ENCOUNTER — HOSPITAL ENCOUNTER (OUTPATIENT)
Facility: HOSPITAL | Age: 39
Setting detail: INFUSION SERIES
Discharge: HOME | End: 2024-11-15
Payer: COMMERCIAL

## 2024-11-15 VITALS
DIASTOLIC BLOOD PRESSURE: 89 MMHG | RESPIRATION RATE: 18 BRPM | SYSTOLIC BLOOD PRESSURE: 136 MMHG | HEART RATE: 67 BPM | TEMPERATURE: 97.6 F | OXYGEN SATURATION: 98 %

## 2024-11-15 DIAGNOSIS — D50.8 IRON DEFICIENCY ANEMIA SECONDARY TO INADEQUATE DIETARY IRON INTAKE: ICD-10-CM

## 2024-11-15 DIAGNOSIS — Z17.0 MALIGNANT NEOPLASM OF LEFT BREAST IN FEMALE, ESTROGEN RECEPTOR POSITIVE, UNSPECIFIED SITE OF BREAST (HCC): Primary | ICD-10-CM

## 2024-11-15 DIAGNOSIS — C50.912 MALIGNANT NEOPLASM OF LEFT BREAST IN FEMALE, ESTROGEN RECEPTOR POSITIVE, UNSPECIFIED SITE OF BREAST (HCC): Primary | ICD-10-CM

## 2024-11-15 LAB

## 2024-11-15 PROCEDURE — 6360000002 HC RX W HCPCS: Performed by: INTERNAL MEDICINE

## 2024-11-15 PROCEDURE — 96402 CHEMO HORMON ANTINEOPL SQ/IM: CPT

## 2024-11-15 PROCEDURE — 81002 URINALYSIS NONAUTO W/O SCOPE: CPT

## 2024-11-15 RX ORDER — ACETAMINOPHEN 325 MG/1
650 TABLET ORAL
OUTPATIENT
Start: 2024-12-13

## 2024-11-15 RX ORDER — HYDROCORTISONE SODIUM SUCCINATE 100 MG/2ML
100 INJECTION INTRAMUSCULAR; INTRAVENOUS
OUTPATIENT
Start: 2024-12-13

## 2024-11-15 RX ORDER — ALBUTEROL SULFATE 90 UG/1
4 INHALANT RESPIRATORY (INHALATION) PRN
OUTPATIENT
Start: 2024-12-13

## 2024-11-15 RX ORDER — EPINEPHRINE 1 MG/ML
0.3 INJECTION, SOLUTION INTRAMUSCULAR; SUBCUTANEOUS PRN
OUTPATIENT
Start: 2024-12-13

## 2024-11-15 RX ORDER — SODIUM CHLORIDE 9 MG/ML
INJECTION, SOLUTION INTRAVENOUS CONTINUOUS
OUTPATIENT
Start: 2024-12-13

## 2024-11-15 RX ORDER — DIPHENHYDRAMINE HYDROCHLORIDE 50 MG/ML
50 INJECTION INTRAMUSCULAR; INTRAVENOUS
OUTPATIENT
Start: 2024-12-13

## 2024-11-15 RX ORDER — ONDANSETRON 2 MG/ML
8 INJECTION INTRAMUSCULAR; INTRAVENOUS
OUTPATIENT
Start: 2024-12-13

## 2024-11-15 RX ADMIN — GOSERELIN ACETATE 3.6 MG: 3.6 IMPLANT SUBCUTANEOUS at 14:47

## 2024-11-15 ASSESSMENT — PAIN DESCRIPTION - FREQUENCY: FREQUENCY: INTERMITTENT

## 2024-11-15 ASSESSMENT — PAIN DESCRIPTION - ORIENTATION: ORIENTATION: RIGHT;LEFT

## 2024-11-15 ASSESSMENT — PAIN DESCRIPTION - LOCATION: LOCATION: BACK

## 2024-11-15 ASSESSMENT — PAIN SCALES - GENERAL: PAINLEVEL_OUTOF10: 5

## 2024-11-15 NOTE — PROGRESS NOTES
..[facility] Chemotherapy/Immunotherapy Progress Note    Date: November 15, 2024  Name: Cely Givens  MRN: [id number]       : 1985    Pt arrived ambulatory and in no acute distress to [facility] for Zoladex   Denies flu-like symptoms. Arrives alone.    Ms. Givens's vitals were reviewed.  Patient Vitals for the past 12 hrs:   Temp Pulse Resp BP SpO2   11/15/24 1435 97.6 °F (36.4 °C) 67 18 136/89 98 %     Pre-medications were administered as ordered and chemotherapy was initiated. Please see MAR for specific drug names and time of administration.  Medications Administered         goserelin (ZOLADEX) injection 3.6 mg Admin Date  11/15/2024 Action  Given Dose  3.6 mg Route  SubCUTAneous Documented By  Shelbie Hopkins RN        Prior to chemotherapy/immunotherapy administration the following were verified with a second chemotherapy/immunotherapy certified nurse: Patient name, Patient  or CSN, Drug name, Drug dose, Infusion/drug volume, Rate of administration, Route of administration, Expiration dates/times, Appearance and physical integrity of the drug(s).     Patient received Zoladex in Abdomen RLQ  Bandaid and Gauze applied to site.     Pt aware of next appointment scheduled set for [facility].   Tolerated procedure well without issue. Education given about chemo precautions and infection prevention- education reinforced prior to departure.    Shelbie Hopkins RN  November 15, 2024

## 2024-12-13 ENCOUNTER — HOSPITAL ENCOUNTER (OUTPATIENT)
Facility: HOSPITAL | Age: 39
Setting detail: INFUSION SERIES
Discharge: HOME | End: 2024-12-13
Payer: COMMERCIAL

## 2024-12-13 VITALS
DIASTOLIC BLOOD PRESSURE: 95 MMHG | HEART RATE: 58 BPM | SYSTOLIC BLOOD PRESSURE: 147 MMHG | RESPIRATION RATE: 18 BRPM | TEMPERATURE: 97.9 F

## 2024-12-13 DIAGNOSIS — Z17.0 MALIGNANT NEOPLASM OF LEFT BREAST IN FEMALE, ESTROGEN RECEPTOR POSITIVE, UNSPECIFIED SITE OF BREAST (HCC): Primary | ICD-10-CM

## 2024-12-13 DIAGNOSIS — C50.912 MALIGNANT NEOPLASM OF LEFT BREAST IN FEMALE, ESTROGEN RECEPTOR POSITIVE, UNSPECIFIED SITE OF BREAST (HCC): Primary | ICD-10-CM

## 2024-12-13 DIAGNOSIS — D50.8 IRON DEFICIENCY ANEMIA SECONDARY TO INADEQUATE DIETARY IRON INTAKE: ICD-10-CM

## 2024-12-13 PROCEDURE — 6360000002 HC RX W HCPCS: Performed by: INTERNAL MEDICINE

## 2024-12-13 PROCEDURE — 96402 CHEMO HORMON ANTINEOPL SQ/IM: CPT

## 2024-12-13 RX ORDER — ONDANSETRON 2 MG/ML
8 INJECTION INTRAMUSCULAR; INTRAVENOUS
OUTPATIENT
Start: 2025-01-10

## 2024-12-13 RX ORDER — SODIUM CHLORIDE 9 MG/ML
INJECTION, SOLUTION INTRAVENOUS CONTINUOUS
OUTPATIENT
Start: 2025-01-10

## 2024-12-13 RX ORDER — HYDROCORTISONE SODIUM SUCCINATE 100 MG/2ML
100 INJECTION INTRAMUSCULAR; INTRAVENOUS
OUTPATIENT
Start: 2025-01-10

## 2024-12-13 RX ORDER — EPINEPHRINE 1 MG/ML
0.3 INJECTION, SOLUTION INTRAMUSCULAR; SUBCUTANEOUS PRN
OUTPATIENT
Start: 2025-01-10

## 2024-12-13 RX ORDER — DIPHENHYDRAMINE HYDROCHLORIDE 50 MG/ML
50 INJECTION INTRAMUSCULAR; INTRAVENOUS
OUTPATIENT
Start: 2025-01-10

## 2024-12-13 RX ORDER — ACETAMINOPHEN 325 MG/1
650 TABLET ORAL
OUTPATIENT
Start: 2025-01-10

## 2024-12-13 RX ORDER — ALBUTEROL SULFATE 90 UG/1
4 INHALANT RESPIRATORY (INHALATION) PRN
OUTPATIENT
Start: 2025-01-10

## 2024-12-13 RX ADMIN — GOSERELIN ACETATE 3.6 MG: 3.6 IMPLANT SUBCUTANEOUS at 14:57

## 2024-12-13 ASSESSMENT — PAIN SCALES - GENERAL: PAINLEVEL_OUTOF10: 5

## 2024-12-13 NOTE — PROGRESS NOTES
[facility] Short Note                       Date: 2024    Name: Cely Givens    MRN: [id number]         : 1985      Pt admit to [facility] for Zoladex ambulatory in stable condition. Assessment completed and documented in flowsheets. Pt states she had an episode of \"weakness\"/\"limpness\" in chest near heart that lasted about an hour. States she plans to follow up with cardiology soon. MD office notified and per Omari Ortiz NP, ok to treat today and have pt follow up with cardiology. Pt aware and she verbalized understanding. No other acute complaints at this time.    Ms. Givens's vitals were reviewed:  Patient Vitals for the past 12 hrs:   Temp Pulse Resp BP   24 1438 97.9 °F (36.6 °C) 58 18 (!) 147/95     Medications given: Zoladex given SC in LLQ of abdomen.  Medications Administered         goserelin (ZOLADEX) injection 3.6 mg Admin Date  2024 Action  Given Dose  3.6 mg Route  SubCUTAneous Documented By  Magui Thornton RN          Ms. Givens tolerated the injection and was discharged [facility] in stable condition. Patient is aware if future appointments.    Future Appointments   Date Time Provider Department Center   1/10/2025  2:30 PM [facility] FASTTRACK 2 [facility]   2025  2:30 PM [facility]VIS FASTTRACK 2 [facility]   2025  2:45 PM Irasema Morris MD [facility] BS AMB   4/15/2025 10:15 AM Adeline Stern, APRN - NP [facility] BS AMB       MAGUI THORNTON RN  2024  3:10 PM

## 2025-01-10 ENCOUNTER — HOSPITAL ENCOUNTER (OUTPATIENT)
Facility: HOSPITAL | Age: 40
Setting detail: INFUSION SERIES
Discharge: HOME | End: 2025-01-10
Payer: COMMERCIAL

## 2025-01-10 VITALS
DIASTOLIC BLOOD PRESSURE: 88 MMHG | RESPIRATION RATE: 16 BRPM | HEART RATE: 64 BPM | OXYGEN SATURATION: 98 % | TEMPERATURE: 97.7 F | SYSTOLIC BLOOD PRESSURE: 136 MMHG

## 2025-01-10 DIAGNOSIS — D50.8 IRON DEFICIENCY ANEMIA SECONDARY TO INADEQUATE DIETARY IRON INTAKE: ICD-10-CM

## 2025-01-10 DIAGNOSIS — C50.912 MALIGNANT NEOPLASM OF LEFT BREAST IN FEMALE, ESTROGEN RECEPTOR POSITIVE, UNSPECIFIED SITE OF BREAST (HCC): Primary | ICD-10-CM

## 2025-01-10 DIAGNOSIS — Z17.0 MALIGNANT NEOPLASM OF LEFT BREAST IN FEMALE, ESTROGEN RECEPTOR POSITIVE, UNSPECIFIED SITE OF BREAST (HCC): Primary | ICD-10-CM

## 2025-01-10 PROCEDURE — 96360 HYDRATION IV INFUSION INIT: CPT

## 2025-01-10 PROCEDURE — 96402 CHEMO HORMON ANTINEOPL SQ/IM: CPT

## 2025-01-10 PROCEDURE — 2580000003 HC RX 258

## 2025-01-10 PROCEDURE — 6360000002 HC RX W HCPCS: Performed by: INTERNAL MEDICINE

## 2025-01-10 RX ORDER — DIPHENHYDRAMINE HYDROCHLORIDE 50 MG/ML
50 INJECTION INTRAMUSCULAR; INTRAVENOUS
OUTPATIENT
Start: 2025-02-07

## 2025-01-10 RX ORDER — SODIUM CHLORIDE 0.9 % (FLUSH) 0.9 %
5-40 SYRINGE (ML) INJECTION PRN
OUTPATIENT
Start: 2025-01-10

## 2025-01-10 RX ORDER — ALBUTEROL SULFATE 90 UG/1
4 INHALANT RESPIRATORY (INHALATION) PRN
OUTPATIENT
Start: 2025-02-07

## 2025-01-10 RX ORDER — EPINEPHRINE 1 MG/ML
0.3 INJECTION, SOLUTION INTRAMUSCULAR; SUBCUTANEOUS PRN
OUTPATIENT
Start: 2025-01-10

## 2025-01-10 RX ORDER — 0.9 % SODIUM CHLORIDE 0.9 %
1000 INTRAVENOUS SOLUTION INTRAVENOUS ONCE
OUTPATIENT
Start: 2025-01-10 | End: 2025-01-10

## 2025-01-10 RX ORDER — ALBUTEROL SULFATE 90 UG/1
4 INHALANT RESPIRATORY (INHALATION) PRN
OUTPATIENT
Start: 2025-01-10

## 2025-01-10 RX ORDER — ACETAMINOPHEN 325 MG/1
650 TABLET ORAL
OUTPATIENT
Start: 2025-02-07

## 2025-01-10 RX ORDER — EPINEPHRINE 1 MG/ML
0.3 INJECTION, SOLUTION INTRAMUSCULAR; SUBCUTANEOUS PRN
OUTPATIENT
Start: 2025-02-07

## 2025-01-10 RX ORDER — ONDANSETRON 2 MG/ML
8 INJECTION INTRAMUSCULAR; INTRAVENOUS
OUTPATIENT
Start: 2025-02-07

## 2025-01-10 RX ORDER — SODIUM CHLORIDE 9 MG/ML
5-250 INJECTION, SOLUTION INTRAVENOUS PRN
OUTPATIENT
Start: 2025-01-10

## 2025-01-10 RX ORDER — HYDROCORTISONE SODIUM SUCCINATE 100 MG/2ML
100 INJECTION INTRAMUSCULAR; INTRAVENOUS
OUTPATIENT
Start: 2025-01-10

## 2025-01-10 RX ORDER — ACETAMINOPHEN 325 MG/1
650 TABLET ORAL
OUTPATIENT
Start: 2025-01-10

## 2025-01-10 RX ORDER — SODIUM CHLORIDE 9 MG/ML
INJECTION, SOLUTION INTRAVENOUS CONTINUOUS
OUTPATIENT
Start: 2025-02-07

## 2025-01-10 RX ORDER — DIPHENHYDRAMINE HYDROCHLORIDE 50 MG/ML
50 INJECTION INTRAMUSCULAR; INTRAVENOUS
OUTPATIENT
Start: 2025-01-10

## 2025-01-10 RX ORDER — HYDROCORTISONE SODIUM SUCCINATE 100 MG/2ML
100 INJECTION INTRAMUSCULAR; INTRAVENOUS
OUTPATIENT
Start: 2025-02-07

## 2025-01-10 RX ORDER — SODIUM CHLORIDE 9 MG/ML
INJECTION, SOLUTION INTRAVENOUS CONTINUOUS
OUTPATIENT
Start: 2025-01-10

## 2025-01-10 RX ORDER — HEPARIN 100 UNIT/ML
500 SYRINGE INTRAVENOUS PRN
OUTPATIENT
Start: 2025-01-10

## 2025-01-10 RX ORDER — 0.9 % SODIUM CHLORIDE 0.9 %
1000 INTRAVENOUS SOLUTION INTRAVENOUS ONCE
Status: COMPLETED | OUTPATIENT
Start: 2025-01-10 | End: 2025-01-10

## 2025-01-10 RX ORDER — ONDANSETRON 2 MG/ML
8 INJECTION INTRAMUSCULAR; INTRAVENOUS
OUTPATIENT
Start: 2025-01-10

## 2025-01-10 RX ADMIN — GOSERELIN ACETATE 3.6 MG: 3.6 IMPLANT SUBCUTANEOUS at 16:03

## 2025-01-10 RX ADMIN — SODIUM CHLORIDE 1000 ML: 9 INJECTION, SOLUTION INTRAVENOUS at 15:02

## 2025-01-10 NOTE — PROGRESS NOTES
[facility] Chemo Progress Note    Date: January 10, 2025    Name: Cely Givens    MRN: [id number]         : 1985    Ms. Givens arrived ambulatory and in no distress for Zoladex and IVF.    Assessment was completed and documented in flowsheets. No acute concerns at this time. 24G PIV placed without difficulty, positive blood return noted.    Follow Up: Proceed with treatment    Ms. Givens's vitals were reviewed.  Patient Vitals for the past 12 hrs:   Temp Pulse Resp BP SpO2   01/10/25 1445 97.7 °F (36.5 °C) 64 16 136/88 98 %       Pre-medications  were administered as ordered and chemotherapy was initiated.  Medications Administered         goserelin (ZOLADEX) injection 3.6 mg Admin Date  01/10/2025 Action  Given Dose  3.6 mg Rate   Route  SubCUTAneous Documented By  Annemarei Bazan RN        sodium chloride 0.9 % bolus 1,000 mL Admin Date  01/10/2025 Action  New Bag Dose  1,000 mL Rate  983.6 mL/hr Route  IntraVENous Documented By  Annemarie Bazan RN        Zoladex given RLQ    Two nurses verified prior to administering:  Drug name, Drug dose, Infusion volume or drug volume when prepared in a syringe, Rate of administration, Route of administration, Expiration dates and/or times, Appearance and physical integrity of the drugs, Rate set on infusion pump, when used, and Sequencing of drug administration.  Medication education reinforced, pt verbalized understanding.    Ms. Givens tolerated treatment well, PIV removed. Patient was [facility] in stable condition and is aware of next appointment.     Future Appointments   Date Time Provider Department Center   2025  2:30 PM TRAVIS FASTTRACK 2 [facility]   2025  2:45 PM Irasema Morris MD [facility] BS AMB   3/5/2025 10:40 AM Carroll Brice MD [facility] BS AMB   4/15/2025 10:15 AM Adeline Stern, APRN - NP [facility] BS AMB         ANNEMARIE BAZAN RN  January 10, 2025

## 2025-02-06 DIAGNOSIS — F33.1 MAJOR DEPRESSIVE DISORDER, RECURRENT, MODERATE (HCC): ICD-10-CM

## 2025-02-06 RX ORDER — ESCITALOPRAM OXALATE 10 MG/1
10 TABLET ORAL DAILY
Qty: 90 TABLET | Refills: 0 | Status: SHIPPED | OUTPATIENT
Start: 2025-02-06

## 2025-02-07 ENCOUNTER — HOSPITAL ENCOUNTER (OUTPATIENT)
Facility: HOSPITAL | Age: 40
Setting detail: INFUSION SERIES
Discharge: HOME | End: 2025-02-07
Payer: COMMERCIAL

## 2025-02-07 ENCOUNTER — OFFICE VISIT (OUTPATIENT)
Age: 40
End: 2025-02-07
Payer: COMMERCIAL

## 2025-02-07 VITALS
OXYGEN SATURATION: 97 % | HEART RATE: 62 BPM | TEMPERATURE: 98.2 F | WEIGHT: 158.8 LBS | RESPIRATION RATE: 18 BRPM | SYSTOLIC BLOOD PRESSURE: 119 MMHG | BODY MASS INDEX: 26.43 KG/M2 | DIASTOLIC BLOOD PRESSURE: 82 MMHG

## 2025-02-07 VITALS
HEART RATE: 67 BPM | RESPIRATION RATE: 16 BRPM | SYSTOLIC BLOOD PRESSURE: 122 MMHG | TEMPERATURE: 97.4 F | WEIGHT: 157.8 LBS | DIASTOLIC BLOOD PRESSURE: 78 MMHG | BODY MASS INDEX: 26.26 KG/M2

## 2025-02-07 DIAGNOSIS — C50.912 MALIGNANT NEOPLASM OF LEFT BREAST IN FEMALE, ESTROGEN RECEPTOR POSITIVE, UNSPECIFIED SITE OF BREAST (HCC): Primary | ICD-10-CM

## 2025-02-07 DIAGNOSIS — Z17.0 MALIGNANT NEOPLASM OF LEFT BREAST IN FEMALE, ESTROGEN RECEPTOR POSITIVE, UNSPECIFIED SITE OF BREAST (HCC): Primary | ICD-10-CM

## 2025-02-07 DIAGNOSIS — D50.8 IRON DEFICIENCY ANEMIA SECONDARY TO INADEQUATE DIETARY IRON INTAKE: ICD-10-CM

## 2025-02-07 PROCEDURE — 96402 CHEMO HORMON ANTINEOPL SQ/IM: CPT

## 2025-02-07 PROCEDURE — 99213 OFFICE O/P EST LOW 20 MIN: CPT

## 2025-02-07 PROCEDURE — 6360000002 HC RX W HCPCS: Performed by: INTERNAL MEDICINE

## 2025-02-07 RX ORDER — HYDROCORTISONE SODIUM SUCCINATE 100 MG/2ML
100 INJECTION INTRAMUSCULAR; INTRAVENOUS
OUTPATIENT
Start: 2025-03-07

## 2025-02-07 RX ORDER — DIPHENHYDRAMINE HYDROCHLORIDE 50 MG/ML
50 INJECTION INTRAMUSCULAR; INTRAVENOUS
OUTPATIENT
Start: 2025-03-07

## 2025-02-07 RX ORDER — SODIUM CHLORIDE 9 MG/ML
INJECTION, SOLUTION INTRAVENOUS CONTINUOUS
OUTPATIENT
Start: 2025-03-07

## 2025-02-07 RX ORDER — ONDANSETRON 2 MG/ML
8 INJECTION INTRAMUSCULAR; INTRAVENOUS
OUTPATIENT
Start: 2025-03-07

## 2025-02-07 RX ORDER — ACETAMINOPHEN 325 MG/1
650 TABLET ORAL
OUTPATIENT
Start: 2025-03-07

## 2025-02-07 RX ORDER — ALBUTEROL SULFATE 90 UG/1
4 INHALANT RESPIRATORY (INHALATION) PRN
OUTPATIENT
Start: 2025-03-07

## 2025-02-07 RX ORDER — EPINEPHRINE 1 MG/ML
0.3 INJECTION, SOLUTION INTRAMUSCULAR; SUBCUTANEOUS PRN
OUTPATIENT
Start: 2025-03-07

## 2025-02-07 RX ADMIN — GOSERELIN ACETATE 3.6 MG: 3.6 IMPLANT SUBCUTANEOUS at 14:47

## 2025-02-07 NOTE — PROGRESS NOTES
[facility] Progress Note    Date: February 7, 2025        1430: Pt arrived ambulatory to [facility] for Zoladex in stable condition.  Assessment completed. No new concerns voiced.        Vitals:    02/07/25 1430   BP: 122/78   Pulse: 67   Resp: 16   Temp: 97.4 °F (36.3 °C)        Medications Administered         goserelin (ZOLADEX) injection 3.6 mg Admin Date  02/07/2025 Action  Given Dose  3.6 mg Route  SubCUTAneous Documented By  Adelaida Blanco, RN            Given LLQ of the abdomen    Ms. Givens tolerated the treatment well, and had no complaints.    Ms. Givens was discharged [facility] in [facility] condition. Patient is aware of next scheduled [facility] appointment.    Future Appointments   Date Time Provider Department Center   3/5/2025 10:40 AM Carroll Brice MD [facility] BS AMB   4/15/2025 10:15 AM Adeline Stern, APRN - NP [facility] BS AMB           Adelaida Blanco, CHADWICK, RN  February 7, 2025

## 2025-02-07 NOTE — PROGRESS NOTES
Cely M Chon is a 39 y.o. female    Chief Complaint   Patient presents with    Follow-up     Malignant neoplasm of left breast in female, estrogen receptor positive, unspecified site of breast (HCC)     1. Have you been to the ER, urgent care clinic since your last visit?  Hospitalized since your last visit?No    2. Have you seen or consulted any other health care providers outside of the [facility] since your last visit?  Include any pap smears or colon screening. No    
Examined: 3    PATHOLOGIC STAGE CLASSIFICATION (pTNM, AJCC 8th Edition)       Primary Tumor (pT): pT1a       Regional Lymph Nodes Modifier:  (sn): Kyle node(s) evaluated       Regional Lymph Nodes (pN): pN1a    ADDITIONAL FINDINGS       Additional Findings: Fibroadenomas          6.  Left breast anterior margin, excision:        Ductal carcinoma in  situ (DCIS), nuclear grade 3   DCIS is 3 mm from designated anterior margin          Radiology report(s) reviewed above.      MRI 8/2022   IMPRESSION   1. Multicentric, multifocal, left DCIS involves all quadrants and largely   replaces the inferior breast. BI-RADS 6.   2. DCIS extends to the left nipple, with an enhancing mass in the nipple.   3. 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 BI-RADS 4.   4. Linear enhancement in the subareolar right breast, likely DCIS. BI-RADS 4.   5. Bilobed mass at 2:00 in the right breast. BI-RADS 4A.   6. Overall assessment: BI-RADS Assessment Category 4: Suspicious abnormality.    7. Recommendations: Ultrasound-guided left axillary lymph node biopsy, MRI   guided right breast biopsy.      CT 11/2022:   IMPRESSION   1. Post bilateral mastectomies. No evidence of metastatic disease          Assessment:     1) Left breast cancer      S/P Bilateral mastectomy and Left SLN on 9/27/2022    3 mm, 1/3 + nodes   Grade 2   Margins negative   9 CM are of DCIS      ER 90% PR10%HER2 karen negative   Ki 67 20%   Mamma print high risk Luminal B       [id number] Stage IA   S/P 4 cycles of Adjuvant TC as of 1/26/2023  RT completed 4/2023    Is pre menopausal   By definition is High risk given + node and Ki of 20%   Options reviewed were Tamoxifen, OFS+ AI, OFS+AI+ CDK inhibitors.    She opted for Aromasin and Zoladex and started 3/2023.  Tolerating this well. Continue with treatment as prescribed.      2) R breast intraductal papilloma  S/p mastectomy     3) FH of breast cancer  Ambry- GRAYSONS    4) Iron deficiency anemia  Resolved  No longer taking oral

## 2025-02-11 NOTE — PROGRESS NOTES
oriented to person, place, and time. Mental status is at baseline.   Psychiatric:         Mood and Affect: Mood normal.         Behavior: Behavior normal.         Thought Content: Thought content normal.         Judgment: Judgment normal.            On this date 2/13/2025 I have spent 30 minutes reviewing previous notes, test results and face to face with the patient discussing the diagnosis and importance of compliance with the treatment plan as well as documenting on the day of the visit.  The patient (or guardian, if applicable) and other individuals in attendance with the patient were advised that Artificial Intelligence will be utilized during this visit to record, process the conversation to generate a clinical note, and support improvement of the AI technology. The patient (or guardian, if applicable) and other individuals in attendance at the appointment consented to the use of AI, including the recording.                   An electronic signature was used to authenticate this note.    --Addie Yates MD

## 2025-02-13 ENCOUNTER — OFFICE VISIT (OUTPATIENT)
Facility: CLINIC | Age: 40
End: 2025-02-13

## 2025-02-13 VITALS
HEART RATE: 60 BPM | DIASTOLIC BLOOD PRESSURE: 80 MMHG | TEMPERATURE: 97.9 F | BODY MASS INDEX: 26.39 KG/M2 | SYSTOLIC BLOOD PRESSURE: 126 MMHG | OXYGEN SATURATION: 97 % | HEIGHT: 65 IN | RESPIRATION RATE: 16 BRPM | WEIGHT: 158.4 LBS

## 2025-02-13 DIAGNOSIS — Z23 NEEDS FLU SHOT: ICD-10-CM

## 2025-02-13 DIAGNOSIS — Z17.0 MALIGNANT NEOPLASM OF LEFT BREAST IN FEMALE, ESTROGEN RECEPTOR POSITIVE, UNSPECIFIED SITE OF BREAST (HCC): Primary | ICD-10-CM

## 2025-02-13 DIAGNOSIS — C50.912 MALIGNANT NEOPLASM OF LEFT BREAST IN FEMALE, ESTROGEN RECEPTOR POSITIVE, UNSPECIFIED SITE OF BREAST (HCC): Primary | ICD-10-CM

## 2025-02-13 DIAGNOSIS — F33.1 MAJOR DEPRESSIVE DISORDER, RECURRENT, MODERATE (HCC): ICD-10-CM

## 2025-02-13 RX ORDER — ESCITALOPRAM OXALATE 10 MG/1
10 TABLET ORAL DAILY
Qty: 90 TABLET | Refills: 3 | Status: SHIPPED | OUTPATIENT
Start: 2025-02-13

## 2025-02-13 SDOH — ECONOMIC STABILITY: FOOD INSECURITY: WITHIN THE PAST 12 MONTHS, THE FOOD YOU BOUGHT JUST DIDN'T LAST AND YOU DIDN'T HAVE MONEY TO GET MORE.: NEVER TRUE

## 2025-02-13 SDOH — ECONOMIC STABILITY: FOOD INSECURITY: WITHIN THE PAST 12 MONTHS, YOU WORRIED THAT YOUR FOOD WOULD RUN OUT BEFORE YOU GOT MONEY TO BUY MORE.: NEVER TRUE

## 2025-02-13 ASSESSMENT — PATIENT HEALTH QUESTIONNAIRE - PHQ9
4. FEELING TIRED OR HAVING LITTLE ENERGY: NOT AT ALL
SUM OF ALL RESPONSES TO PHQ QUESTIONS 1-9: 0
SUM OF ALL RESPONSES TO PHQ QUESTIONS 1-9: 0
2. FEELING DOWN, DEPRESSED OR HOPELESS: NOT AT ALL
10. IF YOU CHECKED OFF ANY PROBLEMS, HOW DIFFICULT HAVE THESE PROBLEMS MADE IT FOR YOU TO DO YOUR WORK, TAKE CARE OF THINGS AT HOME, OR GET ALONG WITH OTHER PEOPLE: NOT DIFFICULT AT ALL
SUM OF ALL RESPONSES TO PHQ9 QUESTIONS 1 & 2: 0
9. THOUGHTS THAT YOU WOULD BE BETTER OFF DEAD, OR OF HURTING YOURSELF: NOT AT ALL
1. LITTLE INTEREST OR PLEASURE IN DOING THINGS: NOT AT ALL
SUM OF ALL RESPONSES TO PHQ QUESTIONS 1-9: 0
SUM OF ALL RESPONSES TO PHQ QUESTIONS 1-9: 0
6. FEELING BAD ABOUT YOURSELF - OR THAT YOU ARE A FAILURE OR HAVE LET YOURSELF OR YOUR FAMILY DOWN: NOT AT ALL
8. MOVING OR SPEAKING SO SLOWLY THAT OTHER PEOPLE COULD HAVE NOTICED. OR THE OPPOSITE, BEING SO FIGETY OR RESTLESS THAT YOU HAVE BEEN MOVING AROUND A LOT MORE THAN USUAL: NOT AT ALL
5. POOR APPETITE OR OVEREATING: NOT AT ALL
3. TROUBLE FALLING OR STAYING ASLEEP: NOT AT ALL
7. TROUBLE CONCENTRATING ON THINGS, SUCH AS READING THE NEWSPAPER OR WATCHING TELEVISION: NOT AT ALL

## 2025-03-05 ENCOUNTER — OFFICE VISIT (OUTPATIENT)
Age: 40
End: 2025-03-05
Payer: COMMERCIAL

## 2025-03-05 VITALS
SYSTOLIC BLOOD PRESSURE: 130 MMHG | OXYGEN SATURATION: 97 % | HEART RATE: 66 BPM | DIASTOLIC BLOOD PRESSURE: 82 MMHG | HEIGHT: 65 IN | BODY MASS INDEX: 26.33 KG/M2 | WEIGHT: 158 LBS

## 2025-03-05 DIAGNOSIS — Z00.00 ANNUAL PHYSICAL EXAM: ICD-10-CM

## 2025-03-05 DIAGNOSIS — R94.31 ABNORMAL EKG: ICD-10-CM

## 2025-03-05 DIAGNOSIS — R06.02 SHORTNESS OF BREATH: Primary | ICD-10-CM

## 2025-03-05 PROCEDURE — 93000 ELECTROCARDIOGRAM COMPLETE: CPT | Performed by: SPECIALIST

## 2025-03-05 PROCEDURE — 99213 OFFICE O/P EST LOW 20 MIN: CPT | Performed by: SPECIALIST

## 2025-03-05 NOTE — PROGRESS NOTES
Chief Complaint   Patient presents with    RE-ESTABLISH CARE    Cardio Toxic Drugs      Breast Cancer      Vitals:    03/05/25 1042   BP: 130/82   Site: Right Upper Arm   Position: Sitting   Pulse: 66   SpO2: 97%   Weight: 71.7 kg (158 lb)   Height: 1.651 m (5' 5\")         Chest pain: DENIED     Recent hospital stays: DENIED     Refills: DENIED   
in past - has it infrequently    - Echo 7/27/21 - LVEF 60-65%, normal study    - Event Monitor 7/19/21-8/17/21 - NSR, normal study - symptoms (heart racing, skipped beats) correlate with sinus or sinus tach sometimes with single PVC's.  Overall rare PVC's (<1% of beats).    - Has occasional palpitations - nothing bothersome    - can repeat a monitor for worsening symptoms     Breast cancer  - seeing Oncology    - had surgery and chemo in 2022  - S/P 4 cycles of Adjuvant TC as of 1/26/2023   - Patient wanted a cardiac follow up (Denies CP or sig SOB - Has class 1 SOLARES)- -> will check an echo      Anxiety   - anxiety has been under control - sees a counselor      Phone FU.  See me as needed.      Has 3 young kids. 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       Carroll Brice MD, FACC    [facility]  [address] [address], [address]  [address] [address]  [place], [place]  [address]  [place] [address]  Ph: [phone]   Ph [phone]

## 2025-03-05 NOTE — PATIENT INSTRUCTIONS
Patient Education        Learning About the Mediterranean Diet  What is the Mediterranean diet?     The Mediterranean diet is a style of eating rather than a diet plan. It features foods eaten in [place], [place], southern [place] and [place], and other countries along the [place] Sea. It emphasizes eating foods like fish, fruits, vegetables, beans, high-fiber breads and whole grains, nuts, and olive oil. This style of eating includes limited red meat, cheese, and sweets.  Why choose the Mediterranean diet?  A Mediterranean-style diet may improve heart health. It contains more fat than other heart-healthy diets. But the fats are mainly from nuts, unsaturated oils (such as fish oils and olive oil), and certain nut or seed oils (such as canola, soybean, or flaxseed oil). These fats may help protect the heart and blood vessels.  How can you get started on the Mediterranean diet?  Here are some things you can do to switch to a more Mediterranean way of eating.  What to eat  Eat a variety of fruits and vegetables each day, such as grapes, blueberries, tomatoes, broccoli, peppers, figs, olives, spinach, eggplant, beans, lentils, and chickpeas.  Eat a variety of whole-grain foods each day, such as oats, brown rice, and whole wheat bread, pasta, and couscous.  Eat fish at least 2 times a week. Try tuna, salmon, mackerel, lake trout, herring, or sardines.  Eat moderate amounts of low-fat dairy products, such as milk, cheese, or yogurt.  Eat moderate amounts of poultry and eggs.  Choose healthy (unsaturated) fats, such as nuts, olive oil, and certain nut or seed oils like canola, soybean, and flaxseed.  Limit unhealthy (saturated) fats, such as butter, palm oil, and coconut oil. And limit fats found in animal products, such as meat and dairy products made with whole milk. Try to eat red meat only a few times a month in very small amounts.  Limit sweets and desserts to only a few times a week. This includes sugar-sweetened

## 2025-03-07 ENCOUNTER — HOSPITAL ENCOUNTER (OUTPATIENT)
Facility: HOSPITAL | Age: 40
Setting detail: INFUSION SERIES
Discharge: HOME | End: 2025-03-07
Payer: COMMERCIAL

## 2025-03-07 VITALS
DIASTOLIC BLOOD PRESSURE: 84 MMHG | HEART RATE: 66 BPM | SYSTOLIC BLOOD PRESSURE: 138 MMHG | TEMPERATURE: 97.6 F | RESPIRATION RATE: 16 BRPM

## 2025-03-07 DIAGNOSIS — D50.8 IRON DEFICIENCY ANEMIA SECONDARY TO INADEQUATE DIETARY IRON INTAKE: Primary | ICD-10-CM

## 2025-03-07 DIAGNOSIS — Z17.0 MALIGNANT NEOPLASM OF LEFT BREAST IN FEMALE, ESTROGEN RECEPTOR POSITIVE, UNSPECIFIED SITE OF BREAST (HCC): ICD-10-CM

## 2025-03-07 DIAGNOSIS — C50.912 MALIGNANT NEOPLASM OF LEFT BREAST IN FEMALE, ESTROGEN RECEPTOR POSITIVE, UNSPECIFIED SITE OF BREAST (HCC): ICD-10-CM

## 2025-03-07 LAB
BASOPHILS # BLD: 0.03 K/UL (ref 0–0.1)
BASOPHILS NFR BLD: 0.6 % (ref 0–1)
DIFFERENTIAL METHOD BLD: ABNORMAL
EOSINOPHIL # BLD: 0.04 K/UL (ref 0–0.4)
EOSINOPHIL NFR BLD: 0.8 % (ref 0–7)
ERYTHROCYTE [DISTWIDTH] IN BLOOD BY AUTOMATED COUNT: 13 % (ref 11.5–14.5)
FERRITIN SERPL-MCNC: 56 NG/ML (ref 26–388)
HCT VFR BLD AUTO: 35.4 % (ref 35–47)
HGB BLD-MCNC: 11.6 G/DL (ref 11.5–16)
IMM GRANULOCYTES # BLD AUTO: 0.01 K/UL (ref 0–0.04)
IMM GRANULOCYTES NFR BLD AUTO: 0.2 % (ref 0–0.5)
IRON SATN MFR SERPL: 30 % (ref 20–50)
IRON SERPL-MCNC: 112 UG/DL (ref 35–150)
LYMPHOCYTES # BLD: 1.73 K/UL (ref 0.8–3.5)
LYMPHOCYTES NFR BLD: 36.2 % (ref 12–49)
MCH RBC QN AUTO: 29.4 PG (ref 26–34)
MCHC RBC AUTO-ENTMCNC: 32.8 G/DL (ref 30–36.5)
MCV RBC AUTO: 89.6 FL (ref 80–99)
MONOCYTES # BLD: 0.21 K/UL (ref 0–1)
MONOCYTES NFR BLD: 4.4 % (ref 5–13)
NEUTS SEG # BLD: 2.76 K/UL (ref 1.8–8)
NEUTS SEG NFR BLD: 57.8 % (ref 32–75)
NRBC # BLD: 0 K/UL (ref 0–0.01)
NRBC BLD-RTO: 0 PER 100 WBC
PLATELET # BLD AUTO: 248 K/UL (ref 150–400)
PMV BLD AUTO: 10.8 FL (ref 8.9–12.9)
RBC # BLD AUTO: 3.95 M/UL (ref 3.8–5.2)
TIBC SERPL-MCNC: 375 UG/DL (ref 250–450)
WBC # BLD AUTO: 4.8 K/UL (ref 3.6–11)

## 2025-03-07 PROCEDURE — 83550 IRON BINDING TEST: CPT

## 2025-03-07 PROCEDURE — 83540 ASSAY OF IRON: CPT

## 2025-03-07 PROCEDURE — 85025 COMPLETE CBC W/AUTO DIFF WBC: CPT

## 2025-03-07 PROCEDURE — 82728 ASSAY OF FERRITIN: CPT

## 2025-03-07 PROCEDURE — 6360000002 HC RX W HCPCS: Performed by: INTERNAL MEDICINE

## 2025-03-07 PROCEDURE — 96402 CHEMO HORMON ANTINEOPL SQ/IM: CPT

## 2025-03-07 RX ORDER — SODIUM CHLORIDE 9 MG/ML
INJECTION, SOLUTION INTRAVENOUS CONTINUOUS
OUTPATIENT
Start: 2025-04-04

## 2025-03-07 RX ORDER — ONDANSETRON 2 MG/ML
8 INJECTION INTRAMUSCULAR; INTRAVENOUS
OUTPATIENT
Start: 2025-04-04

## 2025-03-07 RX ORDER — EPINEPHRINE 1 MG/ML
0.3 INJECTION, SOLUTION INTRAMUSCULAR; SUBCUTANEOUS PRN
OUTPATIENT
Start: 2025-04-04

## 2025-03-07 RX ORDER — HYDROCORTISONE SODIUM SUCCINATE 100 MG/2ML
100 INJECTION INTRAMUSCULAR; INTRAVENOUS
OUTPATIENT
Start: 2025-04-04

## 2025-03-07 RX ORDER — ACETAMINOPHEN 325 MG/1
650 TABLET ORAL
OUTPATIENT
Start: 2025-04-04

## 2025-03-07 RX ORDER — DIPHENHYDRAMINE HYDROCHLORIDE 50 MG/ML
50 INJECTION INTRAMUSCULAR; INTRAVENOUS
OUTPATIENT
Start: 2025-04-04

## 2025-03-07 RX ORDER — ALBUTEROL SULFATE 90 UG/1
4 INHALANT RESPIRATORY (INHALATION) PRN
OUTPATIENT
Start: 2025-04-04

## 2025-03-07 RX ADMIN — GOSERELIN ACETATE 3.6 MG: 3.6 IMPLANT SUBCUTANEOUS at 15:23

## 2025-03-07 ASSESSMENT — PAIN SCALES - GENERAL: PAINLEVEL_OUTOF10: 0

## 2025-03-07 NOTE — PROGRESS NOTES
[facility] Short Note                       Date: 2025    Name: Cely Givens    MRN: [id number]         : 1985      Pt admit to [facility] for zoladex/labs ambulatory in stable condition. Assessment completed and documented in flowsheets. No acute concerns at this time.  Please review pending lab results in CC.      Ms. Givens's vitals were reviewed:  Patient Vitals for the past 12 hrs:   Temp Pulse Resp BP   25 1500 97.6 °F (36.4 °C) 66 16 138/84         Lab results were obtained and reviewed. Labs within parameter for treatment.  Recent Results (from the past 12 hour(s))   CBC with Auto Differential    Collection Time: 25  3:14 PM   Result Value Ref Range    WBC 4.8 3.6 - 11.0 K/uL    RBC 3.95 3.80 - 5.20 M/uL    Hemoglobin 11.6 11.5 - 16.0 g/dL    Hematocrit 35.4 35.0 - 47.0 %    MCV 89.6 80.0 - 99.0 FL    MCH 29.4 26.0 - 34.0 PG    MCHC 32.8 30.0 - 36.5 g/dL    RDW 13.0 11.5 - 14.5 %    Platelets 248 150 - 400 K/uL    MPV 10.8 8.9 - 12.9 FL    Nucleated RBCs 0.0 0  WBC    nRBC 0.00 0.00 - 0.01 K/uL    Neutrophils % 57.8 32.0 - 75.0 %    Lymphocytes % 36.2 12.0 - 49.0 %    Monocytes % 4.4 (L) 5.0 - 13.0 %    Eosinophils % 0.8 0.0 - 7.0 %    Basophils % 0.6 0.0 - 1.0 %    Immature Granulocytes % 0.2 0.0 - 0.5 %    Neutrophils Absolute 2.76 1.80 - 8.00 K/UL    Lymphocytes Absolute 1.73 0.80 - 3.50 K/UL    Monocytes Absolute 0.21 0.00 - 1.00 K/UL    Eosinophils Absolute 0.04 0.00 - 0.40 K/UL    Basophils Absolute 0.03 0.00 - 0.10 K/UL    Immature Granulocytes Absolute 0.01 0.00 - 0.04 K/UL    Differential Type AUTOMATED         Medications given: RLQ   Medications Administered         goserelin (ZOLADEX) injection 3.6 mg Admin Date  2025 Action  Given Dose  3.6 mg Route  SubCUTAneous Documented By  Nighat Travis RN        Two nurses verified prior to administering:  Drug name, Drug dose, Infusion volume or drug volume when prepared in a syringe, Rate of administration, Route of  administration, Expiration dates and/or times, Appearance and physical integrity of the drugs, Rate set on infusion pump, when used, and Sequencing of drug administration.  Medication education reinforced, pt verbalized understanding.    Medication education reinforced with patient and they verbalize understanding.    Ms. Givens tolerated the injection and [facility]. Patient is aware if future appointments.    Future Appointments   Date Time Provider Department Center   4/4/2025  2:30 PM [facility] FASTTRACK 2 [facility]   4/15/2025 10:15 AM Adeline Stern, APRN - NP [facility] BS AMB   4/16/2025 11:00 AM [facility] ECHO 2 [facility] BS AMB   5/2/2025  2:30 PM [facility] FASTTRACK 2 [facility]   5/30/2025  2:00 PM [facility] FASTTRACK 1 [facility]   6/27/2025  2:30 PM TRAVIS FASTTRACK 2 [facility]   7/25/2025  2:00 PM TRAVIS FASTTRACK 1 [facility]   7/25/2025  2:30 PM Hawa Ortiz APRN - NP [facility] BS AMB   8/22/2025  2:00 PM TRAVIS FASTTRACK 1 [facility]       PRUDENCIO PERSAUD RN  March 7, 2025  3:59 PM

## 2025-04-04 ENCOUNTER — HOSPITAL ENCOUNTER (OUTPATIENT)
Facility: HOSPITAL | Age: 40
Setting detail: INFUSION SERIES
End: 2025-04-04

## 2025-04-07 ENCOUNTER — HOSPITAL ENCOUNTER (OUTPATIENT)
Facility: HOSPITAL | Age: 40
Setting detail: INFUSION SERIES
Discharge: HOME | End: 2025-04-07
Payer: COMMERCIAL

## 2025-04-07 VITALS
SYSTOLIC BLOOD PRESSURE: 130 MMHG | HEART RATE: 70 BPM | DIASTOLIC BLOOD PRESSURE: 74 MMHG | OXYGEN SATURATION: 97 % | RESPIRATION RATE: 16 BRPM | TEMPERATURE: 97.2 F

## 2025-04-07 DIAGNOSIS — Z17.0 MALIGNANT NEOPLASM OF LEFT BREAST IN FEMALE, ESTROGEN RECEPTOR POSITIVE, UNSPECIFIED SITE OF BREAST: Primary | ICD-10-CM

## 2025-04-07 DIAGNOSIS — C50.912 MALIGNANT NEOPLASM OF LEFT BREAST IN FEMALE, ESTROGEN RECEPTOR POSITIVE, UNSPECIFIED SITE OF BREAST: Primary | ICD-10-CM

## 2025-04-07 DIAGNOSIS — D50.8 IRON DEFICIENCY ANEMIA SECONDARY TO INADEQUATE DIETARY IRON INTAKE: ICD-10-CM

## 2025-04-07 PROCEDURE — 6360000002 HC RX W HCPCS: Performed by: INTERNAL MEDICINE

## 2025-04-07 PROCEDURE — 96402 CHEMO HORMON ANTINEOPL SQ/IM: CPT

## 2025-04-07 RX ORDER — SODIUM CHLORIDE 9 MG/ML
INJECTION, SOLUTION INTRAVENOUS CONTINUOUS
OUTPATIENT
Start: 2025-05-05

## 2025-04-07 RX ORDER — EPINEPHRINE 1 MG/ML
0.3 INJECTION, SOLUTION INTRAMUSCULAR; SUBCUTANEOUS PRN
OUTPATIENT
Start: 2025-05-05

## 2025-04-07 RX ORDER — ACETAMINOPHEN 325 MG/1
650 TABLET ORAL
OUTPATIENT
Start: 2025-05-05

## 2025-04-07 RX ORDER — DIPHENHYDRAMINE HYDROCHLORIDE 50 MG/ML
50 INJECTION, SOLUTION INTRAMUSCULAR; INTRAVENOUS
OUTPATIENT
Start: 2025-05-05

## 2025-04-07 RX ORDER — ALBUTEROL SULFATE 90 UG/1
4 INHALANT RESPIRATORY (INHALATION) PRN
OUTPATIENT
Start: 2025-05-05

## 2025-04-07 RX ORDER — ONDANSETRON 2 MG/ML
8 INJECTION INTRAMUSCULAR; INTRAVENOUS
OUTPATIENT
Start: 2025-05-05

## 2025-04-07 RX ORDER — HYDROCORTISONE SODIUM SUCCINATE 100 MG/2ML
100 INJECTION INTRAMUSCULAR; INTRAVENOUS
OUTPATIENT
Start: 2025-05-05

## 2025-04-07 RX ADMIN — GOSERELIN ACETATE 3.6 MG: 3.6 IMPLANT SUBCUTANEOUS at 08:58

## 2025-04-07 ASSESSMENT — PAIN SCALES - GENERAL: PAINLEVEL_OUTOF10: 0

## 2025-04-07 NOTE — PROGRESS NOTES
[facility] Short Note                       Date: 2025    Name: Cely Givens    MRN: [id number]         : 1985      0830 Pt admit to [facility] for Zoladex ambulatory in stable condition. Assessment completed. No new concerns voiced.        Ms. Givens's vitals were reviewed  Patient Vitals for the past 12 hrs:   Temp Pulse Resp BP SpO2   25 0900 97.2 °F (36.2 °C) 70 16 130/74 97 %         Medications given:   Medications Administered         goserelin (ZOLADEX) injection 3.6 mg Admin Date  2025 Action  Given Dose  3.6 mg Route  SubCUTAneous Documented By  Aleah Naranjo RN        Zoladex given LLQ SC      Ms. Givens tolerated the injection LLQ, and had no complaints.    [facility]. Givens [facility] condition. Pt aware of next appt    Future Appointments   Date Time Provider Department Center   4/15/2025 10:15 AM Adeline Stern APRN - NP [facility] BS AMB   2025 11:00 AM [facility] ECHO 2 [facility] BS AMB   2025  2:30 PM TRA[facility] FASTTRACK 2 [facility]   2025  2:00 PM TRAVIS FASTTRACK 1 [facility]   2025  2:30 PM [facility] FASTTRACK 2 [facility]   2025  2:00 PM TRAVIS FASTTRACK 1 [facility]   2025  2:30 PM Hawa Ortiz APRN - NP [facility] BS AMB   2025  2:00 PM [facility]VIS FASTTRACK 1 [facility]       Aleah Naranjo RN  2025  9:07 AM

## 2025-04-16 ENCOUNTER — RESULTS FOLLOW-UP (OUTPATIENT)
Age: 40
End: 2025-04-16

## 2025-04-29 ENCOUNTER — OFFICE VISIT (OUTPATIENT)
Age: 40
End: 2025-04-29
Payer: COMMERCIAL

## 2025-04-29 VITALS — HEIGHT: 65 IN | BODY MASS INDEX: 26.33 KG/M2 | WEIGHT: 158 LBS

## 2025-04-29 DIAGNOSIS — Z85.3 HISTORY OF BREAST CANCER IN FEMALE: ICD-10-CM

## 2025-04-29 DIAGNOSIS — Z17.0 MALIGNANT NEOPLASM OF LEFT BREAST IN FEMALE, ESTROGEN RECEPTOR POSITIVE, UNSPECIFIED SITE OF BREAST (HCC): Primary | ICD-10-CM

## 2025-04-29 DIAGNOSIS — C50.912 MALIGNANT NEOPLASM OF LEFT BREAST IN FEMALE, ESTROGEN RECEPTOR POSITIVE, UNSPECIFIED SITE OF BREAST (HCC): Primary | ICD-10-CM

## 2025-04-29 DIAGNOSIS — Z90.13 STATUS POST BILATERAL MASTECTOMY: ICD-10-CM

## 2025-04-29 PROCEDURE — 99213 OFFICE O/P EST LOW 20 MIN: CPT | Performed by: NURSE PRACTITIONER

## 2025-04-29 NOTE — PROGRESS NOTES
HISTORY OF PRESENT ILLNESS  Cely Givens is a 40 y.o. female     HPI  Established patient presents for follow-up for LEFT breast cancer.  S/P BILATERAL mastectomy.  No breast concerns at this time.         Breast history -    Referring - [facility]   History of breast cysts   22 - mammogram at  [facility] - suspicious calcs at LEFT 4:00 and 8:00   22 - LEFT breast biopsy x 2 -    Both areas DCIS - ER pos, MT pos   8/3/22 - breast MRI:   IMPRESSION   1. Multicentric, multifocal, left DCIS involves all quadrants and largely replaces the inferior breast. BI-RADS 6.   2. DCIS extends to the left nipple, with an enhancing mass in the nipple.   3. 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 BI-RADS 4.   4. Linear enhancement in the subareolar right breast, likely DCIS. BI-RADS 4.   5. Bilobed mass at 2:00 in the right breast. BI-RADS 4A.   6. Overall assessment: BI-RADS Assessment Category 4: Suspicious abnormality.    7. Recommendations: Ultrasound-guided left axillary lymph node biopsy, MRI guided right breast biopsy.   22 - RIGHT breast biopsy x 2 -    1. Breast, right site A, core biopsy:   Intraductal papilloma with focal atypical ductal hyperplasia (see Comment).   2. Breast, right site B, core biopsy:    Benign breast tissue with fibroadenomatoid changes.    22 - BILATERAL mastectomy and LEFT SLNB -   RIGHT breast - benign tissue, no atypia or malignancy   LEFT breast - 0.3cm IDC; extensive DCIS; node positive - 1/3; ER pos, MT pos, HER2 neg; T1aN1a   Mammaprint - high risk   Adjuvant chemo - TC x 4 - Dr. Morris   3/7/23 - port removal - Dr. Rivas  2023 - completed XRT  Taking exemestane - Dr. Morris  24 - implants placed; Dr. Moraes         Family history -    Mom - breast cancer at 65    [facility]      OB History          4    Para        Term   3            AB        Living   3         SAB        IAB        Ectopic        Molar

## 2025-05-21 DIAGNOSIS — F33.1 MAJOR DEPRESSIVE DISORDER, RECURRENT, MODERATE (HCC): ICD-10-CM

## 2025-05-21 RX ORDER — ESCITALOPRAM OXALATE 10 MG/1
TABLET ORAL
Qty: 90 TABLET | Refills: 0 | Status: SHIPPED | OUTPATIENT
Start: 2025-05-21

## 2025-05-22 RX ORDER — ALBUTEROL SULFATE 90 UG/1
4 INHALANT RESPIRATORY (INHALATION) PRN
OUTPATIENT
Start: 2025-05-22

## 2025-05-22 RX ORDER — ACETAMINOPHEN 325 MG/1
650 TABLET ORAL
OUTPATIENT
Start: 2025-05-22

## 2025-05-22 RX ORDER — ONDANSETRON 2 MG/ML
8 INJECTION INTRAMUSCULAR; INTRAVENOUS
OUTPATIENT
Start: 2025-05-22

## 2025-05-22 RX ORDER — SODIUM CHLORIDE 9 MG/ML
INJECTION, SOLUTION INTRAVENOUS CONTINUOUS
OUTPATIENT
Start: 2025-05-22

## 2025-05-22 RX ORDER — 0.9 % SODIUM CHLORIDE 0.9 %
1000 INTRAVENOUS SOLUTION INTRAVENOUS ONCE
OUTPATIENT
Start: 2025-05-22 | End: 2025-05-22

## 2025-05-22 RX ORDER — EPINEPHRINE 1 MG/ML
0.3 INJECTION, SOLUTION, CONCENTRATE INTRAVENOUS PRN
OUTPATIENT
Start: 2025-05-22

## 2025-05-22 RX ORDER — SODIUM CHLORIDE 0.9 % (FLUSH) 0.9 %
5-40 SYRINGE (ML) INJECTION PRN
OUTPATIENT
Start: 2025-05-22

## 2025-05-22 RX ORDER — HYDROCORTISONE SODIUM SUCCINATE 100 MG/2ML
100 INJECTION INTRAMUSCULAR; INTRAVENOUS
OUTPATIENT
Start: 2025-05-22

## 2025-05-22 RX ORDER — HEPARIN 100 UNIT/ML
500 SYRINGE INTRAVENOUS PRN
OUTPATIENT
Start: 2025-05-22

## 2025-05-22 RX ORDER — DIPHENHYDRAMINE HYDROCHLORIDE 50 MG/ML
50 INJECTION, SOLUTION INTRAMUSCULAR; INTRAVENOUS
OUTPATIENT
Start: 2025-05-22

## 2025-05-22 RX ORDER — SODIUM CHLORIDE 9 MG/ML
5-250 INJECTION, SOLUTION INTRAVENOUS PRN
OUTPATIENT
Start: 2025-05-22

## 2025-05-30 ENCOUNTER — HOSPITAL ENCOUNTER (OUTPATIENT)
Facility: HOSPITAL | Age: 40
Setting detail: INFUSION SERIES
Discharge: HOME | End: 2025-05-30
Payer: COMMERCIAL

## 2025-05-30 VITALS
DIASTOLIC BLOOD PRESSURE: 87 MMHG | SYSTOLIC BLOOD PRESSURE: 129 MMHG | HEART RATE: 61 BPM | RESPIRATION RATE: 16 BRPM | OXYGEN SATURATION: 98 % | TEMPERATURE: 97.5 F

## 2025-05-30 DIAGNOSIS — D50.8 IRON DEFICIENCY ANEMIA SECONDARY TO INADEQUATE DIETARY IRON INTAKE: ICD-10-CM

## 2025-05-30 DIAGNOSIS — C50.912 MALIGNANT NEOPLASM OF LEFT BREAST IN FEMALE, ESTROGEN RECEPTOR POSITIVE, UNSPECIFIED SITE OF BREAST (HCC): Primary | ICD-10-CM

## 2025-05-30 DIAGNOSIS — Z17.0 MALIGNANT NEOPLASM OF LEFT BREAST IN FEMALE, ESTROGEN RECEPTOR POSITIVE, UNSPECIFIED SITE OF BREAST (HCC): Primary | ICD-10-CM

## 2025-05-30 PROCEDURE — 96402 CHEMO HORMON ANTINEOPL SQ/IM: CPT

## 2025-05-30 PROCEDURE — 6360000002 HC RX W HCPCS

## 2025-05-30 RX ORDER — ALBUTEROL SULFATE 90 UG/1
4 INHALANT RESPIRATORY (INHALATION) PRN
OUTPATIENT
Start: 2025-06-27

## 2025-05-30 RX ORDER — EPINEPHRINE 1 MG/ML
0.3 INJECTION, SOLUTION INTRAMUSCULAR; SUBCUTANEOUS PRN
OUTPATIENT
Start: 2025-06-27

## 2025-05-30 RX ORDER — DIPHENHYDRAMINE HYDROCHLORIDE 50 MG/ML
50 INJECTION, SOLUTION INTRAMUSCULAR; INTRAVENOUS
OUTPATIENT
Start: 2025-06-27

## 2025-05-30 RX ORDER — SODIUM CHLORIDE 9 MG/ML
INJECTION, SOLUTION INTRAVENOUS CONTINUOUS
OUTPATIENT
Start: 2025-06-27

## 2025-05-30 RX ORDER — ACETAMINOPHEN 325 MG/1
650 TABLET ORAL
OUTPATIENT
Start: 2025-06-27

## 2025-05-30 RX ORDER — HYDROCORTISONE SODIUM SUCCINATE 100 MG/2ML
100 INJECTION INTRAMUSCULAR; INTRAVENOUS
OUTPATIENT
Start: 2025-06-27

## 2025-05-30 RX ORDER — ONDANSETRON 2 MG/ML
8 INJECTION INTRAMUSCULAR; INTRAVENOUS
OUTPATIENT
Start: 2025-06-27

## 2025-05-30 RX ADMIN — GOSERELIN ACETATE 3.6 MG: 3.6 IMPLANT SUBCUTANEOUS at 14:48

## 2025-05-30 ASSESSMENT — PAIN SCALES - GENERAL: PAINLEVEL_OUTOF10: 0

## 2025-05-30 NOTE — PROGRESS NOTES
[facility] Progress Note    Date: May 30, 2025    Name: Cely Givens    MRN: [id number]         : 1985    Ms. Givens Arrived ambulatory and in no distress for Zoladex.      Assessment was completed and documented in flowsheets. No acute concerns at this time.     Ms. Givens's vital signs for this visit.  Vitals:    25 1445   BP: 129/87   Pulse: 61   Resp: 16   Temp: 97.5 °F (36.4 °C)   SpO2: 98%          Medications given:   Medications Administered         goserelin (ZOLADEX) injection 3.6 mg Admin Date  2025 Action  Given Dose  3.6 mg Route  SubCUTAneous Documented By  Estefania Oakes RN        Given SC in the RLQ    Ms. Givens tolerated the injection, and had no complaints.    [facility] Giv[facility] condition.     Future Appointments   Date Time Provider Department Center   2025  2:30 PM [facility] FASTTRACK 4 [facility]   2025  2:00 PM [facility] FASTTRACK 2 [facility]   2025  2:30 PM Hawa Ortiz APRN - NP [facility] BS AMB   2025  2:00 PM [facility] FASTTRACK 3 [facility]       Estefania Oakes RN  May 30, 2025  3:55 PM

## 2025-07-14 ENCOUNTER — TELEPHONE (OUTPATIENT)
Age: 40
End: 2025-07-14

## 2025-07-14 NOTE — TELEPHONE ENCOUNTER
Pt called in to reschedule her missed opic appt. Advised that I will send a message to the opic .     Cb# [phone]

## 2025-07-14 NOTE — TELEPHONE ENCOUNTER
Lm with patient to let her know we are going to just keep her as scheduled for next week on 7/25/25 for zoladex and office visit, gave her office number to call back with any questions.

## 2025-07-24 NOTE — PROGRESS NOTES
[facility] [place] [facility]   [address] [place] [address], [address] [place] [id number]   W: [phone]  F: [phone]     Reason for Visit:   Cely Givens is a 40 y.o.  female who is seen for follow up of Left breast cancer .         Treatment History:   9/27/2022-bilateral mastectomy with left sentinel node biopsy  11/22/22-1/26/2023: Adjuvant TC   3/2/2023: Zoladex  3/30/2023: Aromasin   4/2023: RT     History of Present Illness:   Patient is a 40 y.o. female with a history of anemia and fibrocystic breast disease seen for left breast cancer and DCIS.     She had a mammogram in May 2022 that showed diffuse left breast calcifications as well as several masses in the right breast that were thought to be fibroadenomas.  Initial biopsy of left breast calcifications in June 2022 was consistent with DCIS.  She  was referred to Dr. Rivas who obtained a breast MRI on 8/3/2022.  This confirmed multicentric multifocal left breast DCIS in all quadrants .  Probable left axillary delvin metastases, bilobed mass at 2:00 in the right breast as well is linear enhancement  in the subareolar right breast.  Right breast biopsy of the subareolar region was consistent with intraductal papilloma with focal atypical ductal hyperplasia, right breast 2 cm masslike lesion was consistent with fibroadenoma.  Underwent a bilateral  mastectomy with sentinel node biopsy on the left on 9/27/2022.  Right breast showed intraductal papilloma and fibroadenoma.  Left breast showed extensive high-grade DCIS with negative margins as well as a 3 mm focus of invasive carcinoma, grade 2, 1 positive  node of 3.  ER 90% WA 90% HER2/karen negative, Ki-67 20%. Mother's cancer diagnosed when she was 65, and had negative genetic testing. S/P 4 cycles of adjuvant TC. On adjuvant Zoladex and Aromasin.     She is doing well. She is feeling well.   She has fatigue and feels dehydration. She missed a zoladex injection. Reports she can \"feel her

## 2025-07-25 ENCOUNTER — HOSPITAL ENCOUNTER (OUTPATIENT)
Facility: HOSPITAL | Age: 40
Setting detail: INFUSION SERIES
Discharge: HOME | End: 2025-07-25
Payer: COMMERCIAL

## 2025-07-25 ENCOUNTER — OFFICE VISIT (OUTPATIENT)
Age: 40
End: 2025-07-25
Payer: COMMERCIAL

## 2025-07-25 VITALS
HEART RATE: 62 BPM | RESPIRATION RATE: 16 BRPM | DIASTOLIC BLOOD PRESSURE: 77 MMHG | SYSTOLIC BLOOD PRESSURE: 125 MMHG | TEMPERATURE: 97.7 F

## 2025-07-25 VITALS
OXYGEN SATURATION: 97 % | HEART RATE: 57 BPM | BODY MASS INDEX: 25.46 KG/M2 | SYSTOLIC BLOOD PRESSURE: 115 MMHG | WEIGHT: 152.8 LBS | HEIGHT: 65 IN | TEMPERATURE: 97.7 F | DIASTOLIC BLOOD PRESSURE: 76 MMHG

## 2025-07-25 DIAGNOSIS — Z17.0 MALIGNANT NEOPLASM OF LEFT BREAST IN FEMALE, ESTROGEN RECEPTOR POSITIVE, UNSPECIFIED SITE OF BREAST (HCC): Primary | ICD-10-CM

## 2025-07-25 DIAGNOSIS — D50.8 IRON DEFICIENCY ANEMIA SECONDARY TO INADEQUATE DIETARY IRON INTAKE: ICD-10-CM

## 2025-07-25 DIAGNOSIS — C50.912 MALIGNANT NEOPLASM OF LEFT BREAST IN FEMALE, ESTROGEN RECEPTOR POSITIVE, UNSPECIFIED SITE OF BREAST (HCC): Primary | ICD-10-CM

## 2025-07-25 PROCEDURE — 96402 CHEMO HORMON ANTINEOPL SQ/IM: CPT

## 2025-07-25 PROCEDURE — 2580000003 HC RX 258

## 2025-07-25 PROCEDURE — 6360000002 HC RX W HCPCS

## 2025-07-25 PROCEDURE — 96360 HYDRATION IV INFUSION INIT: CPT

## 2025-07-25 PROCEDURE — 99214 OFFICE O/P EST MOD 30 MIN: CPT

## 2025-07-25 RX ORDER — SODIUM CHLORIDE 9 MG/ML
INJECTION, SOLUTION INTRAVENOUS CONTINUOUS
OUTPATIENT
Start: 2025-08-22

## 2025-07-25 RX ORDER — ACETAMINOPHEN 325 MG/1
650 TABLET ORAL
OUTPATIENT
Start: 2025-08-22

## 2025-07-25 RX ORDER — ONDANSETRON 2 MG/ML
8 INJECTION INTRAMUSCULAR; INTRAVENOUS
OUTPATIENT
Start: 2025-08-22

## 2025-07-25 RX ORDER — SODIUM CHLORIDE 0.9 % (FLUSH) 0.9 %
5-40 SYRINGE (ML) INJECTION PRN
OUTPATIENT
Start: 2025-07-25

## 2025-07-25 RX ORDER — EPINEPHRINE 1 MG/ML
0.3 INJECTION, SOLUTION INTRAMUSCULAR; SUBCUTANEOUS PRN
OUTPATIENT
Start: 2025-08-22

## 2025-07-25 RX ORDER — HEPARIN 100 UNIT/ML
500 SYRINGE INTRAVENOUS PRN
OUTPATIENT
Start: 2025-07-25

## 2025-07-25 RX ORDER — SODIUM CHLORIDE 9 MG/ML
INJECTION, SOLUTION INTRAVENOUS CONTINUOUS
OUTPATIENT
Start: 2025-07-25

## 2025-07-25 RX ORDER — ACETAMINOPHEN 325 MG/1
650 TABLET ORAL
OUTPATIENT
Start: 2025-07-25

## 2025-07-25 RX ORDER — ONDANSETRON 2 MG/ML
8 INJECTION INTRAMUSCULAR; INTRAVENOUS
OUTPATIENT
Start: 2025-07-25

## 2025-07-25 RX ORDER — HYDROCORTISONE SODIUM SUCCINATE 100 MG/2ML
100 INJECTION INTRAMUSCULAR; INTRAVENOUS
OUTPATIENT
Start: 2025-08-22

## 2025-07-25 RX ORDER — ALBUTEROL SULFATE 90 UG/1
4 INHALANT RESPIRATORY (INHALATION) PRN
OUTPATIENT
Start: 2025-07-25

## 2025-07-25 RX ORDER — SODIUM CHLORIDE 9 MG/ML
5-250 INJECTION, SOLUTION INTRAVENOUS PRN
OUTPATIENT
Start: 2025-07-25

## 2025-07-25 RX ORDER — EPINEPHRINE 1 MG/ML
0.3 INJECTION, SOLUTION INTRAMUSCULAR; SUBCUTANEOUS PRN
OUTPATIENT
Start: 2025-07-25

## 2025-07-25 RX ORDER — 0.9 % SODIUM CHLORIDE 0.9 %
1000 INTRAVENOUS SOLUTION INTRAVENOUS ONCE
Status: COMPLETED | OUTPATIENT
Start: 2025-07-25 | End: 2025-07-25

## 2025-07-25 RX ORDER — HYDROCORTISONE SODIUM SUCCINATE 100 MG/2ML
100 INJECTION INTRAMUSCULAR; INTRAVENOUS
OUTPATIENT
Start: 2025-07-25

## 2025-07-25 RX ORDER — DIPHENHYDRAMINE HYDROCHLORIDE 50 MG/ML
50 INJECTION, SOLUTION INTRAMUSCULAR; INTRAVENOUS
OUTPATIENT
Start: 2025-08-22

## 2025-07-25 RX ORDER — DIPHENHYDRAMINE HYDROCHLORIDE 50 MG/ML
50 INJECTION, SOLUTION INTRAMUSCULAR; INTRAVENOUS
OUTPATIENT
Start: 2025-07-25

## 2025-07-25 RX ORDER — 0.9 % SODIUM CHLORIDE 0.9 %
1000 INTRAVENOUS SOLUTION INTRAVENOUS ONCE
OUTPATIENT
Start: 2025-07-25 | End: 2025-07-25

## 2025-07-25 RX ORDER — ALBUTEROL SULFATE 90 UG/1
4 INHALANT RESPIRATORY (INHALATION) PRN
OUTPATIENT
Start: 2025-08-22

## 2025-07-25 RX ADMIN — GOSERELIN ACETATE 3.6 MG: 3.6 IMPLANT SUBCUTANEOUS at 15:10

## 2025-07-25 RX ADMIN — SODIUM CHLORIDE 1000 ML: 0.9 INJECTION, SOLUTION INTRAVENOUS at 15:20

## 2025-07-25 ASSESSMENT — PAIN SCALES - GENERAL: PAINLEVEL_OUTOF10: 0

## 2025-07-25 ASSESSMENT — PATIENT HEALTH QUESTIONNAIRE - PHQ9
SUM OF ALL RESPONSES TO PHQ QUESTIONS 1-9: 0
2. FEELING DOWN, DEPRESSED OR HOPELESS: NOT AT ALL
SUM OF ALL RESPONSES TO PHQ QUESTIONS 1-9: 0
1. LITTLE INTEREST OR PLEASURE IN DOING THINGS: NOT AT ALL

## 2025-07-25 NOTE — PROGRESS NOTES
[facility] Short Note                   Date: 2025    Name: Cely Givens    MRN: [id number]         : 1985      Pt admit to [facility] for Zoladex + IVF ambulatory in stable condition. Assessment completed and documented in flowsheets. No acute concerns at this time.  Please review pending lab results in CC.      Ms. Givens's vitals were reviewed prior to and after treatment.   Patient Vitals for the past 12 hrs:   Temp Pulse Resp BP   25 1415 97.7 °F (36.5 °C) 62 16 125/77       Medications given: via PIV L FA  Medications Administered         goserelin (ZOLADEX) injection 3.6 mg Admin Date  2025 Action  Given Dose  3.6 mg Rate   Route  SubCUTAneous Documented By  Nighat Travis, RN        sodium chloride 0.9 % bolus 1,000 mL Admin Date  2025 Action  New Bag Dose  1,000 mL Rate  983.6 mL/hr Route  IntraVENous Documented By  Nighat Travis, CHADWICK        Zoladex given LLQ    Two nurses verified prior to administering:  Drug name, Drug dose, Infusion volume or drug volume when prepared in a syringe, Rate of administration, Route of administration, Expiration dates and/or times, Appearance and physical integrity of the drugs, Rate set on infusion pump, when used, and Sequencing of drug administration.  Medication education reinforced, pt verbalized understanding.    PIV placed with positive blood return. PIV was flushed and removed per protocol prior to discharge.    Ms. Givens tolerated the infusion and had no complaints.    Ms. Givens [facility] in [facility] condition and is aware of future appointments.     Future Appointments   Date Time Provider Department Center   2025  2:00 PM [facility] FASTTRACK 3 [facility]   2025  2:00 PM TRAVIS SO CHAIR 19 [facility]   10/17/2025  2:00 PM TRAVIS SO CHAIR 19 [facility]   2025  2:00 PM TRAVIS SO CHAIR 19 [facility]   2025  2:00 PM TRAVIS SO CHAIR 19 [facility]   2026  2:00 PM TRAVIS SO CHAIR 19

## 2025-08-22 ENCOUNTER — HOSPITAL ENCOUNTER (OUTPATIENT)
Facility: HOSPITAL | Age: 40
Setting detail: INFUSION SERIES
Discharge: HOME | End: 2025-08-22
Payer: COMMERCIAL

## 2025-08-22 VITALS
SYSTOLIC BLOOD PRESSURE: 137 MMHG | OXYGEN SATURATION: 99 % | WEIGHT: 148 LBS | TEMPERATURE: 97.9 F | DIASTOLIC BLOOD PRESSURE: 79 MMHG | HEART RATE: 65 BPM | RESPIRATION RATE: 16 BRPM | BODY MASS INDEX: 24.66 KG/M2 | HEIGHT: 65 IN

## 2025-08-22 DIAGNOSIS — Z17.0 MALIGNANT NEOPLASM OF LEFT BREAST IN FEMALE, ESTROGEN RECEPTOR POSITIVE, UNSPECIFIED SITE OF BREAST (HCC): Primary | ICD-10-CM

## 2025-08-22 DIAGNOSIS — D50.8 IRON DEFICIENCY ANEMIA SECONDARY TO INADEQUATE DIETARY IRON INTAKE: ICD-10-CM

## 2025-08-22 DIAGNOSIS — C50.912 MALIGNANT NEOPLASM OF LEFT BREAST IN FEMALE, ESTROGEN RECEPTOR POSITIVE, UNSPECIFIED SITE OF BREAST (HCC): Primary | ICD-10-CM

## 2025-08-22 PROCEDURE — 6360000002 HC RX W HCPCS

## 2025-08-22 PROCEDURE — 96402 CHEMO HORMON ANTINEOPL SQ/IM: CPT

## 2025-08-22 RX ORDER — ACETAMINOPHEN 325 MG/1
650 TABLET ORAL
OUTPATIENT
Start: 2025-09-19

## 2025-08-22 RX ORDER — ALBUTEROL SULFATE 90 UG/1
4 INHALANT RESPIRATORY (INHALATION) PRN
OUTPATIENT
Start: 2025-09-19

## 2025-08-22 RX ORDER — EPINEPHRINE 1 MG/ML
0.3 INJECTION, SOLUTION INTRAMUSCULAR; SUBCUTANEOUS PRN
OUTPATIENT
Start: 2025-09-19

## 2025-08-22 RX ORDER — HYDROCORTISONE SODIUM SUCCINATE 100 MG/2ML
100 INJECTION INTRAMUSCULAR; INTRAVENOUS
OUTPATIENT
Start: 2025-09-19

## 2025-08-22 RX ORDER — DIPHENHYDRAMINE HYDROCHLORIDE 50 MG/ML
50 INJECTION, SOLUTION INTRAMUSCULAR; INTRAVENOUS
OUTPATIENT
Start: 2025-09-19

## 2025-08-22 RX ORDER — SODIUM CHLORIDE 9 MG/ML
INJECTION, SOLUTION INTRAVENOUS CONTINUOUS
OUTPATIENT
Start: 2025-09-19

## 2025-08-22 RX ORDER — ONDANSETRON 2 MG/ML
8 INJECTION INTRAMUSCULAR; INTRAVENOUS
OUTPATIENT
Start: 2025-09-19

## 2025-08-22 RX ADMIN — GOSERELIN ACETATE 3.6 MG: 3.6 IMPLANT SUBCUTANEOUS at 14:40

## 2025-08-22 ASSESSMENT — PAIN SCALES - GENERAL: PAINLEVEL_OUTOF10: 0

## 2025-08-25 ENCOUNTER — TELEPHONE (OUTPATIENT)
Age: 40
End: 2025-08-25

## 2025-08-25 DIAGNOSIS — Z79.899 OTHER LONG TERM (CURRENT) DRUG THERAPY: Primary | ICD-10-CM

## 2025-08-26 ENCOUNTER — HOSPITAL ENCOUNTER (OUTPATIENT)
Facility: HOSPITAL | Age: 40
Discharge: HOME | End: 2025-08-29
Payer: COMMERCIAL

## 2025-08-26 DIAGNOSIS — Z79.899 OTHER LONG TERM (CURRENT) DRUG THERAPY: ICD-10-CM

## 2025-08-26 PROCEDURE — 76705 ECHO EXAM OF ABDOMEN: CPT

## 2025-09-02 RX ORDER — EXEMESTANE 25 MG/1
25 TABLET ORAL DAILY
Qty: 90 TABLET | Refills: 3 | Status: SHIPPED | OUTPATIENT
Start: 2025-09-02

## (undated) DEVICE — PREMIUM WET SKIN PREP TRAY: Brand: MEDLINE INDUSTRIES, INC.

## (undated) DEVICE — SUTURE PROL SZ 3-0 L36IN NONABSORBABLE BLU L26MM SH 1/2 CIR 8522H

## (undated) DEVICE — KIT TISS EXP W/ 60ML SYR 122CM TRNSF SET PIERCING DEV L25CM

## (undated) DEVICE — DRAIN SURG 19FR 100% SIL RADPQ RND CHN FULL FLUT

## (undated) DEVICE — GARMENT,MEDLINE,DVT,INT,CALF,MED, GEN2: Brand: MEDLINE

## (undated) DEVICE — GLOVE SURG SZ 6 PF SENSICARE PI ORTHO LT

## (undated) DEVICE — SPONGE LAP 18X18IN STRL -- 5/PK

## (undated) DEVICE — SOLUTION SCRB 2OZ 10% POVIDONE IOD ANTIMIC BTL

## (undated) DEVICE — PREP SKN CHLRAPRP APL 26ML STR --

## (undated) DEVICE — SOLUTION IRRIG 1000ML 0.9% SOD CHL USP POUR PLAS BTL

## (undated) DEVICE — INSULATED BLADE ELECTRODE: Brand: EDGE

## (undated) DEVICE — GLOVE SURG SZ 6 L12IN FNGR THK79MIL GRN LTX FREE

## (undated) DEVICE — HANDLE LT SNAP ON ULT DURABLE LENS FOR TRUMPF ALC DISPOSABLE

## (undated) DEVICE — PLASTICS CHEST BREAST ASU: Brand: MEDLINE INDUSTRIES, INC.

## (undated) DEVICE — DERMABOND SKIN ADH 0.7ML -- DERMABOND ADVANCED 12/BX

## (undated) DEVICE — TAPE ADH W1INXL10YD PLAS TRNSPAR H2O RESIST HYPOALRG CURAD

## (undated) DEVICE — SPONGE GZ W4XL4IN COT 12 PLY TYP VII WVN C FLD DSGN STERILE

## (undated) DEVICE — SYR 10ML LUER LOK 1/5ML GRAD --

## (undated) DEVICE — PAD,ABDOMINAL,5"X9",ST,LF,25/BX: Brand: MEDLINE INDUSTRIES, INC.

## (undated) DEVICE — COLLECTION SET 21GA 1.25IN --

## (undated) DEVICE — SPONGE GZ W4XL4IN COT 12 PLY TYP VII WVN C FLD DSGN

## (undated) DEVICE — TOTAL TRAY, 16FR 10ML SIL FOLEY, URN: Brand: MEDLINE

## (undated) DEVICE — PROTECTOR BRST IMPL W2XL3.5IN + MINUS 0.125IN RET SHLD BRST

## (undated) DEVICE — SUTURE PERMA-HAND SZ 3-0 L18IN NONABSORBABLE BLK L24MM PS-1 1684G

## (undated) DEVICE — SUTURE PDS II SZ 3-0 L27IN ABSRB VLT L26MM SH 1/2 CIR Z316H

## (undated) DEVICE — SUTURE MCRYL SZ 4-0 L27IN ABSRB UD L19MM PS-2 1/2 CIR PRIM Y426H

## (undated) DEVICE — SOL INJ SOD CL 0.9% 500ML BG --

## (undated) DEVICE — BLADE ES ELASTOMERIC COAT INSUL DURABLE BEND UPTO 90DEG

## (undated) DEVICE — COVER US PRB W12XL244CM FLD IORT STR TIP

## (undated) DEVICE — BASIN ST MAJOR-NO CAUTERY: Brand: MEDLINE INDUSTRIES, INC.

## (undated) DEVICE — SPONGE LAPAROTOMY W18XL18IN WHITE STRUNG RADIOPAQUE STERILE

## (undated) DEVICE — Device

## (undated) DEVICE — SYR 10ML CTRL LR LCK NSAF LF --

## (undated) DEVICE — PENCIL SMK EVAC L10FT DIA95MM TBNG NONSTICK W ADPT TO 22MM

## (undated) DEVICE — NEEDLE HYPO 22GA L1.5IN BLK POLYPR HUB S STL REG BVL STR

## (undated) DEVICE — REM POLYHESIVE ADULT PATIENT RETURN ELECTRODE: Brand: VALLEYLAB

## (undated) DEVICE — ADHESIVE SKIN CLSR 0.7ML TOP DERMBND ADV

## (undated) DEVICE — C-ARM: Brand: UNBRANDED

## (undated) DEVICE — UNDERPAD INCON STD 36X23IN --

## (undated) DEVICE — TOWEL SURG W17XL27IN STD BLU COT NONFENESTRATED PREWASHED

## (undated) DEVICE — 3M™ TEGADERM™ TRANSPARENT FILM DRESSING FRAME STYLE, 1624W, 2-3/8 IN X 2-3/4 IN (6 CM X 7 CM), 100/CT 4CT/CASE: Brand: 3M™ TEGADERM™

## (undated) DEVICE — GLOVE SURG 6 11.1IN BEAD CUF LIGHT BRN SENSICARE LTX FREE

## (undated) DEVICE — STRIP,CLOSURE,WOUND,MEDI-STRIP,1/2X4: Brand: MEDLINE

## (undated) DEVICE — ROCKER SWITCH PENCIL BLADE ELECTRODE, HOLSTER: Brand: EDGE

## (undated) DEVICE — SUTURE VCRL SZ 3-0 L27IN ABSRB UD L26MM SH 1/2 CIR J416H

## (undated) DEVICE — MINOR BASIN -SMH: Brand: MEDLINE INDUSTRIES, INC.

## (undated) DEVICE — YANKAUER,TAPERED BULBOUS TIP,W/O VENT: Brand: MEDLINE

## (undated) DEVICE — HYPODERMIC SAFETY NEEDLE: Brand: MAGELLAN

## (undated) DEVICE — BRA SURG LG 40-42IN WHT LYCRA FR HK AND LOOP CLSR WIDE ADJ

## (undated) DEVICE — DRAPE,CHEST,FENES,15X10,STERIL: Brand: MEDLINE

## (undated) DEVICE — SUT MCRYL 4-0 27IN PS2 UD --

## (undated) DEVICE — TUBING, SUCTION, 1/4" X 12', STRAIGHT: Brand: MEDLINE

## (undated) DEVICE — SUTURE MCRYL SZ 3-0 L27IN ABSRB UD L24MM PS-1 3/8 CIR PRIM Y936H

## (undated) DEVICE — DRAPE,LAPAROTOMY,T,PEDI,STERILE: Brand: MEDLINE

## (undated) DEVICE — DECANTER BAG 9": Brand: MEDLINE INDUSTRIES, INC.

## (undated) DEVICE — PROVE COVER: Brand: UNBRANDED

## (undated) DEVICE — INTENDED FOR TISSUE SEPARATION, AND OTHER PROCEDURES THAT REQUIRE A SHARP SURGICAL BLADE TO PUNCTURE OR CUT.: Brand: BARD-PARKER ® CARBON RIB-BACK BLADES

## (undated) DEVICE — SUTURE VCRL SZ 3-0 L27IN ABSRB UD L24MM PS-1 3/8 CIR PRIM J936H

## (undated) DEVICE — PAD,NON-ADHERENT,W/AD,3X4,ST,LF,1/PK: Brand: MEDLINE

## (undated) DEVICE — BLADE ES L6IN ELASTOMERIC COAT INSUL DURABLE BEND UPTO

## (undated) DEVICE — CURVED, SMALL JAW, OPEN SEALER/DIVIDER: Brand: LIGASURE

## (undated) DEVICE — GLOVE SURG SZ 6 THK91MIL LTX FREE SYN POLYISOPRENE ANTI

## (undated) DEVICE — DRESSING,GAUZE,XEROFORM,CURAD,5"X9",ST: Brand: CURAD

## (undated) DEVICE — SUT SLK 2-0SH 30IN BLK --

## (undated) DEVICE — DRESSING,GAUZE,XEROFORM,CURAD,1"X8",ST: Brand: CURAD

## (undated) DEVICE — DRAIN SURG 15FR L3/16IN SIL RND 3/4 FLUT 3/16IN TRCR

## (undated) DEVICE — PACK,BASIC,SIRUS,V: Brand: MEDLINE

## (undated) DEVICE — HYPODERMIC SAFETY NEEDLE: Brand: MONOJECT

## (undated) DEVICE — RESERVOIR,SUCTION,100CC,SILICONE: Brand: MEDLINE

## (undated) DEVICE — SYSTEM IMPL DEL FOR BRST IMPL FUN

## (undated) DEVICE — SYRINGE MED 10ML LUERLOCK TIP W/O SFTY DISP